# Patient Record
Sex: MALE | Race: WHITE | NOT HISPANIC OR LATINO | Employment: OTHER | URBAN - METROPOLITAN AREA
[De-identification: names, ages, dates, MRNs, and addresses within clinical notes are randomized per-mention and may not be internally consistent; named-entity substitution may affect disease eponyms.]

---

## 2017-11-28 ENCOUNTER — RECORDS - HEALTHEAST (OUTPATIENT)
Dept: LAB | Facility: CLINIC | Age: 82
End: 2017-11-28

## 2017-11-28 LAB
CHOLEST SERPL-MCNC: 167 MG/DL
FASTING STATUS PATIENT QL REPORTED: YES
HDLC SERPL-MCNC: 52 MG/DL
LDLC SERPL CALC-MCNC: 101 MG/DL
PSA SERPL-MCNC: 5.7 NG/ML (ref 0–6.5)
TRIGL SERPL-MCNC: 70 MG/DL

## 2019-01-08 ENCOUNTER — RECORDS - HEALTHEAST (OUTPATIENT)
Dept: LAB | Facility: CLINIC | Age: 84
End: 2019-01-08

## 2019-01-08 LAB
ALBUMIN SERPL-MCNC: 3.9 G/DL (ref 3.5–5)
ALP SERPL-CCNC: 85 U/L (ref 45–120)
ALT SERPL W P-5'-P-CCNC: 13 U/L (ref 0–45)
ANION GAP SERPL CALCULATED.3IONS-SCNC: 9 MMOL/L (ref 5–18)
AST SERPL W P-5'-P-CCNC: 21 U/L (ref 0–40)
BASOPHILS # BLD AUTO: 0.1 THOU/UL (ref 0–0.2)
BASOPHILS NFR BLD AUTO: 1 % (ref 0–2)
BILIRUB SERPL-MCNC: 0.8 MG/DL (ref 0–1)
BUN SERPL-MCNC: 16 MG/DL (ref 8–28)
CALCIUM SERPL-MCNC: 9.1 MG/DL (ref 8.5–10.5)
CHLORIDE BLD-SCNC: 104 MMOL/L (ref 98–107)
CHOLEST SERPL-MCNC: 166 MG/DL
CO2 SERPL-SCNC: 27 MMOL/L (ref 22–31)
CREAT SERPL-MCNC: 0.85 MG/DL (ref 0.7–1.3)
EOSINOPHIL # BLD AUTO: 0.3 THOU/UL (ref 0–0.4)
EOSINOPHIL NFR BLD AUTO: 4 % (ref 0–6)
ERYTHROCYTE [DISTWIDTH] IN BLOOD BY AUTOMATED COUNT: 13.1 % (ref 11–14.5)
FASTING STATUS PATIENT QL REPORTED: NORMAL
GFR SERPL CREATININE-BSD FRML MDRD: >60 ML/MIN/1.73M2
GLUCOSE BLD-MCNC: 102 MG/DL (ref 70–125)
HCT VFR BLD AUTO: 41.8 % (ref 40–54)
HDLC SERPL-MCNC: 60 MG/DL
HGB BLD-MCNC: 13.9 G/DL (ref 14–18)
LDLC SERPL CALC-MCNC: 93 MG/DL
LYMPHOCYTES # BLD AUTO: 1.5 THOU/UL (ref 0.8–4.4)
LYMPHOCYTES NFR BLD AUTO: 20 % (ref 20–40)
MCH RBC QN AUTO: 32 PG (ref 27–34)
MCHC RBC AUTO-ENTMCNC: 33.3 G/DL (ref 32–36)
MCV RBC AUTO: 96 FL (ref 80–100)
MONOCYTES # BLD AUTO: 1 THOU/UL (ref 0–0.9)
MONOCYTES NFR BLD AUTO: 13 % (ref 2–10)
NEUTROPHILS # BLD AUTO: 4.6 THOU/UL (ref 2–7.7)
NEUTROPHILS NFR BLD AUTO: 62 % (ref 50–70)
PLATELET # BLD AUTO: 264 THOU/UL (ref 140–440)
PMV BLD AUTO: 10 FL (ref 8.5–12.5)
POTASSIUM BLD-SCNC: 4.1 MMOL/L (ref 3.5–5)
PROT SERPL-MCNC: 6.4 G/DL (ref 6–8)
PSA SERPL-MCNC: 4.8 NG/ML (ref 0–6.5)
RBC # BLD AUTO: 4.34 MILL/UL (ref 4.4–6.2)
SODIUM SERPL-SCNC: 140 MMOL/L (ref 136–145)
TRIGL SERPL-MCNC: 64 MG/DL
TSH SERPL DL<=0.005 MIU/L-ACNC: 3.62 UIU/ML (ref 0.3–5)
WBC: 7.5 THOU/UL (ref 4–11)

## 2019-01-09 LAB — 25(OH)D3 SERPL-MCNC: 61.3 NG/ML (ref 30–80)

## 2019-03-29 ENCOUNTER — RECORDS - HEALTHEAST (OUTPATIENT)
Dept: LAB | Facility: CLINIC | Age: 84
End: 2019-03-29

## 2019-03-29 LAB
ERYTHROCYTE [DISTWIDTH] IN BLOOD BY AUTOMATED COUNT: 14.2 % (ref 11–14.5)
HCT VFR BLD AUTO: 26.3 % (ref 40–54)
HGB BLD-MCNC: 8.6 G/DL (ref 14–18)
MCH RBC QN AUTO: 32.8 PG (ref 27–34)
MCHC RBC AUTO-ENTMCNC: 32.7 G/DL (ref 32–36)
MCV RBC AUTO: 100 FL (ref 80–100)
PLATELET # BLD AUTO: 239 THOU/UL (ref 140–440)
PMV BLD AUTO: 10.5 FL (ref 8.5–12.5)
RBC # BLD AUTO: 2.62 MILL/UL (ref 4.4–6.2)
WBC: 8.8 THOU/UL (ref 4–11)

## 2019-04-24 ENCOUNTER — RECORDS - HEALTHEAST (OUTPATIENT)
Dept: LAB | Facility: CLINIC | Age: 84
End: 2019-04-24

## 2019-04-24 LAB — HGB BLD-MCNC: 10.9 G/DL (ref 14–18)

## 2020-02-26 ENCOUNTER — RECORDS - HEALTHEAST (OUTPATIENT)
Dept: LAB | Facility: CLINIC | Age: 85
End: 2020-02-26

## 2020-02-26 LAB
ALBUMIN SERPL-MCNC: 4 G/DL (ref 3.5–5)
ALP SERPL-CCNC: 99 U/L (ref 45–120)
ALT SERPL W P-5'-P-CCNC: 15 U/L (ref 0–45)
ANION GAP SERPL CALCULATED.3IONS-SCNC: 11 MMOL/L (ref 5–18)
AST SERPL W P-5'-P-CCNC: 20 U/L (ref 0–40)
BILIRUB SERPL-MCNC: 0.8 MG/DL (ref 0–1)
BUN SERPL-MCNC: 17 MG/DL (ref 8–28)
CALCIUM SERPL-MCNC: 9.6 MG/DL (ref 8.5–10.5)
CHLORIDE BLD-SCNC: 103 MMOL/L (ref 98–107)
CHOLEST SERPL-MCNC: 194 MG/DL
CO2 SERPL-SCNC: 27 MMOL/L (ref 22–31)
CREAT SERPL-MCNC: 0.89 MG/DL (ref 0.7–1.3)
FASTING STATUS PATIENT QL REPORTED: NORMAL
GFR SERPL CREATININE-BSD FRML MDRD: >60 ML/MIN/1.73M2
GLUCOSE BLD-MCNC: 109 MG/DL (ref 70–125)
HDLC SERPL-MCNC: 56 MG/DL
LDLC SERPL CALC-MCNC: 119 MG/DL
POTASSIUM BLD-SCNC: 4.4 MMOL/L (ref 3.5–5)
PROT SERPL-MCNC: 6.6 G/DL (ref 6–8)
PSA SERPL-MCNC: 5.3 NG/ML (ref 0–6.5)
SODIUM SERPL-SCNC: 141 MMOL/L (ref 136–145)
TRIGL SERPL-MCNC: 96 MG/DL
TSH SERPL DL<=0.005 MIU/L-ACNC: 4.09 UIU/ML (ref 0.3–5)

## 2021-02-11 ENCOUNTER — AMBULATORY - HEALTHEAST (OUTPATIENT)
Dept: NURSING | Facility: CLINIC | Age: 86
End: 2021-02-11

## 2021-03-04 ENCOUNTER — AMBULATORY - HEALTHEAST (OUTPATIENT)
Dept: NURSING | Facility: CLINIC | Age: 86
End: 2021-03-04

## 2021-05-28 ENCOUNTER — RECORDS - HEALTHEAST (OUTPATIENT)
Dept: ADMINISTRATIVE | Facility: CLINIC | Age: 86
End: 2021-05-28

## 2021-05-29 ENCOUNTER — RECORDS - HEALTHEAST (OUTPATIENT)
Dept: ADMINISTRATIVE | Facility: CLINIC | Age: 86
End: 2021-05-29

## 2021-06-29 ENCOUNTER — RECORDS - HEALTHEAST (OUTPATIENT)
Dept: LAB | Facility: CLINIC | Age: 86
End: 2021-06-29

## 2021-06-29 LAB
ALBUMIN SERPL-MCNC: 4 G/DL (ref 3.5–5)
ALP SERPL-CCNC: 82 U/L (ref 45–120)
ALT SERPL W P-5'-P-CCNC: 15 U/L (ref 0–45)
ANION GAP SERPL CALCULATED.3IONS-SCNC: 11 MMOL/L (ref 5–18)
AST SERPL W P-5'-P-CCNC: 19 U/L (ref 0–40)
BASOPHILS # BLD AUTO: 0.1 THOU/UL (ref 0–0.2)
BASOPHILS NFR BLD AUTO: 1 % (ref 0–2)
BILIRUB SERPL-MCNC: 0.9 MG/DL (ref 0–1)
BUN SERPL-MCNC: 22 MG/DL (ref 8–28)
CALCIUM SERPL-MCNC: 9 MG/DL (ref 8.5–10.5)
CHLORIDE BLD-SCNC: 106 MMOL/L (ref 98–107)
CHOLEST SERPL-MCNC: 169 MG/DL
CO2 SERPL-SCNC: 25 MMOL/L (ref 22–31)
CREAT SERPL-MCNC: 0.83 MG/DL (ref 0.7–1.3)
EOSINOPHIL # BLD AUTO: 0.3 THOU/UL (ref 0–0.4)
EOSINOPHIL NFR BLD AUTO: 4 % (ref 0–6)
ERYTHROCYTE [DISTWIDTH] IN BLOOD BY AUTOMATED COUNT: 13.3 % (ref 11–14.5)
FASTING STATUS PATIENT QL REPORTED: YES
GFR SERPL CREATININE-BSD FRML MDRD: >60 ML/MIN/1.73M2
GLUCOSE BLD-MCNC: 98 MG/DL (ref 70–125)
HCT VFR BLD AUTO: 42.1 % (ref 40–54)
HDLC SERPL-MCNC: 47 MG/DL
HGB BLD-MCNC: 14.1 G/DL (ref 14–18)
IMM GRANULOCYTES # BLD: 0 THOU/UL
IMM GRANULOCYTES NFR BLD: 0 %
LDLC SERPL CALC-MCNC: 101 MG/DL
LYMPHOCYTES # BLD AUTO: 1.7 THOU/UL (ref 0.8–4.4)
LYMPHOCYTES NFR BLD AUTO: 21 % (ref 20–40)
MCH RBC QN AUTO: 32.6 PG (ref 27–34)
MCHC RBC AUTO-ENTMCNC: 33.5 G/DL (ref 32–36)
MCV RBC AUTO: 97 FL (ref 80–100)
MONOCYTES # BLD AUTO: 0.9 THOU/UL (ref 0–0.9)
MONOCYTES NFR BLD AUTO: 12 % (ref 2–10)
NEUTROPHILS # BLD AUTO: 5.1 THOU/UL (ref 2–7.7)
NEUTROPHILS NFR BLD AUTO: 63 % (ref 50–70)
PLATELET # BLD AUTO: 239 THOU/UL (ref 140–440)
PMV BLD AUTO: 10.5 FL (ref 8.5–12.5)
POTASSIUM BLD-SCNC: 4 MMOL/L (ref 3.5–5)
PROT SERPL-MCNC: 6.3 G/DL (ref 6–8)
PSA SERPL-MCNC: 5 NG/ML (ref 0–6.5)
RBC # BLD AUTO: 4.33 MILL/UL (ref 4.4–6.2)
SODIUM SERPL-SCNC: 142 MMOL/L (ref 136–145)
TRIGL SERPL-MCNC: 104 MG/DL
TSH SERPL DL<=0.005 MIU/L-ACNC: 3.13 UIU/ML (ref 0.3–5)
WBC: 8.2 THOU/UL (ref 4–11)

## 2021-08-04 ENCOUNTER — VIRTUAL VISIT (OUTPATIENT)
Dept: FAMILY MEDICINE | Facility: CLINIC | Age: 86
End: 2021-08-04
Payer: MEDICARE

## 2021-08-04 DIAGNOSIS — Z20.822 SUSPECTED COVID-19 VIRUS INFECTION: Primary | ICD-10-CM

## 2021-08-04 PROCEDURE — 99207 PR NO CHARGE LOS: CPT | Performed by: PREVENTIVE MEDICINE

## 2021-08-04 NOTE — PATIENT INSTRUCTIONS
"Discharge Instructions for COVID-19 Patients  You have--or may have--COVID-19. Please follow the instructions listed below.   If you have a weakened immune system, discuss with your doctor any other actions you need to take.  How can I protect others?  If you have symptoms (fever, cough, body aches or trouble breathing):    Stay home and away from others (self-isolate) until:  ? Your other symptoms have resolved (gotten better). And   ? You've had no fever--and no medicine that reduces fever--for 1 full day (24 hours). And   ? At least 10 days have passed since your symptoms started. (You may need to wait 20 days. Follow the advice of your care team.)  If you don't show symptoms, but testing showed that you have COVID-19:    Stay home and away from others (self-isolate) until at least 10 days have passed since the date of your first positive COVID-19 test.  During this time    Stay in your own room, even for meals. Use your own bathroom if you can.    Stay away from others in your home. No hugging, kissing or shaking hands. No visitors.    Don't go to work, school or anywhere else.    Clean \"high touch\" surfaces often (doorknobs, counters, handles). Use household cleaning spray or wipes.    You'll find a full list of  on the EPA website: www.epa.gov/pesticide-registration/list-n-disinfectants-use-against-sars-cov-2.    Cover your mouth and nose with a mask or other face covering to avoid spreading germs.    Wash your hands and face often. Use soap and water.    Caregivers in these groups are at risk for severe illness due to COVID-19:  ? People 65 years and older  ? People who live in a nursing home or long-term care facility  ? People with chronic disease (lung, heart, cancer, diabetes, kidney, liver, immunologic)  ? People who have a weakened immune system, including those who:    Are in cancer treatment    Take medicine that weakens the immune system, such as corticosteroids    Had a bone marrow or organ " transplant    Have an immune deficiency    Have poorly controlled HIV or AIDS    Are obese (body mass index of 40 or higher)    Smoke regularly    Caregivers should wear gloves while washing dishes, handling laundry and cleaning bedrooms and bathrooms.    Use caution when washing and drying laundry: Don't shake dirty laundry and use the warmest water setting that you can.    For more tips on managing your health at home, go to www.cdc.gov/coronavirus/2019-ncov/downloads/10Things.pdf.  How can I take care of myself at home?  1. Get lots of rest. Drink extra fluids (unless a doctor has told you not to).  2. Take Tylenol (acetaminophen) for fever or pain. If you have liver or kidney problems, ask your family doctor if it's okay to take Tylenol.   Adults can take either:   ? 650 mg (two 325 mg pills) every 4 to 6 hours, or   ? 1,000 mg (two 500 mg pills) every 8 hours as needed.  ? Note: Don't take more than 3,000 mg in one day. Acetaminophen is found in many medicines (both prescribed and over-the-counter medicines). Read all labels to be sure you don't take too much.   For children, check the Tylenol bottle for the right dose. The dose is based on the child's age or weight.  3. If you have other health problems (like cancer, heart failure, an organ transplant or severe kidney disease): Call your specialty clinic if you don't feel better in the next 2 days.  4. Know when to call 911. Emergency warning signs include:  ? Trouble breathing or shortness of breath  ? Pain or pressure in the chest that doesn't go away  ? Feeling confused like you haven't felt before, or not being able to wake up  ? Bluish-colored lips or face  5. Your doctor may have prescribed a blood thinner medicine. Follow their instructions.  Where can I get more information?     [facility] - About COVID-19:   [email or web address]    CDC - What to Do If You're Sick:  www.cdc.gov/coronavirus/2019-ncov/about/steps-when-sick.html    CDC - Ending Home Isolation: www.cdc.gov/coronavirus/2019-ncov/hcp/disposition-in-home-patients.html    CDC - Caring for Someone: www.cdc.gov/coronavirus/2019-ncov/if-you-are-sick/care-for-someone.html    [facility] - Interim Guidance for Hospital Discharge to Home: www.health.[facility].[place]./diseases/coronavirus/hcp/hospdischarge.pdf    Below are the COVID-19 hotlines at the [place] Department of Health ([facility]). Interpreters are available.  ? For health questions: Call 615-680-9122 or 1-857.519.6854 (7 a.m. to 7 p.m.)  ? For questions about schools and childcare: Call 999-112-3596 or 1-798.351.5476 (7 a.m. to 7 p.m.)    For informational purposes only. Not to replace the advice of your health care provider. Clinically reviewed by Dr. Alf Mazariegos.   Copyright   2020 [facility]. All rights reserved. [employer] 011426 - REV 01/05/21.

## 2021-08-04 NOTE — PROGRESS NOTES
Clayton is a 89 year old who is being evaluated via a billable telephone visit.      What phone number would you like to be contacted at? [phone]  How would you like to obtain your AVS? MyChart    Assessment & Plan     Suspected COVID-19 virus infection  -await labs  -hydration and monitor temperature   - Symptomatic COVID-19 Virus (Coronavirus) by PCR Nasopharyngeal  -fully vaccinated for Covid  -Lives in assisted living       Ordering of each unique test  15 minutes spent on the date of the encounter doing chart review, history and exam, documentation and further activities per the note         Return in about 1 day (around 8/5/2021) for labs.    Kayla Monzon MD MPH    [facility]    Brigid Arnold is a 89 year old who presents for the following health issues:    HPI       PHQ 2:     Covid symptoms, lives in assisted living where there have been positive cases.    Concern for COVID-19  About how many days ago did these symptoms start? 7/31/21  Is this your first visit for this illness? Yes  In the 14 days before your symptoms started, have you had close contact with someone with COVID-19 (Coronavirus)? I do not know.  Do you have a fever or chills? Yes, I felt feverish or had chills  Are you having new or worsening difficulty breathing? No  Do you have new or worsening cough? Yes, I am coughing up mucus.  Have you had any new or unexplained body aches? No    Have you experienced any of the following NEW symptoms?    Headache: YES    Sore throat: No    Loss of taste or smell: No    Chest pain: No    Diarrhea: No    Rash: No  What treatments have you tried? Over the counter cold medication   Who do you live with? Wife in assisted living   Are you, or a household member, a healthcare worker or a ? No  Do you live in a nursing home, group home, or shelter? No Lives in assisted living   Do you have a way to get food/medications if quarantined? Yes, I have a  friend or family member who can help me.          Symptoms started 7/31/21  Has had a lot of congestion  Some blood in the nose when he blows his nose   No shortness of breath  No wheezing  Cough+ less in the last 2 days, some phlegm, thick, no hemoptysis  Dull headache last 2 days  Symptoms are a little better  Feels like a really bad cold  Fully vaccinated   Has taken over the counter medication     Review of Systems   Constitutional, HEENT, cardiovascular, pulmonary, gi and gu systems are negative, except as otherwise noted.      Objective           Vitals:  No vitals were obtained today due to virtual visit.    Physical Exam   healthy, alert and no distress  PSYCH: Alert and oriented times 3; coherent speech, normal   rate and volume, able to articulate logical thoughts, able   to abstract reason, no tangential thoughts, no hallucinations   or delusions  His affect is normal  RESP: No cough, no audible wheezing, able to talk in full sentences  Remainder of exam unable to be completed due to telephone visits            Phone call duration: 4 minutes

## 2021-08-05 DIAGNOSIS — Z20.822 SUSPECTED COVID-19 VIRUS INFECTION: ICD-10-CM

## 2021-08-05 LAB — SARS-COV-2 RNA RESP QL NAA+PROBE: POSITIVE

## 2021-08-05 PROCEDURE — U0003 INFECTIOUS AGENT DETECTION BY NUCLEIC ACID (DNA OR RNA); SEVERE ACUTE RESPIRATORY SYNDROME CORONAVIRUS 2 (SARS-COV-2) (CORONAVIRUS DISEASE [COVID-19]), AMPLIFIED PROBE TECHNIQUE, MAKING USE OF HIGH THROUGHPUT TECHNOLOGIES AS DESCRIBED BY CMS-2020-01-R: HCPCS

## 2021-08-05 PROCEDURE — U0005 INFEC AGEN DETEC AMPLI PROBE: HCPCS

## 2021-08-06 ENCOUNTER — TELEPHONE (OUTPATIENT)
Dept: EMERGENCY MEDICINE | Facility: CLINIC | Age: 86
End: 2021-08-06

## 2021-08-06 NOTE — TELEPHONE ENCOUNTER
"-Coronavirus (COVID-19) Notification    Caller Name (Patient, parent, daughter/son, grandparent, etc)  Patient and      Reason for call  Notify of Positive Coronavirus (COVID-19) lab results, assess symptoms,  review  [employer] recommendations    Lab Result    Lab test:  2019-nCoV rRt-PCR or SARS-CoV-2 PCR    Oropharyngeal AND/OR nasopharyngeal swabs is POSITIVE for 2019-nCoV RNA/SARS-COV-2 PCR (COVID-19 virus)    RN Recommendations/Instructions per [facility] Coronavirus COVID-19 recommendations    Brief introduction script  Introduce self then review script:  \"I am calling on behalf of [employer].  We were notified that your Coronavirus test (COVID-19) for was POSITIVE for the virus.  I have some information to relay to you but first I wanted to mention that the [place] Dept of Health will be contacting you shortly [it's possible MD already called Patient] to talk to you more about how you are feeling and other people you have had contact with who might now also have the virus.  Also,  [employer] is Partnering with the [facility] for Covid-19 research, you may be contacted directly by research staff.\"    Assessment (Inquire about Patient's current symptoms)   Assessment   Current Symptoms at time of phone call: (if no symptoms, document No symptoms] Cough, phlegm, and runny nose   Symptoms onset (if applicable) 7/30/2021     If at time of call, Patients symptoms hare worsened, the Patient should contact 911 or have someone drive them to Emergency Dept promptly:      If Patient calling 911, inform 911 personal that you have tested positive for the Coronavirus (COVID-19).  Place mask on and await 911 to arrive.    If Emergency Dept, If possible, please have another adult drive you to the Emergency Dept but you need to wear mask when in contact with other people.      Monoclonal Antibody Administration    You may be eligible to receive a new treatment with a monoclonal antibody for " "preventing hospitalization in patients at high risk for complications from COVID-19.   This medication is still experimental and available on a limited basis; it is given through an IV and must be given at an infusion center. Please note that not all people who are eligible will receive the medication since it is in limited supply.     Are you interested in being considered for this medication?  No.   Does the patient fit the criteria: No declined     If patient qualifies based on above criteria:  \"You will be contacted if you are selected to receive this treatment in the next 1-2 business days.   This is time sensitive and if you are not selected in the next 1-2 business days, you will not receive the medication.  If you do not receive a call to schedule, you have not been selected.\"      Review information with Patient    Your result was positive. This means you have COVID-19 (coronavirus).  We have sent you a letter that reviews the information that I'll be reviewing with you now.    How can I protect others?    If you have symptoms: stay home and away from others (self-isolate) until:    You've had no fever--and no medicine that reduces fever--for 1 full day (24 hours). And       Your other symptoms have gotten better. For example, your cough or breathing has improved. And     At least 10 days have passed since your symptoms started. (If you've been told by a doctor that you have a weak immune system, wait 20 days.)     If you don't have symptoms: Stay home and away from others (self-isolate) until at least 10 days have passed since your first positive COVID-19 test. (Date test collected)    During this time:    Stay in your own room, including for meals. Use your own bathroom if you can.    Stay away from others in your home. No hugging, kissing or shaking hands. No visitors.     Don't go to work, school or anywhere else.     Clean  high touch  surfaces often (doorknobs, counters, handles, etc.). Use a " household cleaning spray or wipes. You'll find a full list on the EPA website at www.epa.gov/pesticide-registration/list-n-disinfectants-use-against-sars-cov-2.     Cover your mouth and nose with a mask, tissue or other face covering to avoid spreading germs.    Wash your hands and face often with soap and water.    Make a list of people you have been in close contact with recently, even if either of you wore a face covering.   ; Start your list from 2 days before you became ill or had a positive test.  ; Include anyone that was within 6 feet of you for a cumulative total of 15 minutes or more in 24 hours. (Example: if you sat next to Sage for 5 minutes in the morning and 10 minutes in the afternoon, then you were in close contact for 15 minutes total that day. Sage would be added to your list.)    A public health worker will call or text you. It is important that you answer. They will ask you questions about possible exposures to COVID-19, such as people you have been in direct contact with and places you have visited.    Tell the people on your list that you have COVID-19; they should stay away from others for 14 days starting from the last time they were in contact with you (unless you are told something different from a public health worker).     Caregivers in these groups are at risk for severe illness due to COVID-19:  o People 65 years and older  o People who live in a nursing home or long-term care facility  o People with chronic disease (lung, heart, cancer, diabetes, kidney, liver, immunologic)  o People who have a weakened immune system, including those who:  - Are in cancer treatment  - Take medicine that weakens the immune system, such as corticosteroids  - Had a bone marrow or organ transplant  - Have an immune deficiency  - Have poorly controlled HIV or AIDS  - Are obese (body mass index of 40 or higher)  - Smoke regularly    Caregivers should wear gloves while washing dishes, handling laundry and  cleaning bedrooms and bathrooms.    Wash and dry laundry with special caution. Don't shake dirty laundry, and use the warmest water setting you can.    If you have a weakened immune system, ask your doctor about other actions you should take.    For more tips, go to www.cdc.gov/coronavirus/2019-ncov/downloads/10Things.pdf.    You should not go back to work until you meet the guidelines above for ending your home isolation. You don't need to be retested for COVID-19 before going back to work--studies show that you won't spread the virus if it's been at least 10 days since your symptoms started (or 20 days, if you have a weak immune system).    Employers: This document serves as formal notice of your employee's medical guidelines for going back to work. They must meet the above guidelines before going back to work in person.    How can I take care of myself?    1. Get lots of rest. Drink extra fluids (unless a doctor has told you not to).    2. Take Tylenol (acetaminophen) for fever or pain. If you have liver or kidney problems, ask your family doctor if it's okay to take Tylenol.     Take either:     650 mg (two 325 mg pills) every 4 to 6 hours, or     1,000 mg (two 500 mg pills) every 8 hours as needed.     Note: Don't take more than 3,000 mg in one day. Acetaminophen is found in many medicines (both prescribed and over-the-counter medicines). Read all labels to be sure you don't take too much.    For children, check the Tylenol bottle for the right dose (based on their age or weight).    3. If you have other health problems (like cancer, heart failure, an organ transplant or severe kidney disease): Call your specialty clinic if you don't feel better in the next 2 days.    4. Know when to call 911: Emergency warning signs include:    Trouble breathing or shortness of breath    Pain or pressure in the chest that doesn't go away    Feeling confused like you haven't felt before, or not being able to wake  up    Bluish-colored lips or face    5. Sign up for [employer]. We know it's scary to hear that you have COVID-19. We want to track your symptoms to make sure you're okay over the next 2 weeks. Please look for an email from [employer]--this is a free, online program that we'll use to keep in touch. To sign up, follow the link in the email. Learn more at [email or web address].    Where can I get more information?    [facility]: [email or web address]    Coronavirus Basics: www.health.[facility].[place]./diseases/coronavirus/basics.html    What to Do If You're Sick: www.cdc.gov/coronavirus/2019-ncov/about/steps-when-sick.html    Ending Home Isolation: www.cdc.gov/coronavirus/2019-ncov/hcp/disposition-in-home-patients.html     Caring for Someone with COVID-19: www.cdc.gov/coronavirus/2019-ncov/if-you-are-sick/care-for-someone.html     [facility] clinical trials (COVID-19 research studies): [email or web address].[email or web address]-clinical-trials     A Positive COVID-19 letter will be sent via [employer] or the mail. (Exception, no letters sent to Presurgerical/Preprocedure Patients)    Zeina Benson LPN

## 2021-08-06 NOTE — RESULT ENCOUNTER NOTE
Dalia Arnold test was Positive.     Please do not hesitate to call us at [phone] if you have any questions or concerns.    Thank you,    Kayla Monzon MD MPH

## 2021-08-21 ENCOUNTER — HEALTH MAINTENANCE LETTER (OUTPATIENT)
Age: 86
End: 2021-08-21

## 2021-08-23 ENCOUNTER — MYC MEDICAL ADVICE (OUTPATIENT)
Dept: FAMILY MEDICINE | Facility: CLINIC | Age: 86
End: 2021-08-23

## 2021-10-16 ENCOUNTER — HEALTH MAINTENANCE LETTER (OUTPATIENT)
Age: 86
End: 2021-10-16

## 2022-09-06 ENCOUNTER — LAB REQUISITION (OUTPATIENT)
Dept: LAB | Facility: CLINIC | Age: 87
End: 2022-09-06
Payer: MEDICARE

## 2022-09-06 DIAGNOSIS — I10 ESSENTIAL (PRIMARY) HYPERTENSION: ICD-10-CM

## 2022-09-06 DIAGNOSIS — E03.9 HYPOTHYROIDISM, UNSPECIFIED: ICD-10-CM

## 2022-09-06 DIAGNOSIS — R97.20 ELEVATED PROSTATE SPECIFIC ANTIGEN (PSA): ICD-10-CM

## 2022-09-06 DIAGNOSIS — E78.5 HYPERLIPIDEMIA, UNSPECIFIED: ICD-10-CM

## 2022-09-06 LAB
ALBUMIN SERPL BCG-MCNC: 4.3 G/DL (ref 3.5–5.2)
ALP SERPL-CCNC: 80 U/L (ref 40–129)
ALT SERPL W P-5'-P-CCNC: 16 U/L (ref 10–50)
ANION GAP SERPL CALCULATED.3IONS-SCNC: 10 MMOL/L (ref 7–15)
AST SERPL W P-5'-P-CCNC: 22 U/L (ref 10–50)
BASOPHILS # BLD AUTO: 0.1 10E3/UL (ref 0–0.2)
BASOPHILS NFR BLD AUTO: 1 %
BILIRUB SERPL-MCNC: 0.6 MG/DL
BUN SERPL-MCNC: 16.2 MG/DL (ref 8–23)
CALCIUM SERPL-MCNC: 9.4 MG/DL (ref 8.2–9.6)
CHLORIDE SERPL-SCNC: 104 MMOL/L (ref 98–107)
CHOLEST SERPL-MCNC: 166 MG/DL
CREAT SERPL-MCNC: 0.93 MG/DL (ref 0.67–1.17)
DEPRECATED HCO3 PLAS-SCNC: 27 MMOL/L (ref 22–29)
EOSINOPHIL # BLD AUTO: 0.3 10E3/UL (ref 0–0.7)
EOSINOPHIL NFR BLD AUTO: 3 %
ERYTHROCYTE [DISTWIDTH] IN BLOOD BY AUTOMATED COUNT: 13.9 % (ref 10–15)
GFR SERPL CREATININE-BSD FRML MDRD: 77 ML/MIN/1.73M2
GLUCOSE SERPL-MCNC: 95 MG/DL (ref 70–99)
HCT VFR BLD AUTO: 41.2 % (ref 40–53)
HDLC SERPL-MCNC: 56 MG/DL
HGB BLD-MCNC: 13.8 G/DL (ref 13.3–17.7)
IMM GRANULOCYTES # BLD: 0 10E3/UL
IMM GRANULOCYTES NFR BLD: 0 %
LDLC SERPL CALC-MCNC: 91 MG/DL
LYMPHOCYTES # BLD AUTO: 1.8 10E3/UL (ref 0.8–5.3)
LYMPHOCYTES NFR BLD AUTO: 20 %
MCH RBC QN AUTO: 32.5 PG (ref 26.5–33)
MCHC RBC AUTO-ENTMCNC: 33.5 G/DL (ref 31.5–36.5)
MCV RBC AUTO: 97 FL (ref 78–100)
MONOCYTES # BLD AUTO: 1.1 10E3/UL (ref 0–1.3)
MONOCYTES NFR BLD AUTO: 12 %
NEUTROPHILS # BLD AUTO: 5.9 10E3/UL (ref 1.6–8.3)
NEUTROPHILS NFR BLD AUTO: 64 %
NONHDLC SERPL-MCNC: 110 MG/DL
NRBC # BLD AUTO: 0 10E3/UL
NRBC BLD AUTO-RTO: 0 /100
PLATELET # BLD AUTO: 272 10E3/UL (ref 150–450)
POTASSIUM SERPL-SCNC: 3.8 MMOL/L (ref 3.4–5.3)
PROT SERPL-MCNC: 6.3 G/DL (ref 6.4–8.3)
PSA SERPL-MCNC: 4.99 NG/ML
RBC # BLD AUTO: 4.24 10E6/UL (ref 4.4–5.9)
SODIUM SERPL-SCNC: 141 MMOL/L (ref 136–145)
TRIGL SERPL-MCNC: 96 MG/DL
TSH SERPL DL<=0.005 MIU/L-ACNC: 3.49 UIU/ML (ref 0.3–4.2)
WBC # BLD AUTO: 9.2 10E3/UL (ref 4–11)

## 2022-09-06 PROCEDURE — 80053 COMPREHEN METABOLIC PANEL: CPT | Mod: ORL | Performed by: FAMILY MEDICINE

## 2022-09-06 PROCEDURE — 84443 ASSAY THYROID STIM HORMONE: CPT | Mod: ORL | Performed by: FAMILY MEDICINE

## 2022-09-06 PROCEDURE — 84153 ASSAY OF PSA TOTAL: CPT | Mod: ORL | Performed by: FAMILY MEDICINE

## 2022-09-06 PROCEDURE — 80061 LIPID PANEL: CPT | Mod: ORL | Performed by: FAMILY MEDICINE

## 2022-09-06 PROCEDURE — 85025 COMPLETE CBC W/AUTO DIFF WBC: CPT | Mod: ORL | Performed by: FAMILY MEDICINE

## 2022-10-01 ENCOUNTER — HEALTH MAINTENANCE LETTER (OUTPATIENT)
Age: 87
End: 2022-10-01

## 2023-07-06 ENCOUNTER — APPOINTMENT (OUTPATIENT)
Dept: CT IMAGING | Facility: HOSPITAL | Age: 88
DRG: 689 | End: 2023-07-06
Attending: STUDENT IN AN ORGANIZED HEALTH CARE EDUCATION/TRAINING PROGRAM
Payer: MEDICARE

## 2023-07-06 ENCOUNTER — APPOINTMENT (OUTPATIENT)
Dept: RADIOLOGY | Facility: HOSPITAL | Age: 88
DRG: 689 | End: 2023-07-06
Attending: STUDENT IN AN ORGANIZED HEALTH CARE EDUCATION/TRAINING PROGRAM
Payer: MEDICARE

## 2023-07-06 ENCOUNTER — APPOINTMENT (OUTPATIENT)
Dept: MRI IMAGING | Facility: HOSPITAL | Age: 88
DRG: 689 | End: 2023-07-06
Attending: STUDENT IN AN ORGANIZED HEALTH CARE EDUCATION/TRAINING PROGRAM
Payer: MEDICARE

## 2023-07-06 ENCOUNTER — HOSPITAL ENCOUNTER (INPATIENT)
Facility: HOSPITAL | Age: 88
LOS: 1 days | Discharge: HOME HEALTH | DRG: 689 | End: 2023-07-10
Attending: STUDENT IN AN ORGANIZED HEALTH CARE EDUCATION/TRAINING PROGRAM | Admitting: HOSPITALIST
Payer: MEDICARE
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
## 2023-07-06 DIAGNOSIS — N30.00 ACUTE CYSTITIS WITHOUT HEMATURIA: ICD-10-CM

## 2023-07-06 DIAGNOSIS — N39.0 URINARY TRACT INFECTION WITHOUT HEMATURIA, SITE UNSPECIFIED: ICD-10-CM

## 2023-07-06 DIAGNOSIS — M79.5 FOREIGN BODY (FB) IN SOFT TISSUE: ICD-10-CM

## 2023-07-06 DIAGNOSIS — N17.9 AKI (ACUTE KIDNEY INJURY) (H): ICD-10-CM

## 2023-07-06 PROBLEM — I10 PRIMARY HYPERTENSION: Status: ACTIVE | Noted: 2023-07-06

## 2023-07-06 PROBLEM — E78.49 OTHER HYPERLIPIDEMIA: Status: ACTIVE | Noted: 2023-07-06

## 2023-07-06 PROBLEM — E03.8 OTHER SPECIFIED HYPOTHYROIDISM: Status: ACTIVE | Noted: 2023-07-06

## 2023-07-06 PROBLEM — E83.42 HYPOMAGNESEMIA: Status: ACTIVE | Noted: 2023-07-06

## 2023-07-06 PROBLEM — G93.41 METABOLIC ENCEPHALOPATHY: Status: ACTIVE | Noted: 2023-07-06

## 2023-07-06 LAB
ALBUMIN SERPL BCG-MCNC: 4.4 G/DL (ref 3.5–5.2)
ALBUMIN UR-MCNC: 100 MG/DL
ALP SERPL-CCNC: 95 U/L (ref 40–129)
ALT SERPL W P-5'-P-CCNC: 14 U/L (ref 0–70)
ANION GAP SERPL CALCULATED.3IONS-SCNC: 15 MMOL/L (ref 7–15)
APPEARANCE UR: ABNORMAL
AST SERPL W P-5'-P-CCNC: 24 U/L (ref 0–45)
BASOPHILS # BLD MANUAL: 0 10E3/UL (ref 0–0.2)
BASOPHILS NFR BLD MANUAL: 0 %
BILIRUB SERPL-MCNC: 1.5 MG/DL
BILIRUB UR QL STRIP: NEGATIVE
BUN SERPL-MCNC: 25.4 MG/DL (ref 8–23)
CALCIUM SERPL-MCNC: 9.9 MG/DL (ref 8.2–9.6)
CHLORIDE SERPL-SCNC: 99 MMOL/L (ref 98–107)
COLOR UR AUTO: YELLOW
CREAT SERPL-MCNC: 1.16 MG/DL (ref 0.67–1.17)
CREAT SERPL-MCNC: 1.19 MG/DL (ref 0.67–1.17)
DEPRECATED HCO3 PLAS-SCNC: 23 MMOL/L (ref 22–29)
EOSINOPHIL # BLD MANUAL: 0.2 10E3/UL (ref 0–0.7)
EOSINOPHIL NFR BLD MANUAL: 1 %
ERYTHROCYTE [DISTWIDTH] IN BLOOD BY AUTOMATED COUNT: 13.3 % (ref 10–15)
ETHANOL SERPL-MCNC: <0.01 G/DL
FLUAV RNA SPEC QL NAA+PROBE: NEGATIVE
FLUBV RNA RESP QL NAA+PROBE: NEGATIVE
GFR SERPL CREATININE-BSD FRML MDRD: 57 ML/MIN/1.73M2
GFR SERPL CREATININE-BSD FRML MDRD: 59 ML/MIN/1.73M2
GLUCOSE SERPL-MCNC: 142 MG/DL (ref 70–99)
GLUCOSE UR STRIP-MCNC: NEGATIVE MG/DL
HCT VFR BLD AUTO: 39 % (ref 40–53)
HGB BLD-MCNC: 13.5 G/DL (ref 13.3–17.7)
HGB UR QL STRIP: ABNORMAL
HOLD SPECIMEN: NORMAL
KETONES UR STRIP-MCNC: ABNORMAL MG/DL
LACTATE SERPL-SCNC: 1.7 MMOL/L (ref 0.7–2)
LEUKOCYTE ESTERASE UR QL STRIP: ABNORMAL
LYMPHOCYTES # BLD MANUAL: 1.4 10E3/UL (ref 0.8–5.3)
LYMPHOCYTES NFR BLD MANUAL: 6 %
MAGNESIUM SERPL-MCNC: 1.6 MG/DL (ref 1.7–2.3)
MCH RBC QN AUTO: 32.8 PG (ref 26.5–33)
MCHC RBC AUTO-ENTMCNC: 34.6 G/DL (ref 31.5–36.5)
MCV RBC AUTO: 95 FL (ref 78–100)
MONOCYTES # BLD MANUAL: 0.2 10E3/UL (ref 0–1.3)
MONOCYTES NFR BLD MANUAL: 1 %
MUCOUS THREADS #/AREA URNS LPF: PRESENT /LPF
NEUTROPHILS # BLD MANUAL: 21.1 10E3/UL (ref 1.6–8.3)
NEUTROPHILS NFR BLD MANUAL: 92 %
NITRATE UR QL: POSITIVE
PH UR STRIP: 6 [PH] (ref 5–7)
PLAT MORPH BLD: ABNORMAL
PLATELET # BLD AUTO: ABNORMAL 10*3/UL
POTASSIUM SERPL-SCNC: 3.9 MMOL/L (ref 3.4–5.3)
PROT SERPL-MCNC: 7.1 G/DL (ref 6.4–8.3)
RBC # BLD AUTO: 4.11 10E6/UL (ref 4.4–5.9)
RBC MORPH BLD: ABNORMAL
RBC URINE: 53 /HPF
RSV RNA SPEC NAA+PROBE: NEGATIVE
SARS-COV-2 RNA RESP QL NAA+PROBE: NEGATIVE
SODIUM SERPL-SCNC: 137 MMOL/L (ref 136–145)
SP GR UR STRIP: 1.02 (ref 1–1.03)
SQUAMOUS EPITHELIAL: 1 /HPF
TROPONIN T SERPL HS-MCNC: 29 NG/L
TROPONIN T SERPL HS-MCNC: 34 NG/L
UROBILINOGEN UR STRIP-MCNC: <2 MG/DL
WBC # BLD AUTO: 22.9 10E3/UL (ref 4–11)
WBC URINE: >182 /HPF

## 2023-07-06 PROCEDURE — 87040 BLOOD CULTURE FOR BACTERIA: CPT | Performed by: STUDENT IN AN ORGANIZED HEALTH CARE EDUCATION/TRAINING PROGRAM

## 2023-07-06 PROCEDURE — 96361 HYDRATE IV INFUSION ADD-ON: CPT

## 2023-07-06 PROCEDURE — 85007 BL SMEAR W/DIFF WBC COUNT: CPT | Performed by: STUDENT IN AN ORGANIZED HEALTH CARE EDUCATION/TRAINING PROGRAM

## 2023-07-06 PROCEDURE — G1010 CDSM STANSON: HCPCS

## 2023-07-06 PROCEDURE — 80053 COMPREHEN METABOLIC PANEL: CPT | Performed by: STUDENT IN AN ORGANIZED HEALTH CARE EDUCATION/TRAINING PROGRAM

## 2023-07-06 PROCEDURE — 36415 COLL VENOUS BLD VENIPUNCTURE: CPT | Performed by: STUDENT IN AN ORGANIZED HEALTH CARE EDUCATION/TRAINING PROGRAM

## 2023-07-06 PROCEDURE — 87186 SC STD MICRODIL/AGAR DIL: CPT | Performed by: STUDENT IN AN ORGANIZED HEALTH CARE EDUCATION/TRAINING PROGRAM

## 2023-07-06 PROCEDURE — 72148 MRI LUMBAR SPINE W/O DYE: CPT | Mod: MF

## 2023-07-06 PROCEDURE — 82552 ASSAY OF CPK IN BLOOD: CPT | Performed by: INTERNAL MEDICINE

## 2023-07-06 PROCEDURE — 82550 ASSAY OF CK (CPK): CPT | Performed by: INTERNAL MEDICINE

## 2023-07-06 PROCEDURE — 250N000011 HC RX IP 250 OP 636: Mod: JZ | Performed by: HOSPITALIST

## 2023-07-06 PROCEDURE — 250N000013 HC RX MED GY IP 250 OP 250 PS 637: Performed by: STUDENT IN AN ORGANIZED HEALTH CARE EDUCATION/TRAINING PROGRAM

## 2023-07-06 PROCEDURE — 81001 URINALYSIS AUTO W/SCOPE: CPT | Performed by: STUDENT IN AN ORGANIZED HEALTH CARE EDUCATION/TRAINING PROGRAM

## 2023-07-06 PROCEDURE — 84484 ASSAY OF TROPONIN QUANT: CPT | Performed by: STUDENT IN AN ORGANIZED HEALTH CARE EDUCATION/TRAINING PROGRAM

## 2023-07-06 PROCEDURE — 96367 TX/PROPH/DG ADDL SEQ IV INF: CPT

## 2023-07-06 PROCEDURE — G0378 HOSPITAL OBSERVATION PER HR: HCPCS

## 2023-07-06 PROCEDURE — 258N000003 HC RX IP 258 OP 636: Performed by: STUDENT IN AN ORGANIZED HEALTH CARE EDUCATION/TRAINING PROGRAM

## 2023-07-06 PROCEDURE — 96372 THER/PROPH/DIAG INJ SC/IM: CPT | Performed by: HOSPITALIST

## 2023-07-06 PROCEDURE — 83735 ASSAY OF MAGNESIUM: CPT | Performed by: HOSPITALIST

## 2023-07-06 PROCEDURE — 250N000013 HC RX MED GY IP 250 OP 250 PS 637: Performed by: HOSPITALIST

## 2023-07-06 PROCEDURE — 87637 SARSCOV2&INF A&B&RSV AMP PRB: CPT | Performed by: STUDENT IN AN ORGANIZED HEALTH CARE EDUCATION/TRAINING PROGRAM

## 2023-07-06 PROCEDURE — 83605 ASSAY OF LACTIC ACID: CPT | Performed by: STUDENT IN AN ORGANIZED HEALTH CARE EDUCATION/TRAINING PROGRAM

## 2023-07-06 PROCEDURE — 99285 EMERGENCY DEPT VISIT HI MDM: CPT | Mod: 25

## 2023-07-06 PROCEDURE — 258N000003 HC RX IP 258 OP 636: Performed by: HOSPITALIST

## 2023-07-06 PROCEDURE — 71046 X-RAY EXAM CHEST 2 VIEWS: CPT

## 2023-07-06 PROCEDURE — 99222 1ST HOSP IP/OBS MODERATE 55: CPT | Performed by: HOSPITALIST

## 2023-07-06 PROCEDURE — 96365 THER/PROPH/DIAG IV INF INIT: CPT

## 2023-07-06 PROCEDURE — 82077 ASSAY SPEC XCP UR&BREATH IA: CPT | Performed by: STUDENT IN AN ORGANIZED HEALTH CARE EDUCATION/TRAINING PROGRAM

## 2023-07-06 PROCEDURE — 73502 X-RAY EXAM HIP UNI 2-3 VIEWS: CPT

## 2023-07-06 PROCEDURE — 96360 HYDRATION IV INFUSION INIT: CPT

## 2023-07-06 PROCEDURE — 85048 AUTOMATED LEUKOCYTE COUNT: CPT | Performed by: STUDENT IN AN ORGANIZED HEALTH CARE EDUCATION/TRAINING PROGRAM

## 2023-07-06 PROCEDURE — 250N000011 HC RX IP 250 OP 636: Mod: JZ | Performed by: STUDENT IN AN ORGANIZED HEALTH CARE EDUCATION/TRAINING PROGRAM

## 2023-07-06 RX ORDER — CEFTRIAXONE 1 G/1
1 INJECTION, POWDER, FOR SOLUTION INTRAMUSCULAR; INTRAVENOUS EVERY 24 HOURS
Status: DISCONTINUED | OUTPATIENT
Start: 2023-07-07 | End: 2023-07-06

## 2023-07-06 RX ORDER — ENOXAPARIN SODIUM 100 MG/ML
40 INJECTION SUBCUTANEOUS EVERY 24 HOURS
Status: DISCONTINUED | OUTPATIENT
Start: 2023-07-06 | End: 2023-07-10 | Stop reason: HOSPADM

## 2023-07-06 RX ORDER — LOSARTAN POTASSIUM AND HYDROCHLOROTHIAZIDE 12.5; 1 MG/1; MG/1
1 TABLET ORAL DAILY
COMMUNITY
End: 2023-07-06

## 2023-07-06 RX ORDER — ACETAMINOPHEN 650 MG/1
650 SUPPOSITORY RECTAL EVERY 6 HOURS PRN
Status: DISCONTINUED | OUTPATIENT
Start: 2023-07-06 | End: 2023-07-06

## 2023-07-06 RX ORDER — LIDOCAINE 40 MG/G
CREAM TOPICAL
Status: DISCONTINUED | OUTPATIENT
Start: 2023-07-06 | End: 2023-07-08

## 2023-07-06 RX ORDER — PRAVASTATIN SODIUM 20 MG
20 TABLET ORAL EVERY EVENING
COMMUNITY

## 2023-07-06 RX ORDER — CEFTRIAXONE 1 G/1
1 INJECTION, POWDER, FOR SOLUTION INTRAMUSCULAR; INTRAVENOUS EVERY 24 HOURS
Status: DISCONTINUED | OUTPATIENT
Start: 2023-07-07 | End: 2023-07-10 | Stop reason: HOSPADM

## 2023-07-06 RX ORDER — PIPERACILLIN SODIUM, TAZOBACTAM SODIUM 3; .375 G/15ML; G/15ML
3.38 INJECTION, POWDER, LYOPHILIZED, FOR SOLUTION INTRAVENOUS ONCE
Status: DISCONTINUED | OUTPATIENT
Start: 2023-07-06 | End: 2023-07-06

## 2023-07-06 RX ORDER — OXYCODONE HYDROCHLORIDE 5 MG/1
5 TABLET ORAL EVERY 4 HOURS PRN
Status: DISCONTINUED | OUTPATIENT
Start: 2023-07-06 | End: 2023-07-10 | Stop reason: HOSPADM

## 2023-07-06 RX ORDER — ACETAMINOPHEN 325 MG/1
650 TABLET ORAL EVERY 6 HOURS PRN
Status: DISCONTINUED | OUTPATIENT
Start: 2023-07-06 | End: 2023-07-06

## 2023-07-06 RX ORDER — ONDANSETRON 4 MG/1
4 TABLET, ORALLY DISINTEGRATING ORAL EVERY 6 HOURS PRN
Status: DISCONTINUED | OUTPATIENT
Start: 2023-07-06 | End: 2023-07-10 | Stop reason: HOSPADM

## 2023-07-06 RX ORDER — ONDANSETRON 4 MG/1
4 TABLET, ORALLY DISINTEGRATING ORAL EVERY 6 HOURS PRN
Status: DISCONTINUED | OUTPATIENT
Start: 2023-07-06 | End: 2023-07-06

## 2023-07-06 RX ORDER — AMLODIPINE BESYLATE 10 MG/1
10 TABLET ORAL DAILY
COMMUNITY

## 2023-07-06 RX ORDER — LIDOCAINE 40 MG/G
CREAM TOPICAL
Status: DISCONTINUED | OUTPATIENT
Start: 2023-07-06 | End: 2023-07-10 | Stop reason: HOSPADM

## 2023-07-06 RX ORDER — ONDANSETRON 2 MG/ML
4 INJECTION INTRAMUSCULAR; INTRAVENOUS EVERY 6 HOURS PRN
Status: DISCONTINUED | OUTPATIENT
Start: 2023-07-06 | End: 2023-07-06

## 2023-07-06 RX ORDER — ACETAMINOPHEN 325 MG/1
975 TABLET ORAL ONCE
Status: COMPLETED | OUTPATIENT
Start: 2023-07-06 | End: 2023-07-06

## 2023-07-06 RX ORDER — LEVOTHYROXINE SODIUM 25 UG/1
50 TABLET ORAL DAILY
Status: DISCONTINUED | OUTPATIENT
Start: 2023-07-07 | End: 2023-07-10 | Stop reason: HOSPADM

## 2023-07-06 RX ORDER — IRBESARTAN AND HYDROCHLOROTHIAZIDE 300; 12.5 MG/1; MG/1
1 TABLET, FILM COATED ORAL DAILY
COMMUNITY

## 2023-07-06 RX ORDER — AMLODIPINE BESYLATE 5 MG/1
10 TABLET ORAL DAILY
Status: DISCONTINUED | OUTPATIENT
Start: 2023-07-07 | End: 2023-07-10 | Stop reason: HOSPADM

## 2023-07-06 RX ORDER — CARBOXYMETHYLCELLULOSE SODIUM 5 MG/ML
2 SOLUTION/ DROPS OPHTHALMIC 2 TIMES DAILY
Status: DISCONTINUED | OUTPATIENT
Start: 2023-07-06 | End: 2023-07-08

## 2023-07-06 RX ORDER — LEVOTHYROXINE SODIUM 50 UG/1
50 TABLET ORAL DAILY
COMMUNITY
End: 2024-03-19

## 2023-07-06 RX ORDER — CEFTRIAXONE 1 G/1
1 INJECTION, POWDER, FOR SOLUTION INTRAMUSCULAR; INTRAVENOUS ONCE
Status: COMPLETED | OUTPATIENT
Start: 2023-07-06 | End: 2023-07-06

## 2023-07-06 RX ORDER — ONDANSETRON 2 MG/ML
4 INJECTION INTRAMUSCULAR; INTRAVENOUS EVERY 6 HOURS PRN
Status: DISCONTINUED | OUTPATIENT
Start: 2023-07-06 | End: 2023-07-10 | Stop reason: HOSPADM

## 2023-07-06 RX ORDER — PRAVASTATIN SODIUM 20 MG
20 TABLET ORAL EVERY EVENING
Status: DISCONTINUED | OUTPATIENT
Start: 2023-07-06 | End: 2023-07-10 | Stop reason: HOSPADM

## 2023-07-06 RX ORDER — SODIUM CHLORIDE 9 MG/ML
INJECTION, SOLUTION INTRAVENOUS CONTINUOUS
Status: DISCONTINUED | OUTPATIENT
Start: 2023-07-06 | End: 2023-07-07

## 2023-07-06 RX ORDER — ENOXAPARIN SODIUM 100 MG/ML
40 INJECTION SUBCUTANEOUS EVERY 24 HOURS
Status: DISCONTINUED | OUTPATIENT
Start: 2023-07-06 | End: 2023-07-06

## 2023-07-06 RX ORDER — SODIUM CHLORIDE, SODIUM LACTATE, POTASSIUM CHLORIDE, CALCIUM CHLORIDE 600; 310; 30; 20 MG/100ML; MG/100ML; MG/100ML; MG/100ML
INJECTION, SOLUTION INTRAVENOUS CONTINUOUS
Status: DISCONTINUED | OUTPATIENT
Start: 2023-07-06 | End: 2023-07-06

## 2023-07-06 RX ORDER — MAGNESIUM SULFATE HEPTAHYDRATE 40 MG/ML
2 INJECTION, SOLUTION INTRAVENOUS ONCE
Status: COMPLETED | OUTPATIENT
Start: 2023-07-06 | End: 2023-07-07

## 2023-07-06 RX ORDER — ACETAMINOPHEN 325 MG/1
650 TABLET ORAL EVERY 6 HOURS PRN
Status: DISCONTINUED | OUTPATIENT
Start: 2023-07-06 | End: 2023-07-10 | Stop reason: HOSPADM

## 2023-07-06 RX ORDER — ACETAMINOPHEN 650 MG/1
650 SUPPOSITORY RECTAL EVERY 6 HOURS PRN
Status: DISCONTINUED | OUTPATIENT
Start: 2023-07-06 | End: 2023-07-10 | Stop reason: HOSPADM

## 2023-07-06 RX ADMIN — PRAVASTATIN SODIUM 20 MG: 20 TABLET ORAL at 23:30

## 2023-07-06 RX ADMIN — ENOXAPARIN SODIUM 40 MG: 40 INJECTION SUBCUTANEOUS at 23:38

## 2023-07-06 RX ADMIN — ACETAMINOPHEN 975 MG: 325 TABLET ORAL at 17:55

## 2023-07-06 RX ADMIN — SODIUM CHLORIDE, POTASSIUM CHLORIDE, SODIUM LACTATE AND CALCIUM CHLORIDE 1000 ML: 600; 310; 30; 20 INJECTION, SOLUTION INTRAVENOUS at 17:55

## 2023-07-06 RX ADMIN — Medication 2 DROP: at 23:29

## 2023-07-06 RX ADMIN — SODIUM CHLORIDE: 9 INJECTION, SOLUTION INTRAVENOUS at 20:51

## 2023-07-06 RX ADMIN — CEFTRIAXONE SODIUM 1 G: 1 INJECTION, POWDER, FOR SOLUTION INTRAMUSCULAR; INTRAVENOUS at 20:13

## 2023-07-06 RX ADMIN — MAGNESIUM SULFATE HEPTAHYDRATE 2 G: 40 INJECTION, SOLUTION INTRAVENOUS at 23:31

## 2023-07-06 ASSESSMENT — ACTIVITIES OF DAILY LIVING (ADL)
ADLS_ACUITY_SCORE: 35

## 2023-07-06 NOTE — PHARMACY-ADMISSION MEDICATION HISTORY
Pharmacist Admission Medication History    Admission medication history is complete. The information provided in this note is only as accurate as the sources available at the time of the update.    Medication reconciliation/reorder completed by provider prior to medication history? No    Information Source(s): Patient, Clinic records and CareEverywhere/SureScripts via in-person    Pertinent Information: none    Changes made to PTA medication list:    Added: all as new to system but nothing new to him    Deleted: None    Changed: None    Medication Affordability:  Not including over the counter (OTC) medications, was there a time in the past 3 months when you did not take your medications as prescribed because of cost?: No    Allergies reviewed with patient and updates made in EHR: yes    Medication History Completed By: Angelito Hill [facility] 7/6/2023 6:24 PM    Prior to Admission medications    Medication Sig Last Dose Taking? Auth Provider Long Term End Date   amLODIPine (NORVASC) 10 MG tablet Take 10 mg by mouth daily 7/6/2023 at am Yes Unknown, Entered By History Yes    carboxymethylcellulose PF (REFRESH PLUS) 0.5 % ophthalmic solution Place 2 drops into both eyes 2 times daily 7/6/2023 at am Yes Unknown, Entered By History     irbesartan-hydrochlorothiazide (AVALIDE) 300-12.5 MG tablet Take 1 tablet by mouth daily 7/6/2023 at am Yes Unknown, Entered By History Yes    levothyroxine (SYNTHROID/LEVOTHROID) 50 MCG tablet Take 50 mcg by mouth daily 7/6/2023 at am Yes Unknown, Entered By History Yes    pravastatin (PRAVACHOL) 20 MG tablet Take 20 mg by mouth every evening 7/5/2023 Yes Unknown, Entered By History Yes

## 2023-07-06 NOTE — ED NOTES
[facility]  Expected date: 7/6/23  Expected time: 5:02 PM  Means of arrival: Ambulance  Comments:  MPLWD 89 M fall last night, no thinners, bump on head, alert, no thinners

## 2023-07-06 NOTE — ED TRIAGE NOTES
Pt arrives via ambulance to ER d/t altered mental status after falling around 0430 this morning. Fall was unwitnessed and a bump noted on back of right side of head. Pt knows why he is here but unable to give his birthday or date or place. Denies being on any blood thinners. Complains of mid back pain but no neck or chest or head pain. Sons were notified and said they're on their way here but lives a little far out. Slightly tachycardic and low grade temp but all VSS. Will continue to monitor.      Triage Assessment     Row Name 07/06/23 [id number]       Triage Assessment (Adult)    Airway WDL WDL       Respiratory WDL    Respiratory WDL X;rhythm/pattern    Rhythm/Pattern, Respiratory other (see comments)  o2 sats dropped a bit; on 1L NC       Skin Circulation/Temperature WDL    Skin Circulation/Temperature WDL WDL       Cardiac WDL    Cardiac WDL X;rhythm    Pulse Rate & Regularity tachycardic       Peripheral/Neurovascular WDL    Peripheral Neurovascular WDL WDL       Cognitive/Neuro/Behavioral WDL    Cognitive/Neuro/Behavioral WDL X;level of consciousness    Level of Consciousness confused;intermittent confusion    Arousal Level opens eyes spontaneously    Orientation disoriented to;time;person;place    Speech clear;rambling    Mood/Behavior calm;cooperative

## 2023-07-06 NOTE — ED PROVIDER NOTES
Emergency Department Encounter         FINAL IMPRESSION:  UTI        ED COURSE AND MEDICAL DECISION MAKING       ED Course as of 07/06/23 2007   Thu Jul 06, 2023   [id number] Patient is a 89-year-old male with history of hypertension, here from home via EMS after a fall.  Apparently his wife heard a boom this morning around 430 and patient fell getting out of bed.  Unsure how he fell.  Unable to tell me whether not he tripped or fainted.  EMS was called later on the day for unknown reason.  Seems like he may have been confused which initiated the call.  On arrival here he is confused.  Repeating questions intermittently.  Has no obvious head or neck trauma.  Neurologically grossly tact with no obvious cranial nerve deficits, no unilateral weakness.  He is febrile.  Tachycardic.  Placed on 1 L nasal cannula for subtle hypoxia.  Plan for metabolic, septic as well as traumatic work-up as patient states he has back pain.  Has no midline neck pain.  Heart and lungs otherwise normal.  No abdominal pain.  No chest pain.  No lower extremity injuries appreciated.   1958 Leukocytosis.  UTI and analysis.  CT head neck neg. Lactate normal.  Antibiotics given.  Fluids given.  Plan for admission   1959 Awaiting thoracic and lumbar spine imaging     5:09 PM I met with the patient to gather history and perform my exam. ED course and treatment discussed. Patient seen in the hallway due to critical capacity and boarding crisis leaving no ED rooms available.      8:07 PM I discussed the patient with Dr. Magallanes from the hospitalist service who agrees to admit the patient.              Medical Decision Making    History:    Supplemental history from: Documented in chart, if applicable and EMS    External Record(s) reviewed: Documented in chart, if applicable.    Work Up:    Chart documentation includes differential considered and any EKGs or imaging independently interpreted by provider, where specified.    In additional to work up  documented, I considered the following work up: Documented in chart, if applicable.    External consultation:    Discussion of management with another provider: Documented in chart, if applicable and Hospitalist    Complicating factors:    Care impacted by chronic illness: Hyperlipidemia and Hypertension    Care affected by social determinants of health: N/A    Disposition considerations: Admit.                      EKG:      Dr. Alf Rey reviewed and independently interpreted the patient's EKG, with comments made as listed above.    Please see scanned EKG for full report.       Critical Care     Performed by: Alf Rey or    Authorized by: Alf Rey  Total critical care time:  minutes  Critical care was necessary to treat or prevent imminent or life-threatening deterioration of the following conditions:   Critical care was time spent personally by me on the following activities: development of treatment plan with patient or surrogate, discussions with consultants, examination of patient, evaluation of patient's response to treatment, obtaining history from patient or surrogate, ordering and performing treatments and interventions, ordering and review of laboratory studies, ordering and review of radiographic studies, re-evaluation of patient's condition and monitoring for potential decompensation.  Critical care time was exclusive of separately billable procedures and treating other patients.'        At the conclusion of the encounter I discussed the results of all the tests and the disposition. The questions were answered. The patient or family acknowledged understanding and was agreeable with the care plan.                  MEDICATIONS GIVEN IN THE EMERGENCY DEPARTMENT:  Medications   lactated ringers BOLUS 1,000 mL (1,000 mLs Intravenous $New Bag 7/6/23 1755)   acetaminophen (TYLENOL) tablet 975 mg (975 mg Oral $Given 7/6/23 1755)       NEW PRESCRIPTIONS STARTED AT TODAY'S ED VISIT:  New Prescriptions    No  "medications on file       HPI     Patient information obtained from: EMS and patient     Use of Interpretor: N/A     Clayton Pacheco is a 89 year old male with a pertinent history of HTN, HLD, and hypothyroidism who presents to this ED via EMS for evaluation of a fall and confusion    Per EMS: The patient had an unwitnessed fall around 4:30 AM this morning. The patient is unsure of loss of consciousness and is not on blood thinners. They note the patient endorses a bump to the right side of his head and feels warm to the touch. The patient's wife notes the patient has had increased confusion as well. The patient was unable to answer what month, day, or year it is. When asked when his birthday is, he gave his wife's birthday instead of his own.     The patient reports that he fell getting out of bed and is unsure of loss of consciousness or how he fell. The patient reports mid-back pain from the fall and denies any bumps to his head. The patient reports feeling fine prior to the fall.     Of note, the patient endorses somewhat stuttering speech and answered \"seven thirty two\" when asked what year it is and answered the same thing when asked what month it is.  When asked what day it is, the patient answered, \"I'm not sure what day of the week it is\".     The patient denies neck pain, head pain, abdominal pain, chest pain, or shortness of breath.    REVIEW OF SYSTEMS:  Review of Systems   Constitutional: Positive for subjective fever   HEENT: Negative runny nose, sore throat, ear pain, neck pain. Positive for bump to the right side of head  Respiratory: Negative for shortness of breath, cough, congestion  Cardiovascular: Negative for chest pain, leg edema  Gastrointestinal: Negative for abdominal distention, abdominal pain, constipation, vomiting, nausea, diarrhea  Genitourinary: Negative for dysuria and hematuria.   Integument: Negative for rash, skin breakdown  Neurological: Negative for paresthesias, weakness, " headache. Positive for confusion  Musculoskeletal: Negative for joint pain, joint swelling      All other systems reviewed and are negative.          MEDICAL HISTORY     No past medical history on file.    No past surgical history on file.         No current outpatient medications on file.          PHYSICAL EXAM     BP (!) 142/63   Pulse 106   Temp (!) 100.8  F (38.2  C) (Oral)   Resp 22   SpO2 97%       PHYSICAL EXAM:     General: Patient appears well, nontoxic, comfortable  HEENT: Moist mucous membranes,  No obvious head or neck trauma. No midline neck pain   Cardiovascular: Normal rhythm, no extremity edema.  No appreciable murmur. Patient is tachycardic  Respiratory: No signs of respiratory distress, lungs are clear to auscultation bilaterally with no wheezes rhonchi or rales.  Abdominal: Soft, nontender, nondistended, no palpable masses, no guarding, no rebound  Musculoskeletal: Full range of motion of joints, no deformities appreciated. No lower extremity injuries appreciated  Neurological: Patient is confused, Repeating questions intermittently. Neurologically grossly tact with no obvious cranial nerve deficits, no unilateral weakness no lower extremity weakness.  Psychological: Normal affect and mood.  Integument: No rashes appreciated. Febrile         RESULTS       Labs Ordered and Resulted from Time of ED Arrival to Time of ED Departure   COMPREHENSIVE METABOLIC PANEL - Abnormal       Result Value    Sodium 137      Potassium 3.9      Chloride 99      Carbon Dioxide (CO2) 23      Anion Gap 15      Urea Nitrogen 25.4 (*)     Creatinine 1.16      Calcium 9.9 (*)     Glucose 142 (*)     Alkaline Phosphatase 95      AST 24      ALT 14      Protein Total 7.1      Albumin 4.4      Bilirubin Total 1.5 (*)     GFR Estimate 59 (*)    TROPONIN T, HIGH SENSITIVITY - Abnormal    Troponin T, High Sensitivity 29 (*)    ROUTINE UA WITH MICROSCOPIC REFLEX TO CULTURE - Abnormal    Color Urine Yellow      Appearance  Urine Cloudy (*)     Glucose Urine Negative      Bilirubin Urine Negative      Ketones Urine Trace (*)     Specific Gravity Urine 1.025      Blood Urine >1.0 mg/dL (*)     pH Urine 6.0      Protein Albumin Urine 100 (*)     Urobilinogen Urine <2.0      Nitrite Urine Positive (*)     Leukocyte Esterase Urine 500 Morales/uL (*)     Mucus Urine Present (*)     RBC Urine 53 (*)     WBC Urine >182 (*)     Squamous Epithelials Urine 1     CBC WITH PLATELETS AND DIFFERENTIAL - Abnormal    WBC Count 22.9 (*)     RBC Count 4.11 (*)     Hemoglobin 13.5      Hematocrit 39.0 (*)     MCV 95      MCH 32.8      MCHC 34.6      RDW 13.3      Platelet Count       DIFFERENTIAL - Abnormal    % Neutrophils 92      % Lymphocytes 6      % Monocytes 1      % Eosinophils 1      % Basophils 0      Absolute Neutrophils 21.1 (*)     Absolute Lymphocytes 1.4      Absolute Monocytes 0.2      Absolute Eosinophils 0.2      Absolute Basophils 0.0      RBC Morphology Confirmed RBC Indices      Platelet Assessment Platelets Clumped (*)    TROPONIN T, HIGH SENSITIVITY - Abnormal    Troponin T, High Sensitivity 34 (*)    CREATININE - Abnormal    Creatinine 1.19 (*)     GFR Estimate 57 (*)    MAGNESIUM - Abnormal    Magnesium 1.6 (*)    ETHYL ALCOHOL LEVEL - Normal    Alcohol ethyl <0.01     LACTIC ACID WHOLE BLOOD - Normal    Lactic Acid 1.7     INFLUENZA A/B, RSV, & SARS-COV2 PCR - Normal    Influenza A PCR Negative      Influenza B PCR Negative      RSV PCR Negative      SARS CoV2 PCR Negative     BASIC METABOLIC PANEL   CBC WITH PLATELETS   MAGNESIUM   BLOOD CULTURE   URINE CULTURE       Thoracic spine MRI w/o contrast   Final Result   IMPRESSION:   1.  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   2.  Lumbar degenerative changes with severe canal stenosis at L3-L4 and L4-L5, as detailed above.      Lumbar spine MRI w/o contrast   Final Result   IMPRESSION:   1.  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   2.  Lumbar degenerative changes with severe canal stenosis at L3-L4 and L4-L5, as detailed above.      XR Pelvis w Hip Right G/E 2 Views   Final Result   IMPRESSION: Bones are demineralized. There is no evidence of an acute, displaced pelvic or right hip fracture. Mild degenerative changes both hips. Atherosclerotic vascular calcifications. Small metallic density in the soft tissues along the right pelvis    is unchanged from prior.      Lumbar spine CT w/o contrast   Final Result   IMPRESSION:      THORACIC SPINE CT:   1.  Question nondisplaced fracture at the left anterior T6 vertebral body. Given limitations related to osseous demineralization, consider MRI if further imaging assessment is clinically warranted.   2.  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   3.  Mild multilevel degenerative changes.      LUMBAR SPINE CT:   1.  Suspect acute left lateral inferior endplate fracture at L4.   2.  Age-indeterminate mild L3 superior endplate compression eccentric to the right.   3.  Question nondisplaced fracture line through left iliac wing, partially visualized. Correlate for attributable pain and consider dedicated CT bony pelvis.   4.  Multilevel degenerative disc disease including suspected severe spinal canal stenosis at L3-L4 and grade 1 degenerative anterolisthesis at L4-L5.   5.  Diffuse osseous demineralization.            CT Thoracic Spine w/o Contrast   Final Result   IMPRESSION:      THORACIC SPINE CT:   1.  Question nondisplaced fracture at the left anterior T6 vertebral body. Given limitations related to osseous demineralization, consider MRI if further imaging assessment is clinically warranted.   2.  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   3.  Mild multilevel degenerative changes.      LUMBAR SPINE CT:   1.  Suspect acute left lateral inferior endplate fracture at L4.   2.  Age-indeterminate mild L3 superior endplate  compression eccentric to the right.   3.  Question nondisplaced fracture line through left iliac wing, partially visualized. Correlate for attributable pain and consider dedicated CT bony pelvis.   4.  Multilevel degenerative disc disease including suspected severe spinal canal stenosis at L3-L4 and grade 1 degenerative anterolisthesis at L4-L5.   5.  Diffuse osseous demineralization.            Chest XR,  PA & LAT   Final Result   IMPRESSION: Heart is normal in size. Lungs are clear.      Cervical spine CT w/o contrast   Final Result   IMPRESSION:   HEAD CT:   1.  No finding for intracranial hemorrhage, mass, or acute infarct.      2.  Moderate volume loss and mild to moderate presumed sequela chronic microvascular ischemic change.      CERVICAL SPINE CT:   1.  Advanced cervical spondylosis without finding for acute fracture.      2.  Grade 1 anterolisthesis C2 on C3 and C7 on T1, age indeterminate but favored to be chronic and degenerative in etiology.      3.  Disc osteophyte complex contributes to at least mild spinal canal stenosis at C6-C7.      Head CT w/o contrast   Final Result   IMPRESSION:   HEAD CT:   1.  No finding for intracranial hemorrhage, mass, or acute infarct.      2.  Moderate volume loss and mild to moderate presumed sequela chronic microvascular ischemic change.      CERVICAL SPINE CT:   1.  Advanced cervical spondylosis without finding for acute fracture.      2.  Grade 1 anterolisthesis C2 on C3 and C7 on T1, age indeterminate but favored to be chronic and degenerative in etiology.      3.  Disc osteophyte complex contributes to at least mild spinal canal stenosis at C6-C7.      CT Chest Abdomen Pelvis w/o Contrast    (Results Pending)                     PROCEDURES:  Procedures:  Procedures       ICandis am serving as a scribe to document services personally performed by Alf Rey DO, based on my observations and the provider's statements to me.  I, Alf Rey DO, attest that Candis  Samra is acting in a scribe capacity, has observed my performance of the services and has documented them in accordance with my direction.    Alf Rey DO  Emergency Medicine  [facility] EMERGENCY DEPARTMENT     Krissy, Alf Velazquez DO  07/07/23 0039

## 2023-07-07 ENCOUNTER — APPOINTMENT (OUTPATIENT)
Dept: CT IMAGING | Facility: HOSPITAL | Age: 88
DRG: 689 | End: 2023-07-07
Attending: STUDENT IN AN ORGANIZED HEALTH CARE EDUCATION/TRAINING PROGRAM
Payer: MEDICARE

## 2023-07-07 LAB
ANION GAP SERPL CALCULATED.3IONS-SCNC: 11 MMOL/L (ref 7–15)
BUN SERPL-MCNC: 21.9 MG/DL (ref 8–23)
CALCIUM SERPL-MCNC: 8.9 MG/DL (ref 8.2–9.6)
CHLORIDE SERPL-SCNC: 101 MMOL/L (ref 98–107)
CREAT SERPL-MCNC: 1.01 MG/DL (ref 0.67–1.17)
DEPRECATED HCO3 PLAS-SCNC: 25 MMOL/L (ref 22–29)
ERYTHROCYTE [DISTWIDTH] IN BLOOD BY AUTOMATED COUNT: 13.5 % (ref 10–15)
GFR SERPL CREATININE-BSD FRML MDRD: 69 ML/MIN/1.73M2
GLUCOSE SERPL-MCNC: 122 MG/DL (ref 70–99)
HCT VFR BLD AUTO: 36 % (ref 40–53)
HGB BLD-MCNC: 12.2 G/DL (ref 13.3–17.7)
LACTATE SERPL-SCNC: 1.2 MMOL/L (ref 0.7–2)
MAGNESIUM SERPL-MCNC: 1.9 MG/DL (ref 1.7–2.3)
MCH RBC QN AUTO: 33 PG (ref 26.5–33)
MCHC RBC AUTO-ENTMCNC: 33.9 G/DL (ref 31.5–36.5)
MCV RBC AUTO: 97 FL (ref 78–100)
PLATELET # BLD AUTO: 186 10E3/UL (ref 150–450)
POTASSIUM SERPL-SCNC: 3.7 MMOL/L (ref 3.4–5.3)
RBC # BLD AUTO: 3.7 10E6/UL (ref 4.4–5.9)
SODIUM SERPL-SCNC: 137 MMOL/L (ref 136–145)
TROPONIN T SERPL HS-MCNC: 53 NG/L
WBC # BLD AUTO: 26.4 10E3/UL (ref 4–11)

## 2023-07-07 PROCEDURE — G0378 HOSPITAL OBSERVATION PER HR: HCPCS

## 2023-07-07 PROCEDURE — 96376 TX/PRO/DX INJ SAME DRUG ADON: CPT

## 2023-07-07 PROCEDURE — 36415 COLL VENOUS BLD VENIPUNCTURE: CPT | Performed by: INTERNAL MEDICINE

## 2023-07-07 PROCEDURE — 250N000013 HC RX MED GY IP 250 OP 250 PS 637: Performed by: HOSPITALIST

## 2023-07-07 PROCEDURE — 258N000003 HC RX IP 258 OP 636: Performed by: INTERNAL MEDICINE

## 2023-07-07 PROCEDURE — 96361 HYDRATE IV INFUSION ADD-ON: CPT

## 2023-07-07 PROCEDURE — 250N000011 HC RX IP 250 OP 636: Mod: JZ | Performed by: HOSPITALIST

## 2023-07-07 PROCEDURE — 99233 SBSQ HOSP IP/OBS HIGH 50: CPT | Performed by: INTERNAL MEDICINE

## 2023-07-07 PROCEDURE — 83605 ASSAY OF LACTIC ACID: CPT | Performed by: INTERNAL MEDICINE

## 2023-07-07 PROCEDURE — 85027 COMPLETE CBC AUTOMATED: CPT | Performed by: HOSPITALIST

## 2023-07-07 PROCEDURE — 80048 BASIC METABOLIC PNL TOTAL CA: CPT | Performed by: HOSPITALIST

## 2023-07-07 PROCEDURE — 36415 COLL VENOUS BLD VENIPUNCTURE: CPT | Performed by: HOSPITALIST

## 2023-07-07 PROCEDURE — 96372 THER/PROPH/DIAG INJ SC/IM: CPT | Performed by: HOSPITALIST

## 2023-07-07 PROCEDURE — 83735 ASSAY OF MAGNESIUM: CPT | Performed by: HOSPITALIST

## 2023-07-07 PROCEDURE — G1010 CDSM STANSON: HCPCS

## 2023-07-07 PROCEDURE — 71250 CT THORAX DX C-: CPT | Mod: MG

## 2023-07-07 PROCEDURE — 84484 ASSAY OF TROPONIN QUANT: CPT | Performed by: INTERNAL MEDICINE

## 2023-07-07 RX ORDER — NALOXONE HYDROCHLORIDE 0.4 MG/ML
0.2 INJECTION, SOLUTION INTRAMUSCULAR; INTRAVENOUS; SUBCUTANEOUS
Status: DISCONTINUED | OUTPATIENT
Start: 2023-07-07 | End: 2023-07-10 | Stop reason: HOSPADM

## 2023-07-07 RX ORDER — NALOXONE HYDROCHLORIDE 0.4 MG/ML
0.4 INJECTION, SOLUTION INTRAMUSCULAR; INTRAVENOUS; SUBCUTANEOUS
Status: DISCONTINUED | OUTPATIENT
Start: 2023-07-07 | End: 2023-07-10 | Stop reason: HOSPADM

## 2023-07-07 RX ORDER — SODIUM CHLORIDE 9 MG/ML
INJECTION, SOLUTION INTRAVENOUS CONTINUOUS
Status: ACTIVE | OUTPATIENT
Start: 2023-07-07 | End: 2023-07-07

## 2023-07-07 RX ADMIN — CEFTRIAXONE SODIUM 1 G: 1 INJECTION, POWDER, FOR SOLUTION INTRAMUSCULAR; INTRAVENOUS at 07:46

## 2023-07-07 RX ADMIN — Medication 2 DROP: at 21:55

## 2023-07-07 RX ADMIN — ENOXAPARIN SODIUM 40 MG: 40 INJECTION SUBCUTANEOUS at 21:56

## 2023-07-07 RX ADMIN — LEVOTHYROXINE SODIUM 50 MCG: 0.03 TABLET ORAL at 06:32

## 2023-07-07 RX ADMIN — Medication 2 DROP: at 07:46

## 2023-07-07 RX ADMIN — PRAVASTATIN SODIUM 20 MG: 20 TABLET ORAL at 21:56

## 2023-07-07 RX ADMIN — AMLODIPINE BESYLATE 10 MG: 5 TABLET ORAL at 07:46

## 2023-07-07 RX ADMIN — SODIUM CHLORIDE: 9 INJECTION, SOLUTION INTRAVENOUS at 11:33

## 2023-07-07 ASSESSMENT — ACTIVITIES OF DAILY LIVING (ADL)
ADLS_ACUITY_SCORE: 39
ADLS_ACUITY_SCORE: 37
ADLS_ACUITY_SCORE: 35
ADLS_ACUITY_SCORE: 35
ADLS_ACUITY_SCORE: 37
DEPENDENT_IADLS:: TRANSPORTATION;CLEANING
ADLS_ACUITY_SCORE: 35
ADLS_ACUITY_SCORE: 37

## 2023-07-07 NOTE — PLAN OF CARE
PRIMARY DIAGNOSIS: PYELONEPHRITIS  OUTPATIENT/OBSERVATION GOALS TO BE MET BEFORE DISCHARGE:  Diagnostic test and consults (if applicable) complete: Yes    Vitals signs stable or return to baseline: No    Tolerate oral intake to maintain hydration: Yes    Pain status: Pain free.    Tolerating oral antibiotics or has plans for home infusion setup:  Yes    Return to near baseline physical activity: No    Discharge Planner Nurse   Safe discharge environment identified: No  Barriers to discharge: Yes       Entered by: Amada Boyd RN 07/07/2023 2:46 AM     Please review provider order for any additional goals.   Nurse to notify provider when observation goals have been met and patient is ready for discharge.

## 2023-07-07 NOTE — H&P
Admission History and Physical   Clayton Pacheco,  1929, MRN [id number]      Urinary tract infection without hematuria, site unspecified [N39.0]    PCP: Ocsar De Oliveira, [unfilled], [phone]   Code status:  No CPR- Pre-arrest intubation OK       Extended Emergency Contact Information  Primary Emergency Contact: YEFRI PACHECO  Mobile Phone: [phone]  Relation: Son  Secondary Emergency Contact: TIMOTHY PACHECO  Mobile Phone: [phone]  Relation: Son       Assessment and Plan   Clayton Pacheco is a 89 year old old male with significant past medical history of hypertension, hyperlipidemia, hypothyroidism who brought to the hospital because he had a fall early morning and he is confused.  Assessment:  Principal Problem:    Acute cystitis without hematuria  Active Problems:    Metabolic encephalopathy    Primary hypertension    Other hyperlipidemia    Other specified hypothyroidism    Fall    Hypomagnesemia    ELVIN (acute kidney injury) (H)  Possible vertebral fracture and pelvic wing fracture  Plan:  It is not clear if the patient has syncope or not  CT of the head no acute event  We will keep the patient on cardiac monitor and check orthostatic vital  We will keep the patient on IV fluid and replace his magnesium  His confusion likely metabolic encephalopathy secondary to UTI  Keep the patient on Rocephin IV and follow urine culture  CT scan of his spine and pelvis showing possible infarction  MRI ordered, neuro spine consult  Pain medication as needed  PT and OT  Hold his therapist into and HCTZ and resume his amlodipine  Resume his pravastatin  DVT prophylaxis Lovenox               Chief Complaint:  Fall and confusion     HPI:    Informant is patient reliable  Clayton Pacheco is a 89 year old old male with significant past medical history of hypertension, hyperlipidemia, hypothyroidism who brought to the hospital because he had a fall early morning and he is confused.  Patient  lives at home with his wife, he said around 4:30 AM he fall backward while he walking, he does not remember if he lost his consciousness or not and he is not sure why did he fall, with that because he was weak or tripped or he has a loss of consciousness and syncope, patient complaining of some back pain.  His family later on the day they were concerned because he is more confused, he been having some urine symptoms where he had burning in the urine in the last few days.  Patient denied any headache, no chest pain, no shortness of breath, no orthopnea or PND, he denied any fever or chills.  When I saw him he is alert, oriented x3         Medical History      No past medical history on file.  Hypertension  Hyperlipidemia  Hypothyroidism Family History  Reviewed, and family history is not on file.          Allergies  No Known Allergies Social History  Reviewed, and he      Review of Systems:  10-point ROS negative, except as noted in HPI            Prior to Admission Medications   (Not in a hospital admission)         Physical Exam:  Temp:  [98.9  F (37.2  C)-100.8  F (38.2  C)] 98.9  F (37.2  C)  Pulse:  [] 90  Resp:  [22-28] 27  BP: (106-142)/(54-63) 120/60  SpO2:  [89 %-97 %] 96 %  Wt Readings from Last 5 Encounters:   07/06/23 68 kg (150 lb)     Body mass index is 24.21 kg/m .  Alert, oriented*3, no acute distress  No pallor, icterus, clubbing, cyanosis  Head, atraumatic normocephalic  Moist mucus membranes  Neck supple, nontender  CVS: S1 S2-N, no murmurs, gallops, rubs  Resp: B/L vesicular breath sounds, no wheezing, crackles   Abd: soft, No t/g/r  Neuro: no involuntary movements such as tremors, nonfocal  Vasc: no leg edema  No clubbing  Skin--no generalized skin rash     Pertinent Labs  Lab Results:  Recent Results (from the past 24 hour(s))   Comprehensive metabolic panel    Collection Time: 07/06/23  5:23 PM   Result Value Ref Range    Sodium 137 136 - 145 mmol/L    Potassium 3.9 3.4 - 5.3 mmol/L     Chloride 99 98 - 107 mmol/L    Carbon Dioxide (CO2) 23 22 - 29 mmol/L    Anion Gap 15 7 - 15 mmol/L    Urea Nitrogen 25.4 (H) 8.0 - 23.0 mg/dL    Creatinine 1.16 0.67 - 1.17 mg/dL    Calcium 9.9 (H) 8.2 - 9.6 mg/dL    Glucose 142 (H) 70 - 99 mg/dL    Alkaline Phosphatase 95 40 - 129 U/L    AST 24 0 - 45 U/L    ALT 14 0 - 70 U/L    Protein Total 7.1 6.4 - 8.3 g/dL    Albumin 4.4 3.5 - 5.2 g/dL    Bilirubin Total 1.5 (H) <=1.2 mg/dL    GFR Estimate 59 (L) >60 mL/min/1.73m2   Troponin T, High Sensitivity    Collection Time: 07/06/23  5:23 PM   Result Value Ref Range    Troponin T, High Sensitivity 29 (H) <=22 ng/L   Ethyl Alcohol Level    Collection Time: 07/06/23  5:23 PM   Result Value Ref Range    Alcohol ethyl <0.01 <=0.01 g/dL   CBC with platelets and differential    Collection Time: 07/06/23  5:23 PM   Result Value Ref Range    WBC Count 22.9 (H) 4.0 - 11.0 10e3/uL    RBC Count 4.11 (L) 4.40 - 5.90 10e6/uL    Hemoglobin 13.5 13.3 - 17.7 g/dL    Hematocrit 39.0 (L) 40.0 - 53.0 %    MCV 95 78 - 100 fL    MCH 32.8 26.5 - 33.0 pg    MCHC 34.6 31.5 - 36.5 g/dL    RDW 13.3 10.0 - 15.0 %    Platelet Count     Extra Blue Top Tube    Collection Time: 07/06/23  5:23 PM   Result Value Ref Range    Hold Specimen JIC    Extra Red Top Tube    Collection Time: 07/06/23  5:23 PM   Result Value Ref Range    Hold Specimen JIC    Extra Green Top (Lithium Heparin) Tube    Collection Time: 07/06/23  5:23 PM   Result Value Ref Range    Hold Specimen JIC    Extra Purple Top Tube    Collection Time: 07/06/23  5:23 PM   Result Value Ref Range    Hold Specimen JIC    Manual Differential    Collection Time: 07/06/23  5:23 PM   Result Value Ref Range    % Neutrophils 92 %    % Lymphocytes 6 %    % Monocytes 1 %    % Eosinophils 1 %    % Basophils 0 %    Absolute Neutrophils 21.1 (H) 1.6 - 8.3 10e3/uL    Absolute Lymphocytes 1.4 0.8 - 5.3 10e3/uL    Absolute Monocytes 0.2 0.0 - 1.3 10e3/uL    Absolute Eosinophils 0.2 0.0 - 0.7 10e3/uL     Absolute Basophils 0.0 0.0 - 0.2 10e3/uL    RBC Morphology Confirmed RBC Indices     Platelet Assessment Platelets Clumped (A) Automated Count Confirmed. Platelet morphology is normal.   Creatinine    Collection Time: 07/06/23  5:23 PM   Result Value Ref Range    Creatinine 1.19 (H) 0.67 - 1.17 mg/dL    GFR Estimate 57 (L) >60 mL/min/1.73m2   Magnesium    Collection Time: 07/06/23  5:23 PM   Result Value Ref Range    Magnesium 1.6 (L) 1.7 - 2.3 mg/dL   Lactic acid whole blood    Collection Time: 07/06/23  5:48 PM   Result Value Ref Range    Lactic Acid 1.7 0.7 - 2.0 mmol/L   Symptomatic Influenza A/B, RSV, & SARS-CoV2 PCR (COVID-19) Nasopharyngeal    Collection Time: 07/06/23  5:48 PM    Specimen: Nasopharyngeal; Swab   Result Value Ref Range    Influenza A PCR Negative Negative    Influenza B PCR Negative Negative    RSV PCR Negative Negative    SARS CoV2 PCR Negative Negative   UA with Microscopic reflex to Culture    Collection Time: 07/06/23  6:35 PM    Specimen: Urine, Clean Catch   Result Value Ref Range    Color Urine Yellow Colorless, Straw, Light Yellow, Yellow    Appearance Urine Cloudy (A) Clear    Glucose Urine Negative Negative mg/dL    Bilirubin Urine Negative Negative    Ketones Urine Trace (A) Negative mg/dL    Specific Gravity Urine 1.025 1.001 - 1.030    Blood Urine >1.0 mg/dL (A) Negative    pH Urine 6.0 5.0 - 7.0    Protein Albumin Urine 100 (A) Negative mg/dL    Urobilinogen Urine <2.0 <2.0 mg/dL    Nitrite Urine Positive (A) Negative    Leukocyte Esterase Urine 500 Morales/uL (A) Negative    Mucus Urine Present (A) None Seen /LPF    RBC Urine 53 (H) <=2 /HPF    WBC Urine >182 (H) <=5 /HPF    Squamous Epithelials Urine 1 <=1 /HPF   Troponin T, High Sensitivity (now)    Collection Time: 07/06/23  8:09 PM   Result Value Ref Range    Troponin T, High Sensitivity 34 (H) <=22 ng/L     No results found for: HGBA1C  No results found for: IRON  No results found for: CKTOTAL, CKMB, TROPONINI  Lab Results    Component Value Date    TSH 3.49 09/06/2022       Pertinent Radiology  Radiology Results:  CT Thoracic Spine w/o Contrast    Result Date: 7/6/2023  EXAM: CT THORACIC SPINE W/O CONTRAST, CT LUMBAR SPINE W/O CONTRAST LOCATION: [facility] DATE/TIME: 7/6/2023 7:33 PM CDT INDICATION: Trauma. Fall. COMPARISON: None. TECHNIQUE: 1) Routine CT Thoracic Spine without IV contrast. Multiplanar reformats. Dose reduction techniques were used. 2) Routine CT Lumbar Spine without IV contrast. Multiplanar reformats. Dose reduction techniques were used. FINDINGS: THORACIC SPINE CT: VERTEBRA: Normal alignment. Visualized osseous structures appear diffusely demineralized. Flowing marginal osteophytes greater on the right compatible with diffuse idiopathic skeletal hyperostosis (DISH). Indeterminate linear lucency at the left anterior  T6 vertebral body (sagittal series 7 image 43 with possible subtle lateral superior endplate irregularity (coronal series 6 image 49) may indicate nondisplaced fracture. No vertebral body height loss. Facets are intact. CANAL/FORAMINA: Disc spaces are maintained. No high-grade spinal canal or foraminal stenosis. PARASPINAL: No visible soft tissue abnormality. Atelectasis in the visualized upper lungs. Advanced aortic atherosclerosis and coronary artery calcifications. LUMBAR SPINE CT: VERTEBRA: Grade 1 anterolisthesis at L4-L5 by approximately 4 mm, most likely degenerative. Slight upper lumbar dextrocurvature may be positional. Visualized osseous structures appear diffusely demineralized. Minimal irregularity at the left lateral inferior endplate of L4 (coronal series 6 image 31) suspicious for acute nondisplaced fracture. Mild depression of the right L3 superior endplate compatible with age-indeterminate compression fracture. Facets are intact. CANAL/FORAMINA: Multilevel discogenic and facet degenerative changes. Mild-to-moderate multilevel disc space narrowing with vacuum  discs. Disc bulge/protrusion at L3-L4 in conjunction with facet and flaval ligament hypertrophy contribute to suspected severe spinal canal stenosis. Additional suspected moderate spinal canal stenosis at L2-L3 and L4-L5. Multifocal foraminal stenosis includes severe narrowing bilaterally at L1-L2 and on the right at L3-L4 with additional mild to moderate multifocal narrowing. PARASPINAL: No visible soft tissue abnormality. Advanced aortoiliac atherosclerotic calcifications. Partially visualized large cystic lesion in the right hemiabdomen, most likely renal. Indeterminate linear lucency traversing the partially visualized left iliac wing (coronal series 6 image 62) may represent nondisplaced fracture.     IMPRESSION: THORACIC SPINE CT: 1.  Question nondisplaced fracture at the left anterior T6 vertebral body. Given limitations related to osseous demineralization, consider MRI if further imaging assessment is clinically warranted. 2.  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 3.  Mild multilevel degenerative changes. LUMBAR SPINE CT: 1.  Suspect acute left lateral inferior endplate fracture at L4. 2.  Age-indeterminate mild L3 superior endplate compression eccentric to the right. 3.  Question nondisplaced fracture line through left iliac wing, partially visualized. Correlate for attributable pain and consider dedicated CT bony pelvis. 4.  Multilevel degenerative disc disease including suspected severe spinal canal stenosis at L3-L4 and grade 1 degenerative anterolisthesis at L4-L5. 5.  Diffuse osseous demineralization.     Lumbar spine CT w/o contrast    Result Date: 7/6/2023  EXAM: CT THORACIC SPINE W/O CONTRAST, CT LUMBAR SPINE W/O CONTRAST LOCATION: [facility] DATE/TIME: 7/6/2023 7:33 PM CDT INDICATION: Trauma. Fall. COMPARISON: None. TECHNIQUE: 1) Routine CT Thoracic Spine without IV contrast. Multiplanar reformats. Dose reduction techniques were used. 2)  Routine CT Lumbar Spine without IV contrast. Multiplanar reformats. Dose reduction techniques were used. FINDINGS: THORACIC SPINE CT: VERTEBRA: Normal alignment. Visualized osseous structures appear diffusely demineralized. Flowing marginal osteophytes greater on the right compatible with diffuse idiopathic skeletal hyperostosis (DISH). Indeterminate linear lucency at the left anterior  T6 vertebral body (sagittal series 7 image 43 with possible subtle lateral superior endplate irregularity (coronal series 6 image 49) may indicate nondisplaced fracture. No vertebral body height loss. Facets are intact. CANAL/FORAMINA: Disc spaces are maintained. No high-grade spinal canal or foraminal stenosis. PARASPINAL: No visible soft tissue abnormality. Atelectasis in the visualized upper lungs. Advanced aortic atherosclerosis and coronary artery calcifications. LUMBAR SPINE CT: VERTEBRA: Grade 1 anterolisthesis at L4-L5 by approximately 4 mm, most likely degenerative. Slight upper lumbar dextrocurvature may be positional. Visualized osseous structures appear diffusely demineralized. Minimal irregularity at the left lateral inferior endplate of L4 (coronal series 6 image 31) suspicious for acute nondisplaced fracture. Mild depression of the right L3 superior endplate compatible with age-indeterminate compression fracture. Facets are intact. CANAL/FORAMINA: Multilevel discogenic and facet degenerative changes. Mild-to-moderate multilevel disc space narrowing with vacuum discs. Disc bulge/protrusion at L3-L4 in conjunction with facet and flaval ligament hypertrophy contribute to suspected severe spinal canal stenosis. Additional suspected moderate spinal canal stenosis at L2-L3 and L4-L5. Multifocal foraminal stenosis includes severe narrowing bilaterally at L1-L2 and on the right at L3-L4 with additional mild to moderate multifocal narrowing. PARASPINAL: No visible soft tissue abnormality. Advanced aortoiliac atherosclerotic  calcifications. Partially visualized large cystic lesion in the right hemiabdomen, most likely renal. Indeterminate linear lucency traversing the partially visualized left iliac wing (coronal series 6 image 62) may represent nondisplaced fracture.     IMPRESSION: THORACIC SPINE CT: 1.  Question nondisplaced fracture at the left anterior T6 vertebral body. Given limitations related to osseous demineralization, consider MRI if further imaging assessment is clinically warranted. 2.  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 3.  Mild multilevel degenerative changes. LUMBAR SPINE CT: 1.  Suspect acute left lateral inferior endplate fracture at L4. 2.  Age-indeterminate mild L3 superior endplate compression eccentric to the right. 3.  Question nondisplaced fracture line through left iliac wing, partially visualized. Correlate for attributable pain and consider dedicated CT bony pelvis. 4.  Multilevel degenerative disc disease including suspected severe spinal canal stenosis at L3-L4 and grade 1 degenerative anterolisthesis at L4-L5. 5.  Diffuse osseous demineralization.     Head CT w/o contrast    Result Date: 7/6/2023  EXAM: CT HEAD W/O CONTRAST, CT CERVICAL SPINE W/O CONTRAST LOCATION: [facility] DATE: 7/6/2023 INDICATION: Fall, AMS COMPARISON: None. TECHNIQUE: 1) Routine CT Head without IV contrast. Multiplanar reformats. Dose reduction techniques were used. 2) Routine CT Cervical Spine without IV contrast. Multiplanar reformats. Dose reduction techniques were used. FINDINGS: HEAD CT: INTRACRANIAL CONTENTS: No finding for intracranial hemorrhage, mass, or acute infarct. Mild to moderate prominence of the lateral and third ventricles and sulci. Moderate presumed sequela chronic microvascular ischemic change. No transcortical areas of low-attenuation change. No mass effect or midline shift. Cerebellar tonsils are normally positioned. Sella is unremarkable for  technique. Corpus callosum is normally formed. VISUALIZED ORBITS/SINUSES/MASTOIDS: Prior cataract surgeries. Scleral calcifications. Visualized paranasal sinuses, middle ear cavities, and mastoid air cells are clear. BONES/SOFT TISSUES: Calvarium is intact, without fracture or suspicious lytic or blastic foci. CERVICAL SPINE CT: VERTEBRA: Lateral alignment is normal. There is straightening of the normal cervical lordosis. There is slight anterolisthesis C2 on C3 and C7 on T1. Listhesis at these levels is age-indeterminate but favored to be chronic and degenerative in etiology given congruent appearance of associated facet degenerative changes at these levels and lack of prevertebral swelling. Vertebral body heights are grossly preserved. Moderate interspace narrowing at the C3-C4 and more caudal levels and most severe at C3-C4, C6-C7, and T2-T3. Solid-appearing ankylosis across the C4-C5 interspace and ventrally at C5-C6. Large ventral osteophyte formation C6-C7. Advanced facet arthropathy C2-C3 and C3-4. Ankylosis across the C4-C5 facets. Advanced facet arthropathy C7-T1 and T1-T2. CANAL/FORAMINA: Posterior interbody spurring C3-C4 through C6-C7 with moderate canal narrowing at C6-C7. There is severe bilateral C3-C4 and C6-C7 neural foraminal stenosis. PARASPINAL: Dorsal paraspinal soft tissues are unremarkable. No prevertebral soft tissue swelling. Coarse calcifications both carotid bifurcations/bulbs. Visualized lung apices are clear.     IMPRESSION: HEAD CT: 1.  No finding for intracranial hemorrhage, mass, or acute infarct. 2.  Moderate volume loss and mild to moderate presumed sequela chronic microvascular ischemic change. CERVICAL SPINE CT: 1.  Advanced cervical spondylosis without finding for acute fracture. 2.  Grade 1 anterolisthesis C2 on C3 and C7 on T1, age indeterminate but favored to be chronic and degenerative in etiology. 3.  Disc osteophyte complex contributes to at least mild spinal canal stenosis  at C6-C7.    Cervical spine CT w/o contrast    Result Date: 7/6/2023  EXAM: CT HEAD W/O CONTRAST, CT CERVICAL SPINE W/O CONTRAST LOCATION: [facility] DATE: 7/6/2023 INDICATION: Fall, AMS COMPARISON: None. TECHNIQUE: 1) Routine CT Head without IV contrast. Multiplanar reformats. Dose reduction techniques were used. 2) Routine CT Cervical Spine without IV contrast. Multiplanar reformats. Dose reduction techniques were used. FINDINGS: HEAD CT: INTRACRANIAL CONTENTS: No finding for intracranial hemorrhage, mass, or acute infarct. Mild to moderate prominence of the lateral and third ventricles and sulci. Moderate presumed sequela chronic microvascular ischemic change. No transcortical areas of low-attenuation change. No mass effect or midline shift. Cerebellar tonsils are normally positioned. Sella is unremarkable for technique. Corpus callosum is normally formed. VISUALIZED ORBITS/SINUSES/MASTOIDS: Prior cataract surgeries. Scleral calcifications. Visualized paranasal sinuses, middle ear cavities, and mastoid air cells are clear. BONES/SOFT TISSUES: Calvarium is intact, without fracture or suspicious lytic or blastic foci. CERVICAL SPINE CT: VERTEBRA: Lateral alignment is normal. There is straightening of the normal cervical lordosis. There is slight anterolisthesis C2 on C3 and C7 on T1. Listhesis at these levels is age-indeterminate but favored to be chronic and degenerative in etiology given congruent appearance of associated facet degenerative changes at these levels and lack of prevertebral swelling. Vertebral body heights are grossly preserved. Moderate interspace narrowing at the C3-C4 and more caudal levels and most severe at C3-C4, C6-C7, and T2-T3. Solid-appearing ankylosis across the C4-C5 interspace and ventrally at C5-C6. Large ventral osteophyte formation C6-C7. Advanced facet arthropathy C2-C3 and C3-4. Ankylosis across the C4-C5 facets. Advanced facet arthropathy C7-T1 and T1-T2.  CANAL/FORAMINA: Posterior interbody spurring C3-C4 through C6-C7 with moderate canal narrowing at C6-C7. There is severe bilateral C3-C4 and C6-C7 neural foraminal stenosis. PARASPINAL: Dorsal paraspinal soft tissues are unremarkable. No prevertebral soft tissue swelling. Coarse calcifications both carotid bifurcations/bulbs. Visualized lung apices are clear.     IMPRESSION: HEAD CT: 1.  No finding for intracranial hemorrhage, mass, or acute infarct. 2.  Moderate volume loss and mild to moderate presumed sequela chronic microvascular ischemic change. CERVICAL SPINE CT: 1.  Advanced cervical spondylosis without finding for acute fracture. 2.  Grade 1 anterolisthesis C2 on C3 and C7 on T1, age indeterminate but favored to be chronic and degenerative in etiology. 3.  Disc osteophyte complex contributes to at least mild spinal canal stenosis at C6-C7.    Chest XR,  PA & LAT    Result Date: 7/6/2023  EXAM: XR CHEST 2 VIEWS LOCATION: [facility] DATE: 7/6/2023 INDICATION: fall, ams COMPARISON: None.     IMPRESSION: Heart is normal in size. Lungs are clear.      EKG Results: personally reviewed.   Echo: No results found.

## 2023-07-07 NOTE — PLAN OF CARE
"  Problem: Plan of Care - These are the overarching goals to be used throughout the patient stay.    Goal: Plan of Care Review  Description: The Plan of Care Review/Shift note should be completed every shift.  The Outcome Evaluation is a brief statement about your assessment that the patient is improving, declining, or no change.  This information will be displayed automatically on your shift note.  Outcome: Progressing  Goal: Patient-Specific Goal (Individualized)  Description: You can add care plan individualizations to a care plan. Examples of Individualization might be:  \"Parent requests to be called daily at 9am for status\", \"I have a hard time hearing out of my right ear\", or \"Do not touch me to wake me up as it startles me\".  Outcome: Progressing  Goal: Absence of Hospital-Acquired Illness or Injury  Outcome: Progressing  Intervention: Identify and Manage Fall Risk  Recent Flowsheet Documentation  Taken 7/7/2023 1140 by David Leyva RN  Safety Promotion/Fall Prevention:    room door open    room near nurse's station    safety round/check completed    treat underlying cause  Taken 7/7/2023 0800 by David Leyva RN  Safety Promotion/Fall Prevention:    room door open    room near nurse's station    safety round/check completed    treat underlying cause  Intervention: Prevent Skin Injury  Recent Flowsheet Documentation  Taken 7/7/2023 1140 by David Leyva RN  Body Position: (boosted) log-rolled  Taken 7/7/2023 0800 by David Leyva RN  Body Position: (boosted) log-rolled  Goal: Optimal Comfort and Wellbeing  Outcome: Progressing  Goal: Readiness for Transition of Care  Outcome: Progressing   Goal Outcome Evaluation:         Pt is alert and oriented x3. Pt is forgetful. Pt denies pain. Pt's v/s is stable. Pt is med complaint. Pt has  Frequent  voiding. Pt was bladder scan for 62ml. Continue to monitor pt.               "

## 2023-07-07 NOTE — PROGRESS NOTES
Hospitalist Progress Note    Assessment/Plan  Clayton Pacheco is a 89 year old old male with significant past medical history of hypertension, hyperlipidemia, hypothyroidism who brought to the hospital because he had a fall early morning and he is confused.    Assessment:  Principal Problem:    Acute cystitis without hematuria: Cultures pending  Active Problems:  Leukocytosis: WBC 26.9-26.4.  Suspect reactive, more than infectious.    Metabolic encephalopathy: Resolved.  Patient awake, alert and oriented x3.    Primary hypertension: Controlled and at goal    Other hyperlipidemia: Stable    Other specified hypothyroidism:    Fall: No evidence of fractures.  CT head unrewarding.    Hypomagnesemia, POA.:  Magnesium noted at 1.9    ELVIN (acute kidney injury) (H): POA.  Resolved.  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    Lumbar degenerative changes with severe canal stenosis at L3-L4 and L4-L5    Colonic diverticulosis      Plan:  Continue to optimize pain control  -Continue telemetry monitor  -Continue IV fluid therapy  -Monitor and replete electrolytes as needed  -Follow urine culture  -Continue IV ceftriaxone  -Consult to spine surgery.  Orders placed  -PT/OT eval/Rx      DVT prophylaxis Lovenox  Disposition: Pending progress         Subjective  Patient denies any new complaints.  No acute events overnight.  Endorses low back pain.  Son at bedside, all questions answered.    Objective    Vital signs in last 24 hours  Temp:  [98.9  F (37.2  C)-100.8  F (38.2  C)] 99.8  F (37.7  C)  Pulse:  [] 97  Resp:  [22-34] 23  BP: (105-158)/(53-70) 105/58  SpO2:  [88 %-97 %] 91 % @LASTSAO2(12)@ O2 Device: None (Room air)    Weight:   Wt Readings from Last 3 Encounters:   07/06/23 68 kg (150 lb)      Weight change:     Intake/Output last 3 shifts  I/O last 3 completed  shifts:  In: 100 [IV Piggyback:100]  Out: 250 [Urine:250]  Body mass index is 24.21 kg/m .    Physical Exam    General Appearance:    Alert, cooperative, no distress, appears stated age   Lungs:     Clear bilaterally    Cardiovascular:    Regular rate ands rhythm.  Normal S1, S2.  No murmur, rub or gallop.  No edema   Abdomen:     Soft, non-tender, bowel sounds active all four quadrants,     no masses, no organomegaly   Neurologic:   Awake, alert, oriented x 3.  Grossly nonfocal      Pertinent Labs   Lab Results: personally reviewed.   Recent Labs   Lab 07/06/23  1723      CO2 23   BUN 25.4*   ALBUMIN 4.4   ALKPHOS 95   ALT 14   AST 24     Recent Labs   Lab 07/06/23  1723   WBC 22.9*   HGB 13.5   HCT 39.0*     No results for input(s): CKTOTAL, TROPONINI in the last 168 hours.    Invalid input(s): TROPONINT, CKMBINDEX  Invalid input(s): POCGLUFGR    Medications  Current Facility-Administered Medications   Medication     acetaminophen (TYLENOL) tablet 650 mg    Or     acetaminophen (TYLENOL) Suppository 650 mg     amLODIPine (NORVASC) tablet 10 mg     carboxymethylcellulose PF (REFRESH PLUS) 0.5 % ophthalmic solution 2 drop     cefTRIAXone (ROCEPHIN) 1 g vial to attach to  mL bag for ADULTS or NS 50 mL bag for PEDS     enoxaparin ANTICOAGULANT (LOVENOX) injection 40 mg     levothyroxine (SYNTHROID/LEVOTHROID) tablet 50 mcg     lidocaine (LMX4) cream     lidocaine (LMX4) cream     lidocaine 1 % 0.1-1 mL     lidocaine 1 % 0.1-1 mL     melatonin tablet 1 mg     ondansetron (ZOFRAN ODT) ODT tab 4 mg    Or     ondansetron (ZOFRAN) injection 4 mg     oxyCODONE (ROXICODONE) tablet 5 mg     pravastatin (PRAVACHOL) tablet 20 mg     sodium chloride (PF) 0.9% PF flush 3 mL     sodium chloride (PF) 0.9% PF flush 3 mL     sodium chloride (PF) 0.9% PF flush 3 mL     sodium chloride (PF) 0.9% PF flush 3 mL     Current Outpatient Medications   Medication     amLODIPine (NORVASC) 10 MG tablet     carboxymethylcellulose PF  (REFRESH PLUS) 0.5 % ophthalmic solution     irbesartan-hydrochlorothiazide (AVALIDE) 300-12.5 MG tablet     levothyroxine (SYNTHROID/LEVOTHROID) 50 MCG tablet     pravastatin (PRAVACHOL) 20 MG tablet       Pertinent Radiology   Radiology Results: Personally reviewed   Results for orders placed or performed during the hospital encounter of 07/06/23   Head CT w/o contrast    Impression    IMPRESSION:  HEAD CT:  1.  No finding for intracranial hemorrhage, mass, or acute infarct.    2.  Moderate volume loss and mild to moderate presumed sequela chronic microvascular ischemic change.    CERVICAL SPINE CT:  1.  Advanced cervical spondylosis without finding for acute fracture.    2.  Grade 1 anterolisthesis C2 on C3 and C7 on T1, age indeterminate but favored to be chronic and degenerative in etiology.    3.  Disc osteophyte complex contributes to at least mild spinal canal stenosis at C6-C7.   Cervical spine CT w/o contrast    Impression    IMPRESSION:  HEAD CT:  1.  No finding for intracranial hemorrhage, mass, or acute infarct.    2.  Moderate volume loss and mild to moderate presumed sequela chronic microvascular ischemic change.    CERVICAL SPINE CT:  1.  Advanced cervical spondylosis without finding for acute fracture.    2.  Grade 1 anterolisthesis C2 on C3 and C7 on T1, age indeterminate but favored to be chronic and degenerative in etiology.    3.  Disc osteophyte complex contributes to at least mild spinal canal stenosis at C6-C7.   CT Thoracic Spine w/o Contrast    Impression    IMPRESSION:    THORACIC SPINE CT:  1.  Question nondisplaced fracture at the left anterior T6 vertebral body. Given limitations related to osseous demineralization, consider MRI if further imaging assessment is clinically warranted.  2.  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  3.  Mild multilevel degenerative changes.    LUMBAR SPINE CT:  1.  Suspect acute left lateral inferior endplate fracture at  L4.  2.  Age-indeterminate mild L3 superior endplate compression eccentric to the right.  3.  Question nondisplaced fracture line through left iliac wing, partially visualized. Correlate for attributable pain and consider dedicated CT bony pelvis.  4.  Multilevel degenerative disc disease including suspected severe spinal canal stenosis at L3-L4 and grade 1 degenerative anterolisthesis at L4-L5.  5.  Diffuse osseous demineralization.       Lumbar spine CT w/o contrast    Impression    IMPRESSION:    THORACIC SPINE CT:  1.  Question nondisplaced fracture at the left anterior T6 vertebral body. Given limitations related to osseous demineralization, consider MRI if further imaging assessment is clinically warranted.  2.  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  3.  Mild multilevel degenerative changes.    LUMBAR SPINE CT:  1.  Suspect acute left lateral inferior endplate fracture at L4.  2.  Age-indeterminate mild L3 superior endplate compression eccentric to the right.  3.  Question nondisplaced fracture line through left iliac wing, partially visualized. Correlate for attributable pain and consider dedicated CT bony pelvis.  4.  Multilevel degenerative disc disease including suspected severe spinal canal stenosis at L3-L4 and grade 1 degenerative anterolisthesis at L4-L5.  5.  Diffuse osseous demineralization.       Chest XR,  PA & LAT    Impression    IMPRESSION: Heart is normal in size. Lungs are clear.   Lumbar spine MRI w/o contrast    Impression    IMPRESSION:  1.  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  2.  Lumbar degenerative changes with severe canal stenosis at L3-L4 and L4-L5, as detailed above.   Thoracic spine MRI w/o contrast    Impression    IMPRESSION:  1.  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  2.  Lumbar degenerative changes with severe canal stenosis at L3-L4 and L4-L5, as detailed above.   CT Chest  Abdomen Pelvis w/o Contrast    Impression    IMPRESSION:    1.  No acute process in the chest, abdomen and pelvis. No acute fracture.    2.  Moderate wall thickening of the urinary bladder, possibly due to a markedly enlarged prostate gland and relative decompression. However, clinical correlation with urinalysis is recommended due to presence of mild adjacent fat stranding.    3.  Colonic diverticulosis without acute arthritis.   XR Pelvis w Hip Right G/E 2 Views    Impression    IMPRESSION: Bones are demineralized. There is no evidence of an acute, displaced pelvic or right hip fracture. Mild degenerative changes both hips. Atherosclerotic vascular calcifications. Small metallic density in the soft tissues along the right pelvis   is unchanged from prior.       I spent a total of 53 inutes for this encounter with interval history, physical exam, in patient medication review and reconciliation.      Dia Pizano DO  Internal Medicine Hospitalist  7/7/2023

## 2023-07-07 NOTE — CONSULTS
Care Management Initial Consult    General Information  Assessment completed with: Patient, pt  Type of CM/SW Visit: Initial Assessment    Primary Care Provider verified and updated as needed: Yes   Readmission within the last 30 days: no previous admission in last 30 days      Reason for Consult: discharge planning  Advance Care Planning: Advance Care Planning Reviewed: no concerns identified, verified with patient          Communication Assessment  Patient's communication style: spoken language (English or Bilingual)             Cognitive  Cognitive/Neuro/Behavioral: .WDL except, speech, orientation  Level of Consciousness: alert, intermittent confusion  Arousal Level: opens eyes spontaneously  Orientation: disoriented to, place, time  Mood/Behavior: anxious, cooperative     Speech: word-finding difficulty (at times)    Living Environment:   People in home: spouse     Current living Arrangements: apartment, independent living facility ([facility])      Able to return to prior arrangements: yes       Family/Social Support:  Care provided by: self, spouse/significant other  Provides care for: no one  Marital Status:   Wife          Description of Support System: Supportive, Involved    Support Assessment: Adequate family and caregiver support, Adequate social supports    Current Resources:   Patient receiving home care services: No     Community Resources: Housekeeping/Chore Agency  Equipment currently used at home:    Supplies currently used at home: Hearing Aid Batteries    Employment/Financial:  Employment Status: retired        Financial Concerns: No concerns identified   Referral to Financial Worker: No       Does the patient's insurance plan have a 3 day qualifying hospital stay waiver?  No    Lifestyle & Psychosocial Needs:  Social Determinants of Health     Tobacco Use: Not on file   Alcohol Use: Not on file   Financial Resource Strain: Not on file   Food Insecurity: Not on file    Transportation Needs: Not on file   Physical Activity: Not on file   Stress: Not on file   Social Connections: Not on file   Intimate Partner Violence: Not on file   Depression: Not on file   Housing Stability: Not on file       Functional Status:  Prior to admission patient needed assistance:   Dependent ADLs:: Ambulation-cane, Dressing  Dependent IADLs:: Transportation, Cleaning  Assesssment of Functional Status: Not at baseline with ADL Functioning    Mental Health Status:  Mental Health Status: No Current Concerns       Chemical Dependency Status:                Values/Beliefs:  Spiritual, Cultural Beliefs, Faith Practices, Values that affect care:                 Additional Information:  Assessed, lives w/wife at [facility], and ind w/cane use, 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 Wife helps him minimally. Discussed MOON. Pt would rather go home. Awaiting PT/OT ARANZA munguia to follow.       Tanya Forbes RN

## 2023-07-07 NOTE — PLAN OF CARE
PRIMARY DIAGNOSIS: PYELONEPHRITIS  OUTPATIENT/OBSERVATION GOALS TO BE MET BEFORE DISCHARGE:  Diagnostic test and consults (if applicable) complete: Yes    Vitals signs stable or return to baseline: Yes    Tolerate oral intake to maintain hydration: Yes    Pain status: Pain free.    Tolerating oral antibiotics or has plans for home infusion setup:  Yes    Return to near baseline physical activity: No    Pt alert, orientation fluctuates, currently Aox4.  MRI and CT of his back completed.  Pt on bedrest, turns himself, denies pain.  Voiding, reports dysuria, on iv abx.     Discharge Planner Nurse   Safe discharge environment identified: Yes  Barriers to discharge: Yes       Entered by: Amada Boyd RN 07/07/2023 7:02 AM     Please review provider order for any additional goals.   Nurse to notify provider when observation goals have been met and patient is ready for discharge.

## 2023-07-08 ENCOUNTER — APPOINTMENT (OUTPATIENT)
Dept: CARDIOLOGY | Facility: HOSPITAL | Age: 88
DRG: 689 | End: 2023-07-08
Attending: INTERNAL MEDICINE
Payer: MEDICARE

## 2023-07-08 LAB
ANION GAP SERPL CALCULATED.3IONS-SCNC: 12 MMOL/L (ref 7–15)
ATRIAL RATE - MUSE: 100 BPM
BASOPHILS # BLD AUTO: 0.1 10E3/UL (ref 0–0.2)
BASOPHILS NFR BLD AUTO: 0 %
BUN SERPL-MCNC: 20.6 MG/DL (ref 8–23)
CALCIUM SERPL-MCNC: 8.5 MG/DL (ref 8.2–9.6)
CHLORIDE SERPL-SCNC: 102 MMOL/L (ref 98–107)
CREAT SERPL-MCNC: 0.8 MG/DL (ref 0.67–1.17)
DEPRECATED HCO3 PLAS-SCNC: 23 MMOL/L (ref 22–29)
DIASTOLIC BLOOD PRESSURE - MUSE: NORMAL MMHG
EOSINOPHIL # BLD AUTO: 0 10E3/UL (ref 0–0.7)
EOSINOPHIL NFR BLD AUTO: 0 %
ERYTHROCYTE [DISTWIDTH] IN BLOOD BY AUTOMATED COUNT: 13.3 % (ref 10–15)
GFR SERPL CREATININE-BSD FRML MDRD: 83 ML/MIN/1.73M2
GLUCOSE SERPL-MCNC: 103 MG/DL (ref 70–99)
HCT VFR BLD AUTO: 36.2 % (ref 40–53)
HGB BLD-MCNC: 12.2 G/DL (ref 13.3–17.7)
HOLD SPECIMEN: NORMAL
IMM GRANULOCYTES # BLD: 0.2 10E3/UL
IMM GRANULOCYTES NFR BLD: 1 %
INTERPRETATION ECG - MUSE: NORMAL
LACTATE SERPL-SCNC: 0.9 MMOL/L (ref 0.7–2)
LVEF ECHO: NORMAL
LYMPHOCYTES # BLD AUTO: 1.2 10E3/UL (ref 0.8–5.3)
LYMPHOCYTES NFR BLD AUTO: 5 %
MAGNESIUM SERPL-MCNC: 1.8 MG/DL (ref 1.7–2.3)
MCH RBC QN AUTO: 32.7 PG (ref 26.5–33)
MCHC RBC AUTO-ENTMCNC: 33.7 G/DL (ref 31.5–36.5)
MCV RBC AUTO: 97 FL (ref 78–100)
MONOCYTES # BLD AUTO: 1.9 10E3/UL (ref 0–1.3)
MONOCYTES NFR BLD AUTO: 8 %
NEUTROPHILS # BLD AUTO: 20.3 10E3/UL (ref 1.6–8.3)
NEUTROPHILS NFR BLD AUTO: 86 %
NRBC # BLD AUTO: 0 10E3/UL
NRBC BLD AUTO-RTO: 0 /100
NT-PROBNP SERPL-MCNC: 1880 PG/ML (ref 0–1800)
P AXIS - MUSE: 54 DEGREES
PLATELET # BLD AUTO: 177 10E3/UL (ref 150–450)
POTASSIUM SERPL-SCNC: 3.4 MMOL/L (ref 3.4–5.3)
POTASSIUM SERPL-SCNC: 3.4 MMOL/L (ref 3.4–5.3)
POTASSIUM SERPL-SCNC: 3.6 MMOL/L (ref 3.4–5.3)
PR INTERVAL - MUSE: 152 MS
QRS DURATION - MUSE: 88 MS
QT - MUSE: 350 MS
QTC - MUSE: 451 MS
R AXIS - MUSE: -53 DEGREES
RBC # BLD AUTO: 3.73 10E6/UL (ref 4.4–5.9)
SODIUM SERPL-SCNC: 137 MMOL/L (ref 136–145)
SYSTOLIC BLOOD PRESSURE - MUSE: NORMAL MMHG
T AXIS - MUSE: 38 DEGREES
VENTRICULAR RATE- MUSE: 100 BPM
WBC # BLD AUTO: 23.7 10E3/UL (ref 4–11)

## 2023-07-08 PROCEDURE — 93005 ELECTROCARDIOGRAM TRACING: CPT

## 2023-07-08 PROCEDURE — 255N000002 HC RX 255 OP 636: Performed by: INTERNAL MEDICINE

## 2023-07-08 PROCEDURE — 93306 TTE W/DOPPLER COMPLETE: CPT | Mod: 26 | Performed by: INTERNAL MEDICINE

## 2023-07-08 PROCEDURE — 93005 ELECTROCARDIOGRAM TRACING: CPT | Performed by: INTERNAL MEDICINE

## 2023-07-08 PROCEDURE — 85025 COMPLETE CBC W/AUTO DIFF WBC: CPT | Performed by: INTERNAL MEDICINE

## 2023-07-08 PROCEDURE — 80048 BASIC METABOLIC PNL TOTAL CA: CPT | Performed by: INTERNAL MEDICINE

## 2023-07-08 PROCEDURE — 84132 ASSAY OF SERUM POTASSIUM: CPT | Performed by: INTERNAL MEDICINE

## 2023-07-08 PROCEDURE — 250N000013 HC RX MED GY IP 250 OP 250 PS 637: Performed by: HOSPITALIST

## 2023-07-08 PROCEDURE — 83735 ASSAY OF MAGNESIUM: CPT | Performed by: HOSPITALIST

## 2023-07-08 PROCEDURE — 250N000011 HC RX IP 250 OP 636: Mod: JZ | Performed by: HOSPITALIST

## 2023-07-08 PROCEDURE — 96376 TX/PRO/DX INJ SAME DRUG ADON: CPT

## 2023-07-08 PROCEDURE — G0378 HOSPITAL OBSERVATION PER HR: HCPCS

## 2023-07-08 PROCEDURE — 84132 ASSAY OF SERUM POTASSIUM: CPT | Performed by: HOSPITALIST

## 2023-07-08 PROCEDURE — 36415 COLL VENOUS BLD VENIPUNCTURE: CPT | Performed by: HOSPITALIST

## 2023-07-08 PROCEDURE — 93010 ELECTROCARDIOGRAM REPORT: CPT | Mod: HIP | Performed by: INTERNAL MEDICINE

## 2023-07-08 PROCEDURE — 36415 COLL VENOUS BLD VENIPUNCTURE: CPT | Performed by: INTERNAL MEDICINE

## 2023-07-08 PROCEDURE — 99222 1ST HOSP IP/OBS MODERATE 55: CPT | Performed by: INTERNAL MEDICINE

## 2023-07-08 PROCEDURE — 83880 ASSAY OF NATRIURETIC PEPTIDE: CPT | Performed by: INTERNAL MEDICINE

## 2023-07-08 PROCEDURE — 83605 ASSAY OF LACTIC ACID: CPT | Performed by: INTERNAL MEDICINE

## 2023-07-08 PROCEDURE — 96372 THER/PROPH/DIAG INJ SC/IM: CPT | Performed by: HOSPITALIST

## 2023-07-08 PROCEDURE — 250N000013 HC RX MED GY IP 250 OP 250 PS 637: Performed by: INTERNAL MEDICINE

## 2023-07-08 RX ORDER — POTASSIUM CHLORIDE 1500 MG/1
40 TABLET, EXTENDED RELEASE ORAL ONCE
Status: COMPLETED | OUTPATIENT
Start: 2023-07-08 | End: 2023-07-08

## 2023-07-08 RX ORDER — CARBOXYMETHYLCELLULOSE SODIUM 10 MG/ML
2 GEL OPHTHALMIC 2 TIMES DAILY
Status: DISCONTINUED | OUTPATIENT
Start: 2023-07-08 | End: 2023-07-09

## 2023-07-08 RX ADMIN — Medication 2 DROP: at 08:21

## 2023-07-08 RX ADMIN — CEFTRIAXONE SODIUM 1 G: 1 INJECTION, POWDER, FOR SOLUTION INTRAMUSCULAR; INTRAVENOUS at 08:22

## 2023-07-08 RX ADMIN — PRAVASTATIN SODIUM 20 MG: 20 TABLET ORAL at 21:06

## 2023-07-08 RX ADMIN — ENOXAPARIN SODIUM 40 MG: 40 INJECTION SUBCUTANEOUS at 21:06

## 2023-07-08 RX ADMIN — CARBOXYMETHYLCELLULOSE SODIUM 2 DROP: 10 GEL OPHTHALMIC at 21:06

## 2023-07-08 RX ADMIN — POTASSIUM CHLORIDE 40 MEQ: 1500 TABLET, EXTENDED RELEASE ORAL at 11:35

## 2023-07-08 RX ADMIN — PERFLUTREN 3 ML: 6.52 INJECTION, SUSPENSION INTRAVENOUS at 10:53

## 2023-07-08 RX ADMIN — LEVOTHYROXINE SODIUM 50 MCG: 0.03 TABLET ORAL at 06:24

## 2023-07-08 RX ADMIN — AMLODIPINE BESYLATE 10 MG: 5 TABLET ORAL at 08:21

## 2023-07-08 ASSESSMENT — ACTIVITIES OF DAILY LIVING (ADL)
ADLS_ACUITY_SCORE: 39

## 2023-07-08 NOTE — PLAN OF CARE
Goal Outcome Evaluation:  Alert and oriented, can be forgetful at times.  Sepsis protocol fired at start of shift d/t low grade fever, tachycardia and WBC 26.4. LA came back at 1.2. VSS aside from low grade temp.  Denies pain.  Poor appetite.  Sl'd .  Mg and K protocols are both rechecks in am.  Urinary frequency. Primofit in place. Roopa urine.  Telemetry-NSR.   Bedrest d/t possible L4 fx and sever spinal stenosis. Awaiting Spine Surg Consult. Reposition every 2-3 hrs. Bed alarm on.

## 2023-07-08 NOTE — PLAN OF CARE
PRIMARY DIAGNOSIS: GENERALIZED WEAKNESS    Alert and oriented. Drowsy. Ate most of breakfast. Understands reason for bedrest and staying in bed. Using urinal for urinary frequency. Denies pain.   OUTPATIENT/OBSERVATION GOALS TO BE MET BEFORE DISCHARGE  1. Orthostatic performed: No    2. Tolerating PO medications: Yes    3. Return to near baseline physical activity: No    4. Cleared for discharge by consultants (if involved): No    Discharge Planner Nurse   Safe discharge environment identified: No  Barriers to discharge: Yes       Entered by: Gabriel Noguera RN 07/08/2023 11:46 AM     Please review provider order for any additional goals.   Nurse to notify provider when observation goals have been met and patient is ready for discharge.Goal Outcome Evaluation:

## 2023-07-08 NOTE — PROGRESS NOTES
PRIMARY DIAGNOSIS: GENERALIZED WEAKNESS    OUTPATIENT/OBSERVATION GOALS TO BE MET BEFORE DISCHARGE  1. Orthostatic performed: No    2. Tolerating PO medications: Yes    3. Return to near baseline physical activity: No    4. Cleared for discharge by consultants (if involved): No    Discharge Planner Nurse   Safe discharge environment identified: No  Barriers to discharge: Yes       Entered by: Robbie Azevedo RN 07/08/2023 6:06 AM     Please review provider order for any additional goals.   Nurse to notify provider when observation goals have been met and patient is ready for discharge.

## 2023-07-08 NOTE — CONSULTS
[facility] Heart Care  Cardiac Electrophysiology     Consultation Note: Ivan Haskins MD    Primary Care: Oscar Bedolla      Thank you, Dr. De Oliveira, Oscar Ho, for asking the [facility] Heart Care team to see Mr. Clayton Pacheco to evaluate elevated troponin.      Assessment/Recommendations   Assessment:      Elevated troponin: Mild high-sensitivity troponin elevation with increase of 19 on second measure.  The patient has no acute ST or T wave changes on his twelve-lead EKG (no comparisons available).  The patient has no symptoms suggesting coronary ischemia or significant heart failure.  Patient's echocardiogram demonstrates normal left ventricular systolic performance, normal wall motion, with LVEF of 55-60%.  At this point I would not recommend anticoagulant therapy and would follow the patient expectantly with planned early stress imaging study once the patient is cleared medically.    Fall (uncertain of whether it was mechanical versus syncope) which occurred in the early morning hours.  The patient was brought to the hospital due to confusion.  The patient reports no previous episodes.  His twelve-lead EKG does demonstrate left anterior fascicular block and telemetry is demonstrated no tachy or sherif arrhythmia.    Plan:    Check BNP level    Continue to monitor telemetry    Consider early dobutamine stress echo.       History of Present Illness/Subjective    Mr. Clayton Pacheco is a 89 year old male with no significant past cardiac history.  He does have a history of hypertension, hyperlipidemia and at least one of his sons has coronary artery disease.  The patient was admitted after suffering a fall in the early morning hours.  This was unwitnessed and the patient does not recall details.  It is unclear whether or not this is mechanical versus syncopal event.  The patient had no previous similar symptoms or events.  He reports no recent exertional dyspnea or  "chest discomfort.  He is not experiencing any orthopnea, PND, or ankle edema.  He reports no recent change in his medical therapy.    ECG: Personally reviewed: Twelve-lead EKG from today demonstrates sinus rhythm 100 bpm, RI, QRS and QT intervals are all normal.  Patient demonstrates left anterior fascicular block with slow R wave progression and leftward axis.    ECHO: Dated 7/8/2023                                              The left ventricle is normal in size.  There is mild to moderate concentric left ventricular hypertrophy.  The visual ejection fraction is 55-60%.  Diastolic Doppler findings (E/E' ratio and/or other parameters) suggest left  ventricular filling pressures are increased.  No regional wall motion abnormalities noted.  Normal right ventricle size and systolic function.  The right ventricular systolic pressure is approximated at 29mmHg plus the  right atrial pressure.  There is moderate trileaflet aortic sclerosis.  Mild valvular aortic stenosis.  Trivial pericardial effusion  Sinus rhythm was noted.    Other Studies:     Time spent: 60   minutes spent on the date of the encounter doing chart review, history and exam, documentation and further activities as noted above.    Clinically Significant Risk Factors Present on Admission                       Physical Examination Review of Systems   /66 (BP Location: Right arm)   Pulse 106   Temp 98.6  F (37  C) (Oral)   Resp 18   Ht 1.676 m (5' 6\")   Wt 68 kg (150 lb)   SpO2 94%   BMI 24.21 kg/m    Body mass index is 24.21 kg/m .  Wt Readings from Last 3 Encounters:   07/06/23 68 kg (150 lb)       Intake/Output Summary (Last 24 hours) at 7/8/2023 1725  Last data filed at 7/8/2023 1456  Gross per 24 hour   Intake 444 ml   Output 600 ml   Net -156 ml       General     Appearance:   The patient is alert oriented to person place and situation.    The patient is in no acute distress at the time of my evaluation.   HEENT:  Pupils are equal, " round, and reactive to light.  Conjunctiva and sclera are clear.  ENT: Oral mucosa is moist and without redness. No evident nasal discharge.   Neck: Without palpable thyroid or appreciable lymph nodes.   Chest: Symmetric, without deformity.   Lungs:   Pulmonary exam reveals finerales in the bases bilaterally, but no rhonchi, or wheezes.     Cardiovascular:   Rhythm is regular. S1 and S2 are normal. No significant murmur is present. JVP is mildly elevated.  Lower extremities demonstrate no significant edema. Distal pulses are intact bilaterally.   Abdomen:  Abdomen is flat, soft, and nontender.   Extremities: Without deformity.   Skin: Skin is warm, dry, and otherwise intact.   Neurologic: Gait is not observed as the patient is reclining in bed.            A 12 point comprehensive review of systems was negative except as noted.      Medical History  Surgical History Family History Social History   Hypertension  Dyslipidemia  Hypothyroidism   No past surgical history on file. Mother:   Father:   Son: Valve surgery  Son: CABG   Social History     Socioeconomic History     Marital status:      Spouse name: Not on file     Number of children: Not on file     Years of education: Not on file     Highest education level: Not on file   Occupational History     Not on file   Tobacco Use     Smoking status: Not on file     Smokeless tobacco: Not on file   Substance and Sexual Activity     Alcohol use: Not on file     Drug use: Not on file     Sexual activity: Not on file   Other Topics Concern     Not on file   Social History Narrative     Not on file     Social Determinants of Health     Financial Resource Strain: Not on file   Food Insecurity: Not on file   Transportation Needs: Not on file   Physical Activity: Not on file   Stress: Not on file   Social Connections: Not on file   Intimate Partner Violence: Not on file   Housing Stability: Not on file          Medications  Allergies   Scheduled Meds:     amLODIPine  10 mg Oral Daily     carboxymethylcellulose PF  2 drop Both Eyes BID     cefTRIAXone  1 g Intravenous Q24H     enoxaparin ANTICOAGULANT  40 mg Subcutaneous Q24H     levothyroxine  50 mcg Oral Daily     pravastatin  20 mg Oral QPM     sodium chloride (PF)  3 mL Intracatheter Q8H     sodium chloride (PF)  3 mL Intracatheter Q8H     Continuous Infusions:  PRN Meds:.acetaminophen **OR** acetaminophen, lidocaine 4%, lidocaine (buffered or not buffered), melatonin, naloxone **OR** naloxone **OR** naloxone **OR** naloxone, ondansetron **OR** ondansetron, oxyCODONE, sodium chloride (PF) No Known Allergies      Lab Results    Chemistry/lipid CBC Cardiac Enzymes/BNP/TSH/INR   Lab Results   Component Value Date    CHOL 166 09/06/2022    HDL 56 09/06/2022    TRIG 96 09/06/2022    BUN 20.6 07/08/2023     07/08/2023    CO2 23 07/08/2023    Lab Results   Component Value Date    WBC 23.7 (H) 07/08/2023    HGB 12.2 (L) 07/08/2023    HCT 36.2 (L) 07/08/2023    MCV 97 07/08/2023     07/08/2023    Lab Results   Component Value Date    TSH 3.49 09/06/2022           Ivan Haskins MD  Clinical Cardiac Electrophysiologist  [facility]

## 2023-07-08 NOTE — PROGRESS NOTES
Hospitalist Progress Note    Assessment/Plan  Clayton Pacheco is a 89 year old old male with significant past medical history of hypertension, hyperlipidemia, hypothyroidism who brought to the hospital because he had a fall early morning and he is confused.    Assessment:  Principal Problem:   Acute cystitis without hematuria: Cultures pending   Active Problems:     Elevated troponin: Noted troponin 29-->34-->53.  Denies any chest pain.  Unrewarding  EKG.  Intermittent tachycardia: will consult cardiology.  Discussed with Dr. Haskins.  Check   echocardiogram.    Leukocytosis: WBC slight improvement today at 23.7.  No evidence of active infectious process ongoing.  Monitor.  Metabolic encephalopathy: Resolved.  Patient awake, alert and oriented x3.    Primary hypertension: Controlled and at goal    Other hyperlipidemia: Stable    Other specified hypothyroidism:    Fall: No evidence of fractures.  CT head unrewarding.    Hypomagnesemia, POA.:  Resolved.    ELVIN (acute kidney injury) (H): POA.  Resolved.  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    Lumbar degenerative changes with severe canal stenosis at L3-L4 and L4-L5    Colonic diverticulosis  UTI: Urine cultures (+) E. Coli.      Plan:  Continue to optimize pain control  -Continue telemetry monitor  -Continue IV fluid therapy  -Cardiology consult.    -Monitor and replete electrolytes as needed  -Follow urine culture.  Monitor WBC.  -Continue IV ceftriaxone.  Plan to de-escalate prior to discharge.  -Consult to spine surgery.  Orders placed  -PT/OT eval/Rx      DVT prophylaxis Lovenox  Disposition: Pending progress.  Follow cardiology/spine surgery consult.       Subjective  Patient denies any new complaints.  No acute events overnight.  Endorses low back pain.  Son at bedside,  all questions answered.    Objective    Vital signs in last 24 hours  Temp:  [98  F (36.7  C)-100.8  F (38.2  C)] 98.7  F (37.1  C)  Pulse:  [] 84  Resp:  [18-28] 18  BP: (116-135)/(58-69) 121/60  SpO2:  [90 %-95 %] 95 % @LASTSAO2(12)@ O2 Device: None (Room air)    Weight:   Wt Readings from Last 3 Encounters:   07/06/23 68 kg (150 lb)      Weight change:     Intake/Output last 3 shifts  I/O last 3 completed shifts:  In: 444 [I.V.:444]  Out: 700 [Urine:700]  Body mass index is 24.21 kg/m .    Physical Exam    General Appearance:    Alert, cooperative, no distress, appears stated age   Lungs:     Clear bilaterally    Cardiovascular:    Regular rate ands rhythm.  Normal S1, S2.  No murmur, rub or gallop.  No edema   Abdomen:     Soft, non-tender, bowel sounds active all four quadrants,     no masses, no organomegaly   Neurologic:   Awake, alert, oriented x 3.  Grossly nonfocal      Pertinent Labs   Lab Results: personally reviewed.   Recent Labs   Lab 07/07/23  0752 07/06/23  1723    137   CO2 25 23   BUN 21.9 25.4*   ALBUMIN  --  4.4   ALKPHOS  --  95   ALT  --  14   AST  --  24     Recent Labs   Lab 07/07/23  0752 07/06/23  1723   WBC 26.4* 22.9*   HGB 12.2* 13.5   HCT 36.0* 39.0*     --      No results for input(s): CKTOTAL, TROPONINI in the last 168 hours.    Invalid input(s): TROPONINT, CKMBINDEX  Invalid input(s): POCGLUFGR    Medications  Current Facility-Administered Medications   Medication     acetaminophen (TYLENOL) tablet 650 mg    Or     acetaminophen (TYLENOL) Suppository 650 mg     amLODIPine (NORVASC) tablet 10 mg     carboxymethylcellulose PF (REFRESH PLUS) 0.5 % ophthalmic solution 2 drop     cefTRIAXone (ROCEPHIN) 1 g vial to attach to  mL bag for ADULTS or NS 50 mL bag for PEDS     enoxaparin ANTICOAGULANT (LOVENOX) injection 40 mg     levothyroxine (SYNTHROID/LEVOTHROID) tablet 50 mcg     lidocaine (LMX4) cream     lidocaine (LMX4) cream     lidocaine 1 % 0.1-1 mL      lidocaine 1 % 0.1-1 mL     melatonin tablet 1 mg     naloxone (NARCAN) injection 0.2 mg    Or     naloxone (NARCAN) injection 0.4 mg    Or     naloxone (NARCAN) injection 0.2 mg    Or     naloxone (NARCAN) injection 0.4 mg     ondansetron (ZOFRAN ODT) ODT tab 4 mg    Or     ondansetron (ZOFRAN) injection 4 mg     oxyCODONE (ROXICODONE) tablet 5 mg     pravastatin (PRAVACHOL) tablet 20 mg     sodium chloride (PF) 0.9% PF flush 3 mL     sodium chloride (PF) 0.9% PF flush 3 mL     sodium chloride (PF) 0.9% PF flush 3 mL     sodium chloride (PF) 0.9% PF flush 3 mL       Pertinent Radiology   Radiology Results: Personally reviewed   Results for orders placed or performed during the hospital encounter of 07/06/23   Head CT w/o contrast    Impression    IMPRESSION:  HEAD CT:  1.  No finding for intracranial hemorrhage, mass, or acute infarct.    2.  Moderate volume loss and mild to moderate presumed sequela chronic microvascular ischemic change.    CERVICAL SPINE CT:  1.  Advanced cervical spondylosis without finding for acute fracture.    2.  Grade 1 anterolisthesis C2 on C3 and C7 on T1, age indeterminate but favored to be chronic and degenerative in etiology.    3.  Disc osteophyte complex contributes to at least mild spinal canal stenosis at C6-C7.   Cervical spine CT w/o contrast    Impression    IMPRESSION:  HEAD CT:  1.  No finding for intracranial hemorrhage, mass, or acute infarct.    2.  Moderate volume loss and mild to moderate presumed sequela chronic microvascular ischemic change.    CERVICAL SPINE CT:  1.  Advanced cervical spondylosis without finding for acute fracture.    2.  Grade 1 anterolisthesis C2 on C3 and C7 on T1, age indeterminate but favored to be chronic and degenerative in etiology.    3.  Disc osteophyte complex contributes to at least mild spinal canal stenosis at C6-C7.   CT Thoracic Spine w/o Contrast    Impression    IMPRESSION:    THORACIC SPINE CT:  1.  Question nondisplaced fracture at  the left anterior T6 vertebral body. Given limitations related to osseous demineralization, consider MRI if further imaging assessment is clinically warranted.  2.  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  3.  Mild multilevel degenerative changes.    LUMBAR SPINE CT:  1.  Suspect acute left lateral inferior endplate fracture at L4.  2.  Age-indeterminate mild L3 superior endplate compression eccentric to the right.  3.  Question nondisplaced fracture line through left iliac wing, partially visualized. Correlate for attributable pain and consider dedicated CT bony pelvis.  4.  Multilevel degenerative disc disease including suspected severe spinal canal stenosis at L3-L4 and grade 1 degenerative anterolisthesis at L4-L5.  5.  Diffuse osseous demineralization.       Lumbar spine CT w/o contrast    Impression    IMPRESSION:    THORACIC SPINE CT:  1.  Question nondisplaced fracture at the left anterior T6 vertebral body. Given limitations related to osseous demineralization, consider MRI if further imaging assessment is clinically warranted.  2.  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  3.  Mild multilevel degenerative changes.    LUMBAR SPINE CT:  1.  Suspect acute left lateral inferior endplate fracture at L4.  2.  Age-indeterminate mild L3 superior endplate compression eccentric to the right.  3.  Question nondisplaced fracture line through left iliac wing, partially visualized. Correlate for attributable pain and consider dedicated CT bony pelvis.  4.  Multilevel degenerative disc disease including suspected severe spinal canal stenosis at L3-L4 and grade 1 degenerative anterolisthesis at L4-L5.  5.  Diffuse osseous demineralization.       Chest XR,  PA & LAT    Impression    IMPRESSION: Heart is normal in size. Lungs are clear.   Lumbar spine MRI w/o contrast    Impression    IMPRESSION:  1.  Thoracic DISH without MRI findings of acute thoracolumbar  spine injury.  2.  Lumbar degenerative changes with severe canal stenosis at L3-L4 and L4-L5, as detailed above.   Thoracic spine MRI w/o contrast    Impression    IMPRESSION:  1.  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  2.  Lumbar degenerative changes with severe canal stenosis at L3-L4 and L4-L5, as detailed above.   CT Chest Abdomen Pelvis w/o Contrast    Impression    IMPRESSION:    1.  No acute process in the chest, abdomen and pelvis. No acute fracture.    2.  Moderate wall thickening of the urinary bladder, possibly due to a markedly enlarged prostate gland and relative decompression. However, clinical correlation with urinalysis is recommended due to presence of mild adjacent fat stranding.    3.  Colonic diverticulosis without acute arthritis.   XR Pelvis w Hip Right G/E 2 Views    Impression    IMPRESSION: Bones are demineralized. There is no evidence of an acute, displaced pelvic or right hip fracture. Mild degenerative changes both hips. Atherosclerotic vascular calcifications. Small metallic density in the soft tissues along the right pelvis   is unchanged from prior.       I spent a total of 53 inutes for this encounter with interval history, physical exam, in patient medication review and reconciliation.      Dia Pizano DO  Internal Medicine Hospitalist  7/7/2023

## 2023-07-09 ENCOUNTER — APPOINTMENT (OUTPATIENT)
Dept: OCCUPATIONAL THERAPY | Facility: HOSPITAL | Age: 88
DRG: 689 | End: 2023-07-09
Payer: MEDICARE

## 2023-07-09 ENCOUNTER — APPOINTMENT (OUTPATIENT)
Dept: PHYSICAL THERAPY | Facility: HOSPITAL | Age: 88
DRG: 689 | End: 2023-07-09
Attending: HOSPITALIST
Payer: MEDICARE

## 2023-07-09 PROBLEM — N39.0 URINARY TRACT INFECTION WITHOUT HEMATURIA, SITE UNSPECIFIED: Status: ACTIVE | Noted: 2023-07-09

## 2023-07-09 PROBLEM — M79.5 FOREIGN BODY (FB) IN SOFT TISSUE: Status: ACTIVE | Noted: 2023-07-09

## 2023-07-09 LAB
ANION GAP SERPL CALCULATED.3IONS-SCNC: 10 MMOL/L (ref 7–15)
BACTERIA UR CULT: ABNORMAL
BASOPHILS # BLD AUTO: 0.1 10E3/UL (ref 0–0.2)
BASOPHILS NFR BLD AUTO: 1 %
BUN SERPL-MCNC: 23.2 MG/DL (ref 8–23)
CALCIUM SERPL-MCNC: 8.5 MG/DL (ref 8.2–9.6)
CHLORIDE SERPL-SCNC: 105 MMOL/L (ref 98–107)
CK BB CFR SERPL ELPH: 0 %
CK MACRO1 CFR SERPL: 0 %
CK MACRO2 CFR SERPL: 0 %
CK MB CFR SERPL ELPH: 0 %
CK MM CFR SERPL ELPH: 100 %
CK SERPL-CCNC: 105 U/L
CREAT SERPL-MCNC: 0.68 MG/DL (ref 0.67–1.17)
DEPRECATED HCO3 PLAS-SCNC: 23 MMOL/L (ref 22–29)
EOSINOPHIL # BLD AUTO: 0.1 10E3/UL (ref 0–0.7)
EOSINOPHIL NFR BLD AUTO: 0 %
ERYTHROCYTE [DISTWIDTH] IN BLOOD BY AUTOMATED COUNT: 13.3 % (ref 10–15)
GFR SERPL CREATININE-BSD FRML MDRD: 87 ML/MIN/1.73M2
GLUCOSE SERPL-MCNC: 117 MG/DL (ref 70–99)
HCT VFR BLD AUTO: 36.7 % (ref 40–53)
HGB BLD-MCNC: 12.6 G/DL (ref 13.3–17.7)
HOLD SPECIMEN: NORMAL
IMM GRANULOCYTES # BLD: 0.4 10E3/UL
IMM GRANULOCYTES NFR BLD: 2 %
LACTATE SERPL-SCNC: 1.4 MMOL/L (ref 0.7–2)
LYMPHOCYTES # BLD AUTO: 1.4 10E3/UL (ref 0.8–5.3)
LYMPHOCYTES NFR BLD AUTO: 9 %
MAGNESIUM SERPL-MCNC: 1.7 MG/DL (ref 1.7–2.3)
MCH RBC QN AUTO: 32.6 PG (ref 26.5–33)
MCHC RBC AUTO-ENTMCNC: 34.3 G/DL (ref 31.5–36.5)
MCV RBC AUTO: 95 FL (ref 78–100)
MONOCYTES # BLD AUTO: 1.4 10E3/UL (ref 0–1.3)
MONOCYTES NFR BLD AUTO: 9 %
NEUTROPHILS # BLD AUTO: 12.6 10E3/UL (ref 1.6–8.3)
NEUTROPHILS NFR BLD AUTO: 79 %
NRBC # BLD AUTO: 0 10E3/UL
NRBC BLD AUTO-RTO: 0 /100
PLATELET # BLD AUTO: 217 10E3/UL (ref 150–450)
POTASSIUM SERPL-SCNC: 3.6 MMOL/L (ref 3.4–5.3)
RBC # BLD AUTO: 3.87 10E6/UL (ref 4.4–5.9)
SODIUM SERPL-SCNC: 138 MMOL/L (ref 136–145)
WBC # BLD AUTO: 15.9 10E3/UL (ref 4–11)

## 2023-07-09 PROCEDURE — 97116 GAIT TRAINING THERAPY: CPT | Mod: GP

## 2023-07-09 PROCEDURE — 83605 ASSAY OF LACTIC ACID: CPT | Performed by: INTERNAL MEDICINE

## 2023-07-09 PROCEDURE — 97535 SELF CARE MNGMENT TRAINING: CPT | Mod: GO

## 2023-07-09 PROCEDURE — 96376 TX/PRO/DX INJ SAME DRUG ADON: CPT

## 2023-07-09 PROCEDURE — 83735 ASSAY OF MAGNESIUM: CPT | Performed by: INTERNAL MEDICINE

## 2023-07-09 PROCEDURE — 97161 PT EVAL LOW COMPLEX 20 MIN: CPT | Mod: GP

## 2023-07-09 PROCEDURE — 99232 SBSQ HOSP IP/OBS MODERATE 35: CPT | Performed by: INTERNAL MEDICINE

## 2023-07-09 PROCEDURE — 250N000011 HC RX IP 250 OP 636: Mod: JZ | Performed by: HOSPITALIST

## 2023-07-09 PROCEDURE — 97165 OT EVAL LOW COMPLEX 30 MIN: CPT | Mod: GO

## 2023-07-09 PROCEDURE — 250N000013 HC RX MED GY IP 250 OP 250 PS 637: Performed by: INTERNAL MEDICINE

## 2023-07-09 PROCEDURE — 250N000013 HC RX MED GY IP 250 OP 250 PS 637: Performed by: HOSPITALIST

## 2023-07-09 PROCEDURE — 82310 ASSAY OF CALCIUM: CPT | Performed by: INTERNAL MEDICINE

## 2023-07-09 PROCEDURE — 97110 THERAPEUTIC EXERCISES: CPT | Mod: GP

## 2023-07-09 PROCEDURE — 36415 COLL VENOUS BLD VENIPUNCTURE: CPT | Performed by: INTERNAL MEDICINE

## 2023-07-09 PROCEDURE — 85025 COMPLETE CBC W/AUTO DIFF WBC: CPT | Performed by: INTERNAL MEDICINE

## 2023-07-09 PROCEDURE — 120N000001 HC R&B MED SURG/OB

## 2023-07-09 PROCEDURE — G0378 HOSPITAL OBSERVATION PER HR: HCPCS

## 2023-07-09 RX ORDER — LANOLIN ALCOHOL/MO/W.PET/CERES
3 CREAM (GRAM) TOPICAL AT BEDTIME
COMMUNITY

## 2023-07-09 RX ORDER — AMOXICILLIN 250 MG
1 CAPSULE ORAL AT BEDTIME
Status: DISCONTINUED | OUTPATIENT
Start: 2023-07-09 | End: 2023-07-10 | Stop reason: HOSPADM

## 2023-07-09 RX ORDER — LANOLIN ALCOHOL/MO/W.PET/CERES
3 CREAM (GRAM) TOPICAL
Status: DISCONTINUED | OUTPATIENT
Start: 2023-07-09 | End: 2023-07-10 | Stop reason: HOSPADM

## 2023-07-09 RX ADMIN — CEFTRIAXONE SODIUM 1 G: 1 INJECTION, POWDER, FOR SOLUTION INTRAMUSCULAR; INTRAVENOUS at 08:20

## 2023-07-09 RX ADMIN — AMLODIPINE BESYLATE 10 MG: 5 TABLET ORAL at 08:18

## 2023-07-09 RX ADMIN — ENOXAPARIN SODIUM 40 MG: 40 INJECTION SUBCUTANEOUS at 20:10

## 2023-07-09 RX ADMIN — MAGNESIUM HYDROXIDE 30 ML: 400 SUSPENSION ORAL at 11:24

## 2023-07-09 RX ADMIN — SENNOSIDES AND DOCUSATE SODIUM 1 TABLET: 50; 8.6 TABLET ORAL at 20:09

## 2023-07-09 RX ADMIN — PRAVASTATIN SODIUM 20 MG: 20 TABLET ORAL at 20:09

## 2023-07-09 RX ADMIN — LEVOTHYROXINE SODIUM 50 MCG: 0.03 TABLET ORAL at 07:06

## 2023-07-09 ASSESSMENT — ACTIVITIES OF DAILY LIVING (ADL)
ADLS_ACUITY_SCORE: 39

## 2023-07-09 NOTE — UTILIZATION REVIEW
Inpatient appropriate    Admission Status; Secondary Review Determination       Under the authority of the Utilization Management Committee, the utilization review process indicated a secondary review on the above patient. The review outcome is based on review of the medical records, discussions with staff, and applying clinical experience noted on the date of the review.     (x) Inpatient Status Appropriate - This patient's medical care is consistent with medical management for inpatient care and reasonable inpatient medical practice.     RATIONALE FOR DETERMINATION     89 year old old male with significant past medical history of hypertension, hyperlipidemia, hypothyroidism who brought to the hospital because he had a fall early morning and he is confused.  Patient found to have UTI and met sepsis criteria.  He started on IV antibiotic.  Also found to have elevated troponin concerning for non-ST elevation MI.  Cardiologist plan for early stress test once patient is more stable.    At the time of admission with the information available to the attending physician more than 2 nights Hospital complex care was anticipated, based on patient risk of adverse outcome if treated as outpatient and complex care required. Inpatient admission is appropriate based on the Medicare guidelines.     The information on this document is developed by the utilization review team in order for the business office to ensure compliance. This only denotes the appropriateness of proper admission status and does not reflect the quality of care rendered.   The definitions of Inpatient Status and Observation Status used in making the determination above are those provided in the CMS Coverage Manual, Chapter 1 and Chapter 6, section 70.4.   Sincerely,   Gianfranco Pettit MD  Utilization Review  Physician Advisor  [facility].

## 2023-07-09 NOTE — PROGRESS NOTES
"   [facility]  Cardiology     Progress Note: Ivan Haskins MD    Primary Care: Dr. De Oliveira, Oscar Ho    Assessment:         Elevated troponin: Mild high-sensitivity troponin elevation with increase of 19 on second measure.  The patient has no acute ST or T wave changes on his twelve-lead EKG (no comparisons available).  The patient remains asymptomatic.  Patient's echocardiogram demonstrates normal left ventricular systolic performance, normal wall motion, with LVEF of 55-60%.    Okay to move forward with dobutamine stress echo.      HFpEF: Patient has elevated BNP in the setting of normal left regular systolic performance.  The patient does not appear to be volume overloaded at present time.    Fall (uncertain of whether it was mechanical versus syncope) which occurred in the early morning hours.  The patient was brought to the hospital due to confusion.  The patient reports no previous episodes.  His twelve-lead EKG does demonstrate left anterior fascicular block, cardiac telemetry has not demonstrated any tachy or sherif arrhythmia.  If the patient should suffer recurrent events then he would be a candidate for a implantable loop recorder (ILR).    Principal Problem:    Acute cystitis without hematuria  Active Problems:    Metabolic encephalopathy    Primary hypertension    Other hyperlipidemia    Other specified hypothyroidism    Fall    Hypomagnesemia    ELVIN (acute kidney injury) (H)    Foreign body (FB) in soft tissue    Urinary tract infection without hematuria, site unspecified     LOS: 0 days       Recommendations:    Patient will schedule for a dobutamine stress echo tomorrow.    Continue current medication regimen    Time spent: I have spent 30 minutes       Subjective:  Clayton Pacheco (89 year old male) is seen in follow-up for elevated troponin and BNP as well as a fall.  Patient reports that he did not sleep well and felt somewhat \"anxious\".  He reports no new symptoms.  He is not " having any chest pain or shortness of breath.  He reports no dizziness or lightheadedness.    No current outpatient medications on file.       Objective:   Vital signs in last 24 hours:  Temp:  [98.5  F (36.9  C)-99.6  F (37.6  C)] 98.5  F (36.9  C)  Pulse:  [86-98] 86  Resp:  [17-18] 18  BP: (127-145)/(62-73) 128/63  SpO2:  [94 %-95 %] 94 %  Weight:   [unfilled]   Weight change:       Physical Exam:  General: The patient is alert oriented to person place and situation.  The patient is in no acute distress at the time of my evaluation.  Eyes: Pupils are equal, round, and reactive to light.  Conjunctiva and sclera are clear.  ENT: Oral mucosa is moist and without redness. No evident nasal discharge.  Pulmonary: Lungs are clear bilaterally with no rales, rhonchi, or wheezes.    Cardiovascular exam: Rhythm is regular. S1 and S2 are normal. No apparent callie.  There is a 2/6 systolic murmur heard at the base.  JVP is normal. Lower extremities demonstrate no significant edema. Distal pulses are intact bilaterally.  Abdomen is soft, nontender, with no organomegaly. Bowel sounds are present.  Musculoskeletal: Spine is straight. Extremities without deformity.  Neuro: Gait is not observed as the patient is at bedrest.     Skin is warm, dry, and otherwise intact.        Cardiographics:   Cardiac telemetry, personally reviewed reveals sinus rhythm.    Radiology:      Lab Results:   Lab Results   Component Value Date     07/09/2023    CO2 23 07/09/2023    CO2 25 06/29/2021    BUN 23.2 07/09/2023    BUN 22 06/29/2021     Lab Results   Component Value Date    WBC 15.9 07/09/2023    HGB 12.6 07/09/2023    HCT 36.7 07/09/2023    MCV 95 07/09/2023     07/09/2023     No results found for: INR    Outside record review:

## 2023-07-09 NOTE — PROGRESS NOTES
Hospitalist Progress Note    Assessment/Plan  Clayton Pacheco is a 89 year old old male with significant past medical history of hypertension, hyperlipidemia, hypothyroidism who brought to the hospital because he had a fall early morning and he is confused.    Assessment:  Principal Problem:   Acute cystitis without hematuria: Urine culture (+) E. coli with available sensitivities.    Active Problems:  Elevated troponin: Echo overall normal.  Cardiology notes reviewed, appreciate input: Plan early stress imaging study once patient is stable  Leukocytosis: WBC continues to improve.  Noted at 15.9 today.   Metabolic encephalopathy: Resolved.  Patient awake, alert and oriented x3.  Primary hypertension: Controlled and at goal  Other hyperlipidemia: Stable  Other specified hypothyroidism:  Fall: No evidence of fractures.  CT head unrewarding.  Hypomagnesemia, POA.:  Resolved.  ELVIN (acute kidney injury) (H): POA.  Resolved.  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   Lumbar degenerative changes with severe canal stenosis at L3-L4 and L4-L5  Colonic diverticulosis        Plan:  Continue to optimize pain control  -Continue telemetry monitor  -Continue IV fluid therapy  -Follow spine surgery consult..    -Monitor and replete electrolytes as needed  -Follow urine culture.  Monitor WBC.  -Continue IV ceftriaxone.  Plan to de-escalate with normal WBC, given bladder stranding on imaging..  -PT/OT eval/Rx      DVT prophylaxis Lovenox  Disposition: Pending progress.  Follow spine surgery consult.  Watch WBC.      Subjective  Patient denies any new complaints.  No acute events overnight.      Objective    Vital signs in last 24 hours  Temp:  [98.5  F (36.9  C)-99.6  F (37.6  C)] 98.5  F (36.9  C)  Pulse:  [] 89  Resp:  [17-18] 18  BP: (120-145)/(57-73) 145/68  SpO2:  [94  %-95 %] 94 % @LASTSAO2(12)@ O2 Device: None (Room air)    Weight:   Wt Readings from Last 3 Encounters:   07/06/23 68 kg (150 lb)      Weight change:     Intake/Output last 3 shifts  I/O last 3 completed shifts:  In: -   Out: 850 [Urine:850]  Body mass index is 24.21 kg/m .    Physical Exam    General Appearance:    Alert, cooperative, no distress, appears stated age   Lungs:     Clear bilaterally    Cardiovascular:    Regular rate ands rhythm.  Normal S1, S2.  No murmur, rub or gallop.  No edema   Abdomen:     Soft, non-tender, bowel sounds active all four quadrants,     no masses, no organomegaly   Neurologic:   Awake, alert, oriented x 3.  Grossly nonfocal      Pertinent Labs   Lab Results: personally reviewed.   Recent Labs   Lab 07/09/23 0639 07/08/23 0619 07/07/23 0752 07/06/23  1723    137 137 137   CO2 23 23 25 23   BUN 23.2* 20.6 21.9 25.4*   ALBUMIN  --   --   --  4.4   ALKPHOS  --   --   --  95   ALT  --   --   --  14   AST  --   --   --  24     Recent Labs   Lab 07/09/23  0639 07/08/23 0619 07/07/23  0752   WBC 15.9* 23.7* 26.4*   HGB 12.6* 12.2* 12.2*   HCT 36.7* 36.2* 36.0*    177 186     No results for input(s): CKTOTAL, TROPONINI in the last 168 hours.    Invalid input(s): TROPONINT, CKMBINDEX  Invalid input(s): POCGLUFGR    Medications  Current Facility-Administered Medications   Medication     acetaminophen (TYLENOL) tablet 650 mg    Or     acetaminophen (TYLENOL) Suppository 650 mg     amLODIPine (NORVASC) tablet 10 mg     carboxymethylcellulose PF (REFRESH LIQUIGEL) 1 % ophthalmic gel 2 drop     cefTRIAXone (ROCEPHIN) 1 g vial to attach to  mL bag for ADULTS or NS 50 mL bag for PEDS     enoxaparin ANTICOAGULANT (LOVENOX) injection 40 mg     levothyroxine (SYNTHROID/LEVOTHROID) tablet 50 mcg     lidocaine (LMX4) cream     lidocaine 1 % 0.1-1 mL     magnesium hydroxide (MILK OF MAGNESIA) suspension 30 mL     melatonin tablet 1 mg     naloxone (NARCAN) injection 0.2 mg    Or      naloxone (NARCAN) injection 0.4 mg    Or     naloxone (NARCAN) injection 0.2 mg    Or     naloxone (NARCAN) injection 0.4 mg     ondansetron (ZOFRAN ODT) ODT tab 4 mg    Or     ondansetron (ZOFRAN) injection 4 mg     oxyCODONE (ROXICODONE) tablet 5 mg     pravastatin (PRAVACHOL) tablet 20 mg     senna-docusate (SENOKOT-S/PERICOLACE) 8.6-50 MG per tablet 1 tablet     sodium chloride (PF) 0.9% PF flush 3 mL     sodium chloride (PF) 0.9% PF flush 3 mL     sodium chloride (PF) 0.9% PF flush 3 mL       Pertinent Radiology   Radiology Results: Personally reviewed   Results for orders placed or performed during the hospital encounter of 07/06/23   Head CT w/o contrast    Impression    IMPRESSION:  HEAD CT:  1.  No finding for intracranial hemorrhage, mass, or acute infarct.    2.  Moderate volume loss and mild to moderate presumed sequela chronic microvascular ischemic change.    CERVICAL SPINE CT:  1.  Advanced cervical spondylosis without finding for acute fracture.    2.  Grade 1 anterolisthesis C2 on C3 and C7 on T1, age indeterminate but favored to be chronic and degenerative in etiology.    3.  Disc osteophyte complex contributes to at least mild spinal canal stenosis at C6-C7.   Cervical spine CT w/o contrast    Impression    IMPRESSION:  HEAD CT:  1.  No finding for intracranial hemorrhage, mass, or acute infarct.    2.  Moderate volume loss and mild to moderate presumed sequela chronic microvascular ischemic change.    CERVICAL SPINE CT:  1.  Advanced cervical spondylosis without finding for acute fracture.    2.  Grade 1 anterolisthesis C2 on C3 and C7 on T1, age indeterminate but favored to be chronic and degenerative in etiology.    3.  Disc osteophyte complex contributes to at least mild spinal canal stenosis at C6-C7.   CT Thoracic Spine w/o Contrast    Impression    IMPRESSION:    THORACIC SPINE CT:  1.  Question nondisplaced fracture at the left anterior T6 vertebral body. Given limitations related to  osseous demineralization, consider MRI if further imaging assessment is clinically warranted.  2.  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  3.  Mild multilevel degenerative changes.    LUMBAR SPINE CT:  1.  Suspect acute left lateral inferior endplate fracture at L4.  2.  Age-indeterminate mild L3 superior endplate compression eccentric to the right.  3.  Question nondisplaced fracture line through left iliac wing, partially visualized. Correlate for attributable pain and consider dedicated CT bony pelvis.  4.  Multilevel degenerative disc disease including suspected severe spinal canal stenosis at L3-L4 and grade 1 degenerative anterolisthesis at L4-L5.  5.  Diffuse osseous demineralization.       Lumbar spine CT w/o contrast    Impression    IMPRESSION:    THORACIC SPINE CT:  1.  Question nondisplaced fracture at the left anterior T6 vertebral body. Given limitations related to osseous demineralization, consider MRI if further imaging assessment is clinically warranted.  2.  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  3.  Mild multilevel degenerative changes.    LUMBAR SPINE CT:  1.  Suspect acute left lateral inferior endplate fracture at L4.  2.  Age-indeterminate mild L3 superior endplate compression eccentric to the right.  3.  Question nondisplaced fracture line through left iliac wing, partially visualized. Correlate for attributable pain and consider dedicated CT bony pelvis.  4.  Multilevel degenerative disc disease including suspected severe spinal canal stenosis at L3-L4 and grade 1 degenerative anterolisthesis at L4-L5.  5.  Diffuse osseous demineralization.       Chest XR,  PA & LAT    Impression    IMPRESSION: Heart is normal in size. Lungs are clear.   Lumbar spine MRI w/o contrast    Impression    IMPRESSION:  1.  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  2.  Lumbar degenerative changes with severe canal  stenosis at L3-L4 and L4-L5, as detailed above.   Thoracic spine MRI w/o contrast    Impression    IMPRESSION:  1.  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  2.  Lumbar degenerative changes with severe canal stenosis at L3-L4 and L4-L5, as detailed above.   CT Chest Abdomen Pelvis w/o Contrast    Impression    IMPRESSION:    1.  No acute process in the chest, abdomen and pelvis. No acute fracture.    2.  Moderate wall thickening of the urinary bladder, possibly due to a markedly enlarged prostate gland and relative decompression. However, clinical correlation with urinalysis is recommended due to presence of mild adjacent fat stranding.    3.  Colonic diverticulosis without acute arthritis.   XR Pelvis w Hip Right G/E 2 Views    Impression    IMPRESSION: Bones are demineralized. There is no evidence of an acute, displaced pelvic or right hip fracture. Mild degenerative changes both hips. Atherosclerotic vascular calcifications. Small metallic density in the soft tissues along the right pelvis   is unchanged from prior.       I spent a total of 53 inutes for this encounter with interval history, physical exam, in patient medication review and reconciliation.      Dia Pizano DO  Internal Medicine Hospitalist  7/7/2023

## 2023-07-09 NOTE — CONSULTS
NEUROSURGERY CONSULTATION NOTE    Clayton Pacheco   [address]   [facility] [address]  89 year old male  Admission Date/Time: 7/6/2023  5:18 PM  Primary Care Provider: Oscar De Oliveira [facility] Attending Physician: Dia Pizano DO    Neurosurgery was asked to see this patient by Dia Pizano DO for evaluation of back pain    PROBLEM LIST:  Principal Problem:    Acute cystitis without hematuria  Active Problems:    Metabolic encephalopathy    Primary hypertension    Other hyperlipidemia    Other specified hypothyroidism    Fall    Hypomagnesemia    ELVIN (acute kidney injury) (H)    Foreign body (FB) in soft tissue    Urinary tract infection without hematuria, site unspecified       Neurosurgery Attending: The patient's clinical examination, laboratory data, and plan was discussed with Dr Meehan    CONSULTATION ASSESSMENT AND PLAN:    Patient is a 89yo who presented to [facility] after having a mechanical fall at home getting out of bed. Arrived to the ED confused. Found to have UTI. C/L CTs at arrival demonstrated age indeterminate fractures of T6, L3, L4.    MRI confirms fractures are chronic. Clayton is asymptomatic at this point. Since fall, denies any neck or back pain. No history of radicular arm or leg pain, numbness, or weakness. No claudicating symptoms. We discussed CT Cspine, MRI thoracic and lumbar results. He does not appear to be symptomatic for his severe lumbar stenosis or listhesis at this time. I recommend pain control and PT/OT. Referral for outpatient PT if needed.     Can follow up in the spine center if questions or concerns. No surgery indicated at this time. Could consider injections if symptoms did not respond to PT. Did review red flag symptoms related to lumbar stenosis that he should contact us for as an outpatient if they occur. Patient agrees with plan.     Spoke with RN later who states he has been on bedrest since admission. There is no reason for this  "since MRIs clarified fractures are chronic. OK for activity as tolerated, PT/OT        HPI:    Patient is a 89yo who presented to [facility] ED on 7/6 following a mechanical fall. His left shoulder is very painful. He otherwise has no neck, arm, lower back, or leg pain. He has chronic intermittent low back pain with flares which resolving without treatment. Chronic left shoulder pain. Chronic nocturia related to enlarged prostate, which was diagnosed in his 40s. Denies radicular arm or leg pain, numbness, weakness.    No past medical history on file.  No past surgical history on file.    REVIEW OF SYSTEMS:   negative    MEDICATIONS:  No current outpatient medications on file.         ALLERGIES/SENSITIVITIES:     No Known Allergies    PERTINENT SOCIAL HISTORY: per below  Social History     Socioeconomic History     Marital status:          FAMILY HISTORY:  No family history on file.     PHYSICAL EXAM:   Constitutional: BP (!) 145/89 (BP Location: Right arm)   Pulse 99   Temp 98.4  F (36.9  C) (Oral)   Resp 18   Ht 5' 6\" (1.676 m)   Wt 150 lb (68 kg)   SpO2 92%   BMI 24.21 kg/m       Mental Status: A & O in no acute distress.  Affect is appropriate.  Speech is fluent.  Recent and remote memory are intact.  Attention span and concentration are normal.     Cranial Nerves: CN1: grossly intact per patient recall. CN2: No funduscopic exam performed. CN3,4 & 6: Pupillary light response, lateral and vertical gaze normal.  No nystagmus.  Visual fields are full to confrontation. CN5: Intact to touch CN7: No facial weakness, smile, facial symmetry intact. CN8: Intact to spoken voice. CN9&10: Gag reflex, uvula midline, palate rises with phonation. CN11: Shoulder shrug 5/5 intact bilaterally. CN12: Tongue midline and moves freely from side to side.     Motor: No pronator drift of upper extremity. Normal bulk and tone all muscle groups of upper and lower extremities.    Strength:   Full throughout both upper and lower " extremities     Sensory: Sensation intact bilaterally to light touch throughout upper and lower extremities    Gait deferred     Reflexes; supinator, biceps, triceps, knee/ ankle jerks are 0+. No hoffmans/babinski/ clonus.    IMAGING:  I personally reviewed all radiographic images   EXAM: CT THORACIC SPINE W/O CONTRAST, CT LUMBAR SPINE W/O CONTRAST  LOCATION: [facility]  DATE/TIME: 7/6/2023 7:33 PM CDT     INDICATION: Trauma. Fall.  COMPARISON: None.  TECHNIQUE:  1) Routine CT Thoracic Spine without IV contrast. Multiplanar reformats. Dose reduction techniques were used.   2) Routine CT Lumbar Spine without IV contrast. Multiplanar reformats. Dose reduction techniques were used.      FINDINGS:     THORACIC SPINE CT:  VERTEBRA: Normal alignment. Visualized osseous structures appear diffusely demineralized. Flowing marginal osteophytes greater on the right compatible with diffuse idiopathic skeletal hyperostosis (DISH). Indeterminate linear lucency at the left anterior   T6 vertebral body (sagittal series 7 image 43 with possible subtle lateral superior endplate irregularity (coronal series 6 image 49) may indicate nondisplaced fracture. No vertebral body height loss. Facets are intact.     CANAL/FORAMINA: Disc spaces are maintained. No high-grade spinal canal or foraminal stenosis.     PARASPINAL: No visible soft tissue abnormality. Atelectasis in the visualized upper lungs. Advanced aortic atherosclerosis and coronary artery calcifications.     LUMBAR SPINE CT:  VERTEBRA: Grade 1 anterolisthesis at L4-L5 by approximately 4 mm, most likely degenerative. Slight upper lumbar dextrocurvature may be positional. Visualized osseous structures appear diffusely demineralized. Minimal irregularity at the left lateral   inferior endplate of L4 (coronal series 6 image 31) suspicious for acute nondisplaced fracture. Mild depression of the right L3 superior endplate compatible with age-indeterminate  compression fracture. Facets are intact.     CANAL/FORAMINA: Multilevel discogenic and facet degenerative changes. Mild-to-moderate multilevel disc space narrowing with vacuum discs. Disc bulge/protrusion at L3-L4 in conjunction with facet and flaval ligament hypertrophy contribute to suspected   severe spinal canal stenosis. Additional suspected moderate spinal canal stenosis at L2-L3 and L4-L5. Multifocal foraminal stenosis includes severe narrowing bilaterally at L1-L2 and on the right at L3-L4 with additional mild to moderate multifocal   narrowing.     PARASPINAL: No visible soft tissue abnormality. Advanced aortoiliac atherosclerotic calcifications. Partially visualized large cystic lesion in the right hemiabdomen, most likely renal. Indeterminate linear lucency traversing the partially visualized   left iliac wing (coronal series 6 image 62) may represent nondisplaced fracture.                                                                      IMPRESSION:     THORACIC SPINE CT:  1.  Question nondisplaced fracture at the left anterior T6 vertebral body. Given limitations related to osseous demineralization, consider MRI if further imaging assessment is clinically warranted.  2.  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  3.  Mild multilevel degenerative changes.     LUMBAR SPINE CT:  1.  Suspect acute left lateral inferior endplate fracture at L4.  2.  Age-indeterminate mild L3 superior endplate compression eccentric to the right.  3.  Question nondisplaced fracture line through left iliac wing, partially visualized. Correlate for attributable pain and consider dedicated CT bony pelvis.  4.  Multilevel degenerative disc disease including suspected severe spinal canal stenosis at L3-L4 and grade 1 degenerative anterolisthesis at L4-L5.  5.  Diffuse osseous demineralization.      EXAM: MR THORACIC SPINE W/O CONTRAST, MR LUMBAR SPINE W/O CONTRAST  LOCATION: [facility]  DATE/TIME: 7/6/2023 11:11 PM CDT     INDICATION: Possible fracture seen on ct  COMPARISON:  Same day CT thoracic and lumbar spine.  TECHNIQUE:   1) Routine Thoracic Spine MRI without IV contrast.  2) Routine Lumbar Spine MRI without IV contrast.     FINDINGS:     THORACIC SPINE:  Thoracic vertebral body heights maintained. Alignment is unremarkable. Flowing marrow along the ventral osteophytes compatible with DISH. No suspicious marrow signal. Incidental L1 vertebral body hemangioma. Multilevel spondylosis without significant   canal or foraminal stenosis in the thoracic spine. Incompletely assessed severe canal stenosis at C6-C7 (series 7, image 2).      Intrafissural fluid in the left major fissure. Right renal cyst.     LUMBAR SPINE:   Motion degraded examination.     Nomenclature is based on 5 lumbar type vertebral bodies with L5-S1 defined on image 9 of series 5. 3 mm anterolisthesis of L3 on L4 and 8 mm anterolisthesis of L4 on L5. Chronic L3 superior endplate deformity with mild height loss.  No suspicious marrow   signal abnormality. Normal distal spinal cord and cauda equina with conus medullaris at L2.      Prevertebral and dorsal paraspinal soft tissues are unremarkable. Urinary bladder trabeculations.     T12-L1: No significant canal or foraminal stenosis.     L1-L2: Mild to moderate canal stenosis. Mild right and moderate left foraminal stenosis.     L2-L3: Moderate canal stenosis. No significant foraminal stenosis.      L3-L4: Severe canal stenosis. Mild bilateral foraminal stenosis.     L4-L5: Severe canal stenosis. Mild bilateral foraminal stenosis.     L5-S1: No significant canal or foraminal stenosis.                                                                      IMPRESSION:  1.  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  2.  Lumbar degenerative changes with severe canal stenosis at L3-L4 and L4-L5, as detailed above.      (non critical care) I  spent more than 45 minutes in this apt, examining the pt, reviewing the scans, reviewing notes from chart, discussing treatment options with risks and benefits and coordinating care. >50 % clinic time was spent in face to face counseling and coordinating care    Shea FELIPE  [facility] Neurosurgery  O. [phone]

## 2023-07-09 NOTE — PROGRESS NOTES
07/09/23 1350   Appointment Info   Signing Clinician's Name / Credentials (OT) Martina Armendariz OTR/L OTD   Quick Adds   Quick Adds Certification   Living Environment   People in Home spouse   Current Living Arrangements independent living facility   Home Accessibility no concerns   Living Environment Comments walk in shower, comfort height toilet   Self-Care   Equipment Currently Used at Home none   Fall history within last six months yes   Number of times patient has fallen within last six months 1   Activity/Exercise/Self-Care Comment Pt reports typically ind with all ADLs and IADLs, still driving   General Information   Onset of Illness/Injury or Date of Surgery 07/06/23   Referring Physician Dia Pizano MD   Patient/Family Therapy Goal Statement (OT) To return home   Additional Occupational Profile Info/Pertinent History of Current Problem Clayton Pacheco is a 89 year old old male with significant past medical history of hypertension, hyperlipidemia, hypothyroidism who brought to the hospital because he had a fall early morning and he is confused.   Existing Precautions/Restrictions fall   Limitations/Impairments hearing   Cognitive Status Examination   Orientation Status orientation to person, place and time   Affect/Mental Status (Cognitive) WFL   Follows Commands WNL   Cognitive Status Comments Reports confusion at beginning of hospital admission, reporting confusion has subsided   Visual Perception   Visual Impairment/Limitations corrective lenses full-time   Posture   Posture forward head position   Range of Motion Comprehensive   General Range of Motion upper extremity range of motion deficits identified   Comment, General Range of Motion R shoulder bursitis at baseline, L wrist ROM deficits at baseline   Strength Comprehensive (MMT)   Comment, General Manual Muscle Testing (MMT) Assessment Min generalized weakness   Bed Mobility   Bed Mobility supine-sit   Supine-Sit [place] (Bed Mobility)  supervision   Assistive Device (Bed Mobility) bed rails   Transfers   Transfers sit-stand transfer;toilet transfer   Sit-Stand Transfer   Sit-Stand [place] (Transfers) contact guard   Assistive Device (Sit-Stand Transfers) walker, front-wheeled   Toilet Transfer   [place] Level (Toilet Transfer) contact guard   Assistive Device (Toilet Transfer) grab bars/safety frame;walker, front-wheeled   Balance   Balance Assessment standing balance: dynamic   Balance Comments fair-good   Activities of Daily Living   BADL Assessment/Intervention lower body dressing;grooming;toileting   Lower Body Dressing Assessment/Training   [place] Level (Lower Body Dressing) minimum assist (75% patient effort)   Grooming Assessment/Training   [place] Level (Grooming) supervision   Toileting   [place] Level (Toileting) contact guard assist   Clinical Impression   Criteria for Skilled Therapeutic Interventions Met (OT) Yes, treatment indicated   OT Diagnosis Decreased ind with ADLs and safety   Influenced by the following impairments UTI, acute cystitis   OT Problem List-Impairments impacting ADL balance;strength;mobility   Assessment of Occupational Performance 1-3 Performance Deficits   Identified Performance Deficits dressing, toileting, g/h, home mgmt, bathing   Planned Therapy Interventions (OT) ADL retraining;balance training;progressive activity/exercise;transfer training   Clinical Decision Making Complexity (OT) low complexity   Risk & Benefits of therapy have been explained evaluation/treatment results reviewed;care plan/treatment goals reviewed;risks/benefits reviewed;current/potential barriers reviewed;patient   OT Total Evaluation Time   OT Eval, Low Complexity Minutes (39329) 10   OT Goals   Therapy Frequency (OT) Daily   OT Predicted Duration/Target Date for Goal Attainment 07/16/23   OT Goals Hygiene/Grooming;Lower Body Dressing;Toilet Transfer/Toileting   OT: Hygiene/Grooming modified independent;while  standing   OT: Lower Body Dressing Modified independent;using adaptive equipment   OT: Toilet Transfer/Toileting Modified independent;toilet transfer;cleaning and garment management   Interventions   Interventions Quick Adds Self-Care/Home Management   Self-Care/Home Management   Self-Care/Home Mgmt/ADL, Compensatory, Meal Prep Minutes (54383) 10   Symptoms Noted During/After Treatment (Meal Preparation/Planning Training) none   Treatment Detail/Skilled Intervention Evaluation completed - treatment initiated. Instructed on log roll technique for bed mobility, completed SBA following cueing. Fxl mob within room CGA w/ FWW, min cueing for pacing. toilet transfer CGA w/ cueing for hand placement. min A for balance when standing from low surface toilet, garment mgmt and pericares CGA. Returned to bedside chair CGA with cueing for safety. Pt left in chair with call light in reach.   OT Discharge Planning   OT Plan toileting, stand g/h, LB dressing with AE as needed   OT Discharge Recommendation (DC Rec) home with assist;home with home care occupational therapy   OT Rationale for DC Rec Pt mobilizing well and progressing toward OT goals, has good home set up, anticipate pt able to discharge home with support from wife and family   OT Brief overview of current status CGA fxl mob and ADLs   Total Session Time   Timed Code Treatment Minutes 10   Total Session Time (sum of timed and untimed services) 20

## 2023-07-09 NOTE — PROGRESS NOTES
Care Management Follow Up    Length of Stay (days): 0    Expected Discharge Date: 07/11/2023     Concerns to be Addressed: discharge planning, care progression      Patient plan of care discussed at interdisciplinary rounds: No    Anticipated Discharge Disposition:  Home     Anticipated Discharge Services:  Pending Therapy recommendations  Anticipated Discharge DME:  Per Therapy and treatement team    Patient/family educated on Medicare website which has current facility and service quality ratings:    Education Provided on the Discharge Plan:    Patient/Family in Agreement with the Plan:      Referrals Placed by CM/SW:  None at this time  Private pay costs discussed: Not applicable    Additional Information:  Chart reviewed.  Pt resides with wife at [facility].      OT recommends Home OT.    PT eval is pending as pt is on bedrest.  Care Management to follow for recommendations.    Juju Bhatia RN

## 2023-07-09 NOTE — PROGRESS NOTES
07/09/23 1415   Appointment Info   Signing Clinician's Name / Credentials (PT) Ellen Banks PT   Living Environment   People in Home spouse   Current Living Arrangements independent living facility;apartment   Home Accessibility no concerns   Transportation Anticipated family or friend will provide   Self-Care   Usual Activity Tolerance good   Current Activity Tolerance moderate   Equipment Currently Used at Home none   Activity/Exercise/Self-Care Comment Indep with all ADL's, drives, assists wife as needed   General Information   Onset of Illness/Injury or Date of Surgery 07/06/23   Referring Physician GRETCHEN Nichols CNP   Patient/Family Therapy Goals Statement (PT) return home   Pertinent History of Current Problem (include personal factors and/or comorbidities that impact the POC) 89 year old old male with significant past medical history of hypertension, hyperlipidemia, hypothyroidism who brought to the hospital because he had a fall early morning and he is confused.   Existing Precautions/Restrictions fall   General Observations pt very willing to participate with PT   Cognition   Affect/Mental Status (Cognition) WFL   Orientation Status (Cognition) oriented x 4   Follows Commands (Cognition) WFL   Pain Assessment   Patient Currently in Pain No   Range of Motion (ROM)   ROM Comment BLE ROM WFL   Strength (Manual Muscle Testing)   Strength Comments BLE strength WFL, fatigues fairly easily   Transfers   Transfers sit-stand transfer   Sit-Stand Transfer   Sit-Stand [place] (Transfers) contact guard   Assistive Device (Sit-Stand Transfers) walker, front-wheeled   Comment, (Sit-Stand Transfer) initial standing balance is good   Gait/Stairs (Locomotion)   [place] Level (Gait) contact guard;verbal cues   Assistive Device (Gait) walker, front-wheeled   Distance in Feet 25   Distance in Feet (Gait) 225   Pattern (Gait) step-to;step-through   Deviations/Abnormal Patterns (Gait) ariana decreased   Comment,  (Gait/Stairs) slow but steady pace, no LOB, mild fatigue   Clinical Impression   Criteria for Skilled Therapeutic Intervention Yes, treatment indicated   PT Diagnosis (PT) impaired functional mobility   Influenced by the following impairments deconditioned/bedrest   Functional limitations due to impairments transfers, gait   Clinical Presentation (PT Evaluation Complexity) Stable/Uncomplicated   Clinical Presentation Rationale presents as diagnosed   Clinical Decision Making (Complexity) low complexity   Planned Therapy Interventions (PT) bed mobility training;transfer training;gait training;strengthening   Anticipated Equipment Needs at Discharge (PT) walker, rolling   Risk & Benefits of therapy have been explained care plan/treatment goals reviewed;patient   PT Total Evaluation Time   PT Eval, Low Complexity Minutes (83999) 10   Physical Therapy Goals   PT Frequency Daily   PT Predicted Duration/Target Date for Goal Attainment 07/12/23   PT Goals Bed Mobility;Transfers;Gait   PT: Bed Mobility Supervision/stand-by assist   PT: Transfers Modified independent;Sit to/from stand;Bed to/from chair;Assistive device   PT: Gait Supervision/stand-by assist;Rolling walker;150 feet   Interventions   Interventions Quick Adds Therapeutic Procedure;Gait Training   Therapeutic Procedure/Exercise   Ther. Procedure: strength, endurance, ROM, flexibillity Minutes (08641) 10   Symptoms Noted During/After Treatment fatigue   Treatment Detail/Skilled Intervention instructed in and performed BLE ex in sitting, 10 reps each with supervision, rest breaks as needed   Gait Training   Gait Training Minutes (58937) 13   Symptoms Noted During/After Treatment (Gait Training) fatigue   Treatment Detail/Skilled Intervention slow pace to begin, signif improvement with time up walking.  Steady pace, no LOB, at present prefers to use the FWW for supportl   [place] Level (Gait Training) stand-by assist   Physical Assistance Level (Gait Training)  verbal cues;1 person assist   Weight Bearing (Gait Training) full weight-bearing   Assistive Device (Gait Training) rolling walker   Pattern Analysis (Gait Training) swing-through gait   Gait Analysis Deviations decreased ariana   PT Discharge Planning   PT Plan instruct log rolling bed mobility, transfers and gt with FWW, LE ther ex   PT Discharge Recommendation (DC Rec) home with assist;home with home care physical therapy   PT Rationale for DC Rec sons available to assist if needed, home PT to eval for safety and maximize functional mobility   PT Brief overview of current status transfers and gt with FWW, up to 225', CGA/SBA   Total Session Time   Timed Code Treatment Minutes 23   Total Session Time (sum of timed and untimed services) 33

## 2023-07-09 NOTE — PLAN OF CARE
PRIMARY DIAGNOSIS: GENERALIZED WEAKNESS    OUTPATIENT/OBSERVATION GOALS TO BE MET BEFORE DISCHARGE  1. Orthostatic performed: No    2. Tolerating PO medications: Yes    3. Return to near baseline physical activity: No    4. Cleared for discharge by consultants (if involved): No    Discharge Planner Nurse   Safe discharge environment identified: No  Barriers to discharge: Yes       Entered by: Robbie Azevedo RN 07/09/2023 8:19 AM     Please review provider order for any additional goals.   Nurse to notify provider when observation goals have been met and patient is ready for discharge.

## 2023-07-09 NOTE — PLAN OF CARE
Goal Outcome Evaluation:               Alert and oriented. Skin checked and no redness or sores. Bedrest lifted this afternoon. No pain or discomfort in spine. Only left shoulder chronic pain but tolerable. External catheter. Pt. Complained of having no sleep due to frequent urination, and sound of external catheter waking him up. Melatonin order adjusted to help sleep at night.

## 2023-07-10 ENCOUNTER — APPOINTMENT (OUTPATIENT)
Dept: NUCLEAR MEDICINE | Facility: HOSPITAL | Age: 88
DRG: 689 | End: 2023-07-10
Attending: INTERNAL MEDICINE
Payer: MEDICARE

## 2023-07-10 ENCOUNTER — APPOINTMENT (OUTPATIENT)
Dept: PHYSICAL THERAPY | Facility: HOSPITAL | Age: 88
DRG: 689 | End: 2023-07-10
Payer: MEDICARE

## 2023-07-10 ENCOUNTER — APPOINTMENT (OUTPATIENT)
Dept: CARDIOLOGY | Facility: HOSPITAL | Age: 88
DRG: 689 | End: 2023-07-10
Attending: INTERNAL MEDICINE
Payer: MEDICARE

## 2023-07-10 ENCOUNTER — APPOINTMENT (OUTPATIENT)
Dept: OCCUPATIONAL THERAPY | Facility: HOSPITAL | Age: 88
DRG: 689 | End: 2023-07-10
Payer: MEDICARE

## 2023-07-10 VITALS
HEART RATE: 92 BPM | DIASTOLIC BLOOD PRESSURE: 62 MMHG | WEIGHT: 144.4 LBS | BODY MASS INDEX: 23.21 KG/M2 | TEMPERATURE: 98 F | OXYGEN SATURATION: 93 % | HEIGHT: 66 IN | RESPIRATION RATE: 18 BRPM | SYSTOLIC BLOOD PRESSURE: 119 MMHG

## 2023-07-10 LAB
CV STRESS CURRENT BP HE: NORMAL
CV STRESS CURRENT HR HE: 100
CV STRESS CURRENT HR HE: 101
CV STRESS CURRENT HR HE: 102
CV STRESS CURRENT HR HE: 105
CV STRESS CURRENT HR HE: 106
CV STRESS CURRENT HR HE: 96
CV STRESS CURRENT HR HE: 96
CV STRESS CURRENT HR HE: 97
CV STRESS CURRENT HR HE: 98
CV STRESS CURRENT HR HE: 99
CV STRESS DEVIATION TIME HE: NORMAL
CV STRESS ECHO PERCENT HR HE: NORMAL
CV STRESS EXERCISE STAGE HE: NORMAL
CV STRESS FINAL RESTING BP HE: NORMAL
CV STRESS FINAL RESTING HR HE: 97
CV STRESS MAX HR HE: 106
CV STRESS MAX TREADMILL GRADE HE: 0
CV STRESS MAX TREADMILL SPEED HE: 0
CV STRESS PEAK DIA BP HE: NORMAL
CV STRESS PEAK SYS BP HE: NORMAL
CV STRESS PHASE HE: NORMAL
CV STRESS PROTOCOL HE: NORMAL
CV STRESS RESTING PT POSITION HE: NORMAL
CV STRESS ST DEVIATION AMOUNT HE: NORMAL
CV STRESS ST DEVIATION ELEVATION HE: NORMAL
CV STRESS ST EVELATION AMOUNT HE: NORMAL
CV STRESS TEST TYPE HE: NORMAL
CV STRESS TOTAL STAGE TIME MIN 1 HE: NORMAL
HOLD SPECIMEN: NORMAL
MAGNESIUM SERPL-MCNC: 1.9 MG/DL (ref 1.7–2.3)
NUC STRESS EJECTION FRACTION: 70 %
POTASSIUM SERPL-SCNC: 3.6 MMOL/L (ref 3.4–5.3)
RATE PRESSURE PRODUCT: NORMAL
STRESS ECHO BASELINE DIASTOLIC HE: 63
STRESS ECHO BASELINE HR: 96
STRESS ECHO BASELINE SYSTOLIC BP: 133
STRESS ECHO CALCULATED PERCENT HR: 83 %
STRESS ECHO LAST STRESS DIASTOLIC BP: 46
STRESS ECHO LAST STRESS HR: 97
STRESS ECHO LAST STRESS SYSTOLIC BP: 100
STRESS ECHO TARGET HR: 127

## 2023-07-10 PROCEDURE — 93018 CV STRESS TEST I&R ONLY: CPT | Performed by: INTERNAL MEDICINE

## 2023-07-10 PROCEDURE — 99239 HOSP IP/OBS DSCHRG MGMT >30: CPT | Performed by: HOSPITALIST

## 2023-07-10 PROCEDURE — 97535 SELF CARE MNGMENT TRAINING: CPT | Mod: GO

## 2023-07-10 PROCEDURE — 36415 COLL VENOUS BLD VENIPUNCTURE: CPT | Performed by: INTERNAL MEDICINE

## 2023-07-10 PROCEDURE — 93016 CV STRESS TEST SUPVJ ONLY: CPT | Performed by: INTERNAL MEDICINE

## 2023-07-10 PROCEDURE — 83735 ASSAY OF MAGNESIUM: CPT | Performed by: INTERNAL MEDICINE

## 2023-07-10 PROCEDURE — 84132 ASSAY OF SERUM POTASSIUM: CPT | Performed by: INTERNAL MEDICINE

## 2023-07-10 PROCEDURE — 93017 CV STRESS TEST TRACING ONLY: CPT

## 2023-07-10 PROCEDURE — 78452 HT MUSCLE IMAGE SPECT MULT: CPT | Mod: 26 | Performed by: INTERNAL MEDICINE

## 2023-07-10 PROCEDURE — G1010 CDSM STANSON: HCPCS | Performed by: INTERNAL MEDICINE

## 2023-07-10 PROCEDURE — 250N000013 HC RX MED GY IP 250 OP 250 PS 637: Performed by: HOSPITALIST

## 2023-07-10 PROCEDURE — 250N000011 HC RX IP 250 OP 636: Mod: JZ | Performed by: HOSPITALIST

## 2023-07-10 PROCEDURE — 343N000001 HC RX 343: Performed by: HOSPITALIST

## 2023-07-10 PROCEDURE — 97530 THERAPEUTIC ACTIVITIES: CPT | Mod: GP | Performed by: PHYSICAL THERAPIST

## 2023-07-10 PROCEDURE — G1010 CDSM STANSON: HCPCS

## 2023-07-10 PROCEDURE — 99231 SBSQ HOSP IP/OBS SF/LOW 25: CPT | Performed by: INTERNAL MEDICINE

## 2023-07-10 PROCEDURE — A9500 TC99M SESTAMIBI: HCPCS | Performed by: HOSPITALIST

## 2023-07-10 RX ORDER — AMINOPHYLLINE 25 MG/ML
50 INJECTION, SOLUTION INTRAVENOUS
Status: DISCONTINUED | OUTPATIENT
Start: 2023-07-10 | End: 2023-07-10 | Stop reason: HOSPADM

## 2023-07-10 RX ORDER — REGADENOSON 0.08 MG/ML
0.4 INJECTION, SOLUTION INTRAVENOUS ONCE
Status: COMPLETED | OUTPATIENT
Start: 2023-07-10 | End: 2023-07-10

## 2023-07-10 RX ADMIN — AMLODIPINE BESYLATE 10 MG: 5 TABLET ORAL at 08:18

## 2023-07-10 RX ADMIN — Medication 50 MG: at 11:17

## 2023-07-10 RX ADMIN — REGADENOSON 0.4 MG: 0.08 INJECTION, SOLUTION INTRAVENOUS at 11:11

## 2023-07-10 RX ADMIN — CEFTRIAXONE SODIUM 1 G: 1 INJECTION, POWDER, FOR SOLUTION INTRAMUSCULAR; INTRAVENOUS at 08:20

## 2023-07-10 RX ADMIN — Medication 30.4 MILLICURIE: at 11:15

## 2023-07-10 RX ADMIN — Medication 8.7 MILLICURIE: at 10:19

## 2023-07-10 RX ADMIN — LEVOTHYROXINE SODIUM 50 MCG: 0.03 TABLET ORAL at 06:56

## 2023-07-10 ASSESSMENT — ACTIVITIES OF DAILY LIVING (ADL)
ADLS_ACUITY_SCORE: 41
ADLS_ACUITY_SCORE: 37
ADLS_ACUITY_SCORE: 41
ADLS_ACUITY_SCORE: 41
ADLS_ACUITY_SCORE: 39

## 2023-07-10 NOTE — PLAN OF CARE
Goal Outcome Evaluation:         Problem: Risk for Delirium  Goal: Improved Attention and Thought Clarity  Outcome: Progressing     Problem: Risk for Delirium  Goal: Improved Sleep  Outcome: Progressing     [id number]    Pt AxO x4, VSS on room air, denies pain, sleeping between cares, declined dinner, NSR tele, no significant events this shift.

## 2023-07-10 NOTE — DISCHARGE SUMMARY
[facility] MEDICINE  DISCHARGE SUMMARY     Primary Care Physician: Oscar De Oliveira  Admission Date: 7/6/2023   Discharge Provider: Scooter Holland MD Discharge Date: 7/10/2023   Diet:   Active Diet and Nourishment Order   Procedures     Regular Diet Adult     Diet       Code Status: Full Code   Activity: DCACTIVITY: Activity as tolerated        Condition at Discharge: Stable       PRINCIPAL & ACTIVE DISCHARGE DIAGNOSES     Principal Problem:    Acute cystitis without hematuria  Active Problems:    Metabolic encephalopathy    Primary hypertension    Other hyperlipidemia    Other specified hypothyroidism    Fall    Hypomagnesemia    ELVIN (acute kidney injury) (H)    Foreign body (FB) in soft tissue    Urinary tract infection without hematuria, site unspecified      PENDING LABS     Unresulted Labs Ordered in the Past 30 Days of this Admission     Date and Time Order Name Status Description    7/6/2023  5:35 PM Blood Culture Peripheral Blood Preliminary             PROCEDURES ( this hospitalization only)          RECOMMENDATIONS TO OUTPATIENT PROVIDER FOR F/U VISIT     Follow-up Appointments     Follow-up and recommended labs and tests       Follow up with primary care provider, Oscar De Oliveira, within 7   days for hospital follow- up.  No follow up labs or test are needed.                DISPOSITION     Home with home care    SUMMARY OF HOSPITAL COURSE:      Initial presentation (Copied from [facility])    Clayton Pacheco is a 89 year old old male with significant past medical history of hypertension, hyperlipidemia, hypothyroidism who brought to the hospital because he had a fall early morning and he is confused.  Patient lives at home with his wife, he said around 4:30 AM he fall backward while he walking, he does not remember if he lost his consciousness or not and he is not sure why did he fall, with that because he was weak or tripped or he has a loss of  consciousness and syncope, patient complaining of some back pain.  His family later on the day they were concerned because he is more confused, he been having some urine symptoms where he had burning in the urine in the last few days.  Patient denied any headache, no chest pain, no shortness of breath, no orthopnea or PND, he denied any fever or chills.  When I saw him he is alert, oriented x3    Problems addressed during hospital stay      Elevated troponin  -Patient denies chest pain.  EKG showed nonspecific T wave changes  -Lexiscan stress test ordered by cardiology and reported normal.  Cleared for discharge from cardiology standpoint.     Toxic metabolic encephalopathy   -likely from UTI, resolved     Acute cystitis  E. coli UTI  -Urine cultures noted  -Continue IV ceftriaxone for 3 to 5 days     Primary hypertension  -Stable, continue PTA meds.     Hyperlipidemia  -Stable, continue pravastatin     Acute kidney injury  -Resolved     Low back pain  Lumbar degenerative changes with severe canal stenosis  Chronic fractures seen by orthopedics  Pain control as needed activity, continue PT and OT.  Pain control as needed with Tylenol.     Hypothyroidism  -Continue levothyroxine    Discharge Medications with Med changes:     Current Discharge Medication List      CONTINUE these medications which have NOT CHANGED    Details   amLODIPine (NORVASC) 10 MG tablet Take 10 mg by mouth daily      Carboxymethylcellulose Sod PF 0.25 % SOLN Place 2 drops into both eyes 2 times daily      irbesartan-hydrochlorothiazide (AVALIDE) 300-12.5 MG tablet Take 1 tablet by mouth daily      levothyroxine (SYNTHROID/LEVOTHROID) 50 MCG tablet Take 50 mcg by mouth daily      melatonin 3 MG tablet Take 3 mg by mouth nightly as needed for sleep      pravastatin (PRAVACHOL) 20 MG tablet Take 20 mg by mouth every evening                   Rationale for medication changes:                Consults         OCCUPATIONAL THERAPY ADULT IP  CONSULT  PHYSICAL THERAPY ADULT IP CONSULT  SPINE SURGERY ADULT IP CONSULT  CARE MANAGEMENT / SOCIAL WORK IP CONSULT  CARDIOLOGY IP CONSULT  PHYSICAL THERAPY ADULT IP CONSULT  OCCUPATIONAL THERAPY ADULT IP CONSULT    Immunizations given this encounter     Most Recent Immunizations   Administered Date(s) Administered     COVID-19 Bivalent 12+ (Pfizer) 11/03/2022     COVID-19 MONOVALENT 12+ (Pfizer) 03/04/2021           Anticoagulation Information            SIGNIFICANT IMAGING FINDINGS     Results for orders placed or performed during the hospital encounter of 07/06/23   Head CT w/o contrast    Impression    IMPRESSION:  HEAD CT:  1.  No finding for intracranial hemorrhage, mass, or acute infarct.    2.  Moderate volume loss and mild to moderate presumed sequela chronic microvascular ischemic change.    CERVICAL SPINE CT:  1.  Advanced cervical spondylosis without finding for acute fracture.    2.  Grade 1 anterolisthesis C2 on C3 and C7 on T1, age indeterminate but favored to be chronic and degenerative in etiology.    3.  Disc osteophyte complex contributes to at least mild spinal canal stenosis at C6-C7.   Cervical spine CT w/o contrast    Impression    IMPRESSION:  HEAD CT:  1.  No finding for intracranial hemorrhage, mass, or acute infarct.    2.  Moderate volume loss and mild to moderate presumed sequela chronic microvascular ischemic change.    CERVICAL SPINE CT:  1.  Advanced cervical spondylosis without finding for acute fracture.    2.  Grade 1 anterolisthesis C2 on C3 and C7 on T1, age indeterminate but favored to be chronic and degenerative in etiology.    3.  Disc osteophyte complex contributes to at least mild spinal canal stenosis at C6-C7.   CT Thoracic Spine w/o Contrast    Impression    IMPRESSION:    THORACIC SPINE CT:  1.  Question nondisplaced fracture at the left anterior T6 vertebral body. Given limitations related to osseous demineralization, consider MRI if further imaging assessment is  clinically warranted.  2.  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  3.  Mild multilevel degenerative changes.    LUMBAR SPINE CT:  1.  Suspect acute left lateral inferior endplate fracture at L4.  2.  Age-indeterminate mild L3 superior endplate compression eccentric to the right.  3.  Question nondisplaced fracture line through left iliac wing, partially visualized. Correlate for attributable pain and consider dedicated CT bony pelvis.  4.  Multilevel degenerative disc disease including suspected severe spinal canal stenosis at L3-L4 and grade 1 degenerative anterolisthesis at L4-L5.  5.  Diffuse osseous demineralization.       Lumbar spine CT w/o contrast    Impression    IMPRESSION:    THORACIC SPINE CT:  1.  Question nondisplaced fracture at the left anterior T6 vertebral body. Given limitations related to osseous demineralization, consider MRI if further imaging assessment is clinically warranted.  2.  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  3.  Mild multilevel degenerative changes.    LUMBAR SPINE CT:  1.  Suspect acute left lateral inferior endplate fracture at L4.  2.  Age-indeterminate mild L3 superior endplate compression eccentric to the right.  3.  Question nondisplaced fracture line through left iliac wing, partially visualized. Correlate for attributable pain and consider dedicated CT bony pelvis.  4.  Multilevel degenerative disc disease including suspected severe spinal canal stenosis at L3-L4 and grade 1 degenerative anterolisthesis at L4-L5.  5.  Diffuse osseous demineralization.       Chest XR,  PA & LAT    Impression    IMPRESSION: Heart is normal in size. Lungs are clear.   Lumbar spine MRI w/o contrast    Impression    IMPRESSION:  1.  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  2.  Lumbar degenerative changes with severe canal stenosis at L3-L4 and L4-L5, as detailed above.   Thoracic spine MRI w/o  contrast    Impression    IMPRESSION:  1.  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  2.  Lumbar degenerative changes with severe canal stenosis at L3-L4 and L4-L5, as detailed above.   CT Chest Abdomen Pelvis w/o Contrast    Impression    IMPRESSION:    1.  No acute process in the chest, abdomen and pelvis. No acute fracture.    2.  Moderate wall thickening of the urinary bladder, possibly due to a markedly enlarged prostate gland and relative decompression. However, clinical correlation with urinalysis is recommended due to presence of mild adjacent fat stranding.    3.  Colonic diverticulosis without acute arthritis.   XR Pelvis w Hip Right G/E 2 Views    Impression    IMPRESSION: Bones are demineralized. There is no evidence of an acute, displaced pelvic or right hip fracture. Mild degenerative changes both hips. Atherosclerotic vascular calcifications. Small metallic density in the soft tissues along the right pelvis   is unchanged from prior.   Echocardiogram Complete   Result Value Ref Range    LVEF  55-60%    NM MPI w Lexiscan   Result Value Ref Range    Pharmacologic Protocol Lexiscan     Test Type Pharmacological     Baseline HR 96     Baseline Systolic      Baseline Diastolic BP 63     Last Stress HR 97     Last Stress Systolic      Last Stress Diastolic BP 46     Target      PERCENT HR 85%     ST Deviation Elevation III 0.2mm     Deviation Time aVL -0.2mm     ST Elevation Amount V2 0.5mm     ST Deviation Amount he aVR -0.4mm     Final Resting /58     Final Resting HR 97     Max Treadmill Speed 0.0     Max Treadmill Grade 0.0     Peak Systolic /56     Peak Diastolic /58     Max HR  106     Stress Phase Resting     Stress Resting Pt Position MANUAL EVENT     Current      Current /50     Stress Phase Stress     Stage Minute EXE 00:00     Exercise Stage STAGE 2     Current HR 97     Current /63     Stress Phase Stress      Stage Minute EXE 01:00     Exercise Stage STAGE 3     Current HR 98     Current /63     Stress Phase Stress     Stage Minute EXE 01:42     Exercise Stage STAGE 3     Current      Current /56     Stress Phase Stress     Stage Minute EXE 02:00     Exercise Stage STAGE 4     Current      Current /56     Stress Phase Stress     Stage Minute EXE 02:51     Exercise Stage STAGE 4     Current      Current /46     Stress Phase Stress     Stage Minute EXE 03:00     Exercise Stage STAGE 5     Current      Current /46     Stress Phase Stress     Stage Minute EXE 04:00     Exercise Stage STAGE 6     Current HR 97     Current /46     Stress Phase Stress     Stage Minute EXE 04:00     Exercise Stage STAGE 6     Current HR 97     Current /46     Stress Phase Recovery     Stage Minute REC 00:17     Exercise Stage Recovery     Current      Current /50     Stress Phase Recovery     Stage Minute REC 00:45     Exercise Stage Recovery     Current      Current /50     Stress Phase Recovery     Stage Minute REC 00:59     Exercise Stage Recovery     Current HR 96     Current /50     Stress Phase Recovery     Stage Minute REC 01:27     Exercise Stage Recovery     Current HR 98     Current /50     Stress Phase Recovery     Stage Minute REC 01:59     Exercise Stage Recovery     Current HR 99     Current /50     Stress Phase Recovery     Stage Minute REC 02:27     Exercise Stage Recovery     Current HR 98     Current /58     Stress Phase Recovery     Stage Minute REC 02:59     Exercise Stage Recovery     Current      Current /58     Stress Phase Recovery     Stage Minute REC 03:59     Exercise Stage Recovery     Current HR 96     Current /58     Stress Phase Recovery     Stage Minute REC 04:01     Exercise Stage Recovery     Current HR 97     Current /58     Max Predicted HR  83 %    Rate Pressure Product  10,600.0     Left Ventricular EF 70 %       SIGNIFICANT LABORATORY FINDINGS         Discharge Orders        Home Care Referral      Reason for your hospital stay    Urinary tract infection, back pain, elevated troponin     Follow-up and recommended labs and tests     Follow up with primary care provider, Oscar De Oliveira, within 7 days for hospital follow- up.  No follow up labs or test are needed.     Activity    Your activity upon discharge: activity as tolerated     Full Code     Diet    Follow this diet upon discharge: Orders Placed This Encounter      Regular Diet Adult       Examination   Physical Exam   Temp:  [97.9  F (36.6  C)-99.3  F (37.4  C)] 98  F (36.7  C)  Pulse:  [70-99] 92  Resp:  [18] 18  BP: (119-158)/(59-89) 119/62  SpO2:  [91 %-98 %] 93 %  Wt Readings from Last 1 Encounters:   07/10/23 65.5 kg (144 lb 6.4 oz)       Physical Exam  HENT:      Head: Normocephalic.   Cardiovascular:      Rate and Rhythm: Normal rate.      Pulses: Normal pulses.   Pulmonary:      Effort: Pulmonary effort is normal.   Abdominal:      General: Abdomen is flat.   Skin:     Capillary Refill: Capillary refill takes less than 2 seconds.   Neurological:      Mental Status: He is alert.             Please see EMR for more detailed significant labs, imaging, consultant notes etc.    I, Scooter Holland MD, personally saw the patient today and spent greater than 30 minutes discharging this patient.    Scooter Holland MD  [facility]    CC:Oscar De Oliveira

## 2023-07-10 NOTE — PROGRESS NOTES
Nuclear Pharmacological Stress Test:  Sitting Protocol. Lexiscan 0.4mg IV administered over 15sec. Peak VS: HR= 106, BP= 100/46. No symptoms of CP produced. Patient felt typical vasodilator side effects from Lexiscan including hypotension so Aminophylline 50mg IV was administered to patient which resolved symptoms. No further dietary restrictions according to stress test. Results pending cardiology interpretation.   Isela Gallegos RN  [facility]

## 2023-07-10 NOTE — PLAN OF CARE
Problem: Plan of Care - These are the overarching goals to be used throughout the patient stay.    Goal: Optimal Comfort and Wellbeing  Outcome: Progressing  Intervention: Provide Person-Centered Care  Recent Flowsheet Documentation  Taken 7/10/2023 0818 by Og Butcher, RN  Trust Relationship/Rapport:   care explained   questions answered   questions encouraged   reassurance provided     Problem: Plan of Care - These are the overarching goals to be used throughout the patient stay.    Goal: Readiness for Transition of Care  Outcome: Progressing     Problem: Infection  Goal: Absence of Infection Signs and Symptoms  Outcome: Progressing     Patient VSS on RA this shift, A+Ox4, denying pain, up with SBA. Telemetry reading sinus tachycardia w/ occasional PACs. IV abx per orders. Stress test done late this AM into the afternoon, cardiology interpretation incomplete at this time. Patient voices readiness to discharge home. Mag and K levels WNL, no replacement per protocols.     For vital signs and complete assessments, please see documentation flowsheets. For detailed medication administrations, please see SHA Butcher, RN 7/10/2023  [phone]

## 2023-07-10 NOTE — PLAN OF CARE
Goal Outcome Evaluation:       Patient is alert and able to communicate needs. Denies pain, slept most the shift. Uses the urinal, NPO at midnight.

## 2023-07-10 NOTE — PROGRESS NOTES
"  HEART CARE ENCOUNTER CONSULTATON NOTE      [facility]  [phone]      Assessment/Recommendations   Assessment:  1.  Elevated troponin: May be demand ischemia.  Underwent Lexiscan nuclear stress test which was negative for inducible ischemia.  No complaints of chest pain.  2.  Fall unclear syncope: If recurrent events would consider ILR per EP    Plan:  1.  Lexiscan nuclear stress test was negative for inducible ischemia.  No new cardiac recommendations.  Call if any further questions or concerns.       History of Present Illness/Subjective     syNo chest pain or recurrent syncopal event  No chest pain or recurrent syncope  No ncopal events     Physical Examination  Review of Systems   Vitals: /62 (BP Location: Right arm)   Pulse 92   Temp 98  F (36.7  C) (Oral)   Resp 18   Ht 1.676 m (5' 6\")   Wt 65.5 kg (144 lb 6.4 oz)   SpO2 93%   BMI 23.31 kg/m    BMI= Body mass index is 23.31 kg/m .  Wt Readings from Last 3 Encounters:   07/10/23 65.5 kg (144 lb 6.4 oz)       General Appearance:   no distress, normal body habitus   ENT/Mouth: membranes moist, no oral lesions or bleeding gums.      EYES:  no scleral icterus, normal conjunctivae   Neck: no carotid bruits or thyromegaly   Chest/Lungs:   lungs are clear to auscultation   Cardiovascular:   Regular. Normal first and second heart sounds with no murmur  no edema bilaterally    Abdomen:  bowel sounds are present   Extremities: no cyanosis or clubbing   Skin: no xanthelasma, warm.    Neurologic: normal  bilateral, no tremors     Psychiatric: alert and oriented x3, calm        Please refer above for cardiac ROS details.        Medical History  Surgical History Family History Social History     Metabolic encephalopathy    Primary hypertension    Other hyperlipidemia    Other specified hypothyroidism    Fall    Hypomagnesemia    ELVIN (acute kidney injury) (H)    Foreign body (FB) in soft tissue    Urinary tract infection without " hematuria, site unspecified No past surgical history on file.    No family history of heart disease Social History     Socioeconomic History     Marital status:      Spouse name: Not on file     Number of children: Not on file     Years of education: Not on file     Highest education level: Not on file   Occupational History     Not on file   Tobacco Use     Smoking status: Not on file     Smokeless tobacco: Not on file   Substance and Sexual Activity     Alcohol use: Not on file     Drug use: Not on file     Sexual activity: Not on file   Other Topics Concern     Not on file   Social History Narrative     Not on file     Social Determinants of Health     Financial Resource Strain: Not on file   Food Insecurity: Not on file   Transportation Needs: Not on file   Physical Activity: Not on file   Stress: Not on file   Social Connections: Not on file   Intimate Partner Violence: Not on file   Housing Stability: Not on file           Medications  Allergies   No current outpatient medications on file.     No Known Allergies       Lab Results    Chemistry/lipid CBC Cardiac Enzymes/BNP/TSH/INR   Recent Labs   Lab Test 09/06/22  0758   CHOL 166   HDL 56   LDL 91   TRIG 96     Recent Labs   Lab Test 09/06/22  0758 06/29/21  0710 02/26/20  0743   LDL 91 101 119     Recent Labs   Lab Test 07/10/23  0612 07/09/23  0639   NA  --  138   POTASSIUM 3.6 3.6   CHLORIDE  --  105   CO2  --  23   GLC  --  117*   BUN  --  23.2*   CR  --  0.68   GFRESTIMATED  --  87   MEGHANN  --  8.5     Recent Labs   Lab Test 07/09/23  0639 07/08/23  0619 07/07/23  0752   CR 0.68 0.80 1.01     No results for input(s): A1C in the last 25308 hours.       Recent Labs   Lab Test 07/09/23  0639   WBC 15.9*   HGB 12.6*   HCT 36.7*   MCV 95        Recent Labs   Lab Test 07/09/23  0639 07/08/23  0619 07/07/23  0752   HGB 12.6* 12.2* 12.2*    No results for input(s): TROPONINI in the last 33771 hours.  Recent Labs   Lab Test 07/08/23  1606   NTBNPI  1,880*     Recent Labs   Lab Test 09/06/22  0758   TSH 3.49     No results for input(s): INR in the last 49734 hours.     Shavonne Skaggs MD

## 2023-07-10 NOTE — PROVIDER NOTIFICATION
@1620 Dr Almeida called. Stress test normal. Informed Og VILLASENOR.  @0006 Dr. Skaggs also stopped by, stated that the stress test is normal also, pt ok to discharge from Cardiology standpoint.  Dr. Holland updated by writer via Vocera messaging.

## 2023-07-10 NOTE — PLAN OF CARE
Goal Outcome Evaluation:    Problem: Plan of Care - These are the overarching goals to be used throughout the patient stay.    Goal: Absence of Hospital-Acquired Illness or Injury  Outcome: Progressing    Pt had no new injuries. No safety incident this shift. Pt is steady on his feet; walker use with ambulation to the bathroom.       Problem: Risk for Delirium  Goal: Optimal Coping  Outcome: Progressing    Pt denied pain this shift; appeared comfortable.    Telemetry: Sinus rhythm with PVCs.    PIV removed; pt tolerated well.    Discharge instruction given to pt. Son present at discharged.   Pt discharged at 6:41 pm, wheelchair assist.

## 2023-07-11 LAB — BACTERIA BLD CULT: NO GROWTH

## 2023-07-11 NOTE — PLAN OF CARE
Physical Therapy Discharge Summary    Reason for therapy discharge:    Discharged to home with home therapy.    Progress towards therapy goal(s). See goals on Care Plan in [facility] electronic health record for goal details.  Goals partially met.  Barriers to achieving goals:   discharge from facility.    Therapy recommendation(s):    Continued therapy is recommended.  Rationale/Recommendations:  PT goals not fully met.

## 2023-10-15 ENCOUNTER — HEALTH MAINTENANCE LETTER (OUTPATIENT)
Age: 88
End: 2023-10-15

## 2023-10-27 ENCOUNTER — LAB REQUISITION (OUTPATIENT)
Dept: LAB | Facility: CLINIC | Age: 88
End: 2023-10-27
Payer: MEDICARE

## 2023-10-27 DIAGNOSIS — E03.9 HYPOTHYROIDISM, UNSPECIFIED: ICD-10-CM

## 2023-10-27 DIAGNOSIS — E78.2 MIXED HYPERLIPIDEMIA: ICD-10-CM

## 2023-10-27 DIAGNOSIS — I10 ESSENTIAL (PRIMARY) HYPERTENSION: ICD-10-CM

## 2023-10-27 DIAGNOSIS — Z87.19 PERSONAL HISTORY OF OTHER DISEASES OF THE DIGESTIVE SYSTEM: ICD-10-CM

## 2023-10-27 LAB
ALBUMIN SERPL BCG-MCNC: 4 G/DL (ref 3.5–5.2)
ALP SERPL-CCNC: 77 U/L (ref 40–129)
ALT SERPL W P-5'-P-CCNC: 16 U/L (ref 0–70)
ANION GAP SERPL CALCULATED.3IONS-SCNC: 13 MMOL/L (ref 7–15)
AST SERPL W P-5'-P-CCNC: 21 U/L (ref 0–45)
BASOPHILS # BLD AUTO: 0.1 10E3/UL (ref 0–0.2)
BASOPHILS NFR BLD AUTO: 1 %
BILIRUB SERPL-MCNC: 0.4 MG/DL
BUN SERPL-MCNC: 18.6 MG/DL (ref 8–23)
CALCIUM SERPL-MCNC: 9.2 MG/DL (ref 8.2–9.6)
CHLORIDE SERPL-SCNC: 99 MMOL/L (ref 98–107)
CHOLEST SERPL-MCNC: 172 MG/DL
CREAT SERPL-MCNC: 0.9 MG/DL (ref 0.67–1.17)
DEPRECATED HCO3 PLAS-SCNC: 25 MMOL/L (ref 22–29)
EGFRCR SERPLBLD CKD-EPI 2021: 80 ML/MIN/1.73M2
EOSINOPHIL # BLD AUTO: 0.3 10E3/UL (ref 0–0.7)
EOSINOPHIL NFR BLD AUTO: 3 %
ERYTHROCYTE [DISTWIDTH] IN BLOOD BY AUTOMATED COUNT: 13.8 % (ref 10–15)
GLUCOSE SERPL-MCNC: 141 MG/DL (ref 70–99)
HCT VFR BLD AUTO: 37.9 % (ref 40–53)
HDLC SERPL-MCNC: 49 MG/DL
HGB BLD-MCNC: 13.1 G/DL (ref 13.3–17.7)
IMM GRANULOCYTES # BLD: 0 10E3/UL
IMM GRANULOCYTES NFR BLD: 0 %
LDLC SERPL CALC-MCNC: 51 MG/DL
LYMPHOCYTES # BLD AUTO: 2 10E3/UL (ref 0.8–5.3)
LYMPHOCYTES NFR BLD AUTO: 22 %
MCH RBC QN AUTO: 32.9 PG (ref 26.5–33)
MCHC RBC AUTO-ENTMCNC: 34.6 G/DL (ref 31.5–36.5)
MCV RBC AUTO: 95 FL (ref 78–100)
MONOCYTES # BLD AUTO: 1 10E3/UL (ref 0–1.3)
MONOCYTES NFR BLD AUTO: 11 %
NEUTROPHILS # BLD AUTO: 5.7 10E3/UL (ref 1.6–8.3)
NEUTROPHILS NFR BLD AUTO: 63 %
NONHDLC SERPL-MCNC: 123 MG/DL
NRBC # BLD AUTO: 0 10E3/UL
NRBC BLD AUTO-RTO: 0 /100
PLATELET # BLD AUTO: 240 10E3/UL (ref 150–450)
POTASSIUM SERPL-SCNC: 3.6 MMOL/L (ref 3.4–5.3)
PROT SERPL-MCNC: 6.3 G/DL (ref 6.4–8.3)
PSA SERPL DL<=0.01 NG/ML-MCNC: 4.58 NG/ML
RBC # BLD AUTO: 3.98 10E6/UL (ref 4.4–5.9)
SODIUM SERPL-SCNC: 137 MMOL/L (ref 135–145)
TRIGL SERPL-MCNC: 362 MG/DL
TSH SERPL DL<=0.005 MIU/L-ACNC: 4.59 UIU/ML (ref 0.3–4.2)
WBC # BLD AUTO: 9 10E3/UL (ref 4–11)

## 2023-10-27 PROCEDURE — 84153 ASSAY OF PSA TOTAL: CPT | Mod: ORL | Performed by: FAMILY MEDICINE

## 2023-10-27 PROCEDURE — 80053 COMPREHEN METABOLIC PANEL: CPT | Mod: ORL | Performed by: FAMILY MEDICINE

## 2023-10-27 PROCEDURE — 85025 COMPLETE CBC W/AUTO DIFF WBC: CPT | Mod: ORL | Performed by: FAMILY MEDICINE

## 2023-10-27 PROCEDURE — 84443 ASSAY THYROID STIM HORMONE: CPT | Mod: ORL | Performed by: FAMILY MEDICINE

## 2023-10-27 PROCEDURE — 80061 LIPID PANEL: CPT | Mod: ORL | Performed by: FAMILY MEDICINE

## 2024-03-13 NOTE — PLAN OF CARE
Occupational Therapy Discharge Summary    Reason for therapy discharge:    Discharged to home with home therapy.    Progress towards therapy goal(s). See goals on Care Plan in [facility] electronic health record for goal details.  Goals partially met.  Barriers to achieving goals:   limited tolerance for therapy.    Therapy recommendation(s):    Continued therapy is recommended.  Rationale/Recommendations:  pt would benefit from home care therapy for continued progression toward goals and independence.                             (4) walks frequently

## 2024-03-19 ENCOUNTER — APPOINTMENT (OUTPATIENT)
Dept: CT IMAGING | Facility: HOSPITAL | Age: 89
DRG: 871 | End: 2024-03-19
Attending: EMERGENCY MEDICINE
Payer: MEDICARE

## 2024-03-19 ENCOUNTER — HOSPITAL ENCOUNTER (INPATIENT)
Facility: HOSPITAL | Age: 89
LOS: 3 days | Discharge: HOME | DRG: 871 | End: 2024-03-22
Attending: EMERGENCY MEDICINE | Admitting: INTERNAL MEDICINE
Payer: MEDICARE

## 2024-03-19 ENCOUNTER — APPOINTMENT (OUTPATIENT)
Dept: RADIOLOGY | Facility: HOSPITAL | Age: 89
DRG: 871 | End: 2024-03-19
Attending: EMERGENCY MEDICINE
Payer: MEDICARE

## 2024-03-19 DIAGNOSIS — N39.0 URINARY TRACT INFECTION WITH HEMATURIA, SITE UNSPECIFIED: ICD-10-CM

## 2024-03-19 DIAGNOSIS — A41.9 SEPSIS, DUE TO UNSPECIFIED ORGANISM, UNSPECIFIED WHETHER ACUTE ORGAN DYSFUNCTION PRESENT (H): Primary | ICD-10-CM

## 2024-03-19 DIAGNOSIS — R31.9 URINARY TRACT INFECTION WITH HEMATURIA, SITE UNSPECIFIED: ICD-10-CM

## 2024-03-19 DIAGNOSIS — R53.83 OTHER FATIGUE: ICD-10-CM

## 2024-03-19 DIAGNOSIS — R09.02 HYPOXIA: ICD-10-CM

## 2024-03-19 DIAGNOSIS — J98.11 ATELECTASIS: ICD-10-CM

## 2024-03-19 DIAGNOSIS — R50.9 FEVER, UNSPECIFIED FEVER CAUSE: ICD-10-CM

## 2024-03-19 DIAGNOSIS — K59.00 CONSTIPATION, UNSPECIFIED CONSTIPATION TYPE: ICD-10-CM

## 2024-03-19 PROBLEM — H90.3 SENSORINEURAL HEARING LOSS, BILATERAL: Status: ACTIVE | Noted: 2024-03-19

## 2024-03-19 PROBLEM — H52.4 PRESBYOPIA: Status: ACTIVE | Noted: 2024-03-19

## 2024-03-19 PROBLEM — H90.3 SENSORINEURAL HEARING LOSS (SNHL) OF BOTH EARS: Status: ACTIVE | Noted: 2024-03-19

## 2024-03-19 LAB
ALBUMIN SERPL BCG-MCNC: 3.8 G/DL (ref 3.5–5.2)
ALBUMIN UR-MCNC: 100 MG/DL
ALP SERPL-CCNC: 167 U/L (ref 40–150)
ALT SERPL W P-5'-P-CCNC: 31 U/L (ref 0–70)
ANION GAP SERPL CALCULATED.3IONS-SCNC: 16 MMOL/L (ref 7–15)
APPEARANCE UR: ABNORMAL
AST SERPL W P-5'-P-CCNC: 35 U/L (ref 0–45)
BACTERIA #/AREA URNS HPF: ABNORMAL /HPF
BASE EXCESS BLDV CALC-SCNC: -0.8 MMOL/L (ref -3–3)
BASOPHILS # BLD AUTO: 0 10E3/UL (ref 0–0.2)
BASOPHILS NFR BLD AUTO: 0 %
BILIRUB DIRECT SERPL-MCNC: 0.27 MG/DL (ref 0–0.3)
BILIRUB SERPL-MCNC: 0.9 MG/DL
BILIRUB UR QL STRIP: NEGATIVE
BUN SERPL-MCNC: 26.1 MG/DL (ref 8–23)
CALCIUM SERPL-MCNC: 9.2 MG/DL (ref 8.2–9.6)
CHLORIDE SERPL-SCNC: 99 MMOL/L (ref 98–107)
CK SERPL-CCNC: 90 U/L (ref 39–308)
COLOR UR AUTO: YELLOW
CREAT SERPL-MCNC: 0.94 MG/DL (ref 0.67–1.17)
CRP SERPL-MCNC: 168.6 MG/L
DEPRECATED HCO3 PLAS-SCNC: 20 MMOL/L (ref 22–29)
EGFRCR SERPLBLD CKD-EPI 2021: 75 ML/MIN/1.73M2
EOSINOPHIL # BLD AUTO: 0.1 10E3/UL (ref 0–0.7)
EOSINOPHIL NFR BLD AUTO: 1 %
ERYTHROCYTE [DISTWIDTH] IN BLOOD BY AUTOMATED COUNT: 14 % (ref 10–15)
FLUAV RNA SPEC QL NAA+PROBE: NEGATIVE
FLUBV RNA RESP QL NAA+PROBE: NEGATIVE
GLUCOSE SERPL-MCNC: 127 MG/DL (ref 70–99)
GLUCOSE UR STRIP-MCNC: NEGATIVE MG/DL
HCO3 BLDV-SCNC: 23 MMOL/L (ref 21–28)
HCT VFR BLD AUTO: 36 % (ref 40–53)
HGB BLD-MCNC: 12.4 G/DL (ref 13.3–17.7)
HGB UR QL STRIP: ABNORMAL
HOLD SPECIMEN: NORMAL
HYALINE CASTS: 2 /LPF
IMM GRANULOCYTES # BLD: 0.1 10E3/UL
IMM GRANULOCYTES NFR BLD: 1 %
INR PPP: 1.08 (ref 0.85–1.15)
KETONES UR STRIP-MCNC: NEGATIVE MG/DL
LACTATE SERPL-SCNC: 1.1 MMOL/L (ref 0.7–2)
LEUKOCYTE ESTERASE UR QL STRIP: ABNORMAL
LIPASE SERPL-CCNC: 14 U/L (ref 13–60)
LYMPHOCYTES # BLD AUTO: 1.2 10E3/UL (ref 0.8–5.3)
LYMPHOCYTES NFR BLD AUTO: 9 %
MAGNESIUM SERPL-MCNC: 1.7 MG/DL (ref 1.7–2.3)
MCH RBC QN AUTO: 33.2 PG (ref 26.5–33)
MCHC RBC AUTO-ENTMCNC: 34.4 G/DL (ref 31.5–36.5)
MCV RBC AUTO: 96 FL (ref 78–100)
MONOCYTES # BLD AUTO: 0.4 10E3/UL (ref 0–1.3)
MONOCYTES NFR BLD AUTO: 3 %
MUCOUS THREADS #/AREA URNS LPF: PRESENT /LPF
NEUTROPHILS # BLD AUTO: 11.9 10E3/UL (ref 1.6–8.3)
NEUTROPHILS NFR BLD AUTO: 86 %
NITRATE UR QL: POSITIVE
NRBC # BLD AUTO: 0 10E3/UL
NRBC BLD AUTO-RTO: 0 /100
NT-PROBNP SERPL-MCNC: 812 PG/ML (ref 0–1800)
O2/TOTAL GAS SETTING VFR VENT: 21 %
OXYHGB MFR BLDV: 81 % (ref 70–75)
PCO2 BLDV: 30 MM HG (ref 40–50)
PH BLDV: 7.49 [PH] (ref 7.32–7.43)
PH UR STRIP: 6 [PH] (ref 5–7)
PLATELET # BLD AUTO: 224 10E3/UL (ref 150–450)
PO2 BLDV: 41 MM HG (ref 25–47)
POTASSIUM SERPL-SCNC: 3.9 MMOL/L (ref 3.4–5.3)
PROCALCITONIN SERPL IA-MCNC: 0.14 NG/ML
PROT SERPL-MCNC: 6.6 G/DL (ref 6.4–8.3)
RBC # BLD AUTO: 3.74 10E6/UL (ref 4.4–5.9)
RBC URINE: 11 /HPF
RSV RNA SPEC NAA+PROBE: NEGATIVE
SAO2 % BLDV: 81.5 % (ref 70–75)
SARS-COV-2 RNA RESP QL NAA+PROBE: NEGATIVE
SODIUM SERPL-SCNC: 135 MMOL/L (ref 135–145)
SP GR UR STRIP: 1.02 (ref 1–1.03)
TROPONIN T SERPL HS-MCNC: 34 NG/L
TROPONIN T SERPL HS-MCNC: 78 NG/L
TSH SERPL DL<=0.005 MIU/L-ACNC: 1.44 UIU/ML (ref 0.3–4.2)
UROBILINOGEN UR STRIP-MCNC: <2 MG/DL
WBC # BLD AUTO: 13.7 10E3/UL (ref 4–11)
WBC CLUMPS #/AREA URNS HPF: PRESENT /HPF
WBC URINE: >182 /HPF

## 2024-03-19 PROCEDURE — 87186 SC STD MICRODIL/AGAR DIL: CPT | Performed by: EMERGENCY MEDICINE

## 2024-03-19 PROCEDURE — 86140 C-REACTIVE PROTEIN: CPT | Performed by: EMERGENCY MEDICINE

## 2024-03-19 PROCEDURE — 84443 ASSAY THYROID STIM HORMONE: CPT | Performed by: INTERNAL MEDICINE

## 2024-03-19 PROCEDURE — G1010 CDSM STANSON: HCPCS

## 2024-03-19 PROCEDURE — 87086 URINE CULTURE/COLONY COUNT: CPT | Performed by: EMERGENCY MEDICINE

## 2024-03-19 PROCEDURE — 258N000003 HC RX IP 258 OP 636: Performed by: INTERNAL MEDICINE

## 2024-03-19 PROCEDURE — 83605 ASSAY OF LACTIC ACID: CPT | Performed by: STUDENT IN AN ORGANIZED HEALTH CARE EDUCATION/TRAINING PROGRAM

## 2024-03-19 PROCEDURE — 81001 URINALYSIS AUTO W/SCOPE: CPT | Performed by: EMERGENCY MEDICINE

## 2024-03-19 PROCEDURE — 84484 ASSAY OF TROPONIN QUANT: CPT | Performed by: INTERNAL MEDICINE

## 2024-03-19 PROCEDURE — 82550 ASSAY OF CK (CPK): CPT | Performed by: INTERNAL MEDICINE

## 2024-03-19 PROCEDURE — 84153 ASSAY OF PSA TOTAL: CPT | Performed by: INTERNAL MEDICINE

## 2024-03-19 PROCEDURE — 36415 COLL VENOUS BLD VENIPUNCTURE: CPT | Performed by: EMERGENCY MEDICINE

## 2024-03-19 PROCEDURE — 82248 BILIRUBIN DIRECT: CPT | Performed by: EMERGENCY MEDICINE

## 2024-03-19 PROCEDURE — 82805 BLOOD GASES W/O2 SATURATION: CPT | Performed by: EMERGENCY MEDICINE

## 2024-03-19 PROCEDURE — 96365 THER/PROPH/DIAG IV INF INIT: CPT | Mod: 59

## 2024-03-19 PROCEDURE — 84145 PROCALCITONIN (PCT): CPT | Performed by: EMERGENCY MEDICINE

## 2024-03-19 PROCEDURE — 250N000013 HC RX MED GY IP 250 OP 250 PS 637: Performed by: INTERNAL MEDICINE

## 2024-03-19 PROCEDURE — 250N000011 HC RX IP 250 OP 636: Performed by: EMERGENCY MEDICINE

## 2024-03-19 PROCEDURE — 99285 EMERGENCY DEPT VISIT HI MDM: CPT | Mod: 25

## 2024-03-19 PROCEDURE — 83735 ASSAY OF MAGNESIUM: CPT | Performed by: EMERGENCY MEDICINE

## 2024-03-19 PROCEDURE — 99223 1ST HOSP IP/OBS HIGH 75: CPT | Performed by: INTERNAL MEDICINE

## 2024-03-19 PROCEDURE — 258N000003 HC RX IP 258 OP 636: Performed by: EMERGENCY MEDICINE

## 2024-03-19 PROCEDURE — 83880 ASSAY OF NATRIURETIC PEPTIDE: CPT | Performed by: EMERGENCY MEDICINE

## 2024-03-19 PROCEDURE — 83690 ASSAY OF LIPASE: CPT | Performed by: EMERGENCY MEDICINE

## 2024-03-19 PROCEDURE — 85004 AUTOMATED DIFF WBC COUNT: CPT | Performed by: EMERGENCY MEDICINE

## 2024-03-19 PROCEDURE — 71045 X-RAY EXAM CHEST 1 VIEW: CPT

## 2024-03-19 PROCEDURE — 84443 ASSAY THYROID STIM HORMONE: CPT | Performed by: EMERGENCY MEDICINE

## 2024-03-19 PROCEDURE — 74177 CT ABD & PELVIS W/CONTRAST: CPT | Mod: MG

## 2024-03-19 PROCEDURE — 84484 ASSAY OF TROPONIN QUANT: CPT | Performed by: EMERGENCY MEDICINE

## 2024-03-19 PROCEDURE — 96367 TX/PROPH/DG ADDL SEQ IV INF: CPT

## 2024-03-19 PROCEDURE — 80053 COMPREHEN METABOLIC PANEL: CPT | Performed by: EMERGENCY MEDICINE

## 2024-03-19 PROCEDURE — 93005 ELECTROCARDIOGRAM TRACING: CPT | Performed by: EMERGENCY MEDICINE

## 2024-03-19 PROCEDURE — 120N000001 HC R&B MED SURG/OB

## 2024-03-19 PROCEDURE — 85610 PROTHROMBIN TIME: CPT | Performed by: EMERGENCY MEDICINE

## 2024-03-19 PROCEDURE — 87149 DNA/RNA DIRECT PROBE: CPT | Performed by: EMERGENCY MEDICINE

## 2024-03-19 PROCEDURE — 36415 COLL VENOUS BLD VENIPUNCTURE: CPT | Performed by: INTERNAL MEDICINE

## 2024-03-19 PROCEDURE — 87637 SARSCOV2&INF A&B&RSV AMP PRB: CPT | Performed by: EMERGENCY MEDICINE

## 2024-03-19 PROCEDURE — 250N000013 HC RX MED GY IP 250 OP 250 PS 637: Performed by: EMERGENCY MEDICINE

## 2024-03-19 RX ORDER — LIDOCAINE 40 MG/G
CREAM TOPICAL
Status: DISCONTINUED | OUTPATIENT
Start: 2024-03-19 | End: 2024-03-22 | Stop reason: HOSPADM

## 2024-03-19 RX ORDER — LEVOTHYROXINE SODIUM 25 UG/1
75 TABLET ORAL DAILY
Status: DISCONTINUED | OUTPATIENT
Start: 2024-03-20 | End: 2024-03-22 | Stop reason: HOSPADM

## 2024-03-19 RX ORDER — AMOXICILLIN 250 MG
2 CAPSULE ORAL 2 TIMES DAILY PRN
Status: DISCONTINUED | OUTPATIENT
Start: 2024-03-19 | End: 2024-03-22 | Stop reason: HOSPADM

## 2024-03-19 RX ORDER — AMOXICILLIN 250 MG
1 CAPSULE ORAL 2 TIMES DAILY PRN
Status: DISCONTINUED | OUTPATIENT
Start: 2024-03-19 | End: 2024-03-22 | Stop reason: HOSPADM

## 2024-03-19 RX ORDER — IOPAMIDOL 755 MG/ML
90 INJECTION, SOLUTION INTRAVASCULAR ONCE
Status: COMPLETED | OUTPATIENT
Start: 2024-03-19 | End: 2024-03-19

## 2024-03-19 RX ORDER — AMLODIPINE BESYLATE 5 MG/1
10 TABLET ORAL EVERY EVENING
Status: DISCONTINUED | OUTPATIENT
Start: 2024-03-20 | End: 2024-03-22 | Stop reason: HOSPADM

## 2024-03-19 RX ORDER — PIPERACILLIN SODIUM, TAZOBACTAM SODIUM 3; .375 G/15ML; G/15ML
3.38 INJECTION, POWDER, LYOPHILIZED, FOR SOLUTION INTRAVENOUS ONCE
Status: COMPLETED | OUTPATIENT
Start: 2024-03-19 | End: 2024-03-19

## 2024-03-19 RX ORDER — ACETAMINOPHEN 500 MG
1000 TABLET ORAL 2 TIMES DAILY
COMMUNITY

## 2024-03-19 RX ORDER — ACETAMINOPHEN 325 MG/1
650 TABLET ORAL EVERY 4 HOURS PRN
Status: DISCONTINUED | OUTPATIENT
Start: 2024-03-19 | End: 2024-03-22 | Stop reason: HOSPADM

## 2024-03-19 RX ORDER — ACETAMINOPHEN 325 MG/1
975 TABLET ORAL ONCE
Status: COMPLETED | OUTPATIENT
Start: 2024-03-19 | End: 2024-03-19

## 2024-03-19 RX ORDER — ACETAMINOPHEN 650 MG/1
650 SUPPOSITORY RECTAL EVERY 4 HOURS PRN
Status: DISCONTINUED | OUTPATIENT
Start: 2024-03-19 | End: 2024-03-22 | Stop reason: HOSPADM

## 2024-03-19 RX ORDER — ONDANSETRON 2 MG/ML
4 INJECTION INTRAMUSCULAR; INTRAVENOUS EVERY 6 HOURS PRN
Status: DISCONTINUED | OUTPATIENT
Start: 2024-03-19 | End: 2024-03-22 | Stop reason: HOSPADM

## 2024-03-19 RX ORDER — ONDANSETRON 4 MG/1
4 TABLET, ORALLY DISINTEGRATING ORAL EVERY 6 HOURS PRN
Status: DISCONTINUED | OUTPATIENT
Start: 2024-03-19 | End: 2024-03-22 | Stop reason: HOSPADM

## 2024-03-19 RX ORDER — SODIUM CHLORIDE 9 MG/ML
INJECTION, SOLUTION INTRAVENOUS CONTINUOUS
Status: DISCONTINUED | OUTPATIENT
Start: 2024-03-19 | End: 2024-03-21

## 2024-03-19 RX ORDER — CEFAZOLIN SODIUM 1 G/50ML
1250 SOLUTION INTRAVENOUS ONCE
Status: COMPLETED | OUTPATIENT
Start: 2024-03-19 | End: 2024-03-19

## 2024-03-19 RX ORDER — PIPERACILLIN SODIUM, TAZOBACTAM SODIUM 3; .375 G/15ML; G/15ML
3.38 INJECTION, POWDER, LYOPHILIZED, FOR SOLUTION INTRAVENOUS EVERY 8 HOURS
Status: DISCONTINUED | OUTPATIENT
Start: 2024-03-20 | End: 2024-03-20

## 2024-03-19 RX ORDER — BISACODYL 10 MG
10 SUPPOSITORY, RECTAL RECTAL ONCE
Status: COMPLETED | OUTPATIENT
Start: 2024-03-19 | End: 2024-03-19

## 2024-03-19 RX ORDER — CALCIUM CARBONATE 500 MG/1
1000 TABLET, CHEWABLE ORAL 4 TIMES DAILY PRN
Status: DISCONTINUED | OUTPATIENT
Start: 2024-03-19 | End: 2024-03-22 | Stop reason: HOSPADM

## 2024-03-19 RX ORDER — VITAMIN B COMPLEX
1 TABLET ORAL DAILY
COMMUNITY

## 2024-03-19 RX ORDER — ACETAMINOPHEN 500 MG
1000 TABLET ORAL 2 TIMES DAILY
Status: DISCONTINUED | OUTPATIENT
Start: 2024-03-19 | End: 2024-03-22 | Stop reason: HOSPADM

## 2024-03-19 RX ORDER — LEVOTHYROXINE SODIUM 75 UG/1
75 TABLET ORAL DAILY
COMMUNITY
Start: 2024-01-09

## 2024-03-19 RX ORDER — PRAVASTATIN SODIUM 20 MG
20 TABLET ORAL EVERY EVENING
Status: DISCONTINUED | OUTPATIENT
Start: 2024-03-20 | End: 2024-03-22 | Stop reason: HOSPADM

## 2024-03-19 RX ORDER — CARBOXYMETHYLCELLULOSE SODIUM 5 MG/ML
2 SOLUTION/ DROPS OPHTHALMIC 2 TIMES DAILY
Status: DISCONTINUED | OUTPATIENT
Start: 2024-03-19 | End: 2024-03-22 | Stop reason: HOSPADM

## 2024-03-19 RX ADMIN — ACETAMINOPHEN 975 MG: 325 TABLET ORAL at 19:13

## 2024-03-19 RX ADMIN — BISACODYL 10 MG: 10 SUPPOSITORY RECTAL at 23:57

## 2024-03-19 RX ADMIN — SODIUM CHLORIDE: 9 INJECTION, SOLUTION INTRAVENOUS at 23:54

## 2024-03-19 RX ADMIN — ACETAMINOPHEN 1000 MG: 500 TABLET ORAL at 23:56

## 2024-03-19 RX ADMIN — SODIUM CHLORIDE 1000 ML: 9 INJECTION, SOLUTION INTRAVENOUS at 19:08

## 2024-03-19 RX ADMIN — IOPAMIDOL 90 ML: 755 INJECTION, SOLUTION INTRAVENOUS at 20:58

## 2024-03-19 RX ADMIN — VANCOMYCIN HYDROCHLORIDE 1250 MG: 5 INJECTION, POWDER, LYOPHILIZED, FOR SOLUTION INTRAVENOUS at 21:19

## 2024-03-19 RX ADMIN — Medication 2 DROP: at 23:56

## 2024-03-19 RX ADMIN — PIPERACILLIN AND TAZOBACTAM 3.38 G: 3; .375 INJECTION, POWDER, FOR SOLUTION INTRAVENOUS at 19:28

## 2024-03-19 ASSESSMENT — COLUMBIA-SUICIDE SEVERITY RATING SCALE - C-SSRS
2. HAVE YOU ACTUALLY HAD ANY THOUGHTS OF KILLING YOURSELF IN THE PAST MONTH?: NO
6. HAVE YOU EVER DONE ANYTHING, STARTED TO DO ANYTHING, OR PREPARED TO DO ANYTHING TO END YOUR LIFE?: NO
1. IN THE PAST MONTH, HAVE YOU WISHED YOU WERE DEAD OR WISHED YOU COULD GO TO SLEEP AND NOT WAKE UP?: NO

## 2024-03-19 ASSESSMENT — ACTIVITIES OF DAILY LIVING (ADL)
ADLS_ACUITY_SCORE: 38

## 2024-03-19 NOTE — ED NOTES
Bed: JNED-19  Expected date: 3/19/24  Expected time: 6:39 PM  Means of arrival: Ambulance  Comments:  Adrianne   89M  SOB

## 2024-03-19 NOTE — ED TRIAGE NOTES
Patient arrives from a senior/independent living.  Was feeling fine earlier today- went to take a nap and awoke with sudden onset of SOB.  EMS state patient does not have any hx of lung problems- was audibly wheezing and given duo neb en route.  Patient tachypneic and febrile upon arrival.       Triage Assessment (Adult)       Row Name 03/19/24 1848          Triage Assessment    Airway WDL WDL        Respiratory WDL    Respiratory WDL X;rhythm/pattern     Rhythm/Pattern, Respiratory shortness of breath;tachypneic        Skin Circulation/Temperature WDL    Skin Circulation/Temperature WDL temperature     Skin Temperature --  hot to touch        Cardiac WDL    Cardiac WDL X;rhythm     Pulse Rate & Regularity tachycardic        Peripheral/Neurovascular WDL    Peripheral Neurovascular WDL WDL        Cognitive/Neuro/Behavioral WDL    Cognitive/Neuro/Behavioral WDL WDL

## 2024-03-20 LAB
ACINETOBACTER SPECIES: NOT DETECTED
ANION GAP SERPL CALCULATED.3IONS-SCNC: 11 MMOL/L (ref 7–15)
ATRIAL RATE - MUSE: 124 BPM
BUN SERPL-MCNC: 17.2 MG/DL (ref 8–23)
CALCIUM SERPL-MCNC: 8.3 MG/DL (ref 8.2–9.6)
CHLORIDE SERPL-SCNC: 106 MMOL/L (ref 98–107)
CITROBACTER SPECIES: NOT DETECTED
CREAT SERPL-MCNC: 0.86 MG/DL (ref 0.67–1.17)
CTX-M: NOT DETECTED
DEPRECATED HCO3 PLAS-SCNC: 23 MMOL/L (ref 22–29)
DIASTOLIC BLOOD PRESSURE - MUSE: 62 MMHG
EGFRCR SERPLBLD CKD-EPI 2021: 80 ML/MIN/1.73M2
ENTEROBACTER SPECIES: NOT DETECTED
ERYTHROCYTE [DISTWIDTH] IN BLOOD BY AUTOMATED COUNT: 14.3 % (ref 10–15)
ESCHERICHIA COLI: DETECTED
GLUCOSE SERPL-MCNC: 96 MG/DL (ref 70–99)
HCT VFR BLD AUTO: 32.2 % (ref 40–53)
HGB BLD-MCNC: 10.5 G/DL (ref 13.3–17.7)
IMP: NOT DETECTED
INTERPRETATION ECG - MUSE: NORMAL
KLEBSIELLA OXYTOCA: NOT DETECTED
KLEBSIELLA PNEUMONIAE: NOT DETECTED
KPC: NOT DETECTED
LACTATE SERPL-SCNC: 1.5 MMOL/L (ref 0.7–2)
MCH RBC QN AUTO: 32.4 PG (ref 26.5–33)
MCHC RBC AUTO-ENTMCNC: 32.6 G/DL (ref 31.5–36.5)
MCV RBC AUTO: 99 FL (ref 78–100)
NDM: NOT DETECTED
OXA (DETECTED/NOT DETECTED): NOT DETECTED
P AXIS - MUSE: 74 DEGREES
PLATELET # BLD AUTO: 199 10E3/UL (ref 150–450)
POTASSIUM SERPL-SCNC: 3.7 MMOL/L (ref 3.4–5.3)
PR INTERVAL - MUSE: 152 MS
PROTEUS SPECIES: NOT DETECTED
PSA SERPL DL<=0.01 NG/ML-MCNC: 21.5 NG/ML
PSEUDOMONAS AERUGINOSA: NOT DETECTED
QRS DURATION - MUSE: 74 MS
QT - MUSE: 302 MS
QTC - MUSE: 433 MS
R AXIS - MUSE: -68 DEGREES
RBC # BLD AUTO: 3.24 10E6/UL (ref 4.4–5.9)
SODIUM SERPL-SCNC: 140 MMOL/L (ref 135–145)
SYSTOLIC BLOOD PRESSURE - MUSE: 134 MMHG
T AXIS - MUSE: 70 DEGREES
TSH SERPL DL<=0.005 MIU/L-ACNC: 1.35 UIU/ML (ref 0.3–4.2)
VENTRICULAR RATE- MUSE: 124 BPM
VIM: NOT DETECTED
WBC # BLD AUTO: 14.1 10E3/UL (ref 4–11)

## 2024-03-20 PROCEDURE — 80048 BASIC METABOLIC PNL TOTAL CA: CPT | Performed by: INTERNAL MEDICINE

## 2024-03-20 PROCEDURE — 99233 SBSQ HOSP IP/OBS HIGH 50: CPT | Performed by: HOSPITALIST

## 2024-03-20 PROCEDURE — 36415 COLL VENOUS BLD VENIPUNCTURE: CPT | Performed by: INTERNAL MEDICINE

## 2024-03-20 PROCEDURE — 85027 COMPLETE CBC AUTOMATED: CPT | Performed by: INTERNAL MEDICINE

## 2024-03-20 PROCEDURE — 250N000011 HC RX IP 250 OP 636: Performed by: HOSPITALIST

## 2024-03-20 PROCEDURE — 250N000013 HC RX MED GY IP 250 OP 250 PS 637: Performed by: HOSPITALIST

## 2024-03-20 PROCEDURE — 83605 ASSAY OF LACTIC ACID: CPT | Performed by: HOSPITALIST

## 2024-03-20 PROCEDURE — 36415 COLL VENOUS BLD VENIPUNCTURE: CPT | Performed by: HOSPITALIST

## 2024-03-20 PROCEDURE — 250N000013 HC RX MED GY IP 250 OP 250 PS 637: Performed by: INTERNAL MEDICINE

## 2024-03-20 PROCEDURE — 250N000011 HC RX IP 250 OP 636: Performed by: INTERNAL MEDICINE

## 2024-03-20 PROCEDURE — 258N000003 HC RX IP 258 OP 636: Performed by: INTERNAL MEDICINE

## 2024-03-20 PROCEDURE — 120N000001 HC R&B MED SURG/OB

## 2024-03-20 RX ORDER — IPRATROPIUM BROMIDE AND ALBUTEROL SULFATE 2.5; .5 MG/3ML; MG/3ML
3 SOLUTION RESPIRATORY (INHALATION) EVERY 4 HOURS PRN
Status: DISCONTINUED | OUTPATIENT
Start: 2024-03-20 | End: 2024-03-22 | Stop reason: HOSPADM

## 2024-03-20 RX ORDER — SENNOSIDES 8.6 MG
2 TABLET ORAL DAILY
Status: DISCONTINUED | OUTPATIENT
Start: 2024-03-20 | End: 2024-03-22 | Stop reason: HOSPADM

## 2024-03-20 RX ORDER — CEFEPIME HYDROCHLORIDE 2 G/1
2 INJECTION, POWDER, FOR SOLUTION INTRAVENOUS EVERY 8 HOURS
Status: DISCONTINUED | OUTPATIENT
Start: 2024-03-20 | End: 2024-03-20

## 2024-03-20 RX ORDER — CEFEPIME HYDROCHLORIDE 2 G/1
2 INJECTION, POWDER, FOR SOLUTION INTRAVENOUS EVERY 12 HOURS
Status: DISCONTINUED | OUTPATIENT
Start: 2024-03-20 | End: 2024-03-21

## 2024-03-20 RX ORDER — CEFTRIAXONE 1 G/1
1 INJECTION, POWDER, FOR SOLUTION INTRAMUSCULAR; INTRAVENOUS EVERY 24 HOURS
Status: DISCONTINUED | OUTPATIENT
Start: 2024-03-20 | End: 2024-03-20

## 2024-03-20 RX ORDER — POLYETHYLENE GLYCOL 3350 17 G/17G
17 POWDER, FOR SOLUTION ORAL DAILY
Status: DISCONTINUED | OUTPATIENT
Start: 2024-03-20 | End: 2024-03-22 | Stop reason: HOSPADM

## 2024-03-20 RX ORDER — ENOXAPARIN SODIUM 100 MG/ML
40 INJECTION SUBCUTANEOUS EVERY 24 HOURS
Status: DISCONTINUED | OUTPATIENT
Start: 2024-03-20 | End: 2024-03-22 | Stop reason: HOSPADM

## 2024-03-20 RX ADMIN — ENOXAPARIN SODIUM 40 MG: 40 INJECTION SUBCUTANEOUS at 15:53

## 2024-03-20 RX ADMIN — SODIUM CHLORIDE: 9 INJECTION, SOLUTION INTRAVENOUS at 11:57

## 2024-03-20 RX ADMIN — PIPERACILLIN AND TAZOBACTAM 3.38 G: 3; .375 INJECTION, POWDER, FOR SOLUTION INTRAVENOUS at 08:03

## 2024-03-20 RX ADMIN — CEFEPIME HYDROCHLORIDE 2 G: 2 INJECTION, POWDER, FOR SOLUTION INTRAVENOUS at 12:28

## 2024-03-20 RX ADMIN — SODIUM CHLORIDE: 9 INJECTION, SOLUTION INTRAVENOUS at 23:48

## 2024-03-20 RX ADMIN — Medication 2 DROP: at 08:03

## 2024-03-20 RX ADMIN — PRAVASTATIN SODIUM 20 MG: 20 TABLET ORAL at 20:12

## 2024-03-20 RX ADMIN — Medication 2 DROP: at 20:12

## 2024-03-20 RX ADMIN — ACETAMINOPHEN 1000 MG: 500 TABLET ORAL at 08:03

## 2024-03-20 RX ADMIN — CEFTRIAXONE SODIUM 1 G: 1 INJECTION, POWDER, FOR SOLUTION INTRAMUSCULAR; INTRAVENOUS at 09:15

## 2024-03-20 RX ADMIN — ACETAMINOPHEN 650 MG: 325 TABLET ORAL at 17:02

## 2024-03-20 RX ADMIN — PIPERACILLIN AND TAZOBACTAM 3.38 G: 3; .375 INJECTION, POWDER, FOR SOLUTION INTRAVENOUS at 00:02

## 2024-03-20 RX ADMIN — LEVOTHYROXINE SODIUM 75 MCG: 0.03 TABLET ORAL at 08:03

## 2024-03-20 RX ADMIN — CEFEPIME HYDROCHLORIDE 2 G: 2 INJECTION, POWDER, FOR SOLUTION INTRAVENOUS at 23:44

## 2024-03-20 ASSESSMENT — ACTIVITIES OF DAILY LIVING (ADL)
HEARING_DIFFICULTY_OR_DEAF: YES
ADLS_ACUITY_SCORE: 29
ADLS_ACUITY_SCORE: 40
ADLS_ACUITY_SCORE: 40
ADLS_ACUITY_SCORE: 38
ADLS_ACUITY_SCORE: 29
ADLS_ACUITY_SCORE: 40
DESCRIBE_HEARING_LOSS: BILATERAL HEARING LOSS
ADLS_ACUITY_SCORE: 40
USE_OF_HEARING_ASSISTIVE_DEVICES: BILATERAL HEARING AIDS
ADLS_ACUITY_SCORE: 40
ADLS_ACUITY_SCORE: 29
ADLS_ACUITY_SCORE: 40
DEPENDENT_IADLS:: INDEPENDENT
ADLS_ACUITY_SCORE: 40
ADLS_ACUITY_SCORE: 29

## 2024-03-20 NOTE — H&P
Admission History and Physical   Clayton Pacheco,    1929,   [id number]    [facility]   No admission diagnoses are documented for this encounter.    PCP: Oscar De Oliveira,    Code status:  Prior       Extended Emergency Contact Information  Primary Emergency Contact: YEFRI PACHECO  Mobile Phone: [phone]  Relation: Son  Secondary Emergency Contact: TIMOTHY PACHECO  Mobile Phone: [phone]  Relation: Son       Active Problems:    * No active hospital problems. *     Clayton Pacheco is a 89 year old male with history of HTN, HLD, hypothyroidism presenting per EMS from [facility] for evaluation of fatigue.       ASSESSMENT AND PLAN:  Generalized weakness and fatigue, likely due to UTI, no focal neurological deficit at bedside.  Begin IV antibiotics, PT OT evaluation, check CK rule out rhabdo    UTI, history of similar infection a few years ago, antibiotics started pending urine culture, CT abdomen showed no surgical acute pathology, has prostate enlargement with diffuse bladder wall thickening, check PSA which could indicate prostatitis if elevated    Fever/sepsis likely due to UTI, chest imaging with no PE or focal infiltrates, dilated pulmonary arteries, possible pulmonary hypertension.  Follow blood culture results, broad-spectrum antibiotic started, note normal lactic acid    Hypertension controlled, temporarily hold home meds for now, follow vital signs closely lt    Mild troponin elevation noted on repeat labs, no acute EKG changes,  Stress test  noted negative for inducible ischemia, LVEF 70%, likely type II non-STEMI, continue cardiac monitoring, follow symptoms    Hypothyroidism, continue home meds, check TSH in a.m.    Full code per discussion with patient and family at bedside      DVT PPX:  Mechanical    Barriers to discharge UTI treatment    Anticipated Discharge date inpatient multiple days          Chief Complaint:  Generalized weakness and  fatigue today    HPI:  Clayton Pacheco is a 89 year old old male presenting for evaluation of generalized weakness and fatigue which has been getting worse since this morning.  He does have a history of pain with urination a few days duration, no incontinence, no abdominal pain or diarrhea, no nausea vomiting.  Does have some chills and fever, no chest pain or shortness of breath, no PND orthopnea.    Denies any recent falls, no headaches skin rashes and no recent antibiotic use.  In the ED he was found to have vital signs significant for temperature 103, blood pressure was stable.  Workup showed evidence for possible sepsis, and a urinary infection.  He was started on antibiotics and admitted to hospital medicine for further care      Medical History  No past medical history on file.       Surgical History  He  has no past surgical history on file.      SOCIAL HISTORY:  Social History     Socioeconomic History    Marital status:      Spouse name: Not on file    Number of children: Not on file    Years of education: Not on file    Highest education level: Not on file   Occupational History    Not on file   Tobacco Use    Smoking status: Not on file    Smokeless tobacco: Not on file   Substance and Sexual Activity    Alcohol use: Not on file    Drug use: Not on file    Sexual activity: Not on file   Other Topics Concern    Not on file   Social History Narrative    Not on file     Social Determinants of Health     Financial Resource Strain: Not on file   Food Insecurity: Not on file   Transportation Needs: Not on file   Physical Activity: Not on file   Stress: Not on file   Social Connections: Not on file   Interpersonal Safety: Not on file   Housing Stability: Not on file       FAMILY HISTORY:  No family history on file.      ALLERGIES:  No Known Allergies    MEDICATIONS: See pharmacy note        ROS:  12 point review of systems reviewed and is negative except as stated above      PHYSICAL EXAM:  /53  "  Pulse 106   Temp (!) 101.4  F (38.6  C) (Oral)   Resp 28   Ht 1.727 m (5' 8\")   SpO2 95%   BMI 21.96 kg/m      General: Alert and oriented x 3. Not in obvious distress.  HEENT: Pupils equal and reactive,ENT WNL   Neck- supple, No JVP elevation, lymphadenopathy or thyromegaly. Trachea-central.  Chest: Clear to auscultation bilaterally.  Heart: S1S2 regular. No M/R/G.  Abdomen: Soft. NT, ND. No organomegaly. Bowel sounds- active.  Back: No spine tenderness. No CVA tenderness.  Extremities: No leg swelling. Peripheral pulses 2+ bilaterally.  Neuro: No focal neurological deficit  Skin: no skin rashes     DIAGNOSTIC DATA:      EKG Results: Personally reviewed, no acute ischemia        Advanced Care Planning       Pedro Jurado MD     "

## 2024-03-20 NOTE — PLAN OF CARE
Problem: Sepsis/Septic Shock  Goal: Optimal Nutrition Intake  Outcome: Not Progressing     Problem: UTI (Urinary Tract Infection)  Goal: Improved Infection Symptoms  Outcome: Progressing     Problem: Sepsis/Septic Shock  Goal: Absence of Infection Signs and Symptoms  Outcome: Progressing  Intervention: Promote Recovery  Recent Flowsheet Documentation  Taken 3/20/2024 1545 by Mayte Shah, RN  Activity Management: ambulated to bathroom     Problem: Comorbidity Management  Goal: Blood Pressure in Desired Range  Outcome: Progressing  Intervention: Maintain Blood Pressure Management  Recent Flowsheet Documentation  Taken 3/20/2024 1552 by Mayte Shah RN  Medication Review/Management: medications reviewed   Goal Outcome Evaluation:       Patient alert and oriented x 4. Denied pain. Tachycardic,  tachypneic, and reported SOB and being chilled after using the toilet. Expiratory wheezing noted. MD and RT notified. Bear hugger used but was turned off patient reported feeling better. Breathing was much better after being in bed. Tylenol given. Temperature at 1574 was 100.7, last re-check was 99.4. Tolerated room air at first but had to be put back on oxygen at 1 LPM NC to keep oxygen saturation at 90% and better. Patient refused dinner saying he had big lunch earlier. Assist of 1 with a walker. Requested a urinal for weakness, urinary urgency and frequency.

## 2024-03-20 NOTE — CONSULTS
Care Management Initial Consult    General Information  Assessment completed with: Patient, patient  Type of CM/SW Visit: Initial Assessment    Primary Care Provider verified and updated as needed: Yes   Readmission within the last 30 days:        Reason for Consult: discharge planning  Advance Care Planning:            Communication Assessment  Patient's communication style: spoken language (English or Bilingual)    Hearing Difficulty or Deaf: yes        Cognitive  Cognitive/Neuro/Behavioral: WDL                      Living Environment:   People in home: spouse     Current living Arrangements: apartment      Able to return to prior arrangements: yes       Family/Social Support:  Care provided by: self  Provides care for: no one  Marital Status:   Wife, Children  Krystyna       Description of Support System: Supportive, Involved    Support Assessment: Adequate family and caregiver support, Adequate social supports    Current Resources:   Patient receiving home care services: No     Community Resources: Housekeeping/[employer]  Equipment currently used at home:    Supplies currently used at home: None    Employment/Financial:  Employment Status:          Financial Concerns:             Does the patient's insurance plan have a 3 day qualifying hospital stay waiver?  Yes     Which insurance plan 3 day waiver is available? [employer]    Will the waiver be used for post-acute placement? Undetermined at this time    Lifestyle & Psychosocial Needs:  Social Determinants of Health     Food Insecurity: Not on file   Depression: Not on file   Housing Stability: Not on file   Tobacco Use: Not on file   Financial Resource Strain: Not on file   Alcohol Use: Not on file   Transportation Needs: Not on file   Physical Activity: Not on file   Interpersonal Safety: Not on file   Stress: Not on file   Social Connections: Not on file       Functional Status:  Prior to admission patient needed assistance:   Dependent ADLs::  Ambulation-cane  Dependent IADLs:: Independent               Additional Information:  Met with patient in the ED. Says he lives with his wife at [facility] , a senior apartment building. Says both he and his wife are independent at baseline. Do not have services other than housekeeping. Uses a cane as needed. Drives.     Patient anticipates returning home at discharge. Says son will transport    CM will follow     Bri Hanna RN

## 2024-03-20 NOTE — PLAN OF CARE
Problem: Adult Inpatient Plan of Care  Goal: Optimal Comfort and Wellbeing  Outcome: Progressing     Problem: UTI (Urinary Tract Infection)  Goal: Improved Infection Symptoms  Outcome: Progressing  Denies pain. Up with SBA. Needs oxygen when sleeping. Call light within reach, calls approprietly. Family at bedside this afternoon. Pt updating family on change of room.        Goal Outcome Evaluation:

## 2024-03-20 NOTE — ED PROVIDER NOTES
EMERGENCY DEPARTMENT ENCOUNTER      NAME: Clayton Pacheco  AGE: 89 year old male  YOB: 1929  MRN: [id number]  EVALUATION DATE & TIME: 3/19/2024  6:45 PM    PCP: Oscar De Oliveira    ED PROVIDER: Isaak Hughes M.D.      Chief Complaint   Patient presents with    Shortness of Breath         IMPRESSION  1. Sepsis, due to unspecified organism, unspecified whether acute organ dysfunction present (H)    2. Fever, unspecified fever cause    3. Other fatigue    4. Urinary tract infection with hematuria, site unspecified    5. Hypoxia    6. Atelectasis        PLAN  - admit to hospitalist for further care; med/surg tele    ED COURSE & MEDICAL DECISION MAKING    ED Course as of 03/19/24 2200   Tue Mar 19, 2024   1907 89yoM with history of HTN, HLD, hypothyroidism presenting per EMS from [facility] for evaluation of fatigue. Reports he was feeling fine this morning, then took his early afternoon as usual. Awoke with new notable fatigue; unable to get himself up so EMS called. Hemodynamically stable. EMS heard wheezing so gave Duoneb; patient denies any shortness of breath and states he feels unchanged after the Duoneb. Denies any cough, runny nose, pains of any sort, shortness of breath, UTI symptoms, rash, headache, neck pain.    Temp 103F, HR 140s, RR 40s, satting 96% on room air, BP 130s/60s on presentation. Calm on exam with dry mucous membranes, no meningismus, clear mentation, clear lungs, normal work of breathing, benign abdomen, no peripheral edema or calf tenderness, no rash.    Clear concern for infectious source; meets sepsis criteria. Will give IVF & antibiotics while obtaining viral swab, CXR, blood, urine. Will likely require admission given age with sepsis. Patient comfortable with this plan; no further questions at this time.   1917 CXR independently reviewed & interpreted by me: RLL infiltrate, no pneumothorax or pulmonary edema.   2157 UA with clear UTI; suspect this  as source. Viral swab negative. Blood tests with WBC 13, lactic acid 1.1 (does not meet severe sepsis criteria or septic shock), no ELVIN, unremarkable LFTs.    While in the ED, new O2 requirement of 2-3L NC. CXR read as clear per radiology. UTI with sepsis would not explain hypoxia. CT PE chest and abdomen/pelvis thus obtained to evaluate for hypoxia source; found to have bilateral lower lobe atelectasis (more likely than pneumonia per radiology); no PE or other explanatory pathology for hypoxia. No ureteral stone, other acute intraabdominal process.    Patient feeling much better on recheck with continued clear mentation and no hypotension. Stable for floor Consulted hospitalist for admission; they agreed. Patient understood and agreed with the plan; no further questions at the time of admission.       --------------------------------------------------------------------------------   --------------------------------------------------------------------------------         This patient involved a high degree of complexity in medical decision making, as significant risks were present and assessed. Recent encounters & results in medical record reviewed by me.    All workup (i.e. any EKG/labs/imaging as per charting below) reviewed and independently interpreted by me. See respective sections for details.        See additional MDM below if interested.    MEDICATIONS GIVEN IN THE EMERGENCY DEPARTMENT  Medications   vancomycin (VANCOCIN) 1,250 mg in sodium chloride 0.9 % 250 mL intermittent infusion (1,250 mg Intravenous $New Bag 3/19/24 2119)   acetaminophen (TYLENOL) tablet 975 mg (975 mg Oral $Given 3/19/24 1913)   sodium chloride 0.9% BOLUS 1,000 mL (0 mLs Intravenous Stopped 3/19/24 1948)   piperacillin-tazobactam (ZOSYN) 3.375 g vial to attach to  mL bag (0 g Intravenous Stopped 3/19/24 1948)   iopamidol (ISOVUE-370) solution 90 mL (90 mLs Intravenous $Given 3/19/24 2058)                =================================================================      HPI  Clayton Pacheco is a 89 year old male with history of HTN, HLD, hypothyroidism presenting per EMS from independent [facility] for evaluation of fatigue. Reports he was feeling fine this morning, then took his early afternoon as usual. Awoke with new notable fatigue; unable to get himself up so EMS called. Hemodynamically stable. EMS heard wheezing so gave Duoneb; patient denies any shortness of breath and states he feels unchanged after the Duoneb. Denies any cough, runny nose, pains of any sort, shortness of breath, UTI symptoms, rash, headache, neck pain.          --------------- MEDICAL HISTORY ---------------  PAST MEDICAL HISTORY:  Reviewed by me.  No past medical history on file.  Patient Active Problem List   Diagnosis    Acute cystitis without hematuria    Metabolic encephalopathy    Primary hypertension    Other hyperlipidemia    Other specified hypothyroidism    Fall    Hypomagnesemia    ELVIN (acute kidney injury) (H24)    Foreign body (FB) in soft tissue    Urinary tract infection without hematuria, site unspecified    Presbyopia    Sensorineural hearing loss, bilateral    Sensorineural hearing loss (SNHL) of both ears    Atelectasis    Hypoxia    Fever, unspecified fever cause    Urinary tract infection with hematuria, site unspecified    Other fatigue    Sepsis, due to unspecified organism, unspecified whether acute organ dysfunction present (H)       PAST SURGICAL HISTORY:  Reviewed by me.No past surgical history on file.    CURRENT MEDICATIONS:    Reviewed by me.    Current Facility-Administered Medications:     vancomycin (VANCOCIN) 1,250 mg in sodium chloride 0.9 % 250 mL intermittent infusion, 1,250 mg, Intravenous, Once, Isaak Hughes MD, 1,250 mg at 03/19/24 2119    Current Outpatient Medications:     acetaminophen (TYLENOL) 500 MG tablet, Take 1,000 mg by mouth 2 times daily, Disp: , Rfl:      amLODIPine (NORVASC) 10 MG tablet, Take 10 mg by mouth daily, Disp: , Rfl:     Carboxymethylcellulose Sod PF 0.25 % SOLN, Place 2 drops into both eyes 2 times daily, Disp: , Rfl:     irbesartan-hydrochlorothiazide (AVALIDE) 300-12.5 MG tablet, Take 1 tablet by mouth daily, Disp: , Rfl:     levothyroxine (SYNTHROID/LEVOTHROID) 75 MCG tablet, Take 75 mcg by mouth daily, Disp: , Rfl:     melatonin 3 MG tablet, Take 3 mg by mouth at bedtime, Disp: , Rfl:     pravastatin (PRAVACHOL) 20 MG tablet, Take 20 mg by mouth every evening, Disp: , Rfl:     Vitamin D3 (VITAMIN D, CHOLECALCIFEROL,) 25 mcg (1000 units) tablet, Take 1 tablet by mouth daily, Disp: , Rfl:     ALLERGIES:  Reviewed by me.  No Known Allergies    FAMILY HISTORY:  Reviewed by me.  No family history on file.      SOCIAL HISTORY:   Reviewed by me.  Social History     Socioeconomic History    Marital status:          --------------- PHYSICAL EXAM ---------------  Nursing notes and vitals independently reviewed by me.  VITALS:  Vitals:    03/19/24 2000 03/19/24 2015 03/19/24 2030 03/19/24 2040   BP: 123/57 122/59 111/53    Pulse: 109 109 106    Resp: 28 26 28    Temp:    (!) 101.4  F (38.6  C)   TempSrc:    Oral   SpO2: 94% 93% 95%    Height:           PHYSICAL EXAM:    General:  alert, interactive, no distress, appears generally fatigued but answers questions quickly & clearly  Eyes:  conjunctivae clear, conjugate gaze, PERRL @ 3mm with EOMI  HENT:  atraumatic, nose with no rhinorrhea, oropharynx clear, mild dry mucous membranes, hearing aids in place  Neck:  no meningismus  Cardiovascular:   during exam, regular rhythm, no murmurs, brisk cap refill  Chest:  no chest wall tenderness  Pulmonary:  no stridor, normal phonation, normal work of breathing, clear lungs bilaterally  Abdomen:  soft, nondistended, nontender  :  no CVA tenderness  Back:  no midline spinal tenderness  Musculoskeletal:  no pretibial edema, no calf tenderness. Gross ROM  intact to joints of extremities with no obvious deformities.  Skin:  warm, dry, no rash  Neuro:  awake, alert, answers questions appropriately, follows commands, moves all limbs, CN 2-12 intact, negative pronator drift, 5/5 strength to individual limb testing, no tremor  Psych:  calm, normal affect      --------------- RESULTS ---------------  EKG:    Reviewed and independently interpreted by me.  - sinus tachycardia at 124bpm, no ST or T wave changes, normal intervals  - increased rate from 100bpm prior with otherwise no changes from 7/8/23  My read.    LAB:  Reviewed and independently interpreted by me.  Results for orders placed or performed during the hospital encounter of 03/19/24   XR Chest Port 1 View    Impression    IMPRESSION: Negative chest.   CT Chest Pulmonary Embolism w Contrast    Impression    IMPRESSION:  1.  No evidence of acute pulmonary embolism.  2.  Mild dilatation of the central pulmonary arteries may indicate pulmonary arterial hypertension.  3.  Moderate dependent opacities in both lungs have increased from 07/07/2023 and are likely due to atelectasis. Infection is not absolutely excluded but considered significantly less likely.     CT Abdomen Pelvis w Contrast    Impression    IMPRESSION:   1.  No acute pathology identified in abdomen or pelvis.  2.  Large amount stool throughout the colon.  3.  Colonic diverticulosis without active inflammation.  4.  Marked enlargement the prostate gland. Diffuse bladder wall thickening likely due to hypertrophy.  5.  Subtle surface nodularity of the liver and relative prominence of the left hepatic lobe may indicate underlying cirrhosis.     Symptomatic Influenza A/B, RSV, & SARS-CoV2 PCR (COVID-19) Nasopharyngeal    Specimen: Nasopharyngeal; Swab   Result Value Ref Range    Influenza A PCR Negative Negative    Influenza B PCR Negative Negative    RSV PCR Negative Negative    SARS CoV2 PCR Negative Negative   Result Value Ref Range    INR 1.08 0.85 -  1.15   Basic metabolic panel   Result Value Ref Range    Sodium 135 135 - 145 mmol/L    Potassium 3.9 3.4 - 5.3 mmol/L    Chloride 99 98 - 107 mmol/L    Carbon Dioxide (CO2) 20 (L) 22 - 29 mmol/L    Anion Gap 16 (H) 7 - 15 mmol/L    Urea Nitrogen 26.1 (H) 8.0 - 23.0 mg/dL    Creatinine 0.94 0.67 - 1.17 mg/dL    GFR Estimate 75 >60 mL/min/1.73m2    Calcium 9.2 8.2 - 9.6 mg/dL    Glucose 127 (H) 70 - 99 mg/dL   Hepatic function panel   Result Value Ref Range    Protein Total 6.6 6.4 - 8.3 g/dL    Albumin 3.8 3.5 - 5.2 g/dL    Bilirubin Total 0.9 <=1.2 mg/dL    Alkaline Phosphatase 167 (H) 40 - 150 U/L    AST 35 0 - 45 U/L    ALT 31 0 - 70 U/L    Bilirubin Direct 0.27 0.00 - 0.30 mg/dL   Result Value Ref Range    Lipase 14 13 - 60 U/L   Result Value Ref Range    Procalcitonin 0.14 <0.50 ng/mL   Result Value Ref Range    Troponin T, High Sensitivity 34 (H) <=22 ng/L   Result Value Ref Range    Magnesium 1.7 1.7 - 2.3 mg/dL   Blood gas venous   Result Value Ref Range    pH Venous 7.49 (H) 7.32 - 7.43    pCO2 Venous 30 (L) 40 - 50 mm Hg    pO2 Venous 41 25 - 47 mm Hg    Bicarbonate Venous 23 21 - 28 mmol/L    Base Excess/Deficit Venous -0.8 -3.0 - 3.0 mmol/L    FIO2 21     Oxyhemoglobin Venous 81 (H) 70 - 75 %    O2 Sat, Venous 81.5 (H) 70.0 - 75.0 %   TSH with free T4 reflex   Result Value Ref Range    TSH 1.44 0.30 - 4.20 uIU/mL   Result Value Ref Range    CRP Inflammation 168.60 (H) <5.00 mg/L   Nt probnp inpatient (BNP)   Result Value Ref Range    N terminal Pro BNP Inpatient 812 0 - 1,800 pg/mL   Lactic Acid STAT   Result Value Ref Range    Lactic Acid 1.1 0.7 - 2.0 mmol/L   UA with Microscopic reflex to Culture    Specimen: Urine, Clean Catch   Result Value Ref Range    Color Urine Yellow Colorless, Straw, Light Yellow, Yellow    Appearance Urine Turbid (A) Clear    Glucose Urine Negative Negative mg/dL    Bilirubin Urine Negative Negative    Ketones Urine Negative Negative mg/dL    Specific Gravity Urine 1.025  1.001 - 1.030    Blood Urine 0.06 mg/dL (A) Negative    pH Urine 6.0 5.0 - 7.0    Protein Albumin Urine 100 (A) Negative mg/dL    Urobilinogen Urine <2.0 <2.0 mg/dL    Nitrite Urine Positive (A) Negative    Leukocyte Esterase Urine 500 Morales/uL (A) Negative    Bacteria Urine Few (A) None Seen /HPF    WBC Clumps Urine Present (A) None Seen /HPF    Mucus Urine Present (A) None Seen /LPF    RBC Urine 11 (H) <=2 /HPF    WBC Urine >182 (H) <=5 /HPF    Hyaline Casts Urine 2 <=2 /LPF   CBC with platelets and differential   Result Value Ref Range    WBC Count 13.7 (H) 4.0 - 11.0 10e3/uL    RBC Count 3.74 (L) 4.40 - 5.90 10e6/uL    Hemoglobin 12.4 (L) 13.3 - 17.7 g/dL    Hematocrit 36.0 (L) 40.0 - 53.0 %    MCV 96 78 - 100 fL    MCH 33.2 (H) 26.5 - 33.0 pg    MCHC 34.4 31.5 - 36.5 g/dL    RDW 14.0 10.0 - 15.0 %    Platelet Count 224 150 - 450 10e3/uL    % Neutrophils 86 %    % Lymphocytes 9 %    % Monocytes 3 %    % Eosinophils 1 %    % Basophils 0 %    % Immature Granulocytes 1 %    NRBCs per 100 WBC 0 <1 /100    Absolute Neutrophils 11.9 (H) 1.6 - 8.3 10e3/uL    Absolute Lymphocytes 1.2 0.8 - 5.3 10e3/uL    Absolute Monocytes 0.4 0.0 - 1.3 10e3/uL    Absolute Eosinophils 0.1 0.0 - 0.7 10e3/uL    Absolute Basophils 0.0 0.0 - 0.2 10e3/uL    Absolute Immature Granulocytes 0.1 <=0.4 10e3/uL    Absolute NRBCs 0.0 10e3/uL   Extra Green Top (Lithium Heparin) Tube   Result Value Ref Range    Hold Specimen JI          RADIOLOGY:  Reviewed and independently interpreted by me. Please see official radiology report.  Recent Results (from the past 24 hour(s))   XR Chest Port 1 View    Narrative    EXAM: XR CHEST PORT 1 VIEW  LOCATION: [facility]  DATE: 3/19/2024    INDICATION: sepsis  COMPARISON: 07/06/2023      Impression    IMPRESSION: Negative chest.   CT Chest Pulmonary Embolism w Contrast    Narrative    EXAM: CT CHEST PULMONARY EMBOLISM W CONTRAST  LOCATION: [facility]  DATE:  3/19/2024    INDICATION: hypoxia, fever, clear CXR per radiology  COMPARISON: 07/06/2023  TECHNIQUE: CT chest pulmonary angiogram during arterial phase injection of IV contrast. Multiplanar reformats and MIP reconstructions were performed. Dose reduction techniques were used.   CONTRAST: isovue 370 90ml    FINDINGS:  ANGIOGRAM CHEST: No evidence of acute pulmonary embolism. Mild dilatation of the central pulmonary arteries may indicate pulmonary arterial hypertension. Extensive calcified plaque in the thoracic aorta. No evidence of dissection.    LUNGS AND PLEURA: Moderate dependent opacities in both lungs have increased from 07/07/2023 and are likely due to atelectasis. No consolidation. A small amount of presumed loculated fluid or pleural thickening along the superior aspect of the left   fissure remains unchanged from 07/06/2023.    MEDIASTINUM/AXILLAE: Normal heart size. No pericardial effusions. No adenopathy.    CORONARY ARTERY CALCIFICATION: Moderate.    UPPER ABDOMEN: See concurrent CT abdomen report.    MUSCULOSKELETAL: Degenerative hypertrophic changes in the thoracic spine.      Impression    IMPRESSION:  1.  No evidence of acute pulmonary embolism.  2.  Mild dilatation of the central pulmonary arteries may indicate pulmonary arterial hypertension.  3.  Moderate dependent opacities in both lungs have increased from 07/07/2023 and are likely due to atelectasis. Infection is not absolutely excluded but considered significantly less likely.     CT Abdomen Pelvis w Contrast    Narrative    EXAM: CT ABDOMEN PELVIS W CONTRAST  LOCATION: [facility]  DATE: 3/19/2024    INDICATION: sepsis, UTI  COMPARISON: 07/06/2023  TECHNIQUE: CT scan of the abdomen and pelvis was performed following injection of IV contrast. Multiplanar reformats were obtained. Dose reduction techniques were used.  CONTRAST: isovue 370 90ml    FINDINGS:   LOWER CHEST: See concurrent CT chest report.    HEPATOBILIARY:  Subtle surface nodularity of the liver and relative prominence of the left hepatic lobe may indicate underlying cirrhosis. Benign-appearing hepatic cysts measuring up to 5.4 cm. No specific follow-up needed. No biliary dilatation.    PANCREAS: Normal.    SPLEEN: Normal.    ADRENAL GLANDS: Stable benign nodular thickening of the adrenal glands.    KIDNEYS/BLADDER: Approximately 9 cm right renal cyst. No follow-up needed. No hydronephrosis. Diffuse bladder wall thickening likely due to hypertrophy.    BOWEL: Probable postoperative changes involving the proximal stomach, likely a fundoplication. Small bowel loops are normal caliber. Colonic diverticulosis without active inflammation. Large amount stool throughout the colon. No evidence of appendicitis.    LYMPH NODES: Normal.    VASCULATURE: Diffuse calcified arterial atheromatous plaque. No evidence of aortic aneurysm.    PELVIC ORGANS: Marked enlargement the prostate gland.    MUSCULOSKELETAL: The bones are demineralized. Degenerative disc disease and facet arthritis in the lumbar spine.      Impression    IMPRESSION:   1.  No acute pathology identified in abdomen or pelvis.  2.  Large amount stool throughout the colon.  3.  Colonic diverticulosis without active inflammation.  4.  Marked enlargement the prostate gland. Diffuse bladder wall thickening likely due to hypertrophy.  5.  Subtle surface nodularity of the liver and relative prominence of the left hepatic lobe may indicate underlying cirrhosis.           PROCEDURES:   Procedures   --------------------------------------------------------------------------------   Cardiac telemetry monitoring ordered by me secondary to the patient's history of fatigue and to monitor the patient for dysrhythmia. Reviewed & independently interpreted by me. Revealed sinus tachycardia.  --------------------------------------------------------------------------------           Critical Care     Performed by:   Isaak Hughes MD    Authorized by:   Isaak Hughes MD  Total critical care time: 90 minutes (Critical care time was exclusive of separately billable procedures and treating other patients.)    Critical care was necessary to treat or prevent imminent or life-threatening deterioration of the following conditions: Sepsis requiring IVF, IV antibiotics, admission    Critical care was time spent personally by me on the following activities:  - obtaining history from patient or surrogate  - examination of patient  - development of treatment plan with patient or surrogate  - ordering and performing treatments and interventions  - ordering and review of laboratory studies  - ordering and review of radiographic studies  - re-evaluation of patient's condition  - monitoring for potential decompensation  - discussion with consultants  ---------------------------------------------------------------------------------------------------------------------  ---------------------------------------------------------------------------------------------------------------------          --------------- ADDITIONAL MDM ---------------  History:  - I considered systemic symptoms of the presenting illness.  - Supplemental history from:       -- patient, EMS  - External Record(s) reviewed:       -- Inpatient/outpatient record, prior labs, prior imaging       -- see above ED course & MDM for further details    Workup:  - Chart documentation above includes differential considered and any EKGs or imaging independently interpreted by provider.  - In additional to work up documented, I considered the following work up:       -- see above ED course & MDM for further details    External Consultation:  - Discussion of management with another provider:       -- see above charting for additional    Complicating Factors:  - Care impacted by chronic illness:       -- see above MDM, past medical history, & problem list  - Care affected by social determinants of health:        -- see above social history       -- Access to Affordable Healthcare    Disposition Considerations:  - Admit         Isaak Hughes MD  03/19/24  Emergency Medicine  [facility] EMERGENCY DEPARTMENT  [address] [place] [id number] [phone]  Dept: [phone]     Isaak Hughes MD  03/19/24 0642

## 2024-03-20 NOTE — PROGRESS NOTES
"[facility] ED Handoff Report    ED Chief Complaint: SOB, Wheezing    ED Diagnosis:  (A41.9) Sepsis, due to unspecified organism, unspecified whether acute organ dysfunction present (H)  (primary encounter diagnosis)  Comment:   Plan:     (R50.9) Fever, unspecified fever cause  Comment:   Plan:     (R53.83) Other fatigue  Comment:   Plan:     (N39.0,  R31.9) Urinary tract infection with hematuria, site unspecified  Comment:   Plan:     (R09.02) Hypoxia  Comment:   Plan:     (J98.11) Atelectasis  Comment:   Plan:        PMH:  No past medical history on file.     Code Status:  No CPR- Pre-arrest intubation OK     Falls Risk: Yes Band: Applied    Current Living Situation/Residence: lives in an assisted living facility     Elimination Status: Continent: Yes     Activity Level: SBA, uses cane intermittently    Vital Signs:  /55   Pulse 79   Temp 98.3  F (36.8  C) (Oral)   Resp 20   Ht 1.727 m (5' 8\")   Wt 68.9 kg (152 lb)   SpO2 94%   BMI 23.11 kg/m         Pain Score: 0/10    Is the Patient Confused:  No    Last Food or Drink: 03/20/24 at 1300    Focused Assessment:  2L oxygen when sleeping, otherwise no oxygen needed. IV antibiotics given per order. Blood cultures positive for E.coli, MD aware.  IV fluids infusing per order.     Tests Performed: Done: Labs and Imaging    Treatments Provided:  See MAR    Family Dynamics/Concerns: No      Belongings Checklist Done and Signed by Patient: Yes    Belongings Sent with Patient: clothing, bilateral hearing aids, glasses, hearing aid batteries    Medications sent with patient: n/a    Additional Information: PT/OT to see    RN: Bri Alan RN 3/20/2024 2:47 PM       "

## 2024-03-20 NOTE — PROGRESS NOTES
Attempted to wean patient off nasal canula. O2 sats dropping to high 80's on room air. Placed back on 2L nasal canula with O2 sats now mid 90's.

## 2024-03-20 NOTE — PHARMACY-ADMISSION MEDICATION HISTORY
Pharmacist Admission Medication History    Admission medication history is complete. The information provided in this note is only as accurate as the sources available at the time of the update.    Information Source(s): Patient and CareEverywhere/SureScripts via in-person    Pertinent Information:     Changes made to PTA medication list:  Added: acetaminophen, vitamin D  Deleted: None  Changed: levothyroxine, melatonin    Allergies reviewed with patient and updates made in EHR: yes    Medication History Completed By: Dianne Shepherd [facility] 3/19/2024 7:48 PM    PTA Med List   Medication Sig Last Dose    acetaminophen (TYLENOL) 500 MG tablet Take 1,000 mg by mouth 2 times daily 3/19/2024 at x1 in am    amLODIPine (NORVASC) 10 MG tablet Take 10 mg by mouth daily 3/18/2024 at pm    Carboxymethylcellulose Sod PF 0.25 % SOLN Place 2 drops into both eyes 2 times daily 3/19/2024 at x1 in am    irbesartan-hydrochlorothiazide (AVALIDE) 300-12.5 MG tablet Take 1 tablet by mouth daily 3/19/2024 at am    levothyroxine (SYNTHROID/LEVOTHROID) 75 MCG tablet Take 75 mcg by mouth daily 3/19/2024 at am    melatonin 3 MG tablet Take 3 mg by mouth at bedtime 3/18/2024 at pm    pravastatin (PRAVACHOL) 20 MG tablet Take 20 mg by mouth every evening 3/19/2024 at pm    Vitamin D3 (VITAMIN D, CHOLECALCIFEROL,) 25 mcg (1000 units) tablet Take 1 tablet by mouth daily Unknown

## 2024-03-20 NOTE — ED NOTES
Patient's RA sats slowly dropping to 89-91%.  Placed on 2L NC and sats rebound to 93-94%.  Provider updated.

## 2024-03-20 NOTE — PHARMACY-VANCOMYCIN DOSING SERVICE
Pharmacy Vancomycin Initial Note  Date of Service 2024  Patient's  [id number]  89 year old, male    Indication: Sepsis    Current estimated CrCl = Estimated Creatinine Clearance: 44.5 mL/min (based on SCr of 0.94 mg/dL).    Creatinine for last 3 days  3/19/2024:  6:58 PM Creatinine 0.94 mg/dL    Recent Vancomycin Level(s) for last 3 days  No results found for requested labs within last 3 days.      Vancomycin IV Administrations (past 72 hours)        No vancomycin orders with administrations in past 72 hours.                    Nephrotoxins and other renal medications (From now, onward)      Start     Dose/Rate Route Frequency Ordered Stop    24  vancomycin (VANCOCIN) 1,250 mg in sodium chloride 0.9 % 250 mL intermittent infusion         1,250 mg  over 90 Minutes Intravenous ONCE 24              Contrast Orders - past 72 hours (72h ago, onward)      None                  Plan:  Start vancomycin  1250 mg IV once   If continuing vancomycin while inpatient, please reconsult pharmacy for further dosing.    Dianne Shepherd [facility]

## 2024-03-20 NOTE — PROGRESS NOTES
[facility]    Medicine Progress Note - Hospitalist Service    Date of Admission:  3/19/2024    Assessment & Plan   89 year old male from independent senior living with history of HTN, HLD, hypothyroidism presented on 3/19 for evaluation of fatigue.  Patient was febrile up to 103 in the ER, also reported dysuria.  CXR was negative for any acute findings, CT A/P was negative for any acute abdominal pathology aside from enlarged prostate with bladder wall thickening, and lisinopril for constipation.  Admitted with sepsis likely due to UTI.  Initially started on broad-spectrum antibiotics, this was de-escalated initially to ceftriaxone and then cefepime on 3/20 to cover for UTI.  Blood culture was positive for E. coli within 24 hours of admission.    Was started bowel meds for constipation seen on CT.     -Await culture results, continue cefepime for now.     UTI  E Coli Bacteremia due to UTI  Sepsis 2/2 Above  Generalized weakness  -HD stable  -Blood Cx 1 of 4 bottles positive for E. Coli  -Urine culture >100K CFU Ecoli, sensitivity pending  -Cefepime (initial antibiotic start date 3/19)      Constipation  Seen on CT.  -Bowel meds ordered     Mild Troponin Elevation, Non-MI   Suspect due to sepsis. No acute ischemic EKG changes. Denies chest pain. Stress test 7/23 noted negative for inducible ischemia, LVEF 70%. Given lack of ischemic symptoms, no EKG changes, does not qualify for type II MI.  -Given recent stress test, hold off on additional work up as suspect this is due to sepsis    ?Cirrhosis  CT read reported possible mention of cirrhosis.  -Follow up as outpatient for further assessment    Hypertension   -Home meds on hold given sepsis    Hypothyroidism  -Continue home meds          Diet: Combination Diet Low Saturated Fat Na <2400mg Diet, No Caffeine Diet    DVT Prophylaxis: Enoxaparin (Lovenox) SQ  Mcintosh Catheter: Not present  Lines: None     Cardiac Monitoring: None  Code Status: No  CPR- Pre-arrest intubation OK      Clinically Significant Risk Factors Present on Admission          # Hypocalcemia: Lowest Ca = 8.3 mg/dL in last 2 days, will monitor and replace as appropriate         # Hypertension: Noted on problem list   # Acute Respiratory Failure: Documented O2 saturation < 91%.  Continue supplemental oxygen as needed                Disposition Plan      Expected Discharge Date: 03/21/2024      Destination: home with family          Case discussed with patient, and patient's nurse.    Donnell Olivares MD  Hospitalist Service  [facility]  Securely message with [employer] (more info)  Text page via [employer] Paging/Directory   ______________________________________________________________________    Interval History   No acute events overnight.     Patient seen and evaluated at bedside.  Denies any chest pain or shortness of breath.  No abdominal pain.  States he was having dysuria prior to arrival which is improving.    Spoke to patient about CODE STATUS and he reported he would like to think about it more and discuss with his family.    Physical Exam   Vital Signs: Temp: 98.3  F (36.8  C) Temp src: Oral BP: 111/55 Pulse: 79   Resp: 20 SpO2: 94 % O2 Device: Nasal cannula Oxygen Delivery: 2 LPM  Weight: 152 lbs 0 oz    General: sitting up in bed, appears well  CV: +S1/S2, no pitting edema  Respiratory: clear bilaterally  GI: soft, NT, ND  Neuro: alert, answers questions appropriately    Medical Decision Making       55 MINUTES SPENT BY ME on the date of service doing chart review, history, exam, documentation & further activities per the note.      Data     I have personally reviewed the following data over the past 24 hrs:    14.1 (H)  \   10.5 (L)   / 199     140 106 17.2 /  96   3.7 23 0.86 \     ALT: 31 AST: 35 AP: 167 (H) TBILI: 0.9   ALB: 3.8 TOT PROTEIN: 6.6 LIPASE: 14     Trop: 78 (H) BNP: 812     TSH: 1.35 T4: N/A A1C: N/A     Procal: 0.14 CRP: 168.60 (H) Lactic Acid:  1.1       INR:  1.08 PTT:  N/A   D-dimer:  N/A Fibrinogen:  N/A       Imaging results reviewed over the past 24 hrs:   Recent Results (from the past 24 hour(s))   XR Chest Port 1 View    Narrative    EXAM: XR CHEST PORT 1 VIEW  LOCATION: [facility]  DATE: 3/19/2024    INDICATION: sepsis  COMPARISON: 07/06/2023      Impression    IMPRESSION: Negative chest.   CT Chest Pulmonary Embolism w Contrast    Narrative    EXAM: CT CHEST PULMONARY EMBOLISM W CONTRAST  LOCATION: [facility]  DATE: 3/19/2024    INDICATION: hypoxia, fever, clear CXR per radiology  COMPARISON: 07/06/2023  TECHNIQUE: CT chest pulmonary angiogram during arterial phase injection of IV contrast. Multiplanar reformats and MIP reconstructions were performed. Dose reduction techniques were used.   CONTRAST: isovue 370 90ml    FINDINGS:  ANGIOGRAM CHEST: No evidence of acute pulmonary embolism. Mild dilatation of the central pulmonary arteries may indicate pulmonary arterial hypertension. Extensive calcified plaque in the thoracic aorta. No evidence of dissection.    LUNGS AND PLEURA: Moderate dependent opacities in both lungs have increased from 07/07/2023 and are likely due to atelectasis. No consolidation. A small amount of presumed loculated fluid or pleural thickening along the superior aspect of the left   fissure remains unchanged from 07/06/2023.    MEDIASTINUM/AXILLAE: Normal heart size. No pericardial effusions. No adenopathy.    CORONARY ARTERY CALCIFICATION: Moderate.    UPPER ABDOMEN: See concurrent CT abdomen report.    MUSCULOSKELETAL: Degenerative hypertrophic changes in the thoracic spine.      Impression    IMPRESSION:  1.  No evidence of acute pulmonary embolism.  2.  Mild dilatation of the central pulmonary arteries may indicate pulmonary arterial hypertension.  3.  Moderate dependent opacities in both lungs have increased from 07/07/2023 and are likely due to atelectasis. Infection is  not absolutely excluded but considered significantly less likely.     CT Abdomen Pelvis w Contrast    Narrative    EXAM: CT ABDOMEN PELVIS W CONTRAST  LOCATION: [facility]  DATE: 3/19/2024    INDICATION: sepsis, UTI  COMPARISON: 07/06/2023  TECHNIQUE: CT scan of the abdomen and pelvis was performed following injection of IV contrast. Multiplanar reformats were obtained. Dose reduction techniques were used.  CONTRAST: isovue 370 90ml    FINDINGS:   LOWER CHEST: See concurrent CT chest report.    HEPATOBILIARY: Subtle surface nodularity of the liver and relative prominence of the left hepatic lobe may indicate underlying cirrhosis. Benign-appearing hepatic cysts measuring up to 5.4 cm. No specific follow-up needed. No biliary dilatation.    PANCREAS: Normal.    SPLEEN: Normal.    ADRENAL GLANDS: Stable benign nodular thickening of the adrenal glands.    KIDNEYS/BLADDER: Approximately 9 cm right renal cyst. No follow-up needed. No hydronephrosis. Diffuse bladder wall thickening likely due to hypertrophy.    BOWEL: Probable postoperative changes involving the proximal stomach, likely a fundoplication. Small bowel loops are normal caliber. Colonic diverticulosis without active inflammation. Large amount stool throughout the colon. No evidence of appendicitis.    LYMPH NODES: Normal.    VASCULATURE: Diffuse calcified arterial atheromatous plaque. No evidence of aortic aneurysm.    PELVIC ORGANS: Marked enlargement the prostate gland.    MUSCULOSKELETAL: The bones are demineralized. Degenerative disc disease and facet arthritis in the lumbar spine.      Impression    IMPRESSION:   1.  No acute pathology identified in abdomen or pelvis.  2.  Large amount stool throughout the colon.  3.  Colonic diverticulosis without active inflammation.  4.  Marked enlargement the prostate gland. Diffuse bladder wall thickening likely due to hypertrophy.  5.  Subtle surface nodularity of the liver and relative  prominence of the left hepatic lobe may indicate underlying cirrhosis.

## 2024-03-21 ENCOUNTER — APPOINTMENT (OUTPATIENT)
Dept: PHYSICAL THERAPY | Facility: HOSPITAL | Age: 89
DRG: 871 | End: 2024-03-21
Attending: INTERNAL MEDICINE
Payer: MEDICARE

## 2024-03-21 LAB
ALBUMIN SERPL BCG-MCNC: 3.4 G/DL (ref 3.5–5.2)
ALP SERPL-CCNC: 177 U/L (ref 40–150)
ALT SERPL W P-5'-P-CCNC: 24 U/L (ref 0–70)
ANION GAP SERPL CALCULATED.3IONS-SCNC: 8 MMOL/L (ref 7–15)
AST SERPL W P-5'-P-CCNC: 28 U/L (ref 0–45)
BACTERIA UR CULT: ABNORMAL
BILIRUB DIRECT SERPL-MCNC: 0.22 MG/DL (ref 0–0.3)
BILIRUB SERPL-MCNC: 0.7 MG/DL
BUN SERPL-MCNC: 13.3 MG/DL (ref 8–23)
CALCIUM SERPL-MCNC: 8.5 MG/DL (ref 8.2–9.6)
CHLORIDE SERPL-SCNC: 104 MMOL/L (ref 98–107)
CREAT SERPL-MCNC: 0.7 MG/DL (ref 0.67–1.17)
DEPRECATED HCO3 PLAS-SCNC: 26 MMOL/L (ref 22–29)
EGFRCR SERPLBLD CKD-EPI 2021: 85 ML/MIN/1.73M2
ERYTHROCYTE [DISTWIDTH] IN BLOOD BY AUTOMATED COUNT: 14.3 % (ref 10–15)
GLUCOSE SERPL-MCNC: 103 MG/DL (ref 70–99)
HCT VFR BLD AUTO: 35.1 % (ref 40–53)
HGB BLD-MCNC: 11.6 G/DL (ref 13.3–17.7)
LACTATE SERPL-SCNC: 1.2 MMOL/L (ref 0.7–2)
MAGNESIUM SERPL-MCNC: 1.8 MG/DL (ref 1.7–2.3)
MCH RBC QN AUTO: 32.6 PG (ref 26.5–33)
MCHC RBC AUTO-ENTMCNC: 33 G/DL (ref 31.5–36.5)
MCV RBC AUTO: 99 FL (ref 78–100)
PHOSPHATE SERPL-MCNC: 2.1 MG/DL (ref 2.5–4.5)
PLATELET # BLD AUTO: 223 10E3/UL (ref 150–450)
POTASSIUM SERPL-SCNC: 3.5 MMOL/L (ref 3.4–5.3)
PROT SERPL-MCNC: 6 G/DL (ref 6.4–8.3)
RBC # BLD AUTO: 3.56 10E6/UL (ref 4.4–5.9)
SODIUM SERPL-SCNC: 138 MMOL/L (ref 135–145)
WBC # BLD AUTO: 15.3 10E3/UL (ref 4–11)

## 2024-03-21 PROCEDURE — 97110 THERAPEUTIC EXERCISES: CPT | Mod: GP

## 2024-03-21 PROCEDURE — 84100 ASSAY OF PHOSPHORUS: CPT | Performed by: HOSPITALIST

## 2024-03-21 PROCEDURE — 80048 BASIC METABOLIC PNL TOTAL CA: CPT | Performed by: HOSPITALIST

## 2024-03-21 PROCEDURE — 83605 ASSAY OF LACTIC ACID: CPT | Performed by: HOSPITALIST

## 2024-03-21 PROCEDURE — 97116 GAIT TRAINING THERAPY: CPT | Mod: GP

## 2024-03-21 PROCEDURE — 99232 SBSQ HOSP IP/OBS MODERATE 35: CPT | Performed by: HOSPITALIST

## 2024-03-21 PROCEDURE — 250N000013 HC RX MED GY IP 250 OP 250 PS 637: Performed by: HOSPITALIST

## 2024-03-21 PROCEDURE — 250N000011 HC RX IP 250 OP 636: Performed by: HOSPITALIST

## 2024-03-21 PROCEDURE — 97161 PT EVAL LOW COMPLEX 20 MIN: CPT | Mod: GP

## 2024-03-21 PROCEDURE — 36415 COLL VENOUS BLD VENIPUNCTURE: CPT | Performed by: HOSPITALIST

## 2024-03-21 PROCEDURE — 84075 ASSAY ALKALINE PHOSPHATASE: CPT | Performed by: HOSPITALIST

## 2024-03-21 PROCEDURE — 120N000001 HC R&B MED SURG/OB

## 2024-03-21 PROCEDURE — 250N000013 HC RX MED GY IP 250 OP 250 PS 637

## 2024-03-21 PROCEDURE — 85027 COMPLETE CBC AUTOMATED: CPT | Performed by: HOSPITALIST

## 2024-03-21 PROCEDURE — 250N000013 HC RX MED GY IP 250 OP 250 PS 637: Performed by: INTERNAL MEDICINE

## 2024-03-21 PROCEDURE — 83735 ASSAY OF MAGNESIUM: CPT | Performed by: HOSPITALIST

## 2024-03-21 RX ORDER — CEFTRIAXONE 2 G/1
2 INJECTION, POWDER, FOR SOLUTION INTRAMUSCULAR; INTRAVENOUS EVERY 24 HOURS
Status: DISCONTINUED | OUTPATIENT
Start: 2024-03-21 | End: 2024-03-22 | Stop reason: HOSPADM

## 2024-03-21 RX ADMIN — Medication 2 DROP: at 20:07

## 2024-03-21 RX ADMIN — LEVOTHYROXINE SODIUM 75 MCG: 0.03 TABLET ORAL at 06:31

## 2024-03-21 RX ADMIN — POTASSIUM & SODIUM PHOSPHATES POWDER PACK 280-160-250 MG 2 PACKET: 280-160-250 PACK at 20:07

## 2024-03-21 RX ADMIN — ENOXAPARIN SODIUM 40 MG: 40 INJECTION SUBCUTANEOUS at 15:49

## 2024-03-21 RX ADMIN — POTASSIUM & SODIUM PHOSPHATES POWDER PACK 280-160-250 MG 2 PACKET: 280-160-250 PACK at 14:30

## 2024-03-21 RX ADMIN — ACETAMINOPHEN 1000 MG: 500 TABLET ORAL at 09:14

## 2024-03-21 RX ADMIN — POTASSIUM & SODIUM PHOSPHATES POWDER PACK 280-160-250 MG 2 PACKET: 280-160-250 PACK at 18:09

## 2024-03-21 RX ADMIN — Medication 5 MG: at 21:54

## 2024-03-21 RX ADMIN — PRAVASTATIN SODIUM 20 MG: 20 TABLET ORAL at 20:07

## 2024-03-21 RX ADMIN — CEFTRIAXONE SODIUM 2 G: 2 INJECTION, POWDER, FOR SOLUTION INTRAMUSCULAR; INTRAVENOUS at 09:16

## 2024-03-21 RX ADMIN — ACETAMINOPHEN 1000 MG: 500 TABLET ORAL at 20:07

## 2024-03-21 RX ADMIN — SENNOSIDES 2 TABLET: 8.6 TABLET, FILM COATED ORAL at 09:16

## 2024-03-21 RX ADMIN — AMLODIPINE BESYLATE 10 MG: 5 TABLET ORAL at 20:07

## 2024-03-21 RX ADMIN — Medication 2 DROP: at 09:15

## 2024-03-21 ASSESSMENT — ACTIVITIES OF DAILY LIVING (ADL)
ADLS_ACUITY_SCORE: 29
ADLS_ACUITY_SCORE: 37
ADLS_ACUITY_SCORE: 35
ADLS_ACUITY_SCORE: 29
ADLS_ACUITY_SCORE: 35
ADLS_ACUITY_SCORE: 37
ADLS_ACUITY_SCORE: 29
ADLS_ACUITY_SCORE: 37
ADLS_ACUITY_SCORE: 37
ADLS_ACUITY_SCORE: 29

## 2024-03-21 NOTE — PROGRESS NOTES
RT came to asses patient. RN present at bedside and states patient currently stable respiratory wise. Currently on 2L nasal cannula. Sating 91%. Patient not in respiratory distress. RN to call RT again if needed.

## 2024-03-21 NOTE — PLAN OF CARE
Goal Outcome Evaluation:      Plan of Care Reviewed With: patient          Problem: Adult Inpatient Plan of Care  Goal: Absence of Hospital-Acquired Illness or Injury  Intervention: Identify and Manage Fall Risk  Recent Flowsheet Documentation  Taken 3/21/2024 0915 by Fiorella Stevens, RN  Safety Promotion/Fall Prevention:   activity supervised   assistive device/personal items within reach   clutter free environment maintained   nonskid shoes/slippers when out of bed   patient and family education   room organization consistent   safety round/check completed   supervised activity     Problem: Adult Inpatient Plan of Care  Goal: Absence of Hospital-Acquired Illness or Injury  Intervention: Prevent Skin Injury  Recent Flowsheet Documentation  Taken 3/21/2024 0915 by Fiorella Stevens, RN  Body Position: position changed independently     Pt educated about the need to move, turn, walk to help prevent further skin breakdown on coccyx. Currently there is small area of redness at distal end of coccyx.    PAIN: denies  GI: Normactive bowel sounds. Pt had a medium sized, soft-formed, tan BM this afternoon.  : Urinating without difficulty.  PULM: Lungs clear

## 2024-03-21 NOTE — PLAN OF CARE
Problem: Adult Inpatient Plan of Care  Goal: Absence of Hospital-Acquired Illness or Injury  Outcome: Progressing  Safety Promotion/Fall Prevention:   activity supervised   assistive device/personal items within reach   clutter free environment maintained   nonskid shoes/slippers when out of bed   safety round/check completed  Problem: UTI (Urinary Tract Infection)  Goal: Improved Infection Symptoms  Outcome: Progressing  Problem: Sepsis/Septic Shock  Goal: Absence of Infection Signs and Symptoms  Outcome: Progressing   Goal Outcome Evaluation:       Pt slept well overnight. A/Ox4, VSS on 1L NC, RA when awake. Spiked some fever, prn tylenol given with some effect. Septic protocol fired once, lactic draw WNL. Pt continues to have urinary urgency. Using urinal with assistance. IVF NS @75 mL/hr. Transfers with A1/GB/W. No acute events reported this shift.

## 2024-03-21 NOTE — PROGRESS NOTES
[facility]    Medicine Progress Note - Hospitalist Service    Date of Admission:  3/19/2024    Assessment & Plan   89 year old male from independent senior living with history of HTN, HLD, hypothyroidism presented on 3/19 for evaluation of fatigue.  Patient was febrile up to 103 in the ER, also reported dysuria.  CXR was negative for any acute findings, CT A/P was negative for any acute abdominal pathology aside from enlarged prostate with bladder wall thickening, and lisinopril for constipation.  Admitted with sepsis likely due to UTI.  Initially started on broad-spectrum antibiotics, this was de-escalated initially to ceftriaxone and then cefepime on 3/20 to cover for UTI.  Blood culture was positive for E. coli within 24 hours of admission. Antibiotics tailored to Ceftriaxone.    Was started bowel meds for constipation seen on CT.    Seen and cleared by PT for discharge home.    -Antibiotics switched to Ceftriaxone.  Monitor for day.     UTI  E Coli Bacteremia due to UTI  Sepsis 2/2 Above (Resolved)  Generalized weakness  Blood Cx 1 of 4 bottles positive for E. Coli. Urine culture >100K CFU Ecoli  -HD stable, clinically appears well  -Was febrile yesterday but had Devendra hugger on  -WBC uptrended slightly, will monitor for 24 hours to ensure downtrending  -Cefepime to Ceftriaxone (initial antibiotic start date 3/19), plan to discharge on Cirpo for total 7 days      Constipation  Seen on CT.  -Bowel meds ordered in house     Mild Troponin Elevation, Non-MI   Suspect due to sepsis. No acute ischemic EKG changes. Denies chest pain. Stress test 7/23 noted negative for inducible ischemia, LVEF 70%. Given lack of ischemic symptoms, no EKG changes, does not qualify for type II MI.  -Given recent stress test, hold off on additional work up as suspect this is due to sepsis    ?Cirrhosis  CT read reported possible mention of cirrhosis.  -Follow up as outpatient for further assessment    Hypertension    -Amlodipine restarted  -Home Irbesartan-HCTZ    Hypothyroidism  -Continue home meds          Diet: Combination Diet Low Saturated Fat Na <2400mg Diet, No Caffeine Diet    DVT Prophylaxis: Enoxaparin (Lovenox) SQ  Mcintosh Catheter: Not present  Lines: None     Cardiac Monitoring: None  Code Status: No CPR- Pre-arrest intubation OK      Clinically Significant Risk Factors          # Hypocalcemia: Lowest Ca = 8.3 mg/dL in last 2 days, will monitor and replace as appropriate     # Hypoalbuminemia: Lowest albumin = 3.4 g/dL at 3/21/2024  7:38 AM, will monitor as appropriate     # Hypertension: Noted on problem list                   Disposition Plan      Expected Discharge Date: 03/22/2024      Destination: home with family  Discharge Comments: WBC went up today 03/21-watch overnight and if improved d/c in the am        Case discussed with patient and son, patient's nurse, and case management.      Donnell Olivares MD  Hospitalist Service  [facility]  Securely message with [employer] (more info)  Text page via [employer] Paging/Directory   ______________________________________________________________________    Interval History   No acute events overnight.     Patient seen and evaluated at bedside.  States he feels relatively well, denies any chest pain, shortness breath, abdominal pain.  Dysuria has improved as well.  Explained plan to watch for him for today to ensure leukocytosis improves.    Spoke to his son Curt with an update about the plan.    Physical Exam   Vital Signs: Temp: 97.9  F (36.6  C) Temp src: Oral BP: (!) 141/66 Pulse: 82   Resp: 21 SpO2: 94 % O2 Device: None (Room air) Oxygen Delivery: 1 LPM  Weight: 147 lbs 14.86 oz    General: Sitting in chair, NAD  CV: +S1/S2, no pitting edema  Respiratory: CTABL  GI: soft, NT, nondistended  Neuro: alert, answers questions appropriately    Medical Decision Making       55 MINUTES SPENT BY ME on the date of service doing chart review, history, exam,  documentation & further activities per the note.      Data     I have personally reviewed the following data over the past 24 hrs:    15.3 (H)  \   11.6 (L)   / 223     138 104 13.3 /  103 (H)   3.5 26 0.70 \     ALT: 24 AST: 28 AP: 177 (H) TBILI: 0.7   ALB: 3.4 (L) TOT PROTEIN: 6.0 (L) LIPASE: N/A     Procal: N/A CRP: N/A Lactic Acid: 1.2         Imaging results reviewed over the past 24 hrs:   No results found for this or any previous visit (from the past 24 hour(s)).

## 2024-03-21 NOTE — PROGRESS NOTES
SPIRITUAL HEALTH SERVICES Note     Saw pt Clayton Pacheco and administered [employer] sacrament of anointing for the healing of the sick.    Fr. Keegan Arteaga

## 2024-03-21 NOTE — PROGRESS NOTES
"Care Management Follow Up    Length of Stay (days): 2    Expected Discharge Date: 03/22/2024     Concerns to be Addressed:    discharge planning   Patient plan of care discussed at interdisciplinary rounds: Yes    Anticipated Discharge Disposition: Home     Anticipated Discharge Services:    Education Provided on the Discharge Plan:  Per Care Team   Patient/Family in Agreement with the Plan:  Yes    Referrals Placed by CM/SW:    Private pay costs discussed: Not applicable    Additional Information:  Chart reviewed.    CM updates:  Therapy recs home care.       Pt is kindly declining home care therapy at this time, he says he has all the exercises from last time.       Social hx:  \" Lives with his wife at [facility] , a senior apartment building. Says both he and his wife are independent at baseline. Do not have services other than housekeeping. Uses a cane as needed. Drives. Patient anticipates returning home at discharge. Says son will transport.\"       Cm will continue to follow plan of care,review recommendations, and assist with any discharge needs anticipated.      Corina Mulligan RN      "

## 2024-03-21 NOTE — PROVIDER NOTIFICATION
Texted Dr Olivares:     Sepsis protocol fired at 12:30. Lactic acid 1.2. VS WDL & in computer. No fevers.

## 2024-03-21 NOTE — PROGRESS NOTES
03/21/24 0920   Appointment Info   Signing Clinician's Name / Credentials (PT) Alessia Chakraborty PT   Rehab Comments (PT) Patient up in chair .Left in chair with chair alarm on and call light in reach.Very chatty.   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   Existing Precautions/Restrictions fall   Weight-Bearing Status - LLE full weight-bearing   Weight-Bearing Status - RLE full weight-bearing   Cognition   Affect/Mental Status (Cognition) WFL   Orientation Status (Cognition) oriented x 4   Pain Assessment   Patient Currently in Pain No   Range of Motion (ROM)   Range of Motion ROM is WFL   Strength (Manual Muscle Testing)   Strength (Manual Muscle Testing) strength is WFL   Bed Mobility   Bed Mobility no deficits identified   Transfers   Transfers no deficits identified   Gait/Stairs (Locomotion)   [place] Level (Gait) contact guard   Assistive Device (Gait) cane, straight   Distance in Feet (Gait) 50 feet   Pattern (Gait) step-through   Deviations/Abnormal Patterns (Gait) right sided deviations;ariana decreased;stride length decreased   Comment, (Gait/Stairs) slightlyy unsteady initially ,but no loss of  balance,path deviation to R   Clinical Impression   Criteria for Skilled Therapeutic Intervention Yes, treatment indicated   PT Diagnosis (PT) impaired functional mobility   Influenced by the following impairments inactivity ,medical dx,age   Functional limitations due to impairments balance ,endurance   Clinical Presentation (PT Evaluation Complexity) stable   Clinical Presentation Rationale patient preesnts as med diagnosed   Clinical Decision Making (Complexity) low complexity   Planned Therapy Interventions (PT) balance training;gait training;progressive activity/exercise   Risk & Benefits of therapy have been explained evaluation/treatment results reviewed;patient   Clinical Impression Comments patient is a 89 yo male admitted with hypoxia and UTI presents  independent mobility /close to his baseline.Apropriate for skilled PT to mobilize and ensure safe return home.   PT Total Evaluation Time   PT Eval, Low Complexity Minutes (32020) 15   Physical Therapy Goals   PT Frequency Daily   PT Predicted Duration/Target Date for Goal Attainment 03/28/24   PT Goals Bed Mobility;Transfers;Gait   PT: Bed Mobility Independent;Supine to/from sit;Goal Met   PT: Transfers Modified independent;Sit to/from stand;Assistive device;Goal Met   PT: Gait Supervision/stand-by assist;Straight cane;Greater than 200 feet   Interventions   Interventions Quick Adds Gait Training;Therapeutic Procedure   Therapeutic Procedure/Exercise   Ther. Procedure: strength, endurance, ROM, flexibillity Minutes (23087) 12   Treatment Detail/Skilled Intervention seated cecille L/E ex x20 reps .Sit -stand x 10 reps   Gait Training   Gait Training Minutes (97644) 8   Symptoms Noted During/After Treatment (Gait Training) fatigue;shortness of breath   Treatment Detail/Skilled Intervention ambulated out on hallway,on RA -O 2 sats 93 % at rest ,96 % after ambultion   Distance in Feet 200 feet   [place] Level (Gait Training) stand-by assist   Physical  Assistance Level (Gait Training) supervision;1 person assist   Weight Bearing (Gait Training) full weight-bearing   Assistive Device (Gait Training) straight cane;rolling walker   Pattern Analysis (Gait Training) swing-through gait   Gait Analysis Deviations decreased step length;decreased ariana;decreased stride length;decreased weight-shifting ability;decreased toe-to-floor clearance   Impairments (Gait Analysis/Training) strength decreased   PT Discharge Planning   PT Plan distance amb and safety with SEC ,gen strengthening /conditioning   PT Discharge Recommendation (DC Rec) home with assist;home with home care physical therapy   PT Rationale for DC Rec Moving well ,independent prior.   PT Brief overview of current status SBA for mobility and a,mb 200 feet with SEC.   PT Equipment Needed at Discharge cane, straight;walker, rolling   Total Session Time   Timed Code Treatment Minutes 20   Total Session Time (sum of timed and untimed services) 35

## 2024-03-22 VITALS
TEMPERATURE: 98.6 F | HEART RATE: 90 BPM | DIASTOLIC BLOOD PRESSURE: 67 MMHG | RESPIRATION RATE: 20 BRPM | WEIGHT: 147.93 LBS | BODY MASS INDEX: 23.77 KG/M2 | OXYGEN SATURATION: 95 % | HEIGHT: 66 IN | SYSTOLIC BLOOD PRESSURE: 140 MMHG

## 2024-03-22 LAB
ANION GAP SERPL CALCULATED.3IONS-SCNC: 12 MMOL/L (ref 7–15)
BACTERIA BLD CULT: ABNORMAL
BACTERIA BLD CULT: ABNORMAL
BUN SERPL-MCNC: 11.2 MG/DL (ref 8–23)
CALCIUM SERPL-MCNC: 8.5 MG/DL (ref 8.2–9.6)
CHLORIDE SERPL-SCNC: 100 MMOL/L (ref 98–107)
CREAT SERPL-MCNC: 0.58 MG/DL (ref 0.67–1.17)
DEPRECATED HCO3 PLAS-SCNC: 24 MMOL/L (ref 22–29)
EGFRCR SERPLBLD CKD-EPI 2021: 90 ML/MIN/1.73M2
ERYTHROCYTE [DISTWIDTH] IN BLOOD BY AUTOMATED COUNT: 13.9 % (ref 10–15)
GLUCOSE SERPL-MCNC: 106 MG/DL (ref 70–99)
HCT VFR BLD AUTO: 35.7 % (ref 40–53)
HGB BLD-MCNC: 11.9 G/DL (ref 13.3–17.7)
MCH RBC QN AUTO: 32.3 PG (ref 26.5–33)
MCHC RBC AUTO-ENTMCNC: 33.3 G/DL (ref 31.5–36.5)
MCV RBC AUTO: 97 FL (ref 78–100)
PLATELET # BLD AUTO: 233 10E3/UL (ref 150–450)
POTASSIUM SERPL-SCNC: 3.5 MMOL/L (ref 3.4–5.3)
RBC # BLD AUTO: 3.68 10E6/UL (ref 4.4–5.9)
SODIUM SERPL-SCNC: 136 MMOL/L (ref 135–145)
WBC # BLD AUTO: 11.9 10E3/UL (ref 4–11)

## 2024-03-22 PROCEDURE — 99239 HOSP IP/OBS DSCHRG MGMT >30: CPT | Performed by: HOSPITALIST

## 2024-03-22 PROCEDURE — 250N000011 HC RX IP 250 OP 636: Performed by: HOSPITALIST

## 2024-03-22 PROCEDURE — 250N000013 HC RX MED GY IP 250 OP 250 PS 637: Performed by: INTERNAL MEDICINE

## 2024-03-22 PROCEDURE — 85027 COMPLETE CBC AUTOMATED: CPT | Performed by: HOSPITALIST

## 2024-03-22 PROCEDURE — 80048 BASIC METABOLIC PNL TOTAL CA: CPT | Performed by: HOSPITALIST

## 2024-03-22 PROCEDURE — 36415 COLL VENOUS BLD VENIPUNCTURE: CPT | Performed by: HOSPITALIST

## 2024-03-22 RX ORDER — POLYETHYLENE GLYCOL 3350 17 G/17G
17 POWDER, FOR SOLUTION ORAL DAILY PRN
Qty: 30 EACH | Refills: 1 | Status: SHIPPED | OUTPATIENT
Start: 2024-03-22

## 2024-03-22 RX ORDER — CIPROFLOXACIN 500 MG/1
500 TABLET, FILM COATED ORAL 2 TIMES DAILY
Qty: 8 TABLET | Refills: 0 | Status: SHIPPED | OUTPATIENT
Start: 2024-03-23 | End: 2024-03-27

## 2024-03-22 RX ADMIN — LEVOTHYROXINE SODIUM 75 MCG: 0.03 TABLET ORAL at 06:36

## 2024-03-22 RX ADMIN — CEFTRIAXONE SODIUM 2 G: 2 INJECTION, POWDER, FOR SOLUTION INTRAMUSCULAR; INTRAVENOUS at 09:40

## 2024-03-22 RX ADMIN — ACETAMINOPHEN 1000 MG: 500 TABLET ORAL at 09:40

## 2024-03-22 RX ADMIN — Medication 2 DROP: at 09:41

## 2024-03-22 ASSESSMENT — ACTIVITIES OF DAILY LIVING (ADL)
ADLS_ACUITY_SCORE: 37

## 2024-03-22 NOTE — PLAN OF CARE
Problem: Adult Inpatient Plan of Care  Goal: Optimal Comfort and Wellbeing  Outcome: Progressing     Problem: Sepsis/Septic Shock  Goal: Absence of Bleeding  Outcome: Progressing  Goal: Optimal Nutrition Intake  Outcome: Progressing     Problem: Comorbidity Management  Goal: Blood Pressure in Desired Range  Outcome: Progressing  Intervention: Maintain Blood Pressure Management  Recent Flowsheet Documentation  Taken 3/21/2024 1557 by Mayte Shah RN  Medication Review/Management: medications reviewed   Goal Outcome Evaluation:       Patient afebrile. Continues to tolerate room air. Had a large loose, watery stools. Still with urinary urgency and frequency but is better per patient. Up with a cane; assist of 1. Denies pain.

## 2024-03-22 NOTE — PLAN OF CARE
Physical Therapy Discharge Summary    Reason for therapy discharge:    Discharged to home.    Progress towards therapy goal(s). See goals on Care Plan in [facility] electronic health record for goal details.  Goals partially met.  Barriers to achieving goals:   discharge from facility.    Therapy recommendation(s):    Continued therapy is recommended.  Rationale/Recommendations:  Continue with home PT as pt is progressing towards goals.           [facility]

## 2024-03-22 NOTE — PLAN OF CARE
Goal Outcome Evaluation:       Pt. Alert and oriented this morning. IV antibiotics infusing. Eating lightly due to frequent loose stools at [facility]. Refuses to take the ordered mirilax or the sennakot. Denies pain. Wanting to go home today. Awaiting WBC results.

## 2024-03-22 NOTE — PROGRESS NOTES
Care Management Discharge Note    Discharge Date: 03/22/2024       Discharge Disposition: Home    Discharge Transportation: family or friend will provide    Education Provided on the Discharge Plan:  Per Care Team   Persons Notified of Discharge Plans: Yes  Patient/Family in Agreement with the Plan:      Handoff Referral Completed: Yes    Additional Information:    Final discharge plan is for pt to discharge back to [facility] with his wife and follow up OP. Pt declined the need for TCU or home care. Per bedside son to transport pt this afternoon.       Corina Mulligan RN

## 2024-03-22 NOTE — PLAN OF CARE
"  Problem: Adult Inpatient Plan of Care  Goal: Optimal Comfort and Wellbeing  Outcome: Progressing     Problem: Risk for Delirium  Goal: Optimal Coping  Outcome: Progressing   Goal Outcome Evaluation:    A&O x4. RA. VSS. Denies pain. Continues to have urinary urgency and frequency. Urinal at bedside and within arms reach. One small loose stool noted prior to bedtime. Tolerating PO intakes well. Able to make needs known. No new concerns noted during this time, care plan is ongoing.     /60 (BP Location: Right arm)   Pulse 88   Temp 98.4  F (36.9  C) (Oral)   Resp 22   Ht 1.676 m (5' 6\")   Wt 67.1 kg (147 lb 14.9 oz)   SpO2 92%   BMI 23.88 kg/m      "

## 2024-03-24 LAB — BACTERIA BLD CULT: NO GROWTH

## 2024-03-26 ENCOUNTER — PATIENT OUTREACH (OUTPATIENT)
Dept: CARE COORDINATION | Facility: CLINIC | Age: 89
End: 2024-03-26
Payer: MEDICARE

## 2024-03-26 NOTE — PROGRESS NOTES
Clinic Care Coordination Contact  Care Team Conversations    Patient identified for care management outreach, however [facility] RN staff has already followed up with patient to ensure they are following up with PCP and have needs and resources met. Clinic RN will refer back to JAMES CHEN if needed/appropriate.    Annalee Lenz, [facility]  Social Work Care Coordinator - [facility]  Care Coordination  [email or web address]  Cell Phone: [phone]  Gender pronouns: she/her  Employed by [facility] [employer] [employer]

## 2024-03-28 ENCOUNTER — LAB REQUISITION (OUTPATIENT)
Dept: LAB | Facility: CLINIC | Age: 89
End: 2024-03-28
Payer: MEDICARE

## 2024-03-28 DIAGNOSIS — A41.51 SEPSIS DUE TO ESCHERICHIA COLI (E. COLI) (H): ICD-10-CM

## 2024-03-28 DIAGNOSIS — I10 ESSENTIAL (PRIMARY) HYPERTENSION: ICD-10-CM

## 2024-03-28 LAB
BASOPHILS # BLD AUTO: 0.1 10E3/UL (ref 0–0.2)
BASOPHILS NFR BLD AUTO: 1 %
EOSINOPHIL # BLD AUTO: 0.3 10E3/UL (ref 0–0.7)
EOSINOPHIL NFR BLD AUTO: 2 %
ERYTHROCYTE [DISTWIDTH] IN BLOOD BY AUTOMATED COUNT: 14.6 % (ref 10–15)
HCT VFR BLD AUTO: 36.4 % (ref 40–53)
HGB BLD-MCNC: 11.8 G/DL (ref 13.3–17.7)
IMM GRANULOCYTES # BLD: 0.3 10E3/UL
IMM GRANULOCYTES NFR BLD: 2 %
LYMPHOCYTES # BLD AUTO: 2.3 10E3/UL (ref 0.8–5.3)
LYMPHOCYTES NFR BLD AUTO: 16 %
MCH RBC QN AUTO: 32.8 PG (ref 26.5–33)
MCHC RBC AUTO-ENTMCNC: 32.4 G/DL (ref 31.5–36.5)
MCV RBC AUTO: 101 FL (ref 78–100)
MONOCYTES # BLD AUTO: 1.5 10E3/UL (ref 0–1.3)
MONOCYTES NFR BLD AUTO: 11 %
NEUTROPHILS # BLD AUTO: 9.6 10E3/UL (ref 1.6–8.3)
NEUTROPHILS NFR BLD AUTO: 68 %
NRBC # BLD AUTO: 0 10E3/UL
NRBC BLD AUTO-RTO: 0 /100
PLATELET # BLD AUTO: 422 10E3/UL (ref 150–450)
RBC # BLD AUTO: 3.6 10E6/UL (ref 4.4–5.9)
WBC # BLD AUTO: 14.2 10E3/UL (ref 4–11)

## 2024-03-28 PROCEDURE — 85025 COMPLETE CBC W/AUTO DIFF WBC: CPT | Mod: ORL | Performed by: FAMILY MEDICINE

## 2024-03-28 PROCEDURE — 80053 COMPREHEN METABOLIC PANEL: CPT | Mod: ORL | Performed by: FAMILY MEDICINE

## 2024-03-29 LAB
ALBUMIN SERPL BCG-MCNC: 3.9 G/DL (ref 3.5–5.2)
ALP SERPL-CCNC: 118 U/L (ref 40–150)
ALT SERPL W P-5'-P-CCNC: 24 U/L (ref 0–70)
ANION GAP SERPL CALCULATED.3IONS-SCNC: 14 MMOL/L (ref 7–15)
AST SERPL W P-5'-P-CCNC: 27 U/L (ref 0–45)
BILIRUB SERPL-MCNC: 0.3 MG/DL
BUN SERPL-MCNC: 19.9 MG/DL (ref 8–23)
CALCIUM SERPL-MCNC: 9.4 MG/DL (ref 8.2–9.6)
CHLORIDE SERPL-SCNC: 101 MMOL/L (ref 98–107)
CREAT SERPL-MCNC: 0.97 MG/DL (ref 0.67–1.17)
DEPRECATED HCO3 PLAS-SCNC: 22 MMOL/L (ref 22–29)
EGFRCR SERPLBLD CKD-EPI 2021: 72 ML/MIN/1.73M2
GLUCOSE SERPL-MCNC: 137 MG/DL (ref 70–99)
POTASSIUM SERPL-SCNC: 4.2 MMOL/L (ref 3.4–5.3)
PROT SERPL-MCNC: 6.5 G/DL (ref 6.4–8.3)
SODIUM SERPL-SCNC: 137 MMOL/L (ref 135–145)

## 2024-04-04 ENCOUNTER — LAB REQUISITION (OUTPATIENT)
Dept: LAB | Facility: CLINIC | Age: 89
End: 2024-04-04
Payer: MEDICARE

## 2024-04-04 DIAGNOSIS — E03.9 HYPOTHYROIDISM, UNSPECIFIED: ICD-10-CM

## 2024-04-04 DIAGNOSIS — D72.829 ELEVATED WHITE BLOOD CELL COUNT, UNSPECIFIED: ICD-10-CM

## 2024-04-04 DIAGNOSIS — R73.09 OTHER ABNORMAL GLUCOSE: ICD-10-CM

## 2024-04-04 LAB
BASOPHILS # BLD AUTO: 0.1 10E3/UL (ref 0–0.2)
BASOPHILS NFR BLD AUTO: 1 %
EOSINOPHIL # BLD AUTO: 0.2 10E3/UL (ref 0–0.7)
EOSINOPHIL NFR BLD AUTO: 2 %
ERYTHROCYTE [DISTWIDTH] IN BLOOD BY AUTOMATED COUNT: 14.5 % (ref 10–15)
HBA1C MFR BLD: 6.3 %
HCT VFR BLD AUTO: 34.9 % (ref 40–53)
HGB BLD-MCNC: 11.4 G/DL (ref 13.3–17.7)
IMM GRANULOCYTES # BLD: 0 10E3/UL
IMM GRANULOCYTES NFR BLD: 1 %
LYMPHOCYTES # BLD AUTO: 1.5 10E3/UL (ref 0.8–5.3)
LYMPHOCYTES NFR BLD AUTO: 19 %
MCH RBC QN AUTO: 32.7 PG (ref 26.5–33)
MCHC RBC AUTO-ENTMCNC: 32.7 G/DL (ref 31.5–36.5)
MCV RBC AUTO: 100 FL (ref 78–100)
MONOCYTES # BLD AUTO: 1 10E3/UL (ref 0–1.3)
MONOCYTES NFR BLD AUTO: 13 %
NEUTROPHILS # BLD AUTO: 5.3 10E3/UL (ref 1.6–8.3)
NEUTROPHILS NFR BLD AUTO: 64 %
NRBC # BLD AUTO: 0 10E3/UL
NRBC BLD AUTO-RTO: 0 /100
PLATELET # BLD AUTO: 414 10E3/UL (ref 150–450)
RBC # BLD AUTO: 3.49 10E6/UL (ref 4.4–5.9)
WBC # BLD AUTO: 8.1 10E3/UL (ref 4–11)

## 2024-04-04 PROCEDURE — 85025 COMPLETE CBC W/AUTO DIFF WBC: CPT | Mod: ORL | Performed by: FAMILY MEDICINE

## 2024-04-04 PROCEDURE — 84443 ASSAY THYROID STIM HORMONE: CPT | Mod: ORL | Performed by: FAMILY MEDICINE

## 2024-04-04 PROCEDURE — 83036 HEMOGLOBIN GLYCOSYLATED A1C: CPT | Mod: ORL | Performed by: FAMILY MEDICINE

## 2024-04-05 LAB — TSH SERPL DL<=0.005 MIU/L-ACNC: 3.37 UIU/ML (ref 0.3–4.2)

## 2024-12-08 ENCOUNTER — HEALTH MAINTENANCE LETTER (OUTPATIENT)
Age: 89
End: 2024-12-08

## 2024-12-14 ENCOUNTER — HOSPITAL ENCOUNTER (INPATIENT)
Facility: HOSPITAL | Age: 89
LOS: 1 days | Discharge: ANOTHER HOSPITAL | DRG: 378 | End: 2024-12-15
Attending: EMERGENCY MEDICINE | Admitting: INTERNAL MEDICINE
Payer: MEDICARE

## 2024-12-14 ENCOUNTER — APPOINTMENT (OUTPATIENT)
Dept: CT IMAGING | Facility: HOSPITAL | Age: 89
DRG: 378 | End: 2024-12-14
Attending: EMERGENCY MEDICINE
Payer: MEDICARE

## 2024-12-14 DIAGNOSIS — K92.1 MELENA: ICD-10-CM

## 2024-12-14 DIAGNOSIS — D62 ANEMIA DUE TO BLOOD LOSS, ACUTE: ICD-10-CM

## 2024-12-14 DIAGNOSIS — K92.2 UGIB (UPPER GASTROINTESTINAL BLEED): ICD-10-CM

## 2024-12-14 LAB
ABO + RH BLD: NORMAL
ALBUMIN SERPL BCG-MCNC: 3.6 G/DL (ref 3.5–5.2)
ALBUMIN UR-MCNC: NEGATIVE MG/DL
ALP SERPL-CCNC: 85 U/L (ref 40–150)
ALT SERPL W P-5'-P-CCNC: 12 U/L (ref 0–70)
ANION GAP SERPL CALCULATED.3IONS-SCNC: 12 MMOL/L (ref 7–15)
APPEARANCE UR: CLEAR
APTT PPP: 24 SECONDS (ref 22–38)
AST SERPL W P-5'-P-CCNC: 16 U/L (ref 0–45)
BASOPHILS # BLD AUTO: 0 10E3/UL (ref 0–0.2)
BASOPHILS NFR BLD AUTO: 0 %
BILIRUB SERPL-MCNC: 0.4 MG/DL
BILIRUB UR QL STRIP: NEGATIVE
BLD GP AB SCN SERPL QL: NEGATIVE
BUN SERPL-MCNC: 67 MG/DL (ref 8–23)
CALCIUM SERPL-MCNC: 8.7 MG/DL (ref 8.8–10.4)
CHLORIDE SERPL-SCNC: 102 MMOL/L (ref 98–107)
COLOR UR AUTO: COLORLESS
CREAT SERPL-MCNC: 0.96 MG/DL (ref 0.67–1.17)
EGFRCR SERPLBLD CKD-EPI 2021: 73 ML/MIN/1.73M2
EOSINOPHIL # BLD AUTO: 0 10E3/UL (ref 0–0.7)
EOSINOPHIL NFR BLD AUTO: 0 %
ERYTHROCYTE [DISTWIDTH] IN BLOOD BY AUTOMATED COUNT: 13.7 % (ref 10–15)
GLUCOSE SERPL-MCNC: 144 MG/DL (ref 70–99)
GLUCOSE UR STRIP-MCNC: NEGATIVE MG/DL
HCO3 SERPL-SCNC: 21 MMOL/L (ref 22–29)
HCT VFR BLD AUTO: 25.2 % (ref 40–53)
HGB BLD-MCNC: 8.6 G/DL (ref 13.3–17.7)
HGB UR QL STRIP: NEGATIVE
HOLD SPECIMEN: NORMAL
HYALINE CASTS: 8 /LPF
IMM GRANULOCYTES # BLD: 0.1 10E3/UL
IMM GRANULOCYTES NFR BLD: 1 %
INR PPP: 1.21 (ref 0.85–1.15)
KETONES UR STRIP-MCNC: ABNORMAL MG/DL
LEUKOCYTE ESTERASE UR QL STRIP: NEGATIVE
LYMPHOCYTES # BLD AUTO: 1.5 10E3/UL (ref 0.8–5.3)
LYMPHOCYTES NFR BLD AUTO: 11 %
MCH RBC QN AUTO: 33.7 PG (ref 26.5–33)
MCHC RBC AUTO-ENTMCNC: 34.1 G/DL (ref 31.5–36.5)
MCV RBC AUTO: 99 FL (ref 78–100)
MONOCYTES # BLD AUTO: 1.1 10E3/UL (ref 0–1.3)
MONOCYTES NFR BLD AUTO: 8 %
MUCOUS THREADS #/AREA URNS LPF: PRESENT /LPF
NEUTROPHILS # BLD AUTO: 11.2 10E3/UL (ref 1.6–8.3)
NEUTROPHILS NFR BLD AUTO: 80 %
NITRATE UR QL: NEGATIVE
NRBC # BLD AUTO: 0 10E3/UL
NRBC BLD AUTO-RTO: 0 /100
PH UR STRIP: 5.5 [PH] (ref 5–7)
PLATELET # BLD AUTO: 223 10E3/UL (ref 150–450)
POTASSIUM SERPL-SCNC: 4.2 MMOL/L (ref 3.4–5.3)
PROT SERPL-MCNC: 5.3 G/DL (ref 6.4–8.3)
RBC # BLD AUTO: 2.55 10E6/UL (ref 4.4–5.9)
RBC URINE: 1 /HPF
SODIUM SERPL-SCNC: 135 MMOL/L (ref 135–145)
SP GR UR STRIP: 1.02 (ref 1–1.03)
SPECIMEN EXP DATE BLD: NORMAL
TROPONIN T SERPL HS-MCNC: 23 NG/L
TROPONIN T SERPL HS-MCNC: 27 NG/L
UROBILINOGEN UR STRIP-MCNC: <2 MG/DL
WBC # BLD AUTO: 14 10E3/UL (ref 4–11)
WBC URINE: 1 /HPF

## 2024-12-14 PROCEDURE — 85610 PROTHROMBIN TIME: CPT | Performed by: EMERGENCY MEDICINE

## 2024-12-14 PROCEDURE — 74174 CTA ABD&PLVS W/CONTRAST: CPT | Mod: MA

## 2024-12-14 PROCEDURE — 250N000011 HC RX IP 250 OP 636: Performed by: EMERGENCY MEDICINE

## 2024-12-14 PROCEDURE — 86900 BLOOD TYPING SEROLOGIC ABO: CPT | Performed by: EMERGENCY MEDICINE

## 2024-12-14 PROCEDURE — 96374 THER/PROPH/DIAG INJ IV PUSH: CPT | Mod: 59

## 2024-12-14 PROCEDURE — 86923 COMPATIBILITY TEST ELECTRIC: CPT | Performed by: INTERNAL MEDICINE

## 2024-12-14 PROCEDURE — 85730 THROMBOPLASTIN TIME PARTIAL: CPT | Performed by: EMERGENCY MEDICINE

## 2024-12-14 PROCEDURE — 85049 AUTOMATED PLATELET COUNT: CPT | Performed by: EMERGENCY MEDICINE

## 2024-12-14 PROCEDURE — 93005 ELECTROCARDIOGRAM TRACING: CPT | Performed by: EMERGENCY MEDICINE

## 2024-12-14 PROCEDURE — 99222 1ST HOSP IP/OBS MODERATE 55: CPT | Mod: AI | Performed by: INTERNAL MEDICINE

## 2024-12-14 PROCEDURE — 84484 ASSAY OF TROPONIN QUANT: CPT | Performed by: EMERGENCY MEDICINE

## 2024-12-14 PROCEDURE — 250N000009 HC RX 250: Performed by: EMERGENCY MEDICINE

## 2024-12-14 PROCEDURE — 99285 EMERGENCY DEPT VISIT HI MDM: CPT | Mod: 25

## 2024-12-14 PROCEDURE — 82247 BILIRUBIN TOTAL: CPT | Performed by: EMERGENCY MEDICINE

## 2024-12-14 PROCEDURE — 85025 COMPLETE CBC W/AUTO DIFF WBC: CPT | Performed by: EMERGENCY MEDICINE

## 2024-12-14 PROCEDURE — 81001 URINALYSIS AUTO W/SCOPE: CPT | Performed by: EMERGENCY MEDICINE

## 2024-12-14 PROCEDURE — 120N000001 HC R&B MED SURG/OB

## 2024-12-14 PROCEDURE — 36415 COLL VENOUS BLD VENIPUNCTURE: CPT | Performed by: EMERGENCY MEDICINE

## 2024-12-14 PROCEDURE — 82310 ASSAY OF CALCIUM: CPT | Performed by: EMERGENCY MEDICINE

## 2024-12-14 RX ORDER — IOPAMIDOL 755 MG/ML
87 INJECTION, SOLUTION INTRAVASCULAR ONCE
Status: COMPLETED | OUTPATIENT
Start: 2024-12-14 | End: 2024-12-14

## 2024-12-14 RX ORDER — METOCLOPRAMIDE HYDROCHLORIDE 5 MG/ML
10 INJECTION INTRAMUSCULAR; INTRAVENOUS ONCE
Status: DISCONTINUED | OUTPATIENT
Start: 2024-12-15 | End: 2024-12-15

## 2024-12-14 RX ADMIN — PANTOPRAZOLE SODIUM 40 MG: 40 INJECTION, POWDER, FOR SOLUTION INTRAVENOUS at 21:05

## 2024-12-14 RX ADMIN — IOPAMIDOL 87 ML: 755 INJECTION, SOLUTION INTRAVENOUS at 22:39

## 2024-12-14 ASSESSMENT — ACTIVITIES OF DAILY LIVING (ADL)
ADLS_ACUITY_SCORE: 58

## 2024-12-14 ASSESSMENT — ENCOUNTER SYMPTOMS
LIGHT-HEADEDNESS: 0
ABDOMINAL PAIN: 0
BACK PAIN: 1
SHORTNESS OF BREATH: 0

## 2024-12-14 ASSESSMENT — COLUMBIA-SUICIDE SEVERITY RATING SCALE - C-SSRS
1. IN THE PAST MONTH, HAVE YOU WISHED YOU WERE DEAD OR WISHED YOU COULD GO TO SLEEP AND NOT WAKE UP?: NO
2. HAVE YOU ACTUALLY HAD ANY THOUGHTS OF KILLING YOURSELF IN THE PAST MONTH?: NO
6. HAVE YOU EVER DONE ANYTHING, STARTED TO DO ANYTHING, OR PREPARED TO DO ANYTHING TO END YOUR LIFE?: NO

## 2024-12-15 ENCOUNTER — APPOINTMENT (OUTPATIENT)
Dept: CT IMAGING | Facility: CLINIC | Age: 89
End: 2024-12-15
Attending: INTERNAL MEDICINE
Payer: MEDICARE

## 2024-12-15 ENCOUNTER — ANESTHESIA EVENT (OUTPATIENT)
Dept: SURGERY | Facility: HOSPITAL | Age: 89
DRG: 378 | End: 2024-12-15
Payer: MEDICARE

## 2024-12-15 ENCOUNTER — HOSPITAL ENCOUNTER (INPATIENT)
Facility: CLINIC | Age: 89
End: 2024-12-15
Attending: HOSPITALIST | Admitting: HOSPITALIST
Payer: MEDICARE

## 2024-12-15 ENCOUNTER — ANESTHESIA (OUTPATIENT)
Dept: SURGERY | Facility: HOSPITAL | Age: 89
DRG: 378 | End: 2024-12-15
Payer: MEDICARE

## 2024-12-15 VITALS
SYSTOLIC BLOOD PRESSURE: 119 MMHG | DIASTOLIC BLOOD PRESSURE: 53 MMHG | WEIGHT: 145 LBS | HEIGHT: 66 IN | OXYGEN SATURATION: 97 % | HEART RATE: 77 BPM | BODY MASS INDEX: 23.3 KG/M2 | TEMPERATURE: 98 F | RESPIRATION RATE: 16 BRPM

## 2024-12-15 DIAGNOSIS — K59.00 CONSTIPATION, UNSPECIFIED CONSTIPATION TYPE: ICD-10-CM

## 2024-12-15 DIAGNOSIS — R31.9 URINARY TRACT INFECTION WITH HEMATURIA, SITE UNSPECIFIED: ICD-10-CM

## 2024-12-15 DIAGNOSIS — D62 ANEMIA DUE TO BLOOD LOSS, ACUTE: ICD-10-CM

## 2024-12-15 DIAGNOSIS — G93.41 METABOLIC ENCEPHALOPATHY: ICD-10-CM

## 2024-12-15 DIAGNOSIS — N39.0 URINARY TRACT INFECTION WITH HEMATURIA, SITE UNSPECIFIED: ICD-10-CM

## 2024-12-15 DIAGNOSIS — K92.2 UGIB (UPPER GASTROINTESTINAL BLEED): Primary | ICD-10-CM

## 2024-12-15 LAB
ALBUMIN SERPL BCG-MCNC: 3.3 G/DL (ref 3.5–5.2)
ALP SERPL-CCNC: 76 U/L (ref 40–150)
ALT SERPL W P-5'-P-CCNC: 11 U/L (ref 0–70)
ANION GAP SERPL CALCULATED.3IONS-SCNC: 12 MMOL/L (ref 7–15)
ANION GAP SERPL CALCULATED.3IONS-SCNC: 9 MMOL/L (ref 7–15)
AST SERPL W P-5'-P-CCNC: 16 U/L (ref 0–45)
BASOPHILS # BLD MANUAL: 0 10E3/UL (ref 0–0.2)
BASOPHILS NFR BLD MANUAL: 0 %
BILIRUB SERPL-MCNC: 0.5 MG/DL
BLD PROD TYP BPU: NORMAL
BLOOD COMPONENT TYPE: NORMAL
BUN SERPL-MCNC: 61.9 MG/DL (ref 8–23)
BUN SERPL-MCNC: 64.4 MG/DL (ref 8–23)
CALCIUM SERPL-MCNC: 8 MG/DL (ref 8.8–10.4)
CALCIUM SERPL-MCNC: 8.6 MG/DL (ref 8.8–10.4)
CHLORIDE SERPL-SCNC: 106 MMOL/L (ref 98–107)
CHLORIDE SERPL-SCNC: 108 MMOL/L (ref 98–107)
CODING SYSTEM: NORMAL
CREAT SERPL-MCNC: 0.8 MG/DL (ref 0.67–1.17)
CREAT SERPL-MCNC: 0.97 MG/DL (ref 0.67–1.17)
CROSSMATCH: NORMAL
EGFRCR SERPLBLD CKD-EPI 2021: 72 ML/MIN/1.73M2
EGFRCR SERPLBLD CKD-EPI 2021: 81 ML/MIN/1.73M2
EOSINOPHIL # BLD MANUAL: 0 10E3/UL (ref 0–0.7)
EOSINOPHIL NFR BLD MANUAL: 0 %
ERYTHROCYTE [DISTWIDTH] IN BLOOD BY AUTOMATED COUNT: 13.5 % (ref 10–15)
ERYTHROCYTE [DISTWIDTH] IN BLOOD BY AUTOMATED COUNT: 13.6 % (ref 10–15)
GLUCOSE SERPL-MCNC: 111 MG/DL (ref 70–99)
GLUCOSE SERPL-MCNC: 127 MG/DL (ref 70–99)
HCO3 SERPL-SCNC: 20 MMOL/L (ref 22–29)
HCO3 SERPL-SCNC: 23 MMOL/L (ref 22–29)
HCT VFR BLD AUTO: 20.1 % (ref 40–53)
HCT VFR BLD AUTO: 27.2 % (ref 40–53)
HGB BLD-MCNC: 6.8 G/DL (ref 13.3–17.7)
HGB BLD-MCNC: 7.5 G/DL (ref 13.3–17.7)
HGB BLD-MCNC: 9.2 G/DL (ref 13.3–17.7)
HGB BLD-MCNC: 9.2 G/DL (ref 13.3–17.7)
HGB BLD-MCNC: 9.7 G/DL (ref 13.3–17.7)
INR PPP: 1.28 (ref 0.85–1.15)
ISSUE DATE AND TIME: NORMAL
LYMPHOCYTES # BLD MANUAL: 2.1 10E3/UL (ref 0.8–5.3)
LYMPHOCYTES NFR BLD MANUAL: 9 %
MCH RBC QN AUTO: 32.6 PG (ref 26.5–33)
MCH RBC QN AUTO: 33.2 PG (ref 26.5–33)
MCHC RBC AUTO-ENTMCNC: 33.8 G/DL (ref 31.5–36.5)
MCHC RBC AUTO-ENTMCNC: 35.1 G/DL (ref 31.5–36.5)
MCV RBC AUTO: 96 FL (ref 78–100)
MCV RBC AUTO: 98 FL (ref 78–100)
MONOCYTES # BLD MANUAL: 1.4 10E3/UL (ref 0–1.3)
MONOCYTES NFR BLD MANUAL: 6 %
NEUTROPHILS # BLD MANUAL: 20.1 10E3/UL (ref 1.6–8.3)
NEUTROPHILS NFR BLD MANUAL: 85 %
PLAT MORPH BLD: ABNORMAL
PLATELET # BLD AUTO: 188 10E3/UL (ref 150–450)
PLATELET # BLD AUTO: 189 10E3/UL (ref 150–450)
POTASSIUM SERPL-SCNC: 3.8 MMOL/L (ref 3.4–5.3)
POTASSIUM SERPL-SCNC: 4.1 MMOL/L (ref 3.4–5.3)
PROT SERPL-MCNC: 4.8 G/DL (ref 6.4–8.3)
RBC # BLD AUTO: 2.05 10E6/UL (ref 4.4–5.9)
RBC # BLD AUTO: 2.79 10E6/UL (ref 4.4–5.9)
RBC MORPH BLD: ABNORMAL
SODIUM SERPL-SCNC: 138 MMOL/L (ref 135–145)
SODIUM SERPL-SCNC: 140 MMOL/L (ref 135–145)
UNIT ABO/RH: NORMAL
UNIT NUMBER: NORMAL
UNIT STATUS: NORMAL
UNIT TYPE ISBT: 6200
UPPER GI ENDOSCOPY: NORMAL
WBC # BLD AUTO: 10.4 10E3/UL (ref 4–11)
WBC # BLD AUTO: 23.6 10E3/UL (ref 4–11)

## 2024-12-15 PROCEDURE — 88305 TISSUE EXAM BY PATHOLOGIST: CPT | Mod: TC | Performed by: INTERNAL MEDICINE

## 2024-12-15 PROCEDURE — 360N000075 HC SURGERY LEVEL 2, PER MIN: Performed by: INTERNAL MEDICINE

## 2024-12-15 PROCEDURE — P9016 RBC LEUKOCYTES REDUCED: HCPCS | Performed by: INTERNAL MEDICINE

## 2024-12-15 PROCEDURE — 250N000009 HC RX 250: Performed by: HOSPITALIST

## 2024-12-15 PROCEDURE — 370N000017 HC ANESTHESIA TECHNICAL FEE, PER MIN: Performed by: INTERNAL MEDICINE

## 2024-12-15 PROCEDURE — XW0G886 INTRODUCTION OF MINERAL-BASED TOPICAL HEMOSTATIC AGENT INTO UPPER GI, VIA NATURAL OR ARTIFICIAL OPENING ENDOSCOPIC, NEW TECHNOLOGY GROUP 6: ICD-10-PCS | Performed by: INTERNAL MEDICINE

## 2024-12-15 PROCEDURE — 250N000009 HC RX 250

## 2024-12-15 PROCEDURE — 258N000003 HC RX IP 258 OP 636

## 2024-12-15 PROCEDURE — 120N000001 HC R&B MED SURG/OB

## 2024-12-15 PROCEDURE — P9016 RBC LEUKOCYTES REDUCED: HCPCS | Performed by: HOSPITALIST

## 2024-12-15 PROCEDURE — 86923 COMPATIBILITY TEST ELECTRIC: CPT | Performed by: HOSPITALIST

## 2024-12-15 PROCEDURE — 0W3P8ZZ CONTROL BLEEDING IN GASTROINTESTINAL TRACT, VIA NATURAL OR ARTIFICIAL OPENING ENDOSCOPIC: ICD-10-PCS | Performed by: INTERNAL MEDICINE

## 2024-12-15 PROCEDURE — 250N000013 HC RX MED GY IP 250 OP 250 PS 637: Performed by: INTERNAL MEDICINE

## 2024-12-15 PROCEDURE — 36415 COLL VENOUS BLD VENIPUNCTURE: CPT | Performed by: INTERNAL MEDICINE

## 2024-12-15 PROCEDURE — 85018 HEMOGLOBIN: CPT | Performed by: INTERNAL MEDICINE

## 2024-12-15 PROCEDURE — 85048 AUTOMATED LEUKOCYTE COUNT: CPT | Performed by: HOSPITALIST

## 2024-12-15 PROCEDURE — 250N000011 HC RX IP 250 OP 636

## 2024-12-15 PROCEDURE — 85610 PROTHROMBIN TIME: CPT | Performed by: INTERNAL MEDICINE

## 2024-12-15 PROCEDURE — 85014 HEMATOCRIT: CPT | Performed by: HOSPITALIST

## 2024-12-15 PROCEDURE — 258N000003 HC RX IP 258 OP 636: Performed by: HOSPITALIST

## 2024-12-15 PROCEDURE — G1010 CDSM STANSON: HCPCS

## 2024-12-15 PROCEDURE — 272N000001 HC OR GENERAL SUPPLY STERILE: Performed by: INTERNAL MEDICINE

## 2024-12-15 PROCEDURE — 99223 1ST HOSP IP/OBS HIGH 75: CPT | Mod: AI | Performed by: HOSPITALIST

## 2024-12-15 PROCEDURE — 0DB68ZX EXCISION OF STOMACH, VIA NATURAL OR ARTIFICIAL OPENING ENDOSCOPIC, DIAGNOSTIC: ICD-10-PCS | Performed by: INTERNAL MEDICINE

## 2024-12-15 PROCEDURE — 80048 BASIC METABOLIC PNL TOTAL CA: CPT | Performed by: INTERNAL MEDICINE

## 2024-12-15 PROCEDURE — 85027 COMPLETE CBC AUTOMATED: CPT | Performed by: INTERNAL MEDICINE

## 2024-12-15 PROCEDURE — 85018 HEMOGLOBIN: CPT | Performed by: HOSPITALIST

## 2024-12-15 PROCEDURE — 36415 COLL VENOUS BLD VENIPUNCTURE: CPT | Performed by: HOSPITALIST

## 2024-12-15 PROCEDURE — 99239 HOSP IP/OBS DSCHRG MGMT >30: CPT | Performed by: INTERNAL MEDICINE

## 2024-12-15 PROCEDURE — 86923 COMPATIBILITY TEST ELECTRIC: CPT | Performed by: INTERNAL MEDICINE

## 2024-12-15 PROCEDURE — 80053 COMPREHEN METABOLIC PANEL: CPT | Performed by: HOSPITALIST

## 2024-12-15 PROCEDURE — 999N000141 HC STATISTIC PRE-PROCEDURE NURSING ASSESSMENT: Performed by: INTERNAL MEDICINE

## 2024-12-15 PROCEDURE — 258N000003 HC RX IP 258 OP 636: Performed by: INTERNAL MEDICINE

## 2024-12-15 PROCEDURE — 85007 BL SMEAR W/DIFF WBC COUNT: CPT | Performed by: HOSPITALIST

## 2024-12-15 RX ORDER — SODIUM CHLORIDE 9 MG/ML
INJECTION, SOLUTION INTRAVENOUS CONTINUOUS
Status: DISCONTINUED | OUTPATIENT
Start: 2024-12-15 | End: 2024-12-15 | Stop reason: HOSPADM

## 2024-12-15 RX ORDER — LIDOCAINE 40 MG/G
CREAM TOPICAL
Status: DISCONTINUED | OUTPATIENT
Start: 2024-12-15 | End: 2024-12-15

## 2024-12-15 RX ORDER — HYDROMORPHONE HCL IN WATER/PF 6 MG/30 ML
0.4 PATIENT CONTROLLED ANALGESIA SYRINGE INTRAVENOUS
Status: DISCONTINUED | OUTPATIENT
Start: 2024-12-15 | End: 2024-12-17

## 2024-12-15 RX ORDER — TAMSULOSIN HYDROCHLORIDE 0.4 MG/1
0.4 CAPSULE ORAL DAILY
COMMUNITY

## 2024-12-15 RX ORDER — HYDROMORPHONE HCL IN WATER/PF 6 MG/30 ML
0.2 PATIENT CONTROLLED ANALGESIA SYRINGE INTRAVENOUS EVERY 5 MIN PRN
Status: CANCELLED | OUTPATIENT
Start: 2024-12-15

## 2024-12-15 RX ORDER — AMOXICILLIN 250 MG
2 CAPSULE ORAL 2 TIMES DAILY PRN
Status: DISCONTINUED | OUTPATIENT
Start: 2024-12-15 | End: 2024-12-27 | Stop reason: HOSPADM

## 2024-12-15 RX ORDER — SODIUM CHLORIDE, SODIUM LACTATE, POTASSIUM CHLORIDE, CALCIUM CHLORIDE 600; 310; 30; 20 MG/100ML; MG/100ML; MG/100ML; MG/100ML
INJECTION, SOLUTION INTRAVENOUS CONTINUOUS
Status: CANCELLED | OUTPATIENT
Start: 2024-12-15

## 2024-12-15 RX ORDER — POLYETHYLENE GLYCOL 3350 17 G/17G
1 POWDER, FOR SOLUTION ORAL DAILY PRN
COMMUNITY

## 2024-12-15 RX ORDER — AMOXICILLIN 250 MG
1 CAPSULE ORAL 2 TIMES DAILY PRN
Status: DISCONTINUED | OUTPATIENT
Start: 2024-12-15 | End: 2024-12-15 | Stop reason: HOSPADM

## 2024-12-15 RX ORDER — LIDOCAINE 40 MG/G
CREAM TOPICAL
Status: DISCONTINUED | OUTPATIENT
Start: 2024-12-15 | End: 2024-12-15 | Stop reason: HOSPADM

## 2024-12-15 RX ORDER — NALOXONE HYDROCHLORIDE 1 MG/ML
0.4 INJECTION INTRAMUSCULAR; INTRAVENOUS; SUBCUTANEOUS
Status: CANCELLED | OUTPATIENT
Start: 2024-12-15

## 2024-12-15 RX ORDER — OXYCODONE HYDROCHLORIDE 5 MG/1
5 TABLET ORAL EVERY 4 HOURS PRN
Status: DISCONTINUED | OUTPATIENT
Start: 2024-12-15 | End: 2024-12-17

## 2024-12-15 RX ORDER — AMOXICILLIN 250 MG
2 CAPSULE ORAL 2 TIMES DAILY PRN
Status: DISCONTINUED | OUTPATIENT
Start: 2024-12-15 | End: 2024-12-15 | Stop reason: HOSPADM

## 2024-12-15 RX ORDER — PRAVASTATIN SODIUM 20 MG
20 TABLET ORAL DAILY
COMMUNITY

## 2024-12-15 RX ORDER — ACETAMINOPHEN 325 MG/1
650 TABLET ORAL EVERY 4 HOURS PRN
Status: DISCONTINUED | OUTPATIENT
Start: 2024-12-15 | End: 2024-12-27 | Stop reason: HOSPADM

## 2024-12-15 RX ORDER — SODIUM CHLORIDE 9 MG/ML
INJECTION, SOLUTION INTRAVENOUS CONTINUOUS
Status: DISCONTINUED | OUTPATIENT
Start: 2024-12-15 | End: 2024-12-16

## 2024-12-15 RX ORDER — ACETAMINOPHEN 500 MG
1000 TABLET ORAL 2 TIMES DAILY
COMMUNITY

## 2024-12-15 RX ORDER — BISACODYL 10 MG
10 SUPPOSITORY, RECTAL RECTAL DAILY PRN
Status: DISCONTINUED | OUTPATIENT
Start: 2024-12-15 | End: 2024-12-27 | Stop reason: HOSPADM

## 2024-12-15 RX ORDER — ONDANSETRON 2 MG/ML
4 INJECTION INTRAMUSCULAR; INTRAVENOUS EVERY 30 MIN PRN
Status: CANCELLED | OUTPATIENT
Start: 2024-12-15

## 2024-12-15 RX ORDER — FLUMAZENIL 0.1 MG/ML
0.2 INJECTION, SOLUTION INTRAVENOUS
Status: CANCELLED | OUTPATIENT
Start: 2024-12-15 | End: 2024-12-15

## 2024-12-15 RX ORDER — OXYCODONE HYDROCHLORIDE 5 MG/1
10 TABLET ORAL EVERY 4 HOURS PRN
Status: DISCONTINUED | OUTPATIENT
Start: 2024-12-15 | End: 2024-12-17

## 2024-12-15 RX ORDER — DEXAMETHASONE SODIUM PHOSPHATE 4 MG/ML
4 INJECTION, SOLUTION INTRA-ARTICULAR; INTRALESIONAL; INTRAMUSCULAR; INTRAVENOUS; SOFT TISSUE
Status: CANCELLED | OUTPATIENT
Start: 2024-12-15

## 2024-12-15 RX ORDER — ONDANSETRON 4 MG/1
4 TABLET, ORALLY DISINTEGRATING ORAL EVERY 6 HOURS PRN
Status: DISCONTINUED | OUTPATIENT
Start: 2024-12-15 | End: 2024-12-27 | Stop reason: HOSPADM

## 2024-12-15 RX ORDER — NALOXONE HYDROCHLORIDE 1 MG/ML
0.2 INJECTION INTRAMUSCULAR; INTRAVENOUS; SUBCUTANEOUS
Status: CANCELLED | OUTPATIENT
Start: 2024-12-15

## 2024-12-15 RX ORDER — LEVOTHYROXINE SODIUM 75 UG/1
75 TABLET ORAL DAILY
COMMUNITY

## 2024-12-15 RX ORDER — FENTANYL CITRATE 50 UG/ML
50 INJECTION, SOLUTION INTRAMUSCULAR; INTRAVENOUS EVERY 5 MIN PRN
Status: CANCELLED | OUTPATIENT
Start: 2024-12-15

## 2024-12-15 RX ORDER — NALOXONE HYDROCHLORIDE 0.4 MG/ML
0.2 INJECTION, SOLUTION INTRAMUSCULAR; INTRAVENOUS; SUBCUTANEOUS
Status: DISCONTINUED | OUTPATIENT
Start: 2024-12-15 | End: 2024-12-27 | Stop reason: HOSPADM

## 2024-12-15 RX ORDER — FENTANYL CITRATE 50 UG/ML
25 INJECTION, SOLUTION INTRAMUSCULAR; INTRAVENOUS EVERY 5 MIN PRN
Status: CANCELLED | OUTPATIENT
Start: 2024-12-15

## 2024-12-15 RX ORDER — LIDOCAINE 40 MG/G
CREAM TOPICAL
Status: DISCONTINUED | OUTPATIENT
Start: 2024-12-15 | End: 2024-12-17

## 2024-12-15 RX ORDER — IRBESARTAN AND HYDROCHLOROTHIAZIDE 300; 12.5 MG/1; MG/1
1 TABLET, FILM COATED ORAL DAILY
Status: ON HOLD | COMMUNITY
End: 2024-12-27

## 2024-12-15 RX ORDER — ONDANSETRON 4 MG/1
4 TABLET, ORALLY DISINTEGRATING ORAL EVERY 6 HOURS PRN
Status: DISCONTINUED | OUTPATIENT
Start: 2024-12-15 | End: 2024-12-15 | Stop reason: HOSPADM

## 2024-12-15 RX ORDER — VITAMIN B COMPLEX
25 TABLET ORAL DAILY
COMMUNITY

## 2024-12-15 RX ORDER — NALOXONE HYDROCHLORIDE 0.4 MG/ML
0.4 INJECTION, SOLUTION INTRAMUSCULAR; INTRAVENOUS; SUBCUTANEOUS
Status: DISCONTINUED | OUTPATIENT
Start: 2024-12-15 | End: 2024-12-27 | Stop reason: HOSPADM

## 2024-12-15 RX ORDER — GUAIFENESIN/DEXTROMETHORPHAN 100-10MG/5
10 SYRUP ORAL EVERY 4 HOURS PRN
Status: DISCONTINUED | OUTPATIENT
Start: 2024-12-15 | End: 2024-12-27 | Stop reason: HOSPADM

## 2024-12-15 RX ORDER — NALOXONE HYDROCHLORIDE 0.4 MG/ML
0.1 INJECTION, SOLUTION INTRAMUSCULAR; INTRAVENOUS; SUBCUTANEOUS
Status: CANCELLED | OUTPATIENT
Start: 2024-12-15

## 2024-12-15 RX ORDER — ONDANSETRON 4 MG/1
4 TABLET, ORALLY DISINTEGRATING ORAL EVERY 30 MIN PRN
Status: CANCELLED | OUTPATIENT
Start: 2024-12-15

## 2024-12-15 RX ORDER — LIDOCAINE HYDROCHLORIDE 20 MG/ML
INJECTION, SOLUTION INFILTRATION; PERINEURAL PRN
Status: DISCONTINUED | OUTPATIENT
Start: 2024-12-15 | End: 2024-12-15

## 2024-12-15 RX ORDER — CALCIUM CARBONATE 500 MG/1
1000 TABLET, CHEWABLE ORAL 4 TIMES DAILY PRN
Status: DISCONTINUED | OUTPATIENT
Start: 2024-12-15 | End: 2024-12-27 | Stop reason: HOSPADM

## 2024-12-15 RX ORDER — AMLODIPINE BESYLATE 10 MG/1
10 TABLET ORAL DAILY
COMMUNITY

## 2024-12-15 RX ORDER — ONDANSETRON 2 MG/ML
4 INJECTION INTRAMUSCULAR; INTRAVENOUS EVERY 6 HOURS PRN
Status: DISCONTINUED | OUTPATIENT
Start: 2024-12-15 | End: 2024-12-15 | Stop reason: HOSPADM

## 2024-12-15 RX ORDER — SODIUM CHLORIDE 9 MG/ML
INJECTION, SOLUTION INTRAVENOUS CONTINUOUS PRN
Status: DISCONTINUED | OUTPATIENT
Start: 2024-12-15 | End: 2024-12-15

## 2024-12-15 RX ORDER — ACETAMINOPHEN 650 MG/1
650 SUPPOSITORY RECTAL EVERY 4 HOURS PRN
Status: DISCONTINUED | OUTPATIENT
Start: 2024-12-15 | End: 2024-12-27 | Stop reason: HOSPADM

## 2024-12-15 RX ORDER — POLYETHYLENE GLYCOL 3350 17 G/17G
17 POWDER, FOR SOLUTION ORAL 2 TIMES DAILY PRN
Status: DISCONTINUED | OUTPATIENT
Start: 2024-12-15 | End: 2024-12-27 | Stop reason: HOSPADM

## 2024-12-15 RX ORDER — ONDANSETRON 2 MG/ML
4 INJECTION INTRAMUSCULAR; INTRAVENOUS EVERY 6 HOURS PRN
Status: DISCONTINUED | OUTPATIENT
Start: 2024-12-15 | End: 2024-12-27 | Stop reason: HOSPADM

## 2024-12-15 RX ORDER — HYDROMORPHONE HCL IN WATER/PF 6 MG/30 ML
0.2 PATIENT CONTROLLED ANALGESIA SYRINGE INTRAVENOUS
Status: DISCONTINUED | OUTPATIENT
Start: 2024-12-15 | End: 2024-12-17

## 2024-12-15 RX ORDER — CARBOXYMETHYLCELLULOSE SODIUM 5 MG/ML
2 SOLUTION/ DROPS OPHTHALMIC 2 TIMES DAILY
COMMUNITY

## 2024-12-15 RX ORDER — PROPOFOL 10 MG/ML
INJECTION, EMULSION INTRAVENOUS PRN
Status: DISCONTINUED | OUTPATIENT
Start: 2024-12-15 | End: 2024-12-15

## 2024-12-15 RX ORDER — AMOXICILLIN 250 MG
1 CAPSULE ORAL 2 TIMES DAILY PRN
Status: DISCONTINUED | OUTPATIENT
Start: 2024-12-15 | End: 2024-12-27 | Stop reason: HOSPADM

## 2024-12-15 RX ORDER — HYDROMORPHONE HCL IN WATER/PF 6 MG/30 ML
0.4 PATIENT CONTROLLED ANALGESIA SYRINGE INTRAVENOUS EVERY 5 MIN PRN
Status: CANCELLED | OUTPATIENT
Start: 2024-12-15

## 2024-12-15 RX ADMIN — PHENYLEPHRINE HYDROCHLORIDE 100 MCG: 10 INJECTION INTRAVENOUS at 01:17

## 2024-12-15 RX ADMIN — PROPOFOL 20 MG: 10 INJECTION, EMULSION INTRAVENOUS at 01:22

## 2024-12-15 RX ADMIN — PANTOPRAZOLE SODIUM 40 MG: 40 INJECTION, POWDER, FOR SOLUTION INTRAVENOUS at 16:20

## 2024-12-15 RX ADMIN — PROPOFOL 20 MG: 10 INJECTION, EMULSION INTRAVENOUS at 01:14

## 2024-12-15 RX ADMIN — PHENYLEPHRINE HYDROCHLORIDE 200 MCG: 10 INJECTION INTRAVENOUS at 01:44

## 2024-12-15 RX ADMIN — PHENYLEPHRINE HYDROCHLORIDE 100 MCG: 10 INJECTION INTRAVENOUS at 01:26

## 2024-12-15 RX ADMIN — PROPOFOL 20 MG: 10 INJECTION, EMULSION INTRAVENOUS at 01:25

## 2024-12-15 RX ADMIN — PHENYLEPHRINE HYDROCHLORIDE 100 MCG: 10 INJECTION INTRAVENOUS at 01:12

## 2024-12-15 RX ADMIN — PROPOFOL 20 MG: 10 INJECTION, EMULSION INTRAVENOUS at 01:28

## 2024-12-15 RX ADMIN — SODIUM CHLORIDE: 9 INJECTION, SOLUTION INTRAVENOUS at 02:20

## 2024-12-15 RX ADMIN — QUETIAPINE 12.5 MG: 25 TABLET, FILM COATED ORAL at 21:17

## 2024-12-15 RX ADMIN — PHENYLEPHRINE HYDROCHLORIDE 200 MCG: 10 INJECTION INTRAVENOUS at 01:28

## 2024-12-15 RX ADMIN — PROPOFOL 20 MG: 10 INJECTION, EMULSION INTRAVENOUS at 01:10

## 2024-12-15 RX ADMIN — PHENYLEPHRINE HYDROCHLORIDE 200 MCG: 10 INJECTION INTRAVENOUS at 01:53

## 2024-12-15 RX ADMIN — SODIUM CHLORIDE: 9 INJECTION, SOLUTION INTRAVENOUS at 01:05

## 2024-12-15 RX ADMIN — PROPOFOL 20 MG: 10 INJECTION, EMULSION INTRAVENOUS at 01:17

## 2024-12-15 RX ADMIN — PANTOPRAZOLE SODIUM 40 MG: 40 INJECTION, POWDER, FOR SOLUTION INTRAVENOUS at 21:17

## 2024-12-15 RX ADMIN — PHENYLEPHRINE HYDROCHLORIDE 100 MCG: 10 INJECTION INTRAVENOUS at 01:14

## 2024-12-15 RX ADMIN — PHENYLEPHRINE HYDROCHLORIDE 200 MCG: 10 INJECTION INTRAVENOUS at 01:36

## 2024-12-15 RX ADMIN — PHENYLEPHRINE HYDROCHLORIDE 200 MCG: 10 INJECTION INTRAVENOUS at 01:50

## 2024-12-15 RX ADMIN — PROPOFOL 10 MG: 10 INJECTION, EMULSION INTRAVENOUS at 01:12

## 2024-12-15 RX ADMIN — SODIUM CHLORIDE: 9 INJECTION, SOLUTION INTRAVENOUS at 04:49

## 2024-12-15 RX ADMIN — LIDOCAINE HYDROCHLORIDE 80 ML: 20 INJECTION, SOLUTION INFILTRATION; PERINEURAL at 01:10

## 2024-12-15 ASSESSMENT — ACTIVITIES OF DAILY LIVING (ADL)
ADLS_ACUITY_SCORE: 47
ADLS_ACUITY_SCORE: 47
ADLS_ACUITY_SCORE: 43
ADLS_ACUITY_SCORE: 47
ADLS_ACUITY_SCORE: 58
ADLS_ACUITY_SCORE: 47
ADLS_ACUITY_SCORE: 58
ADLS_ACUITY_SCORE: 47
ADLS_ACUITY_SCORE: 58
ADLS_ACUITY_SCORE: 47
ADLS_ACUITY_SCORE: 48
ADLS_ACUITY_SCORE: 47
ADLS_ACUITY_SCORE: 47
ADLS_ACUITY_SCORE: 57
ADLS_ACUITY_SCORE: 47
ADLS_ACUITY_SCORE: 43
ADLS_ACUITY_SCORE: 47
ADLS_ACUITY_SCORE: 47
ADLS_ACUITY_SCORE: 58
ADLS_ACUITY_SCORE: 47

## 2024-12-15 ASSESSMENT — ENCOUNTER SYMPTOMS: SEIZURES: 0

## 2024-12-15 ASSESSMENT — COPD QUESTIONNAIRES: COPD: 0

## 2024-12-15 NOTE — ED PROVIDER NOTES
EMERGENCY DEPARTMENT ENCOUNTER      NAME: Clayton Pacheco  AGE: 89 year old male  YOB: 1929  MRN: [id number]  EVALUATION DATE & TIME: 2024  8:00 PM    PCP: Oscar De Oliveira    ED PROVIDER: Caden Wilson M.D.      Chief Complaint   Patient presents with    Melena         FINAL IMPRESSION:  1.  Acute melena.  2.  Upper GI bleed.  3.  Acute blood loss anemia.    ED COURSE & MEDICAL DECISION MAKIN:30 PM I met with the patient to gather history and to perform my initial exam. We discussed plans for the ED course, including diagnostic testing and treatment. PPE worn: cloth mask.  Patient with a last 2 days with a melanotic stools.  No abdominal pain.  11:17 PM I reevaluate the patient .  Hemoglobin down to 8.6 from previous 11.4.  EKG unremarkable.  Urinalysis negative.  CT showing a very large enlarged prostate.  There is a questionable small focus of GI bleeding in the distal stomach and thickening of the distal stomach and proximal duodenum.  BUN elevated to 68 from previous 20.  Troponin 20 7 repeat troponin 23.  Blood type a positive.  Plan admit patient to MedSurg telemetry for blood loss anemia, and upper GI bleeding.  Patient received Protonix IV.  Patient aware of the findings.  Will page and discussed with [facility] and the admitting service.  11:35 PM.  [facility] agrees with plan of management and MedSurg telemetry admission.  They asked that we add Reglan and n.p.o. status to the patient.  They would like to be tagged in the morning.  11:38 PM.  Hospitalist in agreement.  They are aware of need for repeat hemoglobin level  11:58 PM.  [facility] is planning on taking the patient to the GI lab at this time to perform upper endoscopy.    Pertinent Labs & Imaging studies reviewed. (See chart for details)  89 year old male presents to the Emergency Department for evaluation of black tarry stools.    At the conclusion of the encounter I discussed the results of all of  the tests and the disposition. The questions were answered. The patient or family acknowledged understanding and was agreeable with the care plan.              Medical Decision Making  Obtained supplemental history:Supplemental history obtained?: Documented in chart and EMS  Reviewed external records: Both inpatient and outpatient computer records were reviewed.  Care impacted by chronic illness:Hypertension  Did you consider but not order tests?: Work up considered but not performed and documented in chart, if applicable  Did you interpret images independently?: Independent interpretation of ECG and images noted in documentation, when applicable.  Consultation discussion with other provider: We will probably discuss with the hospitalist after evaluation.  Probable admission given age and GI bleeding symptoms.    MIPS: Not Applicable        MEDICATIONS GIVEN IN THE EMERGENCY:  Medications   pantoprazole (PROTONIX) IV push injection 40 mg (has no administration in time range)       NEW PRESCRIPTIONS STARTED AT TODAY'S ER VISIT  New Prescriptions    No medications on file          =================================================================    HPI    Patient information was obtained from: the patient and EMS    Use of : N/A         Clayton Pacheco is a 89 year old male with a pertinent history of HTN, ELVIN, and sepsis who presents to this ED by EMS for evaluation of melena.     Per EMS, the patient arrives from home for an onset of back pain at 4:00 PM today. Reported having black tarry stools. Started on an IV and fluids with EMS.     Per patient, he reports having black tarry stool at 4:00 PM today. Denies abdominal pain. He denies chest pain, shortness of breath, and lightheadedness.     He reports having chronic back pain. No smoking. No drug use. No EtOH intake.     He does not identify any waxing or waning symptoms otherwise, exacerbating or alleviating features, associated symptoms except as  mentioned. He denies any pain related complaints.    REVIEW OF SYSTEMS   Review of Systems   Respiratory:  Negative for shortness of breath.    Cardiovascular:  Negative for chest pain.   Gastrointestinal:  Negative for abdominal pain.   Genitourinary:         Positive for melena   Musculoskeletal:  Positive for back pain.   Neurological:  Negative for light-headedness.    No other complaints at this time.       PAST MEDICAL HISTORY:  History reviewed. No pertinent past medical history.    PAST SURGICAL HISTORY:  History reviewed. No pertinent surgical history.        CURRENT MEDICATIONS:    acetaminophen (TYLENOL) 500 MG tablet  amLODIPine (NORVASC) 10 MG tablet  Carboxymethylcellulose Sod PF 0.25 % SOLN  irbesartan-hydrochlorothiazide (AVALIDE) 300-12.5 MG tablet  levothyroxine (SYNTHROID/LEVOTHROID) 75 MCG tablet  melatonin 3 MG tablet  polyethylene glycol (MIRALAX) 17 g packet  pravastatin (PRAVACHOL) 20 MG tablet  Vitamin D3 (VITAMIN D, CHOLECALCIFEROL,) 25 mcg (1000 units) tablet        ALLERGIES:  No Known Allergies    FAMILY HISTORY:  History reviewed. No pertinent family history.    SOCIAL HISTORY:   Social History     Socioeconomic History    Marital status:      Spouse name: None    Number of children: None    Years of education: None    Highest education level: None     No drugs, alcohol, or tobacco.    VITALS:  /56   Pulse 94   Temp 98.5  F (36.9  C) (Oral)   Resp 16   Wt 65.8 kg (145 lb)   SpO2 95%   BMI 23.40 kg/m      PHYSICAL EXAM    Vital Signs:  /56   Pulse 94   Temp 98.5  F (36.9  C) (Oral)   Resp 16   Wt 65.8 kg (145 lb)   SpO2 95%   BMI 23.40 kg/m    General:  On entering the room he is in no apparent distress.    Neck:  Neck supple with full range of motion and nontender.    Back:  Back and spine are nontender.  No costovertebral angle tenderness.    HEENT:  Oropharynx clear with moist mucous membranes.  HEENT unremarkable.    Pulmonary:  Chest clear to  auscultation without rhonchi rales or wheezing.    Cardiovascular:  Cardiac regular rate and rhythm without murmurs rubs or gallops.    Abdomen:  Abdomen soft nontender.  There is no rebound or guarding.    Muskuloskeletal:  He moves all 4 without any difficulty and has normal neurovascular exams.  Extremities without clubbing, cyanosis, or edema.  Legs and calves are nontender.    Neuro:  He is alert and oriented ×3 and moves all extremities symmetrically.    Psych:  Normal affect.    Skin:  Unremarkable and warm and dry.       LAB:  All pertinent labs reviewed and interpreted.  Labs Ordered and Resulted from Time of ED Arrival to Time of ED Departure   ROUTINE UA WITH MICROSCOPIC REFLEX TO CULTURE - Abnormal       Result Value    Color Urine Colorless      Appearance Urine Clear      Glucose Urine Negative      Bilirubin Urine Negative      Ketones Urine Trace (*)     Specific Gravity Urine 1.020      Blood Urine Negative      pH Urine 5.5      Protein Albumin Urine Negative      Urobilinogen Urine <2.0      Nitrite Urine Negative      Leukocyte Esterase Urine Negative      Mucus Urine Present (*)     RBC Urine 1      WBC Urine 1      Hyaline Casts Urine 8 (*)    COMPREHENSIVE METABOLIC PANEL - Abnormal    Sodium 135      Potassium 4.2      Carbon Dioxide (CO2) 21 (*)     Anion Gap 12      Urea Nitrogen 67.0 (*)     Creatinine 0.96      GFR Estimate 73      Calcium 8.7 (*)     Chloride 102      Glucose 144 (*)     Alkaline Phosphatase 85      AST 16      ALT 12      Protein Total 5.3 (*)     Albumin 3.6      Bilirubin Total 0.4     INR - Abnormal    INR 1.21 (*)    TROPONIN T, HIGH SENSITIVITY - Abnormal    Troponin T, High Sensitivity 27 (*)    CBC WITH PLATELETS AND DIFFERENTIAL - Abnormal    WBC Count 14.0 (*)     RBC Count 2.55 (*)     Hemoglobin 8.6 (*)     Hematocrit 25.2 (*)     MCV 99      MCH 33.7 (*)     MCHC 34.1      RDW 13.7      Platelet Count 223      % Neutrophils 80      % Lymphocytes 11      %  Monocytes 8      % Eosinophils 0      % Basophils 0      % Immature Granulocytes 1      NRBCs per 100 WBC 0      Absolute Neutrophils 11.2 (*)     Absolute Lymphocytes 1.5      Absolute Monocytes 1.1      Absolute Eosinophils 0.0      Absolute Basophils 0.0      Absolute Immature Granulocytes 0.1      Absolute NRBCs 0.0     TROPONIN T, HIGH SENSITIVITY - Abnormal    Troponin T, High Sensitivity 23 (*)    PARTIAL THROMBOPLASTIN TIME - Normal    aPTT 24     EXTRA PURPLE TOP ON ICE    Hold Specimen JIC     TYPE AND SCREEN, ADULT    ABO/RH(D) A POS      Antibody Screen Negative      SPECIMEN EXPIRATION DATE [id number]     ABO/RH TYPE AND SCREEN       RADIOLOGY:  Reviewed all pertinent imaging. Please see official radiology report.  CTA Abdomen Pelvis with Contrast   Final Result   IMPRESSION:   1.  Query small focus of active gastrointestinal bleeding within the distal stomach. There is also possible thickening of the distal stomach/proximal duodenum. Consider correlation with endoscopy.   2.  Massive prostatomegaly.                 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  Otherwise no acute findings.  Very similar to previous EKG of March 2024.    I have independently reviewed and interpreted the EKG(s) documented above.    PROCEDURES:         I, Svetlana Rivers, am serving as a scribe to document services personally performed by Dr. Wilson based on my observation and the provider's statements to me. I, Caden Wilson MD attest that Svetlana Rivers is acting in a scribe capacity, has observed my performance of the services and has documented them in accordance with my direction.    Caden Wilson M.D.  Emergency Medicine  [facility] EMERGENCY DEPARTMENT  [address] [address] [phone]  Dept: [phone]       Caden Wilson MD  12/14/24 4977       Caden Wilson MD  12/14/24 1072       Caden Wilson,  MD  12/14/24 5729       Caden Wilson MD  12/14/24 2116

## 2024-12-15 NOTE — ED NOTES
Bed: JNED-25  Expected date: 12/14/24  Expected time:   Means of arrival:   Comments:  89 male  Lares 89 male  Black stools, fluids running

## 2024-12-15 NOTE — PROGRESS NOTES
Patient arrived from PACU after EGD. Vitals stable. Hemoglobin dropped down to 6.8. First of 2 unit of blood infusing. Also has maintenance IVF infusing. No stool. Denied any pain. Patient to transfer to [facility]. Report given to Kwesi VILLASENOR.

## 2024-12-15 NOTE — ED NOTES
[facility] ED Handoff Report    ED Chief Complaint: tarry stools    ED Diagnosis:  (K92.1) Melena  Comment:   Plan: Case Request: ESOPHAGOGASTRODUODENOSCOPY            (K92.2) UGIB (upper gastrointestinal bleed)  Comment:   Plan: Case Request: ESOPHAGOGASTRODUODENOSCOPY            (D62) Anemia due to blood loss, acute  Comment:   Plan: Case Request: ESOPHAGOGASTRODUODENOSCOPY               PMH:  History reviewed. No pertinent past medical history.     Code Status:  Prior     Fall risk, band applied    Current Living Situation/Residence: lives with a significant other     Elimination Status: Continent: Yes     Activity Level: SBA    Patients Preferred Language:  English     Needed: No    Vital Signs:  /68   Pulse 92   Temp 98.5  F (36.9  C) (Oral)   Resp 21   Wt 65.8 kg (145 lb)   SpO2 97%   BMI 23.40 kg/m       Cardiac Rhythm: NSR    Pain Score: 0/10    Is the Patient Confused:  No    Last Food or Drink: 12/14/24 at 0600    Focused Assessment:  The patient comes from home. He started having dark tarry stools at 1600 today. BP in normal ranges. He is A&Ox4. Denies any pain. Hemoglobin was 11.4 in 4/4/24 and has dropped to 8.6 today. CT reveals an area of suspected bleeding. GI consulted. Pt to have esophagogastroduodenoscopy.  Tests Performed: Done: Labs and Imaging    Treatments Provided:  Antibiotics    Family Dynamics/Concerns: No    Son updated to visitor policy    Plan of Care Communicated to Family: Yes    Who Was Updated about Plan of Care: son    Belongings Checklist Done and Signed by Patient: Yes    Medications sent with patient: na    Covid: asymptomatic , na    Additional Information:     RN: Jimmie Hernandez RN  F 12/15/2024 12:00 AM

## 2024-12-15 NOTE — H&P
[facility]    History and Physical - Hospitalist Service       Date of Admission:  12/14/2024    Assessment & Plan      Clayton Pacheco is a 89 year old male admitted on 12/14/2024. He history PMH most notable for HTN, HLD, hypothyroidism and presents with back pain and melena. He is found to have acute anemia likely due to GI bleed. He is stable on admission.     #Melena  #Likely upper GI bleed and acute blood loss anemia  The patient presents with acute onset of melena for 1 day and orthostatic symptoms.  No syncope. Vital signs normal/stable on admission.  Labs with ~3 g Hgb drop and uremia suggestive of upper GI bleed.  CTA AP with a possible small focus of active gastrointestinal bleeding within the distal stomach.  -GI consulted, am told he will go to the procedure suite tonight  -S/p 40 mg IV Protonix in the ED, will give 40 mg more for a total of 80 mg and continue 40 mg every 12 hours  -Type and screen obtained by the ED  -N.p.o.  -MIVF with NS at 100 mL/hour for 1 day  -tele   -trend Hgb     #Back/RUQ pain  Could be due to MSK pain.  The patient states his melena was preceded by back pain.  The back pain is actually located in the right mid axillary line and RUQ.  On exam he is tenderness to palpation along his right upper quadrant and right ribs.  No tenderness along C/T/L spine.  LFTs are normal.  CTAP with no acute changes in the hepatobiliary system.  Patient denies recent falls and trauma to his rib cage.  -Tylenol as needed  -Rib XR    #Leukocytosis  -Likely due to stress demargination in the setting of acute blood loss anemia.  The patient denies infectious symptoms.  No signs of infection on exam, labs, imaging.  -Observe for now, start antimicrobials if the patient develops systemic signs of infection    #Elevated troponin  Likely due to demand ischemia in the setting of acute GI bleed.  The patient denies chest pain.  EKG with no contiguous ST segment  "elevation/depressions or diffuse T wave inversions.  Troponin is downtrending.    #Massive prostamegaly  -UA not indicative of infection and the patient denies urinary symptoms  -Bladder scan every shift to evaluate for retention    #HTN  -Hold PTA antihypertensives    #HLD  -PTA pravastatin    #Hypothyroidism  - Hold Synthroid        Diet: NPO for Medical/Clinical Reasons Except for: No Exceptions    DVT Prophylaxis: Pneumatic Compression Devices  Mcintosh Catheter: Not present  Lines: None     Cardiac Monitoring: None  Code Status:  Full code, discussed with the patient on admission.  He states he would want to be full code for 1 day to allow his family to gather together if needed    Clinically Significant Risk Factors Present on Admission                # Coagulation Defect: INR = 1.21 (Ref range: 0.85 - 1.15) and/or PTT = 24 Seconds (Ref range: 22 - 38 Seconds), will monitor for bleeding    # Hypertension: Noted on problem list      # Anemia: based on hgb <11                  Disposition Plan     Medically Ready for Discharge: Anticipated in 2-4 Days           Lobo Ybarra MD  Hospitalist Service  [facility]  Securely message with [employer] (more info)  Text page via [employer] Paging/Directory     ______________________________________________________________________    Chief Complaint   \"This whole thing started with back pain.\"    History is obtained from the patient    History of Present Illness   Clayton Pacheco is a 89 year old male who has history of HTN, HLD, hypothyroidism that presents with melena.    Earlier today the patient states he developed acute onset of back pain.  Further questioning reveals that the back pain is actually located along the right mid axillary line and RUQ.  The patient denies falls or trauma.  Later he developed orthostatic symptoms.  He would get lightheaded and dizzy when he stood up.  He then had several episodes of black tarry bowel movements.  He " called EMS and was brought to the emergency department.  He denies fevers, rigors, syncope, chest pain, dyspnea, abdominal pain, vomiting, diarrhea, urinary complaints.  He ambulates with a cane.  No recent falls.  He lives with his wife in a senior living apartment.  He is quite functional for his age.    In the ED the patient's vital signs were normal/stable.  Labs with BUN 67, CR 0.96, WBC 14, Hgb 8.6 (down about 3 g from 04/2024).  Troponin initially 27 and decreased to 23.  INR 1.21.  CTA AP with possible small focus of active GI bleed.  He was given 40 mg Protonix and Reglan.  GI consulted and recommended admission to acute telemetry bed.  Later I was notified the patient will be taken to the procedural suite for endoscopy.      Past Medical History    History reviewed. No pertinent past medical history.    Past Surgical History   History reviewed. No pertinent surgical history.    Prior to Admission Medications   Prior to Admission Medications   Prescriptions Last Dose Informant Patient Reported? Taking?   Carboxymethylcellulose Sod PF 0.25 % SOLN   Yes No   Sig: Place 2 drops into both eyes 2 times daily   Vitamin D3 (VITAMIN D, CHOLECALCIFEROL,) 25 mcg (1000 units) tablet   Yes No   Sig: Take 1 tablet by mouth daily   acetaminophen (TYLENOL) 500 MG tablet   Yes No   Sig: Take 1,000 mg by mouth 2 times daily   amLODIPine (NORVASC) 10 MG tablet   Yes No   Sig: Take 10 mg by mouth daily   irbesartan-hydrochlorothiazide (AVALIDE) 300-12.5 MG tablet   Yes No   Sig: Take 1 tablet by mouth daily   levothyroxine (SYNTHROID/LEVOTHROID) 75 MCG tablet   Yes No   Sig: Take 75 mcg by mouth daily   melatonin 3 MG tablet   Yes No   Sig: Take 3 mg by mouth at bedtime   polyethylene glycol (MIRALAX) 17 g packet   No No   Sig: Take 17 g by mouth daily as needed for constipation Goal to have at least 1 bowel movement either every day or every other day   pravastatin (PRAVACHOL) 20 MG tablet   Yes No   Sig: Take 20 mg by mouth  every evening      Facility-Administered Medications: None        Review of Systems    The 10 point Review of Systems is negative other than noted in the HPI or here.     Social History   I have reviewed this patient's social history and updated it with pertinent information if needed.         Family History     No significant family history, including no history of: Hemophilia      Allergies   No Known Allergies     Physical Exam   Vital Signs: Temp: 98.5  F (36.9  C) Temp src: Oral BP: 139/63 Pulse: 87   Resp: 22 SpO2: 96 % O2 Device: None (Room air)    Weight: 145 lbs 0 oz    GENERAL: Lying in bed, no distress, appears stated age   HEENT: NC/AT, sclera anicteric   CV: RRR, no M/R/G, CR < 2 s   PULM: CTAB, no wheezes, rales, rhonchi   GI: Abd soft, tender in the RUQ and right ribs.  No involuntary or voluntary guarding.  Nonacute abdomen.  BACK: No C/T/L-spine tenderness  MSK: WWP, mild LE  NEURO: Awake, alert, oriented to [facility], December 2024, CN II-XII grossly intact, CARDENAS, appears nonfocal  SKIN: no rash           Medical Decision Making       58 MINUTES SPENT BY ME on the date of service doing chart review, history, exam, documentation & further activities per the note.      Data     I have personally reviewed the following data over the past 24 hrs:    14.0 (H)  \   8.6 (L)   / 223     135 102 67.0 (H) /  144 (H)   4.2 21 (L) 0.96 \     ALT: 12 AST: 16 AP: 85 TBILI: 0.4   ALB: 3.6 TOT PROTEIN: 5.3 (L) LIPASE: N/A     Trop: 23 (H) BNP: N/A     INR:  1.21 (H) PTT:  24   D-dimer:  N/A Fibrinogen:  N/A       Imaging results reviewed over the past 24 hrs:   Recent Results (from the past 24 hours)   CTA Abdomen Pelvis with Contrast    Narrative    EXAM: CTA ABDOMEN PELVIS WITH CONTRAST  LOCATION: [facility]  DATE: 12/14/2024    INDICATION: melena  COMPARISON: CT abdomen/pelvis with contrast 03/19/2024  TECHNIQUE: CT angiogram abdomen pelvis during arterial phase of injection of  IV contrast. 2D and 3D MIP reconstructions were performed by the CT technologist. Dose reduction techniques were used.  CONTRAST: 87ml isovue 370    FINDINGS:  ANGIOGRAM ABDOMEN/PELVIS: Query small focus of active gastrointestinal bleeding within the distal stomach. There is also possible thickening of the distal stomach/proximal duodenum.    LOWER CHEST: No acute focal pulmonary consolidation or pleural effusion.    HEPATOBILIARY: Hepatic steatosis. Benign hepatic cysts which required no dedicated follow-up. Similar morphologic changes of the liver suggestive of chronic liver disease.    PANCREAS: Normal.    SPLEEN: Normal.    ADRENAL GLANDS: Stable adrenal thickening.    KIDNEYS/BLADDER: Renal cysts which require no dedicated follow-up. No hydronephrosis. Diffusely thickened urinary bladder most likely on the basis of chronic outlet obstruction.    BOWEL: Colonic diverticulosis. No bowel obstruction. Rest as above. Atherosclerotic calcifications of the abdominal aorta and its branches without evidence of aneurysmal dilatation.    LYMPH NODES: Normal.    PELVIC ORGANS: Massive prostatomegaly.    MUSCULOSKELETAL: Degenerative changes of the thoracic and lumbosacral spine. No acute osseous abnormality.      Impression    IMPRESSION:  1.  Query small focus of active gastrointestinal bleeding within the distal stomach. There is also possible thickening of the distal stomach/proximal duodenum. Consider correlation with endoscopy.  2.  Massive prostatomegaly.

## 2024-12-15 NOTE — PROGRESS NOTES
First Unit of [facility] from [facility] ED completed shortly after arrival to [facility] ~0430. No signs of transfusion reaction. VSS.

## 2024-12-15 NOTE — ANESTHESIA PREPROCEDURE EVALUATION
Anesthesia Pre-Procedure Evaluation    Patient: Clayton Pacheco   MRN: [id number] : 1929        Procedure : Procedure(s):  ESOPHAGOGASTRODUODENOSCOPY          History reviewed. No pertinent past medical history.   History reviewed. No pertinent surgical history.   No Known Allergies   Social History     Tobacco Use    Smoking status: Not on file    Smokeless tobacco: Not on file   Substance Use Topics    Alcohol use: Not on file      Wt Readings from Last 1 Encounters:   24 65.8 kg (145 lb)        Anesthesia Evaluation            ROS/MED HX  ENT/Pulmonary:    (-) asthma, COPD and recent URI   Neurologic:    (-) no seizures, no CVA and no TIA   Cardiovascular:     (+) Dyslipidemia hypertension- -   -  - -                                   (-) CAD and CHF   METS/Exercise Tolerance:     Hematologic:     (+)      anemia,          Musculoskeletal:    (-) arthritis   GI/Hepatic: Comment: Active GI bleeding   (-) GERD and liver disease   Renal/Genitourinary:     (+) renal disease, type: ARF,            Endo:     (+)          thyroid problem, hypothyroidism,        (-) Type II DM and obesity   Psychiatric/Substance Use:       Infectious Disease:       Malignancy:       Other:            Physical Exam    Airway  airway exam normal      Mallampati: II   TM distance: > 3 FB   Neck ROM: full   Mouth opening: > 3 cm    Respiratory Devices and Support         Dental       (+) Completely normal teeth      Cardiovascular          Rhythm and rate: regular and normal     Pulmonary           breath sounds clear to auscultation           OUTSIDE LABS:  CBC:   Lab Results   Component Value Date    WBC 14.0 (H) 2024    WBC 8.1 2024    HGB 8.6 (L) 2024    HGB 11.4 (L) 2024    HCT 25.2 (L) 2024    HCT 34.9 (L) 2024     2024     2024     BMP:   Lab Results   Component Value Date     2024     2024    POTASSIUM 4.2 2024    POTASSIUM  "4.2 03/28/2024    CHLORIDE 102 12/14/2024    CHLORIDE 101 03/28/2024    CO2 21 (L) 12/14/2024    CO2 22 03/28/2024    BUN 67.0 (H) 12/14/2024    BUN 19.9 03/28/2024    CR 0.96 12/14/2024    CR 0.97 03/28/2024     (H) 12/14/2024     (H) 03/28/2024     COAGS:   Lab Results   Component Value Date    PTT 24 12/14/2024    INR 1.21 (H) 12/14/2024     POC: No results found for: \"BGM\", \"HCG\", \"HCGS\"  HEPATIC:   Lab Results   Component Value Date    ALBUMIN 3.6 12/14/2024    PROTTOTAL 5.3 (L) 12/14/2024    ALT 12 12/14/2024    AST 16 12/14/2024    ALKPHOS 85 12/14/2024    BILITOTAL 0.4 12/14/2024     OTHER:   Lab Results   Component Value Date    LACT 1.2 03/21/2024    A1C 6.3 (H) 04/04/2024    MEGHANN 8.7 (L) 12/14/2024    PHOS 2.1 (L) 03/21/2024    MAG 1.8 03/21/2024    LIPASE 14 03/19/2024    TSH 3.37 04/04/2024       Anesthesia Plan    ASA Status:  4, emergent    NPO Status:  NPO Appropriate    Anesthesia Type: MAC.   Induction: Intravenous, Propofol.   Maintenance: TIVA.        Consents    Anesthesia Plan(s) and associated risks, benefits, and realistic alternatives discussed. Questions answered and patient/representative(s) expressed understanding.     - Discussed: Risks, Benefits and Alternatives for the PROCEDURE were discussed     - Discussed with:  Patient            Postoperative Care    Pain management: IV analgesics, Oral pain medications.   PONV prophylaxis: Ondansetron (or other 5HT-3), Dexamethasone or Solumedrol     Comments:               Saul Coon MD    I have reviewed the pertinent notes and labs in the chart from the past 30 days and (re)examined the patient.  Any updates or changes from those notes are reflected in this note.            # Coagulation Defect: INR = 1.21 (Ref range: 0.85 - 1.15) and/or PTT = 24 Seconds (Ref range: 22 - 38 Seconds), will monitor for bleeding    # Hypertension: Noted on problem list      # Anemia: based on hgb <11  # Anemia: based on hgb <11                 "

## 2024-12-15 NOTE — CONSULTS
[facility]    Consult Note - General Surgery Service  Date of Admission:  12/15/2024  Consult Requested by: Janell Roy MD  Reason for Consult: Ulcerating gastric mass    Assessment & Plan: Surgery   Clayton Pacheco is a 89 year old male who was seen at outside facility for GI bleed.  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  Biopsy of the mass were taken and was noted to have bleeding.  Patient was transferred to [facility] for potential IR intervention.  Patient received 2 units of packed red blood cells on 12/15.  Appears to be hemodynamically stable at this time    - No surgical intervention at this time  - General surgery will continue to follow  - Awaiting pathology from upper endoscopy & biopsy  - If patient demonstrates ongoing bleeding, recommend angiography and embolization.         Drains: None     Code Status: Full Code      Clinically Significant Risk Factors Present on Admission          # Hyperchloremia: Highest Cl = 108 mmol/L in last 2 days, will monitor as appropriate      # Hypocalcemia: Lowest Ca = 8 mg/dL in last 2 days, will monitor and replace as appropriate     # Hypoalbuminemia: Lowest albumin = 3.3 g/dL at 12/15/2024  6:06 AM, will monitor as appropriate  # Coagulation Defect: INR = 1.28 (Ref range: 0.85 - 1.15) and/or PTT = 24 Seconds (Ref range: 22 - 38 Seconds), will monitor for bleeding    # Hypertension: Noted on problem list      # Anemia: based on hgb <11  # Anemia: based on hgb <11                The patient's care was discussed with the Attending Physician, Dr. Curtis .    Giovanny Zamorano DO  [facility]  Non-urgent messages: Securely message with [employer] (more info)  Text page via [employer] Paging/Directory     ______________________________________________________________________    Chief Complaint   Upper GI bleed    History is obtained from the  patient    History of Present Illness   Clayton Pacheco is a 89 year old male with previous medical history significant for hypertension, hyperlipidemia, and hypothyroidism.  Patient was seen at outside facility for GI bleed.  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  Biopsies were taken of the mass and there was noted further bleeding.  Hemostatic gel and Hemospray was applied.  Given concerns for possible IR intervention, patient was transferred to [facility].    Patient received 2 units of packed red blood cells early this morning.  Patient responded appropriately to blood products and remains hemodynamically stable.  Patient states that yesterday he did have some dizziness and weakness but no longer feeling weak.      Past Medical History    Past Medical History:   Diagnosis Date    Enlarged prostate     HTN (hypertension)     Hyperlipidemia     Hypothyroidism     Presbyopia     Vitamin D deficiency      Past Surgical History   Past Surgical History:   Procedure Laterality Date    EGD  12/15/2024     Prior to Admission Medications   I have reviewed this patient's current medications     Social History   I have reviewed this patient's social history and updated it with pertinent information if needed.  Social History     Tobacco Use    Smoking status: Never   Substance Use Topics    Alcohol use: Not Currently     Family History   I have reviewed this patient's family history and updated it with pertinent information if needed.  Family History   Problem Relation Age of Onset    Coronary Artery Disease Son      Allergies   No Known Allergies     Physical Exam   Vital Signs: Temp: 98  F (36.7  C) Temp src: Oral BP: 114/45 Pulse: 64   Resp: 18 SpO2: 95 % O2 Device: None (Room air)    Weight: 0 lbs 0 oz  Intake/Output Summary (Last 24 hours) at 12/15/2024 1459  Last data filed at 12/15/2024 1451  Gross per 24 hour   Intake 800 ml    Output 1325 ml   Net -525 ml     General Appearance: Alert, oriented, no acute distress  Respiratory: Symmetric chest rise and fall, no increased work of breathing  Cardiovascular: Regular rate and rhythm, SBP as above  GI: Soft, distended abdomen.  Minimal epigastric/left upper quadrant abdominal tenderness.  No guarding or rebound tenderness.  No signs of peritonitis.  Skin: Warm and well-perfused    Data     I have personally reviewed the following data over the past 24 hrs:    23.6 (H)  \   9.7 (L)   / 188     138 106 61.9 (H) /  111 (H)   3.8 20 (L) 0.80 \     ALT: 11 AST: 16 AP: 76 TBILI: 0.5   ALB: 3.3 (L) TOT PROTEIN: 4.8 (L) LIPASE: N/A     Trop: 23 (H) BNP: N/A     INR:  1.28 (H) PTT:  24   D-dimer:  N/A Fibrinogen:  N/A       Imaging results reviewed over the past 24 hrs:   Recent Results (from the past 24 hours)   CTA Abdomen Pelvis with Contrast    Narrative    EXAM: CTA ABDOMEN PELVIS WITH CONTRAST  LOCATION: [facility]  DATE: 12/14/2024    INDICATION: melena  COMPARISON: CT abdomen/pelvis with contrast 03/19/2024  TECHNIQUE: CT angiogram abdomen pelvis during arterial phase of injection of IV contrast. 2D and 3D MIP reconstructions were performed by the CT technologist. Dose reduction techniques were used.  CONTRAST: 87ml isovue 370    FINDINGS:  ANGIOGRAM ABDOMEN/PELVIS: Query small focus of active gastrointestinal bleeding within the distal stomach. There is also possible thickening of the distal stomach/proximal duodenum.    LOWER CHEST: No acute focal pulmonary consolidation or pleural effusion.    HEPATOBILIARY: Hepatic steatosis. Benign hepatic cysts which required no dedicated follow-up. Similar morphologic changes of the liver suggestive of chronic liver disease.    PANCREAS: Normal.    SPLEEN: Normal.    ADRENAL GLANDS: Stable adrenal thickening.    KIDNEYS/BLADDER: Renal cysts which require no dedicated follow-up. No hydronephrosis. Diffusely thickened urinary  bladder most likely on the basis of chronic outlet obstruction.    BOWEL: Colonic diverticulosis. No bowel obstruction. Rest as above. Atherosclerotic calcifications of the abdominal aorta and its branches without evidence of aneurysmal dilatation.    LYMPH NODES: Normal.    PELVIC ORGANS: Massive prostatomegaly.    MUSCULOSKELETAL: Degenerative changes of the thoracic and lumbosacral spine. No acute osseous abnormality.      Impression    IMPRESSION:  1.  Query small focus of active gastrointestinal bleeding within the distal stomach. There is also possible thickening of the distal stomach/proximal duodenum. Consider correlation with endoscopy.  2.  Massive prostatomegaly.

## 2024-12-15 NOTE — PROGRESS NOTES
[facility]  [facility]  Gastroenterology Progress note    Interval History:      Pt transferred in case needs IR intervention.  Pt currently confused and trying to get out of bed.  Received 2 units of PRBC overnight with appropriate rise in hemoglobin.  No BM.      Vital Signs:      BP (!) 145/65   Pulse 101   Temp 98.2  F (36.8  C) (Oral)   Resp 20   SpO2 96%   Temp (24hrs), Av.3  F (36.8  C), Min:97.8  F (36.6  C), Max:98.8  F (37.1  C)    No data found.    Intake/Output Summary (Last 24 hours) at 12/15/2024 0827  Last data filed at 12/15/2024 0816  Gross per 24 hour   Intake 300 ml   Output 550 ml   Net -250 ml         Constitutional: NAD, comfortable  Cardiovascular: RRR, normal S1, S2   Respiratory: CTAB  Abdomen: soft, non-tender, nondistended    Additional Comments:  ROS, FH, SH: See initial GI consult for details.    Laboratory Data:  Recent Labs   Lab Test 12/15/24  0606 12/15/24  0228 12/14/24  2007 03/20/24  0624  1858   WBC 23.6* 10.4 14.0*   < > 13.7*   HGB 9.2*  9.2* 6.8* 8.6*   < > 12.4*   MCV 96 98 99   < > 96    189 223   < > 224   INR  --  1.28* 1.21*  --  1.08    < > = values in this interval not displayed.     Recent Labs   Lab Test 12/15/24  0606 12/15/24  0228 12/14/24  2007    140 135   POTASSIUM 3.8 4.1 4.2   CHLORIDE 106 108* 102   CO2 20* 23 21*   BUN 61.9* 64.4* 67.0*   CR 0.80 0.97 0.96   ANIONGAP 12 9 12   MEGHANN 8.6* 8.0* 8.7*     Recent Labs   Lab Test 12/15/24  0606 12/14/24  2118 12/14/24  2007 03/28/24  1613 24  0738 24  1902 24  1858 10/27/23  1441 07/06/23  1835   ALBUMIN 3.3*  --  3.6 3.9 3.4*  --  3.8   < >  --    BILITOTAL 0.5  --  0.4 0.3 0.7  --  0.9   < >  --    DBIL  --   --   --   --  0.22  --  0.27  --   --    ALT 11  --  12 24 24  --  31   < >  --    AST 16  --  16 27 28  --  35   < >  --    ALKPHOS 76  --  85 118 177*  --  167*   < >  --    PROTEIN  --  Negative  --   --   --  100*  --   --   100*   LIPASE  --   --   --   --   --   --  14  --   --     < > = values in this interval not displayed.         Assessment:  89 yo male with acute GI bleeding noted within the stomach on CTA at [facility].  Thickening concerning for possible peptic ulcer disease.  Hemoglobin 8 from baseline of 11.  No NSAID use.  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  Large amount of red blood and clot noted.  Peripheral biopsies obtained with further bleeding, pending.  Hemostatic gel and Hemospray applied.  Bleeding slowed.  [facility] not able to perform angiography.  Patient transferred to [facility] in case needs IR intervention.  Received 2 units PRBC, hemoglobin 6.8 -> 9.2.  No further report of bleeding overnight.  Plan:  -  IR and surgery consults pending.  -  NPO for now.  If no intervention today, ok to start clear liquid diet.  -  Monitor H/H; transfuse prn.  -  Monitor for signs of bleeding.  -  Pantoprazole 40 mg BID.    Total Time Spent: 11 minutes    MARCIA Cintron  [facility]  Office:  [phone] call if needed after 5PM  Cell:  [phone], not available after 5PM at this number

## 2024-12-15 NOTE — PHARMACY-ADMISSION MEDICATION HISTORY
Pharmacist Admission Medication History    Admission medication history is complete. The information provided in this note is only as accurate as the sources available at the time of the update.    Information Source(s): Patient, Family member, and CareEverywhere/SureScripts via in-person    Pertinent Information: None    Changes made to PTA medication list:  Added: All medications  Deleted: None  Changed: None    Allergies reviewed with patient and updates made in EHR: yes    Medication History Completed By: Whitney Aguayo RPH 12/15/2024 10:33 AM    PTA Med List   Medication Sig Last Dose/Taking    acetaminophen (TYLENOL) 500 MG tablet Take 1,000 mg by mouth 2 times daily. 12/14/2024 Morning    amLODIPine (NORVASC) 10 MG tablet Take 10 mg by mouth daily. 12/14/2024 Morning    carboxymethylcellulose PF (REFRESH PLUS) 0.5 % ophthalmic solution Place 2 drops into both eyes 2 times daily. 12/13/2024    irbesartan-hydrochlorothiazide (AVALIDE) 300-12.5 MG tablet Take 1 tablet by mouth daily. 12/14/2024 Morning    levothyroxine (SYNTHROID/LEVOTHROID) 75 MCG tablet Take 75 mcg by mouth daily. 12/14/2024 Morning    melatonin 3 MG tablet Take 3 mg by mouth at bedtime. 12/13/2024    polyethylene glycol (MIRALAX) 17 GM/Dose powder Take 1 Capful by mouth daily as needed for constipation. Past Week    pravastatin (PRAVACHOL) 20 MG tablet Take 20 mg by mouth daily. 12/13/2024 Evening    tamsulosin (FLOMAX) 0.4 MG capsule Take 0.4 mg by mouth daily. 12/13/2024 Evening    Vitamin D3 (VITAMIN D, CHOLECALCIFEROL,) 25 mcg (1000 units) tablet Take 25 mcg by mouth daily. 12/13/2024

## 2024-12-15 NOTE — PROGRESS NOTES
Non billing PN    Noted some mild delirium and impulsivity by patient.  Will check CTH    Mild tachycardia and no further GI blood loss.    Await surgery and IR evaluation but anticipate nothing for the next 24 hours as long he is not bleeding.    Will conditionally start with clear liquid diet, NPO after midnight.    Monitor Hgb and monitor on IMC        Lobo Zimmerman MD

## 2024-12-15 NOTE — ED TRIAGE NOTES
Triage Assessment (Adult)       Row Name 12/14/24 2004          Triage Assessment    Airway WDL WDL        Cognitive/Neuro/Behavioral WDL    Cognitive/Neuro/Behavioral WDL WDL

## 2024-12-15 NOTE — ANESTHESIA POSTPROCEDURE EVALUATION
Patient: Clayton Pacheco    Procedure: Procedure(s):  ESOPHAGOGASTRODUODENOSCOPY WITH GASTRIC MASS BIOPSIES AND HEMOSTASIS       Anesthesia Type:  MAC    Note:  Disposition: Outpatient   Postop Pain Control: Uneventful            Sign Out: Well controlled pain   PONV: No   Neuro/Psych: Uneventful            Sign Out: Acceptable/Baseline neuro status   Airway/Respiratory: Uneventful            Sign Out: Acceptable/Baseline resp. status   CV/Hemodynamics: Uneventful            Sign Out: Acceptable CV status; No obvious hypovolemia; No obvious fluid overload   Other NRE: NONE   DID A NON-ROUTINE EVENT OCCUR? No           Last vitals:  Vitals:    12/15/24 0252 12/15/24 0307 12/15/24 0324   BP: 113/53 101/53 119/53   Pulse: 75 77    Resp: 18 17 16   Temp: 36.6  C (97.8  F) 36.7  C (98  F) 36.7  C (98  F)   SpO2:  96% 97%       Electronically Signed By: Saul Coon MD  December 15, 2024  6:54 AM

## 2024-12-15 NOTE — ED TRIAGE NOTES
The patient comes form home. He reports back pain that started today at 1600 he started having black tarry stools. 911 called. EMS started an IV and fluids. PT A&Ox4.

## 2024-12-15 NOTE — PLAN OF CARE
Pt admitted from [facility] ED this AM for acute GI bleed\gastric mass. Pt A&Ox4. Denies pain. Denies any nausea, dizziness, lightheadedness. Receiving 2nd unit of PRBC, tolerating. VSS on RA ex mild tachycardia low 100s with activity. Voiding via urinal at bedside, urgency. No BM since admission, passing gas. NPO. Plan for GI, gen surgery, IR consult.

## 2024-12-15 NOTE — PROGRESS NOTES
GI follow-up note    Upper endoscopy performed.  Large submucosal ulcerated mass noted within the gastric fundus.  Large amount of red blood and clot.  Peripheral biopsies obtained from the mass.  Further bleeding.  Hemostatic gel and Hemospray applied.  Bleeding slowed.    Discussed findings with interventional radiology.  Images reviewed.  Mass in the fundus noted.  Not able to perform angiography at [facility].  In case of needing IR intervention, needs transfer to either [facility] or [facility].    Discussed with hospitalist.  Findings discussed with patient and son, Curt.    Lobo Hernandez MD on 12/15/2024 at 2:04 AM

## 2024-12-15 NOTE — PROGRESS NOTES
Update H&P documented yesterday:      GI performed EGD that was notable for large submucosal ulcerated mass with a large amount of blood and clot.  The mass continued to bleed when it was biopsied.  Hemostatic gel and Hemospray was applied and the bleeding slowed down but was still bleeding.     GI is recommending angiography by interventional radiology.  GI discussed the case with interventional radiology here at [facility]: IR is not able to provide this procedure here until Monday.  Thus GI is recommending the patient transfer out of soon as possible to a facility that can offer this procedure.    I discussed transfer with the patient and his son Curt.  We discussed the risks and benefits of transfer.  Both of them agree to transfer soon as possible.      Update: 0300  Dr. Roy at [facility] accepted the patient. Son, Curt has been updated. Repeat Hgb returned at 6.8. Ordered another unit of pRBCs for a total of 2 units., Nursing is inserting a second large PIV. VS at the time of this writing: HR 78, /53.

## 2024-12-15 NOTE — ANESTHESIA CARE TRANSFER NOTE
Patient: Clayton Pacheco    Procedure: Procedure(s):  ESOPHAGOGASTRODUODENOSCOPY WITH GASTRIC MASS BIOPSIES AND HEMOSTASIS       Diagnosis: Melena [K92.1]  UGIB (upper gastrointestinal bleed) [K92.2]  Anemia due to blood loss, acute [D62]  Diagnosis Additional Information: No value filed.    Anesthesia Type:   MAC     Note:    Oropharynx: oropharynx clear of all foreign objects and spontaneously breathing  Level of Consciousness: awake  Oxygen Supplementation: room air    Independent Airway: airway patency satisfactory and stable  Dentition: dentition unchanged  Vital Signs Stable: post-procedure vital signs reviewed and stable  Report to RN Given: handoff report given  Patient transferred to: Medical/Surgical Unit    Handoff Report: Identifed the Patient, Identified the Reponsible Provider, Reviewed the pertinent medical history, Discussed the surgical course, Reviewed Intra-OP anesthesia mangement and issues during anesthesia, Set expectations for post-procedure period and Allowed opportunity for questions and acknowledgement of understanding      Vitals:  Vitals Value Taken Time   BP     Temp     Pulse     Resp     SpO2         Electronically Signed By: XOCHILT Sanford CRNA  December 15, 2024  2:00 AM

## 2024-12-15 NOTE — H&P
[facility]    History and Physical  Hospitalist       Date of Admission:  (Not on file)  Date of Service (when I saw the patient): 12/15/24    ASSESSMENT  Clayton Pacheco is a markedly pleasant 89 year old gentleman with past medical history that is most notable for hypertension, hyperlipidemia, and hypothyroid, who presents with acute abdominal pain and melena, and is found to have acute blood loss anemia and due to acute upper GI bleeding and hemorrhage, due to bleeding ulcerated gastric mass.    PLAN     Acute blood loss anemia and due to acute upper GI bleeding and hemorrhage, due to bleeding ulcerated gastric mass: Of note, Mr. Pacheco lives independently. He does not take anticoagulation or NSAID's at home. He presents tonight, 12/14/2024, to the [facility]'s ED, for evaluation of acute abdominal pain and melena.    In the ED, he has tachycardia, without hypotension, fever, or hypoxia. He has acute leukocytosis to 14.0. HGB has dropped to 8.6 from previous 11.4. BUN level is elevated in isolation from Cr. INR is slightly elevated to 1.21. Troponin levels are minimally elevated. UA is negative. CTA shows a small focus of active gastrointestinal bleeding within the distal stomach, and possible thickening of the distal stomach/proximal duodenum. He is admitted there, and [facility] consulted. IV Protonix started. IV NS ordered on admission.    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 This I biopsied. Hemostatic gel and spray are applied applied, but this ulcerated mass continues to bleed. Clotted blood in the gastric body is also noted. His case has been discussed with IR and he is now transferred here.    Prior to arrival, HGB drops to 6.8. Two units packed red cells transfusion have been started. I have discussed the case tonight with IR and guidance is appreciated. Plan to hold on angiogram  for now but he may need an angiogram while hospitalized if he decompensates.       -- IMC. NPO. [facility] as well as IR consulted.    -- 150 cc/hour NS IVF ordered. Q 4 HGB checks, transfuse for less than 7 or hypotension. Informed consent obtained    -- IV Protonix BID. Multi-modal pain relief with hot, cold, Tylenol, prn Oxycodone, and IV Dilaudid as needed for pain. Anti-emetics as needed.    -- Repeat CBC and BMP 12/16/24 .     -- Advance directives discussed and he says he would want a 24 hour trial of resuscitation, if only so that loved ones could gather. We discussed that he may have gastric cancer. It seems that despite his advanced age he lives fully independently at home and is fully alert and oriented. Further care goal discussions will likely be ongoing during his hospital stay    -- SW consulted for disposition planning.    Recent Labs   Lab Test 12/15/24  0228 12/14/24 2007 04/04/24  1031   HGB 6.8* 8.6* 11.4*     Elevated Troponins: Noting that he was hospitalized 3/2024 for sepsis and UTI, as discussed below, and Troponins were elevated at that time. Cardiology was consulted. Lexiscan showed no evidence of inducible ischemia. Suspect elevated Troponins 12/14/24 to be due to demand ischemia due to acute illness.      -- Telemetry.     Massive prostatomegaly. Seen incidentally on CT. Noting that he was hospitalized for cystitis due to E coli in 7/2023, and hospitalized for sepsis, UTI, and bacteremia due to E coli, resistant to Unasyn and Zosyn, treated with Cipro.      -- Monitor symptomatically    Hypertension: TTE in 7/2023 showed preserved LVEF with mild to moderate concentric LVH and mild aortic stenosis.      -- Hold home anti-hypertensives for bleeding    Hyperlipidemia: Resume home Pravastatin when verified  Hypothyroid: Resume Synthroid when verified    I have spent 85 minutes on the date of service doing chart review, history, examination, documentation, and further activities per  the note.    Chief Complaint   Melena    History is obtained from the patient and the [place] physician whom I have spoken with    History of Present Illness   Clayton Pacheco is a markedly pleasant 89 year old gentleman, who lives independently with his wife, who presents with acute onset today of black stool that started this afternoon, associated with new onset of pain in the right side of his lower back, and also with dizziness.  He has had several episodes of melena since onset. He [facility] to the [facility] for further evaluation. There, he was hospitalized and now transferred here after EGD was done. He says he has never had these symptoms before. He denies recent fatigue at home, or weight loss, or fever. He also denies ETOH or NSAID use. He is fully alert and oriented during the interview. He otherwise denies nausea, vomiting, chest pain, dyspnea, syncope, or any other acute complaints.    In the ED,   /56  Pulse 94  Temp 98.5  F (36.9  C) (Oral)  Resp 16  Wt 65.8 kg (145 lb)  SpO2 95%  BMI 23.40 kg/m      CBC and CMP in the ED were notable for WBC 14.0, HGB 8.6, CO2 21, BUN 67, Cr 0.96, Ca 8.7, T prot 5.3, Glucose 144, otherwise were within the normal reference range. INR was 1.21. Troponin T levels were 27 and then 23. UA showed 1 WBC, 1 RBC.    Repeat HGB tonight on the medical floor showed HGB 6.8. 2 units packed red blood cells were ordered.    Recent Results (from the past 24 hours)   CTA Abdomen Pelvis with Contrast    Narrative    EXAM: CTA ABDOMEN PELVIS WITH CONTRAST  LOCATION: [facility]  DATE: 12/14/2024    INDICATION: melena  COMPARISON: CT abdomen/pelvis with contrast 03/19/2024  TECHNIQUE: CT angiogram abdomen pelvis during arterial phase of injection of IV contrast. 2D and 3D MIP reconstructions were performed by the CT technologist. Dose reduction techniques were used.  CONTRAST: 87ml isovue 370    FINDINGS:  ANGIOGRAM ABDOMEN/PELVIS: Query small  focus of active gastrointestinal bleeding within the distal stomach. There is also possible thickening of the distal stomach/proximal duodenum.    LOWER CHEST: No acute focal pulmonary consolidation or pleural effusion.    HEPATOBILIARY: Hepatic steatosis. Benign hepatic cysts which required no dedicated follow-up. Similar morphologic changes of the liver suggestive of chronic liver disease.    PANCREAS: Normal.    SPLEEN: Normal.    ADRENAL GLANDS: Stable adrenal thickening.    KIDNEYS/BLADDER: Renal cysts which require no dedicated follow-up. No hydronephrosis. Diffusely thickened urinary bladder most likely on the basis of chronic outlet obstruction.    BOWEL: Colonic diverticulosis. No bowel obstruction. Rest as above. Atherosclerotic calcifications of the abdominal aorta and its branches without evidence of aneurysmal dilatation.    LYMPH NODES: Normal.    PELVIC ORGANS: Massive prostatomegaly.    MUSCULOSKELETAL: Degenerative changes of the thoracic and lumbosacral spine. No acute osseous abnormality.      Impression    IMPRESSION:  1.  Query small focus of active gastrointestinal bleeding within the distal stomach. There is also possible thickening of the distal stomach/proximal duodenum. Consider correlation with endoscopy.  2.  Massive prostatomegaly.       PHYSICAL EXAM  Blood pressure 135/64, pulse 83, resp. rate 18, SpO2 95%.  Constitutional: Alert and oriented to person, place and time; no apparent distress  Respiratory: lungs clear to auscultation bilaterally  Cardiovascular: regular S1 S2  GI: abdomen soft non tender, mildly distended, bowel sounds positive  Musculoskeletal: no clubbing, cyanosis or edema  Neurologic: extra-ocular muscles intact; moves all four extremities  Psychiatric: appropriate affect, insight and judgment     DVT Prophylaxis: Pneumatic Compression Devices  Code Status: Full Code    Medically Ready for Discharge: Anticipated in 2-4 Days       Diony Roy MD, MD    Past  Medical History    I have reviewed this patient's medical history and updated it with pertinent information if needed.   Past Medical History:   Diagnosis Date    Enlarged prostate     HTN (hypertension)     Hyperlipidemia     Hypothyroidism     Presbyopia     Vitamin D deficiency        Past Surgical History   I have reviewed this patient's surgical history and updated it with pertinent information if needed.  Past Surgical History:   Procedure Laterality Date    EGD  12/15/2024       Prior to Admission Medications     Need to be reconciled but seem to include:    1) Amlodipine  2) Avalide  3) Pravstatin  4) Synthroid    Among others    Allergies   No Known Allergies    Social History   I have reviewed this patient's social history and updated it with pertinent information if needed. Clayton Pacheco  reports that he has never smoked. He does not have any smokeless tobacco history on file. He reports that he does not currently use alcohol.    Family History   Family history assessed and, except as above, is non-contributory.    Family History   Problem Relation Age of Onset    Coronary Artery Disease Son        Review of Systems   The 10 point Review of Systems is negative other than noted in the HPI or here.     Primary Care Physician   Oscar De Oliveira    Data   Labs Ordered and Resulted from Time of ED Arrival to Time of ED Departure - No data to display    Data reviewed today:  I personally reviewed the abdominal CT image(s) showing bleeding into the stomach .

## 2024-12-15 NOTE — DISCHARGE SUMMARY
[facility]  Hospitalist Discharge Summary        Date of Admission:  12/14/2024  Date of Discharge:  12/15/2024  Discharging Provider: Lobo Ybarra MD  Discharge Service: Hospitalist Service    Discharge Diagnoses   Melena  Upper GI bleed  Large gastric mass    Clinically Significant Risk Factors          Follow-ups Needed After Discharge   N/a     Unresulted Labs Ordered in the Past 30 Days of this Admission       Date and Time Order Name Status Description    12/15/2024  2:16 AM Prepare red blood cells (unit) Preliminary         These results will be followed up by accepting physician.    Discharge Disposition   Transferred to [facility]  Condition at discharge: [facility]      Clayton Pacheco is a 89 year old male admitted on 12/14/2024. He history PMH most notable for HTN, HLD, hypothyroidism and presents with back pain and melena. He is found to have acute anemia likely due to GI bleed. He is stable on admission.     #Melena  #Upper GI bleed   #Large gastric mass  #Acute blood loss anemia  The patient presented with acute onset of melena for 1 day and orthostatic symptoms.  No syncope. Vital signs normal/stable on admission.  Labs with ~3 g Hgb drop and uremia suggestive of upper GI bleed.  CTA AP with a possible small focus of active gastrointestinal bleeding within the distal stomach.  He received 40 mg IV Protonix in the emergency department.  GI was consulted who performed emergent EGD.  EGD notable for large submucosal ulcerated mass with a large amount of blood and clot.  The mass continued to bleed when it was biopsied.  Hemostatic gel and Hemospray was applied and the bleeding slowed down but was still bleeding. GI is recommending angiography by interventional radiology.  JESÚS discussed the case with interventional radiology here at [facility]: IR is not able to provide this procedure here until Monday.  Thus GI is recommending the patient transfer  out of soon as possible to a facility that can offer this procedure. I discussed transfer with the patient and his son Curt.  We discussed the risks and benefits of transfer.  Both of them agree to transfer soon as possible.  The patient was given a unit of PRBCs stat.  Discussed the case with Dr. Roy at [facility].  He agrees to accept the patient for transfer.    #Back/RUQ pain  Could be due to MSK pain.  The patient states his melena was preceded by back pain.  The back pain is actually located in the right mid axillary line and RUQ.  On exam he has  tenderness to palpation along his right upper quadrant and right ribs.  No tenderness along C/T/L spine.  LFTs are normal.  CTAP with no acute changes in the hepatobiliary system.  Patient denies recent falls and trauma to his rib cage.    #Leukocytosis  -Likely due to stress demargination in the setting of acute blood loss anemia.  The patient denies infectious symptoms.  No signs of infection on exam, labs, imaging.  -Observe for now, start antimicrobials if the patient develops systemic signs of infection    #Elevated troponin  Likely due to demand ischemia in the setting of acute GI bleed.  The patient denies chest pain.  EKG with no contiguous ST segment elevation/depressions or diffuse T wave inversions.  Troponin is downtrending.    #Massive prostamegaly  -UA not indicative of infection and the patient denies urinary symptoms    #HTN  -Hold PTA antihypertensives    #HLD  -PTA pravastatin    #Hypothyroidism  - Hold Synthroid    Consultations This Hospital Stay   INTERVENTIONAL RADIOLOGY ADULT/PEDS IP CONSULT  GASTROENTEROLOGY IP CONSULT    Code Status   Full Code    Time Spent on this Encounter   I, Lobo Ybarra MD, personally saw the patient today and spent greater than 30 minutes discharging this patient.       Lobo Ybarra MD  [address] [address]  Phone: [phone]  Fax:  [phone]  ______________________________________________________________________    Physical Exam   Vital Signs: Temp: 97.8  F (36.6  C) Temp src: Oral BP: 95/47 Pulse: 74   Resp: 20 SpO2: 94 % O2 Device: None (Room air)    Weight: 145 lbs 0 oz  GENERAL: Lying in bed, no distress, appears stated age   HEENT: NC/AT, sclera anicteric   CV: RRR, no M/R/G, CR < 2 s  PULM: CTAB, no wheezes, rales, rhonchi   GI: Abd soft, NT, ND, no involuntary or voluntary guarding, no pain when I bumped the bed, not an acute abdomen   MSK: WWP, mild LE   NEURO: Awake, alert, oriented to [facility], December 2024, CN II-XII grossly intact, CARDENAS, appears nonfocal  SKIN: no rash              Primary Care Physician   Oscar De Oliveira    Discharge Orders      Reason for your hospital stay    You were admitted for a bleed in your stomach. You need a procedure that is not offered at this facility and will be transferred to [facility].     Activity - Up with assistive device     Full Code     Fall precautions       Significant Results and Procedures   Results for orders placed or performed during the hospital encounter of 12/14/24   CTA Abdomen Pelvis with Contrast    Narrative    EXAM: CTA ABDOMEN PELVIS WITH CONTRAST  LOCATION: [facility]  DATE: 12/14/2024    INDICATION: melena  COMPARISON: CT abdomen/pelvis with contrast 03/19/2024  TECHNIQUE: CT angiogram abdomen pelvis during arterial phase of injection of IV contrast. 2D and 3D MIP reconstructions were performed by the CT technologist. Dose reduction techniques were used.  CONTRAST: 87ml isovue 370    FINDINGS:  ANGIOGRAM ABDOMEN/PELVIS: Query small focus of active gastrointestinal bleeding within the distal stomach. There is also possible thickening of the distal stomach/proximal duodenum.    LOWER CHEST: No acute focal pulmonary consolidation or pleural effusion.    HEPATOBILIARY: Hepatic steatosis. Benign hepatic cysts which required  no dedicated follow-up. Similar morphologic changes of the liver suggestive of chronic liver disease.    PANCREAS: Normal.    SPLEEN: Normal.    ADRENAL GLANDS: Stable adrenal thickening.    KIDNEYS/BLADDER: Renal cysts which require no dedicated follow-up. No hydronephrosis. Diffusely thickened urinary bladder most likely on the basis of chronic outlet obstruction.    BOWEL: Colonic diverticulosis. No bowel obstruction. Rest as above. Atherosclerotic calcifications of the abdominal aorta and its branches without evidence of aneurysmal dilatation.    LYMPH NODES: Normal.    PELVIC ORGANS: Massive prostatomegaly.    MUSCULOSKELETAL: Degenerative changes of the thoracic and lumbosacral spine. No acute osseous abnormality.      Impression    IMPRESSION:  1.  Query small focus of active gastrointestinal bleeding within the distal stomach. There is also possible thickening of the distal stomach/proximal duodenum. Consider correlation with endoscopy.  2.  Massive prostatomegaly.       Discharge Medications   Current Discharge Medication List        STOP taking these medications       acetaminophen (TYLENOL) 500 MG tablet Comments:   Reason for Stopping:         amLODIPine (NORVASC) 10 MG tablet Comments:   Reason for Stopping:         Carboxymethylcellulose Sod PF 0.25 % SOLN Comments:   Reason for Stopping:         irbesartan-hydrochlorothiazide (AVALIDE) 300-12.5 MG tablet Comments:   Reason for Stopping:         levothyroxine (SYNTHROID/LEVOTHROID) 75 MCG tablet Comments:   Reason for Stopping:         melatonin 3 MG tablet Comments:   Reason for Stopping:         polyethylene glycol (MIRALAX) 17 g packet Comments:   Reason for Stopping:         pravastatin (PRAVACHOL) 20 MG tablet Comments:   Reason for Stopping:         Vitamin D3 (VITAMIN D, CHOLECALCIFEROL,) 25 mcg (1000 units) tablet Comments:   Reason for Stopping:             Allergies   No Known Allergies

## 2024-12-15 NOTE — PROGRESS NOTES
"[facility] DIGESTIVE HEALTH CONSULTATION    Clayton Pacheco   [address]   [place] [address]  89 year old male    Admission Date/Time: 12/14/2024  8:00 PM    Primary Care Provider:  Oscar De Oliveira    Requesting Physician: Caden Wilson MD      CHIEF COMPLAINT:   Dark stool    REASON FOR THE CONSULT:  Melena, anemia, GI bleeding    HPI:   Patient is a 89-year-old male with a past medical history significant for hypertension, hypothyroidism who presents to the hospital with melenic stool.  He reports celebrating his birthday when he was experiencing right flank pain.  Shortly thereafter he had a loose stool which was black and \"tarry.\"  He denies any previous episodes.  He denies any hematochezia.  He denies any abdominal pain, nausea, hematemesis, fever or weight loss.  He denies any NSAID use including aspirin.  Uses Tylenol as needed.  He presented to the hospital was noted to have hemoglobin of 8.6 down from 11.4, BUN 67.  CT of the abdomen performed which revealed active bleeding within the distal stomach and thickening of the distal stomach proximal duodenum.  Prostatomegaly also noted.  Denies any chest pain, shortness of breath.  Initial complaints of some lightheadedness however this is improved.  Denies any loss of consciousness/syncope.      REVIEW OF SYSTEMS:   10 point ROS neg other than the symptoms noted above in the HPI.    MEDICATIONS:  Current Outpatient Medications   Medication Instructions    acetaminophen (TYLENOL) 1,000 mg, Oral, 2 TIMES DAILY    amLODIPine (NORVASC) 10 mg, Oral, DAILY    Carboxymethylcellulose Sod PF 0.25 % SOLN 2 drops, Both Eyes, 2 TIMES DAILY    irbesartan-hydrochlorothiazide (AVALIDE) 300-12.5 MG tablet 1 tablet, Oral, DAILY    levothyroxine (SYNTHROID/LEVOTHROID) 75 mcg, Oral, DAILY    melatonin 3 mg, Oral, AT BEDTIME    polyethylene glycol (MIRALAX) 17 g, Oral, DAILY PRN, Goal to have at least 1 bowel movement either every day or every other day    " pravastatin (PRAVACHOL) 20 mg, Oral, EVERY EVENING    Vitamin D3 (VITAMIN D, CHOLECALCIFEROL,) 25 mcg (1000 units) tablet 1 tablet, Oral, DAILY       PAST MEDICAL HISTORY:  Patient Active Problem List   Diagnosis    Acute cystitis without hematuria    Metabolic encephalopathy    Primary hypertension    Other hyperlipidemia    Other specified hypothyroidism    Fall    Hypomagnesemia    ELVIN (acute kidney injury) (H)    Foreign body (FB) in soft tissue    Urinary tract infection without hematuria, site unspecified    Presbyopia    Sensorineural hearing loss, bilateral    Sensorineural hearing loss (SNHL) of both ears    Atelectasis    Hypoxia    Fever, unspecified fever cause    Urinary tract infection with hematuria, site unspecified    Other fatigue    Sepsis, due to unspecified organism, unspecified whether acute organ dysfunction present (H)    Melena    Anemia due to blood loss, acute    UGIB (upper gastrointestinal bleed)       PAST SURGICAL HISTORY:  History reviewed. No pertinent surgical history.    FAMILY HISTORY:  History reviewed. No pertinent family history.    SOCIAL HISTORY:  Social History     Tobacco Use    Smoking status: Not on file    Smokeless tobacco: Not on file   Substance Use Topics    Alcohol use: Not on file       ALLERGIES/SENSITIVITIES:  Patient has no known allergies.      PHYSICAL EXAM:  BP (!) 156/66   Pulse 95   Temp 98.5  F (36.9  C) (Oral)   Resp 20   Wt 65.8 kg (145 lb)   SpO2 99%   BMI 23.40 kg/m    Body mass index is 23.4 kg/m .  General: A&O, NAD, non-toxic appearing  Eyes: No icterus or conjunctivitis  ENT: MMM, OP clear without ulcerations  Neck/Thyroid: Supple, no masses  Pulmonary: CTA B  Cardiovascular: RR, S1, S2  Gastrointestinal: Soft, NTTP, no r/g, no masses  Skin: No jaundice/petechiae/rashes  Lymph: No cervical or supraclavicular lymphadenopathy  Extrem: PPI, no c/c/e      LABORATORY DATA:  CBC:  Recent Labs   Lab Test 12/14/24 2007   WBC 14.0*   RBC 2.55*   HGB  8.6*   HCT 25.2*   MCV 99   MCH 33.7*   MCHC 34.1   RDW 13.7           BMP:  Recent Labs   Lab 12/14/24 2007      POTASSIUM 4.2   CHLORIDE 102   CO2 21*   *   CR 0.96   BUN 67.0*       INR:  Recent Labs   Lab Test 12/14/24 2007   INR 1.21*       Liver and Pancreas panel:  Recent Labs   Lab 12/14/24 2007   AST 16   ALT 12   ALKPHOS 85   BILITOTAL 0.4         IMAGING:    CTA Abdomen Pelvis with Contrast    Result Date: 12/14/2024  EXAM: CTA ABDOMEN PELVIS WITH CONTRAST LOCATION: [facility] DATE: 12/14/2024 INDICATION: melena COMPARISON: CT abdomen/pelvis with contrast 03/19/2024 TECHNIQUE: CT angiogram abdomen pelvis during arterial phase of injection of IV contrast. 2D and 3D MIP reconstructions were performed by the CT technologist. Dose reduction techniques were used. CONTRAST: 87ml isovue 370 FINDINGS: ANGIOGRAM ABDOMEN/PELVIS: Query small focus of active gastrointestinal bleeding within the distal stomach. There is also possible thickening of the distal stomach/proximal duodenum. LOWER CHEST: No acute focal pulmonary consolidation or pleural effusion. HEPATOBILIARY: Hepatic steatosis. Benign hepatic cysts which required no dedicated follow-up. Similar morphologic changes of the liver suggestive of chronic liver disease. PANCREAS: Normal. SPLEEN: Normal. ADRENAL GLANDS: Stable adrenal thickening. KIDNEYS/BLADDER: Renal cysts which require no dedicated follow-up. No hydronephrosis. Diffusely thickened urinary bladder most likely on the basis of chronic outlet obstruction. BOWEL: Colonic diverticulosis. No bowel obstruction. Rest as above. Atherosclerotic calcifications of the abdominal aorta and its branches without evidence of aneurysmal dilatation. LYMPH NODES: Normal. PELVIC ORGANS: Massive prostatomegaly. MUSCULOSKELETAL: Degenerative changes of the thoracic and lumbosacral spine. No acute osseous abnormality.     IMPRESSION: 1.  Query small focus of active  gastrointestinal bleeding within the distal stomach. There is also possible thickening of the distal stomach/proximal duodenum. Consider correlation with endoscopy. 2.  Massive prostatomegaly.        ASSESSMENT:   Melena.  Active GI bleeding within the stomach on CTA.  Thickening concerning for possible peptic ulcer disease.  AVM or malignancy possible.  Hemoglobin 8 down from 11.  Denies NSAID use.  Anemia.  Acute on chronic.  Acute GI blood loss.  Leukocytosis.    PLAN:  -N.p.o.  -IV PPI  -IV Reglan administered.  -EGD for further evaluation    20 minutes of total time was spent providing patient care including patient evaluation, reviewing documentation/test results, and .             Lobo Hernandez MD  Thank you for the opportunity to participate in the care of this patient.   Please feel free to call me with any questions or concerns.  Phone number [phone].            CC: [facility]

## 2024-12-16 ENCOUNTER — APPOINTMENT (OUTPATIENT)
Dept: CT IMAGING | Facility: CLINIC | Age: 89
End: 2024-12-16
Attending: INTERNAL MEDICINE
Payer: MEDICARE

## 2024-12-16 LAB
ANION GAP SERPL CALCULATED.3IONS-SCNC: 11 MMOL/L (ref 7–15)
BLD PROD TYP BPU: NORMAL
BLOOD COMPONENT TYPE: NORMAL
BUN SERPL-MCNC: 64.1 MG/DL (ref 8–23)
CALCIUM SERPL-MCNC: 8.1 MG/DL (ref 8.8–10.4)
CHLORIDE SERPL-SCNC: 116 MMOL/L (ref 98–107)
CODING SYSTEM: NORMAL
CREAT SERPL-MCNC: 0.96 MG/DL (ref 0.67–1.17)
CROSSMATCH: NORMAL
EGFRCR SERPLBLD CKD-EPI 2021: 73 ML/MIN/1.73M2
ERYTHROCYTE [DISTWIDTH] IN BLOOD BY AUTOMATED COUNT: 15.7 % (ref 10–15)
GLUCOSE BLDC GLUCOMTR-MCNC: 107 MG/DL (ref 70–99)
GLUCOSE BLDC GLUCOMTR-MCNC: 145 MG/DL (ref 70–99)
GLUCOSE BLDC GLUCOMTR-MCNC: 175 MG/DL (ref 70–99)
GLUCOSE SERPL-MCNC: 152 MG/DL (ref 70–99)
HCO3 SERPL-SCNC: 21 MMOL/L (ref 22–29)
HCT VFR BLD AUTO: 19.5 % (ref 40–53)
HGB BLD-MCNC: 6.5 G/DL (ref 13.3–17.7)
HGB BLD-MCNC: 6.6 G/DL (ref 13.3–17.7)
HGB BLD-MCNC: 8 G/DL (ref 13.3–17.7)
HGB BLD-MCNC: 8.3 G/DL (ref 13.3–17.7)
ISSUE DATE AND TIME: NORMAL
MAGNESIUM SERPL-MCNC: 2 MG/DL (ref 1.7–2.3)
MCH RBC QN AUTO: 31.9 PG (ref 26.5–33)
MCHC RBC AUTO-ENTMCNC: 33.8 G/DL (ref 31.5–36.5)
MCV RBC AUTO: 94 FL (ref 78–100)
PHOSPHATE SERPL-MCNC: 3.3 MG/DL (ref 2.5–4.5)
PLATELET # BLD AUTO: 152 10E3/UL (ref 150–450)
POTASSIUM SERPL-SCNC: 4.1 MMOL/L (ref 3.4–5.3)
RBC # BLD AUTO: 2.07 10E6/UL (ref 4.4–5.9)
SODIUM SERPL-SCNC: 148 MMOL/L (ref 135–145)
UNIT ABO/RH: NORMAL
UNIT NUMBER: NORMAL
UNIT STATUS: NORMAL
UNIT TYPE ISBT: 6200
UPPER GI ENDOSCOPY: NORMAL
WBC # BLD AUTO: 17.9 10E3/UL (ref 4–11)

## 2024-12-16 PROCEDURE — 99233 SBSQ HOSP IP/OBS HIGH 50: CPT | Performed by: INTERNAL MEDICINE

## 2024-12-16 PROCEDURE — 36415 COLL VENOUS BLD VENIPUNCTURE: CPT | Performed by: INTERNAL MEDICINE

## 2024-12-16 PROCEDURE — 250N000012 HC RX MED GY IP 250 OP 636 PS 637: Performed by: INTERNAL MEDICINE

## 2024-12-16 PROCEDURE — XW0G886 INTRODUCTION OF MINERAL-BASED TOPICAL HEMOSTATIC AGENT INTO UPPER GI, VIA NATURAL OR ARTIFICIAL OPENING ENDOSCOPIC, NEW TECHNOLOGY GROUP 6: ICD-10-PCS | Performed by: INTERNAL MEDICINE

## 2024-12-16 PROCEDURE — P9016 RBC LEUKOCYTES REDUCED: HCPCS | Performed by: INTERNAL MEDICINE

## 2024-12-16 PROCEDURE — 85018 HEMOGLOBIN: CPT | Performed by: INTERNAL MEDICINE

## 2024-12-16 PROCEDURE — 43255 EGD CONTROL BLEEDING ANY: CPT | Performed by: INTERNAL MEDICINE

## 2024-12-16 PROCEDURE — 85041 AUTOMATED RBC COUNT: CPT | Performed by: INTERNAL MEDICINE

## 2024-12-16 PROCEDURE — 83735 ASSAY OF MAGNESIUM: CPT | Performed by: INTERNAL MEDICINE

## 2024-12-16 PROCEDURE — 84100 ASSAY OF PHOSPHORUS: CPT | Performed by: INTERNAL MEDICINE

## 2024-12-16 PROCEDURE — 120N000013 HC R&B IMCU

## 2024-12-16 PROCEDURE — 250N000011 HC RX IP 250 OP 636: Performed by: INTERNAL MEDICINE

## 2024-12-16 PROCEDURE — G1010 CDSM STANSON: HCPCS

## 2024-12-16 PROCEDURE — 3E0436Z INTRODUCTION OF NUTRITIONAL SUBSTANCE INTO CENTRAL VEIN, PERCUTANEOUS APPROACH: ICD-10-PCS | Performed by: SURGERY

## 2024-12-16 PROCEDURE — 36569 INSJ PICC 5 YR+ W/O IMAGING: CPT

## 2024-12-16 PROCEDURE — 999N000040 HC STATISTIC CONSULT NO CHARGE VASC ACCESS

## 2024-12-16 PROCEDURE — 250N000009 HC RX 250: Performed by: HOSPITALIST

## 2024-12-16 PROCEDURE — 85014 HEMATOCRIT: CPT | Performed by: INTERNAL MEDICINE

## 2024-12-16 PROCEDURE — 272N000415: Performed by: INTERNAL MEDICINE

## 2024-12-16 PROCEDURE — 272N000451 HC KIT SHRLOCK 5FR POWER PICC DOUBLE LUMEN

## 2024-12-16 PROCEDURE — 250N000009 HC RX 250: Performed by: INTERNAL MEDICINE

## 2024-12-16 PROCEDURE — G0500 MOD SEDAT ENDO SERVICE >5YRS: HCPCS | Performed by: INTERNAL MEDICINE

## 2024-12-16 PROCEDURE — 250N000013 HC RX MED GY IP 250 OP 250 PS 637: Performed by: INTERNAL MEDICINE

## 2024-12-16 PROCEDURE — 272N000104 HC DEVICE CLIP RESOLUTION, EACH: Performed by: INTERNAL MEDICINE

## 2024-12-16 PROCEDURE — 80048 BASIC METABOLIC PNL TOTAL CA: CPT | Performed by: INTERNAL MEDICINE

## 2024-12-16 PROCEDURE — 84478 ASSAY OF TRIGLYCERIDES: CPT | Performed by: INTERNAL MEDICINE

## 2024-12-16 PROCEDURE — 82374 ASSAY BLOOD CARBON DIOXIDE: CPT | Performed by: INTERNAL MEDICINE

## 2024-12-16 PROCEDURE — B4185 PARENTERAL SOL 10 GM LIPIDS: HCPCS | Performed by: INTERNAL MEDICINE

## 2024-12-16 PROCEDURE — 0W3P8ZZ CONTROL BLEEDING IN GASTROINTESTINAL TRACT, VIA NATURAL OR ARTIFICIAL OPENING ENDOSCOPIC: ICD-10-PCS | Performed by: INTERNAL MEDICINE

## 2024-12-16 RX ORDER — DEXTROSE MONOHYDRATE 100 MG/ML
INJECTION, SOLUTION INTRAVENOUS CONTINUOUS PRN
Status: DISCONTINUED | OUTPATIENT
Start: 2024-12-16 | End: 2024-12-20

## 2024-12-16 RX ORDER — EPINEPHRINE 0.1 MG/ML
INJECTION INTRAVENOUS PRN
Status: DISCONTINUED | OUTPATIENT
Start: 2024-12-16 | End: 2024-12-16 | Stop reason: HOSPADM

## 2024-12-16 RX ORDER — IOPAMIDOL 755 MG/ML
89 INJECTION, SOLUTION INTRAVASCULAR ONCE
Status: COMPLETED | OUTPATIENT
Start: 2024-12-16 | End: 2024-12-16

## 2024-12-16 RX ORDER — FENTANYL CITRATE 50 UG/ML
INJECTION, SOLUTION INTRAMUSCULAR; INTRAVENOUS PRN
Status: DISCONTINUED | OUTPATIENT
Start: 2024-12-16 | End: 2024-12-16 | Stop reason: HOSPADM

## 2024-12-16 RX ORDER — LIDOCAINE 40 MG/G
CREAM TOPICAL
Status: ACTIVE | OUTPATIENT
Start: 2024-12-16 | End: 2024-12-19

## 2024-12-16 RX ORDER — NICOTINE POLACRILEX 4 MG
15-30 LOZENGE BUCCAL
Status: DISCONTINUED | OUTPATIENT
Start: 2024-12-16 | End: 2024-12-27 | Stop reason: HOSPADM

## 2024-12-16 RX ORDER — DEXTROSE MONOHYDRATE 25 G/50ML
25-50 INJECTION, SOLUTION INTRAVENOUS
Status: DISCONTINUED | OUTPATIENT
Start: 2024-12-16 | End: 2024-12-27 | Stop reason: HOSPADM

## 2024-12-16 RX ORDER — FUROSEMIDE 10 MG/ML
20 INJECTION INTRAMUSCULAR; INTRAVENOUS ONCE
Status: COMPLETED | OUTPATIENT
Start: 2024-12-16 | End: 2024-12-16

## 2024-12-16 RX ADMIN — FUROSEMIDE 20 MG: 10 INJECTION, SOLUTION INTRAMUSCULAR; INTRAVENOUS at 11:24

## 2024-12-16 RX ADMIN — ASCORBIC ACID, VITAMIN A PALMITATE, CHOLECALCIFEROL, THIAMINE HYDROCHLORIDE, RIBOFLAVIN-5 PHOSPHATE SODIUM, PYRIDOXINE HYDROCHLORIDE, NIACINAMIDE, DEXPANTHENOL, ALPHA-TOCOPHEROL ACETATE, VITAMIN K1, FOLIC ACID, BIOTIN, CYANOCOBALAMIN: 200; 3300; 200; 6; 3.6; 6; 40; 15; 10; 150; 600; 60; 5 INJECTION, SOLUTION INTRAVENOUS at 20:16

## 2024-12-16 RX ADMIN — PANTOPRAZOLE SODIUM 40 MG: 40 INJECTION, POWDER, FOR SOLUTION INTRAVENOUS at 20:15

## 2024-12-16 RX ADMIN — LIDOCAINE HYDROCHLORIDE ANHYDROUS 1 ML: 10 INJECTION, SOLUTION INFILTRATION at 13:50

## 2024-12-16 RX ADMIN — SODIUM CHLORIDE 70 ML: 9 INJECTION, SOLUTION INTRAVENOUS at 09:42

## 2024-12-16 RX ADMIN — QUETIAPINE 12.5 MG: 25 TABLET, FILM COATED ORAL at 20:36

## 2024-12-16 RX ADMIN — INSULIN ASPART 1 UNITS: 100 INJECTION, SOLUTION INTRAVENOUS; SUBCUTANEOUS at 20:36

## 2024-12-16 RX ADMIN — PANTOPRAZOLE SODIUM 40 MG: 40 INJECTION, POWDER, FOR SOLUTION INTRAVENOUS at 08:36

## 2024-12-16 RX ADMIN — IOPAMIDOL 89 ML: 755 INJECTION, SOLUTION INTRAVENOUS at 09:41

## 2024-12-16 RX ADMIN — LIDOCAINE HYDROCHLORIDE ANHYDROUS 1 ML: 10 INJECTION, SOLUTION INFILTRATION at 13:56

## 2024-12-16 RX ADMIN — OLIVE OIL AND SOYBEAN OIL 250 ML: 16; 4 INJECTION, EMULSION INTRAVENOUS at 20:25

## 2024-12-16 RX ADMIN — INSULIN ASPART 1 UNITS: 100 INJECTION, SOLUTION INTRAVENOUS; SUBCUTANEOUS at 23:32

## 2024-12-16 ASSESSMENT — ACTIVITIES OF DAILY LIVING (ADL)
ADLS_ACUITY_SCORE: 51
ADLS_ACUITY_SCORE: 58
ADLS_ACUITY_SCORE: 58
ADLS_ACUITY_SCORE: 52
ADLS_ACUITY_SCORE: 48
ADLS_ACUITY_SCORE: 45
ADLS_ACUITY_SCORE: 52
ADLS_ACUITY_SCORE: 45
ADLS_ACUITY_SCORE: 52
ADLS_ACUITY_SCORE: 52
ADLS_ACUITY_SCORE: 51
ADLS_ACUITY_SCORE: 45
ADLS_ACUITY_SCORE: 52
ADLS_ACUITY_SCORE: 48
ADLS_ACUITY_SCORE: 58
ADLS_ACUITY_SCORE: 45
ADLS_ACUITY_SCORE: 51
ADLS_ACUITY_SCORE: 45
ADLS_ACUITY_SCORE: 52
ADLS_ACUITY_SCORE: 58
ADLS_ACUITY_SCORE: 45
ADLS_ACUITY_SCORE: 53
ADLS_ACUITY_SCORE: 45

## 2024-12-16 NOTE — PROVIDER NOTIFICATION
MD Notification    Notified Person: MD    Notified Person Name: Dr. Dante Pompa    Notification Date/Time: Dec. 26,2024 @ 0115 am    Notification Interaction: Vocera    Purpose of Notification:     Patient latest hgb is 6.5. No order for Prepare and Transfusion. Still with melena episodes. Will you please order conditional order for prepare and transfuse? Thank you.    Orders Received: See chart    Comments:

## 2024-12-16 NOTE — PLAN OF CARE
[phone]    Orientations: A&OX 2-3. Intermittently disoriented to time & place.   Vitals: VSS on 1L NC. LS dim.   Pain: Denies pain  Tele: SR  GI/: Adequate uop via external catheter. Dark red bloody BM X2.   Diet: NPO all day for procedures. Denies nausea.   Ambulation/Assist: Not oob today. Weight shifting in bed.   Skin/Wounds: scattered bruising  LDA: RUE PICC SL. L PIV X1 SL.   Labs:   BG Q4H   K, Mg, & Phos WDL with rechecks tomorrow a.m.  Hgb (Q6H) 6.6 this a.m. transfused for 2 units PRBC now up to 8.3 with next draw at 2100  Plan: Start TPN with Lipids tonight at 2000. Transfuse for Hgb < 8. Continue to monitor & follow POC.

## 2024-12-16 NOTE — PROGRESS NOTES
"Patient Name: Zoran  MRN: [id number]  Date of Admission: 12/15/2024  Reason for Admission: GI Bleed  [place] [facility] [place]:   BP Readings from Last 1 Encounters:   12/16/24 117/50     Pulse Readings from Last 1 Encounters:   12/16/24 87     Wt Readings from Last 1 Encounters:   12/14/24 65.8 kg (145 lb)     Ht Readings from Last 1 Encounters:   12/15/24 1.676 m (5' 6\")     Estimated body mass index is 23.4 kg/m  as calculated from the following:    Height as of an earlier encounter on 12/15/24: 1.676 m (5' 6\").    Weight as of 12/14/24: 65.8 kg (145 lb).  Temp Readings from Last 1 Encounters:   12/16/24 97.6  F (36.4  C) (Oral)       Pain: Pain goal :0 Pain Rating: Denies  Effective pain medication/regimen: None    CV Surgery Patient: No    Assessment:    Resp: Diminished on RA  Telemetry: SR  Neuro: A&O to self and place,disoriented to time and situation.Intermittent confusion.Follows command and calm.  GI/: BS normoactive, passing flatus, odorous, With Melena episodes x 4 during shift.External catheter in placed with adequate urine output.  Diet: NPO post MN  Skin/Wounds: Skin intact, pale  Lines/Drains:  PIV  on Rt arm., SL  Activity: Assist of 1-2 w/GB and cane  Sleep: Sleep between cares  Abnormal Labs: Hgb- 6.5,1 unit PRBC given,Hgb q 6 hrs  Aggression Stop Light: Green          Patient Care Plan: Plan for angiogram today.        "

## 2024-12-16 NOTE — PROGRESS NOTES
GI chart note:    Discussed with Dr Henao. Reviewed initial CT images and also EGD report and pictures.     EGD showed large ulcerated mass in the fundus with adherent clot and large amount of blood clots in the stomach. Biopsies done and hemospray used then.     On CT, this mass appears large occupying outer aspect of the gastric wall from fundus to distal stomach.     - Concern for ongoing bleeding.   - gastric tumors can be highly vascular with potential for arterial bleed.   - no role for repeat EGD.  - recommend discussing with surgery if surgical reseciton is an option, otherwise suggest empiric embolization by IR if ongoing bleeding. If bleeding stabilizes with conservative measures, then we could await biopsies to guide more definitive management for the tumor.    Sydnee Crawford MD  [facility]  Phone [phone]      I as updated by Dr. Henao that 2nd opinion GI consult from Dr. Kim has been requested. [facility] team will sign off.

## 2024-12-16 NOTE — PLAN OF CARE
"Goal Outcome Evaluation:       Patient Name: Clayton Pacheco  MRN: [id number]  Date of Admission: 12/15/2024  Reason for Admission:     UGIB (upper gastrointestinal bleed) and mass  [place] [facility] [place]    [place]itals:   BP Readings from Last 1 Encounters:   12/15/24 114/45     Pulse Readings from Last 1 Encounters:   12/15/24 64     Wt Readings from Last 1 Encounters:   12/14/24 65.8 kg (145 lb)     Ht Readings from Last 1 Encounters:   12/15/24 1.676 m (5' 6\")     Estimated body mass index is 23.4 kg/m  as calculated from the following:    Height as of an earlier encounter on 12/15/24: 1.676 m (5' 6\").    Weight as of 12/14/24: 65.8 kg (145 lb).  Temp Readings from Last 1 Encounters:   12/15/24 98  F (36.7  C) (Oral)       Pain: Pain goal 0 Pain Rating 0 Effective pain medication/regimen Denied pain during shift    CV Surgery Patient: No    Assessment    Resp: Diminished on RA  Telemetry: SR/ST  Neuro: A&O to self and situation. Disoriented to time and place. Neuros in tact. Mumbles at times. Impulsive, keep bed alarm on. Head CT ordered by provider and performed.  GI/: BS normoactive, passing flatus, odorous, no BM during shift. Urgency when urinating, using external catheter.   Clear liquid diet, NPO after midnight.  Skin/Wounds: Skin intact, pale  Lines/Drains: Two PIV's on Rt arm., SL  Activity: Assist of 1 w/GB and cane, impulsive and weak.  Sleep: Inadequate overnight, slept a lot during the day.  Abnormal Labs: Hgb  Finished one unit of blood at the beginning of the shift and Hgb was 9.7 at 1009. Next lab draw at 1814 it decreased to 7.5.  Aggression Stop Light: Green          Patient Care Plan: NPO at midnight in case of angiogram in the morning. Awaiting Surgery and IR consult.                 "

## 2024-12-16 NOTE — CONSULTS
IR consult request for an an embolization of a bleeding gastric mass.    Clayton Pacheco is a markedly pleasant 89 year old gentleman with past medical history that is most notable for hypertension, hyperlipidemia, and hypothyroid, who presents with acute abdominal pain and melena, and is found to have acute blood loss anemia and due to acute upper GI bleeding and hemorrhage, due to bleeding ulcerated gastric mass.     He had a biopsy early hours of 8/15 with subsequent hemoglobin drop after, CTA done prior to the patient being scoped and biopsied showed some generalized oozing within the mass. The patient received a couple units of packed cells during the day 12/15 and one overnight. Hemoglobin has been in the 6 range with soft bowel movements, no clots noted.     Reviewed imaging and chart with IR Dr Hannah and Hospitalist Dr Zimmerman.     As the CTA was done Saturday evening prior to the EGD and biopsy another one was requested by Dr Hannah. He would need to see if the bleeding was active still and which area to target (Mass in upper stomach vs GI bleeding within the distal stomach in the distal stomach/proximal duodenum). If there is some oozing noted in the mass, this is most likely venous oozing and IR has no target for arterial embolization.     CTA done today with the following noted    IMPRESSION:  1.  A mass in the gastric lumen is again noted. No active extravasation is seen associated with the mass.  2.  Borderline fusiform dilation of the right common iliac artery measuring 1.7 cm. Unchanged from the prior exam.  3.  Marked prostatomegaly.    No extravasation noted in the distal stomach.     No IR embolization procedure recommended.    Total time: 25 minutes     [facility] CNP ([phone]) (phone [phone])

## 2024-12-16 NOTE — PROGRESS NOTES
[facility]  Hospitalist Progress Note  Lobo Zimmerman MD  12/16/2024    Assessment & Plan   Clayton Pacheco is a markedly pleasant 89 year old gentleman with past medical history that is most notable for hypertension, hyperlipidemia, and hypothyroid, who presents with acute abdominal pain and melena, and is found to have acute blood loss anemia and due to acute upper GI bleeding and hemorrhage, due to bleeding ulcerated gastric mass.        ## Acute blood loss anemia and due   ## Acute upper GI bleeding and hemorrhage due to   ## Bleeding ulcerated gastric mass:   Of note, Mr. Pacheco lives independently, drives, takes care of his wife and history of HTN, HLP, hypothyroid.  He is not on any anticoagulation or NSAID use at home. He presented on 12/14/2024, to the [facility] ED, for evaluation of acute abdominal pain and melena.     In the ED, he has tachycardia, without hypotension, fever, or hypoxia. He has acute leukocytosis to 14.0. HGB has dropped to 8.6 from previous 11.4. BUN level is elevated in isolation from Cr. INR is slightly elevated to 1.21. Troponin levels are minimally elevated. UA is negative. CTA shows a small focus of active gastrointestinal bleeding within the distal stomach, and possible thickening of the distal stomach/proximal duodenum. He is admitted there, and [facility] consulted. IV Protonix started. IV NS ordered on admission.     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 This was biopsied. Hemostatic gel and spray are applied applied, but this ulcerated mass continued to bleed. Clotted blood in the gastric body is also noted. Patient was transferred for IR, surgical and GI involvement.        Prior to arrival, HGB drops to 6.8. Two units packed red cells transfusion have been started.         -- [facility] [place] with frequent monitoring of hemodynamics, serial Hgb.    -- on IV  Protonix.BID    -- BP has been stable.    12/16     -- patient had bloody stools this AM and hgb down to 6.6.    -- discussed with GI, IR and Surgery    -- CTA repeat did not show any active bleeding    -- currently no active bleeding for IR, surgery for now hesitant for possible large gastric resection and also not sure how far the mass is away from the esophagus. (Dr Jules)    -- Plan:    -- if patient has active bleeding, then to IR    -- allow clear liquid diet    -- transfuse to keep at or above 8    -- await pathology from 12/15    -- discuss with 2nd GI group for opinion about APC treatment and now will plan for EGD later today.    -- Q 6 HGB checks, transfuse for less than 8 or hypotension. Informed consent obtained    -- Advance directives discussed and he says he would want a 24 hour trial of resuscitation, if only so that loved ones could gather. We discussed that he may have gastric cancer. It seems that despite his advanced age he lives fully independently at home and is fully alert and oriented. Further care goal discussions will likely be ongoing during his hospital stay    -- SW consulted for disposition planning.            Recent Labs   Lab Test 12/15/24      0649 12/15/24  0228 12/14/24 2007 04/04/24  1031   HGB 6.6 6.8* 8.6* 11.4*      Elevated Troponins: Noting that he was hospitalized 3/2024 for sepsis and UTI, as discussed below, and Troponins were elevated at that time. Cardiology was consulted. Lexiscan showed no evidence of inducible ischemia. Suspect elevated Troponins 12/14/24 to be due to demand ischemia due to acute illness.    -- Telemetry.     -- denies any chest pain or sob    -- not a candidate for coronary intervention due to active bleeding     Massive prostatomegaly. Seen incidentally on CT. Noting that he was hospitalized for cystitis due to E coli in 7/2023, and hospitalized for sepsis, UTI, and bacteremia due to E coli, resistant to Unasyn and Zosyn, treated with Cipro.        -- Monitor symptomatically     Hypertension: TTE in 7/2023 showed preserved LVEF with mild to moderate concentric LVH and mild aortic stenosis.    -- Hold home anti-hypertensives for bleeding     Hyperlipidemia:   -- hold Pravastatin    Hypothyroid:   -- change to IV Synthroid      Nutrition  -- will place PICC ine  -- start TPN  -- keep NPO    Disposition:   > 5 days    Interval History   -- had 2 bloody movements this am  -- not tachycardic, BP stable  -- slept better  -- not confused    -Data reviewed today: I reviewed all new labs and imaging over the last 24 hours. I personally reviewed no images or EKG's today.    Physical Exam    , Blood pressure 125/61, pulse 97, temperature 97.5  F (36.4  C), temperature source Oral, resp. rate 16, SpO2 95%.  There were no vitals filed for this visit.  Vital Signs with Ranges  Temp:  [97.5  F (36.4  C)-98.2  F (36.8  C)] 97.5  F (36.4  C)  Pulse:  [64-99] 97  Resp:  [16-18] 16  BP: ()/(41-90) 125/61  SpO2:  [90 %-97 %] 95 %  I/O's Last 24 hours  I/O last 3 completed shifts:  In: 1110 [I.V.:10]  Out: 1525 [Urine:1525]    Constitutional: Awake, alert, cooperative, no apparent distress, pallor  Respiratory: Clear to auscultation bilaterally, no crackles or wheezing  Cardiovascular: Regular rate and rhythm, normal S1 and S2   GI: Normal bowel sounds, soft, non-distended, non-tender  Skin/Integumen: No rashes, no cyanosis, no edema  Other:      Medications   All medications were reviewed.  Current Facility-Administered Medications   Medication Dose Route Frequency Provider Last Rate Last Admin    sodium chloride 0.9 % infusion   Intravenous Continuous Diony Roy  mL/hr at 12/16/24 0803 Restarted at 12/16/24 0803     Current Facility-Administered Medications   Medication Dose Route Frequency Provider Last Rate Last Admin    pantoprazole (PROTONIX) IV push injection 40 mg  40 mg Intravenous Q12H Diony Roy MD   40 mg at 12/16/24 0836     QUEtiapine (SEROquel) half-tab 12.5 mg  12.5 mg Oral At Bedtime Lobo Zimmerman MD   12.5 mg at 12/15/24 2117    sodium chloride (PF) 0.9% PF flush 3 mL  3 mL Intracatheter Q8H Diony Roy MD   3 mL at 12/15/24 2117        Data   Recent Labs   Lab 12/16/24  0649 12/16/24  0037 12/15/24  1814 12/15/24  1009 12/15/24  0606 12/15/24  0228 12/14/24 2007   WBC 17.9*  --   --   --  23.6* 10.4 14.0*   HGB 6.6* 6.5* 7.5*   < > 9.2*  9.2* 6.8* 8.6*   MCV 94  --   --   --  96 98 99     --   --   --  188 189 223   INR  --   --   --   --   --  1.28* 1.21*   *  --   --   --  138 140 135   POTASSIUM 4.1  --   --   --  3.8 4.1 4.2   CHLORIDE 116*  --   --   --  106 108* 102   CO2 21*  --   --   --  20* 23 21*   BUN 64.1*  --   --   --  61.9* 64.4* 67.0*   CR 0.96  --   --   --  0.80 0.97 0.96   ANIONGAP 11  --   --   --  12 9 12   MEGHANN 8.1*  --   --   --  8.6* 8.0* 8.7*   *  --   --   --  111* 127* 144*   ALBUMIN  --   --   --   --  3.3*  --  3.6   PROTTOTAL  --   --   --   --  4.8*  --  5.3*   BILITOTAL  --   --   --   --  0.5  --  0.4   ALKPHOS  --   --   --   --  76  --  85   ALT  --   --   --   --  11  --  12   AST  --   --   --   --  16  --  16    < > = values in this interval not displayed.       Recent Results (from the past 24 hours)   CT Head w/o Contrast    Narrative    EXAM: CT HEAD W/O CONTRAST  LOCATION: [facility]  DATE: 12/15/2024    INDICATION: AMS, new finding of gastric mass  COMPARISON: None.  TECHNIQUE: Routine CT Head without IV contrast. Multiplanar reformats. Dose reduction techniques were used.    FINDINGS:  INTRACRANIAL CONTENTS: No midline shift or mass effect. No acute intracranial hemorrhage. No hydrocephalus or extra-axial hemorrhage. Mild global volume loss with expected dilatation of the ventricular system. Moderate chronic small vessel ischemic   changes. No acute loss of gray-white differentiation identified.     VISUALIZED  ORBITS/SINUSES/MASTOIDS: No intraorbital abnormality. No paranasal sinus mucosal disease. No middle ear or mastoid effusion.    BONES/SOFT TISSUES: No acute abnormality.      Impression    IMPRESSION:  1.  No acute findings. Chronic changes as above.   CTA Abdomen Pelvis with Contrast    Narrative    CTA ABDOMEN PELVIS WITH CONTRAST    PROCEDURE DATE: 12/16/2024 10:05 AM     HISTORY:  bleeding gastric mass    COMPARISON: CTA abdomen/pelvis 12/14/2024    TECHNIQUE: Helical acquisition through the abdomen and pelvis was  performed during the arterial phase of contrast enhancement. 2D and 3D  reconstructions performed by the CT technologist. Dose reduction  techniques were used.  CONTRAST: 89 mL of Isovue-370    FINDINGS:   ARTERIAL FINDINGS: Normal caliber abdominal aorta without dissection  or aneurysm. Diffuse calcific atherosclerotic disease. There is likely  mild celiac origin stenosis from atherosclerotic disease. The SMA and  renal arteries appear patent. Borderline fusiform dilation of the  right common iliac artery measuring 1.7 cm in diameter. Ectasia of the  distal left common iliac artery. No obvious gastrointestinal active  extravasation is seen, with special attention to the known gastric  mass.    NONVASCULAR FINDINGS:  LUNG BASES: Development of dependent consolidation/atelectasis in the  left lung base. No pleural effusion.    ABDOMEN: Benign hepatic cysts are again noted and require no further  follow-up. The spleen and pancreas are unremarkable. Stable mild  thickening of the adrenal glands. Normal caliber bowel loops appearing  renal cysts are benign and require no further follow-up. No  hydronephrosis. Sigmoid diverticulosis without acute diverticulitis.    PELVIS: Marked prostatomegaly.    MUSCULOSKELETAL: Degenerative changes of the thoracolumbar spine as  well as the hips. No destructive osseous lesion.      Impression    IMPRESSION:  1.  A mass in the gastric lumen is again noted. No  active  extravasation is seen associated with the mass.  2.  Borderline fusiform dilation of the right common iliac artery  measuring 1.7 cm. Unchanged from the prior exam.  3.  Marked prostatomegaly.    ARTURO PACKER MD         SYSTEM ID:  [id number]       Lobo Zimmerman MD  Text Page  (7am to 6pm)

## 2024-12-16 NOTE — PROGRESS NOTES
[facility]  General Surgery Progress Note        Ck Jules MD   12/16/2024        Interval History:      1) Still having some red blood per rectum.  HGB down to 6.6.  Getting blood.         Assessment and Plan:      1) Discussion with Dr. Zimmerman:  Further endoscopic treatment of this including Argon beam coagulation or other endoscopic methods would be much more preferable than surgical management of this.  At this point we would need to resect it.  The tumor has not be adequately localized, meaning does he have enough stomach between his tumor and his esophageal orifice to avoid an Esophago-jejunostomy.   I think he needs another EGD with another attempt to stop the bleeding with pictures showing the relationship of the tumor to his esophageal opening to know what operation to offer him if it needed.  I do not think IR will be helpful as bleeding wasn't seen on the CTA, meaning they won't be able to localize it.    In short he needs another EGD.                    Physical Exam:      Blood pressure 129/56, pulse 85, temperature 97.9  F (36.6  C), resp. rate 16, SpO2 100%.  There were no vitals filed for this visit.  Vital Signs with Ranges  Temp:  [97.5  F (36.4  C)-98.2  F (36.8  C)] 97.9  F (36.6  C)  Pulse:  [64-99] 85  Resp:  [16-18] 16  BP: ()/(41-90) 129/56  SpO2:  [90 %-100 %] 100 %  I/O's Last 24 hours  I/O last 3 completed shifts:  In: 1110 [I.V.:10]  Out: 1525 [Urine:1525]    Non tender.             Medications:        Current Facility-Administered Medications   Medication Dose Route Frequency Provider Last Rate Last Admin    insulin aspart (NovoLOG) injection (RAPID ACTING)  1-7 Units Subcutaneous Q4H Lobo Zimmerman MD        lipids plant base (CLINOLIPID) 20 % infusion 250 mL  250 mL Intravenous Q24H Lobo Zimmerman MD        pantoprazole (PROTONIX) IV push injection 40 mg  40 mg Intravenous Q12H Diony Roy MD   40 mg at 12/16/24 0836    QUEtiapine  (SEROquel) half-tab 12.5 mg  12.5 mg Oral At Bedtime Lobo Zimmerman MD   12.5 mg at 12/15/24 2117    sodium chloride (PF) 0.9% PF flush 3 mL  3 mL Intracatheter Q8H Diony Roy MD   3 mL at 12/15/24 2117            Data:      All new lab and imaging data was reviewed.   Recent Labs   Lab Test 12/16/24  0649 12/16/24  0037 12/15/24  1814 12/15/24  1009 12/15/24  0606 12/15/24  0228 12/14/24 2007 03/20/24  0626 03/19/24  1858   WBC 17.9*  --   --   --  23.6* 10.4 14.0*   < > 13.7*   HGB 6.6* 6.5* 7.5*   < > 9.2*  9.2* 6.8* 8.6*   < > 12.4*   MCV 94  --   --   --  96 98 99   < > 96     --   --   --  188 189 223   < > 224   INR  --   --   --   --   --  1.28* 1.21*  --  1.08    < > = values in this interval not displayed.

## 2024-12-16 NOTE — PLAN OF CARE
Pt here with GI bleed/gastric mass. A&Ox4. VSS on RA. Mild tachycardia. Clear liquid diet, NPO after midnight. Monitor Hgb. External catheter. SBA. Patient scoring green on aggressive tool light.

## 2024-12-16 NOTE — PROCEDURES
[facility]    Double Lumen PICC Placement    Date/Time: 12/16/2024 1:45 PM    Performed by: Rivka Hernandez RN  Authorized by: Lobo Zimmerman MD  Indications: vascular access      UNIVERSAL PROTOCOL   Site Marked: Yes  Prior Images Obtained and Reviewed:  Yes  Required items: Required blood products, implants, devices and special equipment available    Patient identity confirmed:  Verbally with patient, arm band and hospital-assigned identification number  NA - No sedation, light sedation, or local anesthesia  Confirmation Checklist:  Patient's identity using two indicators, relevant allergies, procedure was appropriate and matched the consent or emergent situation and correct equipment/implants were available  Time out: Immediately prior to the procedure a time out was called    Universal Protocol: the Joint Commission Universal Protocol was followed    Preparation: Patient was prepped and draped in usual sterile fashion    ESBL (mL):  0.5     ANESTHESIA    Anesthesia:  Local infiltration  Local Anesthetic:  Lidocaine 1% without epinephrine  Anesthetic Total (mL):  2      SEDATION    Patient Sedated: No        Preparation: skin prepped with ChloraPrep  Skin prep agent: skin prep agent completely dried prior to procedure  Sterile barriers: maximum sterile barriers were used: cap, mask, sterile gown, sterile gloves, and large sterile sheet  Hand hygiene: hand hygiene performed prior to central venous catheter insertion  Type of line used: PICC  Catheter type: double lumen  Lumen type: valved and power PICC  Lumen Identification: Purple and Red  Catheter size: 5 Fr  Brand: Bard  Lot number: [id number]  Placement method: venipuncture  Number of attempts: 1  Difficulty threading catheter: no  Successful placement: yes  Orientation: right    Location: basilic vein  Tip Location: SVC/RA Junction  Arm circumference: adults 10 cm  Extremity circumference: 26  Visible catheter length:  4  Total catheter length: 43  Dressing and securement: alcohol impregnated caps, blood cleaned with CHG, blood removed, chlorhexidine disc applied, occlusive dressing applied and subcutaneous anchor securement system  Post procedure assessment: blood return through all ports and placement verified by 3CG technology  PROCEDURE   Patient Tolerance:  Patient tolerated the procedure well with no immediate complicationsDescribe Procedure: Patient instruct on PICC placement. Patient verbalized an understanding. Patient gave verbal and written consent for PICC placement. VAT RN placed 5 FR Double lumen catheter in right basilic vein. Patient tolerated well. Blood return noted. PICC placement was confirmed with 3 CG technology noted on rhythm strip. RN caring for patient updated that PICC is ok to use. This was noted by 2 VAT RN's as printer not working  Disposal: sharps and needle count correct at the end of procedure, needles and guidewire disposed in sharps container

## 2024-12-16 NOTE — CONSULTS
"CLINICAL NUTRITION SERVICES  -  ASSESSMENT NOTE    Recommendations Ordered by Registered Dietitian (RD):   TPN with 3-step progression:     Step 1: Clinimix D15 AA5 @ 40 ml/hr + 250 mL 20% lipid daily.   Provides 144 g Dex (GIR = 1.5), 48 g AA.     Step 2: Clinimix D15 AA5 @ 60 mL/hr + 250 mL 20% lipid daily.   Provides 216 g Dex (GIR = 1.5), 72 g AA.     Step 3 (Goal): Clinimix D15 AA5 @ 75 mL/hr + 250 mL 20% lipid daily.   Provides 270 g Dex (GIR = 2.84), 90 g AA, 28% kcal from lipid.   1778 kcal (27 kcal/kg), 1.4 g protein/kg.     Dosing Weight = 66 kg  Total Fluid = 75 ml/hr, which is TPN at goal (1800 mL/day).    Future/Additional Recommendations:  - Monitor sodium and fluid balance.    Malnutrition:   % Weight Loss:  None noted  % Intake:  Unable to fully assess   Subcutaneous Fat Loss:  Orbital region moderate depletion, Upper arm region moderate depletion, and Thoracic region moderate depletion  Muscle Loss:  Temporal region moderate depletion, Clavicle bone region moderate depletion, and Dorsal hand region moderate depletion  Fluid Retention:  None noted    Malnutrition Diagnosis: Moderate malnutrition  In Context of:  Chronic illness or disease     REASON FOR ASSESSMENT  Clayton Pacheco is a 89 year old male seen by Registered Dietitian for Pharmacy/Nutrition to Start and Manage PN    NUTRITION HISTORY  - Information obtained from chart. Pt seen in room, but he was asleep.   - Patient lives independently.     CURRENT NUTRITION ORDERS  Diet Order:     Clear Liquid Diet     Current Intake/Tolerance:  Nothing since admission.     NUTRITION FOCUSED PHYSICAL ASSESSMENT FOR DIAGNOSING MALNUTRITION)  Yes         Observed:    Muscle wasting (refer to documentation in Malnutrition section) and Subcutaneous fat loss (refer to documentation in Malnutrition section) - age related to some extent.     Obtained from Chart/Interdisciplinary Team:  PICC not yet in place     ANTHROPOMETRICS  Height: 5' 6\"  Weight: 65.8 " kg - taken 12/14  BMI = 23.4 kg/m2  Weight Status:  Normal BMI  IBW: 64.5 kg   % IBW: 102%  Weight History: Stable weights, if accurate  Wt Readings from Last 10 Encounters:   12/14/24 65.8 kg (145 lb)   03/20/24 67.1 kg (147 lb 14.9 oz)   07/10/23 65.5 kg (144 lb 6.4 oz)     LABS  Labs reviewed  Na 148 (H)  BUN 64.1 (H), Cr 0.96 (NL)  GFR NL    MEDICATIONS  Medications reviewed  NaCL IVF was @ 150 mL/hr, now off.     ASSESSED NUTRITION NEEDS PER APPROVED PRACTICE GUIDELINES:  Dosing Weight 66 kg - current   Estimated Energy Needs: 9784-3260 kcals (25-30 Kcal/Kg)  Justification: maintenance  Estimated Protein Needs: 79-99 grams protein (1.2-1.5 g pro/Kg)  Justification: preservation of lean body mass  Estimated Fluid Needs: 1 mL/kcal   Justification: maintenance    MALNUTRITION:  % Weight Loss:  None noted  % Intake:  Unable to fully assess   Subcutaneous Fat Loss:  Orbital region moderate depletion, Upper arm region moderate depletion, and Thoracic region moderate depletion  Muscle Loss:  Temporal region moderate depletion, Clavicle bone region moderate depletion, and Dorsal hand region moderate depletion  Fluid Retention:  None noted    Malnutrition Diagnosis: Moderate malnutrition  In Context of:  Chronic illness or disease    NUTRITION DIAGNOSIS:  Inadequate oral intake related to altered GI function as evidenced by NPO, need for TPN to meet nutrient needs.     NUTRITION INTERVENTIONS  Recommendations / Nutrition Prescription  TPN with 3-step progression:     Step 1: Clinimix D15 AA5 @ 40 ml/hr + 250 mL 20% lipid daily.   Provides 144 g Dex (GIR = 1.5), 48 g AA.     Step 2: Clinimix D15 AA5 @ 60 mL/hr + 250 mL 20% lipid daily.   Provides 216 g Dex (GIR = 1.5), 72 g AA.     Step 3 (Goal): Clinimix D15 AA5 @ 75 mL/hr + 250 mL 20% lipid daily.   Provides 270 g Dex (GIR = 2.84), 90 g AA, 28% kcal from lipid.   1778 kcal (27 kcal/kg), 1.4 g protein/kg.     Dosing Weight = 66 kg  Total Fluid = 75 ml/hr, which is TPN  at goal (1800 mL/day).     Implementation  Nutrition education: Per Provider order if indicated. Pt sleeping during visit.    PN Composition, PN Schedule: ordered  Collaboration and Referral of Nutrition care: D/w PharmD and MD.     Nutrition Goals  TPN at goal to provide % estimated needs.     MONITORING AND EVALUATION:  Progress towards goals will be monitored and evaluated per protocol and Practice Guidelines    Claribel Gutierrez RD, LD  Pager: [phone]  Available on [email or web address]

## 2024-12-16 NOTE — PLAN OF CARE
Goal Outcome Evaluation:      Plan of Care Reviewed With: patient    Overall Patient Progress: no changeOverall Patient Progress: no change

## 2024-12-16 NOTE — PROVIDER NOTIFICATION
MD Notification    Notified Person: MD    Notified Person Name: Lobo Zimmerman    Notification Date/Time: 12/15/24 1100    Notification Interaction: Gus    Purpose of Notification:   Pt is experiencing some confusion that was not present upon admission. He was A&Ox4. Now He is alert to situation and self but does not know the day of the week, year or where he is. He has not received any medication. Son has been here for an hour and this is not the pt's baseline. Did a neuro assessment and everything seems to be intact. He does keep involuntarily moving his left leg, but does not have any numbness or tingling. Son is concerned. Asked provider to advise.    Orders Received:    Provider advised he would be up in 30 minutes to see the pt and son Curt.    Writer advised she told the pt's son this changes could be attributed to lack of sleep and age of pt.    Comments:

## 2024-12-17 ENCOUNTER — APPOINTMENT (OUTPATIENT)
Dept: OCCUPATIONAL THERAPY | Facility: CLINIC | Age: 89
End: 2024-12-17
Attending: INTERNAL MEDICINE
Payer: MEDICARE

## 2024-12-17 LAB
ALBUMIN SERPL BCG-MCNC: 2.9 G/DL (ref 3.5–5.2)
ALP SERPL-CCNC: 54 U/L (ref 40–150)
ALT SERPL W P-5'-P-CCNC: 16 U/L (ref 0–70)
ANION GAP SERPL CALCULATED.3IONS-SCNC: 7 MMOL/L (ref 7–15)
AST SERPL W P-5'-P-CCNC: 23 U/L (ref 0–45)
BILIRUB DIRECT SERPL-MCNC: <0.2 MG/DL (ref 0–0.3)
BILIRUB SERPL-MCNC: 0.3 MG/DL
BLD PROD TYP BPU: NORMAL
BLOOD COMPONENT TYPE: NORMAL
BUN SERPL-MCNC: 59.5 MG/DL (ref 8–23)
CALCIUM SERPL-MCNC: 8 MG/DL (ref 8.8–10.4)
CHLORIDE SERPL-SCNC: 117 MMOL/L (ref 98–107)
CODING SYSTEM: NORMAL
CREAT SERPL-MCNC: 0.96 MG/DL (ref 0.67–1.17)
CROSSMATCH: NORMAL
EGFRCR SERPLBLD CKD-EPI 2021: 73 ML/MIN/1.73M2
GLUCOSE BLDC GLUCOMTR-MCNC: 112 MG/DL (ref 70–99)
GLUCOSE BLDC GLUCOMTR-MCNC: 132 MG/DL (ref 70–99)
GLUCOSE BLDC GLUCOMTR-MCNC: 143 MG/DL (ref 70–99)
GLUCOSE BLDC GLUCOMTR-MCNC: 145 MG/DL (ref 70–99)
GLUCOSE BLDC GLUCOMTR-MCNC: 151 MG/DL (ref 70–99)
GLUCOSE SERPL-MCNC: 147 MG/DL (ref 70–99)
HCO3 SERPL-SCNC: 24 MMOL/L (ref 22–29)
HGB BLD-MCNC: 8.2 G/DL (ref 13.3–17.7)
HGB BLD-MCNC: 8.6 G/DL (ref 13.3–17.7)
HGB BLD-MCNC: 9.3 G/DL (ref 13.3–17.7)
INR PPP: 1.17 (ref 0.85–1.15)
ISSUE DATE AND TIME: NORMAL
MAGNESIUM SERPL-MCNC: 2.1 MG/DL (ref 1.7–2.3)
PATH REPORT.COMMENTS IMP SPEC: NORMAL
PATH REPORT.FINAL DX SPEC: NORMAL
PATH REPORT.GROSS SPEC: NORMAL
PATH REPORT.MICROSCOPIC SPEC OTHER STN: NORMAL
PATH REPORT.RELEVANT HX SPEC: NORMAL
PHOSPHATE SERPL-MCNC: 2.8 MG/DL (ref 2.5–4.5)
PHOTO IMAGE: NORMAL
POTASSIUM SERPL-SCNC: 3.6 MMOL/L (ref 3.4–5.3)
PROT SERPL-MCNC: 4.5 G/DL (ref 6.4–8.3)
SODIUM SERPL-SCNC: 148 MMOL/L (ref 135–145)
TRIGL SERPL-MCNC: 158 MG/DL
UNIT ABO/RH: NORMAL
UNIT NUMBER: NORMAL
UNIT STATUS: NORMAL
UNIT TYPE ISBT: 6200

## 2024-12-17 PROCEDURE — 85018 HEMOGLOBIN: CPT | Performed by: INTERNAL MEDICINE

## 2024-12-17 PROCEDURE — 85610 PROTHROMBIN TIME: CPT | Performed by: INTERNAL MEDICINE

## 2024-12-17 PROCEDURE — 120N000013 HC R&B IMCU

## 2024-12-17 PROCEDURE — 250N000009 HC RX 250: Performed by: HOSPITALIST

## 2024-12-17 PROCEDURE — 82248 BILIRUBIN DIRECT: CPT | Performed by: INTERNAL MEDICINE

## 2024-12-17 PROCEDURE — 82947 ASSAY GLUCOSE BLOOD QUANT: CPT | Performed by: INTERNAL MEDICINE

## 2024-12-17 PROCEDURE — 99233 SBSQ HOSP IP/OBS HIGH 50: CPT | Performed by: INTERNAL MEDICINE

## 2024-12-17 PROCEDURE — 84520 ASSAY OF UREA NITROGEN: CPT | Performed by: INTERNAL MEDICINE

## 2024-12-17 PROCEDURE — 97166 OT EVAL MOD COMPLEX 45 MIN: CPT | Mod: GO | Performed by: OCCUPATIONAL THERAPIST

## 2024-12-17 PROCEDURE — P9016 RBC LEUKOCYTES REDUCED: HCPCS | Performed by: INTERNAL MEDICINE

## 2024-12-17 PROCEDURE — 88342 IMHCHEM/IMCYTCHM 1ST ANTB: CPT | Mod: 26 | Performed by: PATHOLOGY

## 2024-12-17 PROCEDURE — 97530 THERAPEUTIC ACTIVITIES: CPT | Mod: GO | Performed by: OCCUPATIONAL THERAPIST

## 2024-12-17 PROCEDURE — 83735 ASSAY OF MAGNESIUM: CPT | Performed by: INTERNAL MEDICINE

## 2024-12-17 PROCEDURE — 84100 ASSAY OF PHOSPHORUS: CPT | Performed by: INTERNAL MEDICINE

## 2024-12-17 PROCEDURE — 84450 TRANSFERASE (AST) (SGOT): CPT | Performed by: INTERNAL MEDICINE

## 2024-12-17 PROCEDURE — 88305 TISSUE EXAM BY PATHOLOGIST: CPT | Mod: 26 | Performed by: PATHOLOGY

## 2024-12-17 PROCEDURE — 250N000013 HC RX MED GY IP 250 OP 250 PS 637: Performed by: INTERNAL MEDICINE

## 2024-12-17 PROCEDURE — 250N000009 HC RX 250: Performed by: INTERNAL MEDICINE

## 2024-12-17 PROCEDURE — B4185 PARENTERAL SOL 10 GM LIPIDS: HCPCS | Performed by: INTERNAL MEDICINE

## 2024-12-17 RX ORDER — CEFAZOLIN SODIUM 2 G/100ML
2 INJECTION, SOLUTION INTRAVENOUS SEE ADMIN INSTRUCTIONS
OUTPATIENT
Start: 2024-12-17

## 2024-12-17 RX ORDER — LEVOTHYROXINE SODIUM 75 UG/1
75 TABLET ORAL DAILY
Status: DISCONTINUED | OUTPATIENT
Start: 2024-12-17 | End: 2024-12-27 | Stop reason: HOSPADM

## 2024-12-17 RX ORDER — CEFAZOLIN SODIUM 2 G/100ML
2 INJECTION, SOLUTION INTRAVENOUS
OUTPATIENT
Start: 2024-12-17

## 2024-12-17 RX ORDER — CARBOXYMETHYLCELLULOSE SODIUM 5 MG/ML
2 SOLUTION/ DROPS OPHTHALMIC 2 TIMES DAILY
Status: DISCONTINUED | OUTPATIENT
Start: 2024-12-17 | End: 2024-12-27 | Stop reason: HOSPADM

## 2024-12-17 RX ORDER — TAMSULOSIN HYDROCHLORIDE 0.4 MG/1
0.4 CAPSULE ORAL DAILY
Status: DISCONTINUED | OUTPATIENT
Start: 2024-12-17 | End: 2024-12-27 | Stop reason: HOSPADM

## 2024-12-17 RX ADMIN — QUETIAPINE 12.5 MG: 25 TABLET, FILM COATED ORAL at 19:56

## 2024-12-17 RX ADMIN — TAMSULOSIN HYDROCHLORIDE 0.4 MG: 0.4 CAPSULE ORAL at 19:56

## 2024-12-17 RX ADMIN — OLIVE OIL AND SOYBEAN OIL 250 ML: 16; 4 INJECTION, EMULSION INTRAVENOUS at 19:54

## 2024-12-17 RX ADMIN — INSULIN ASPART 1 UNITS: 100 INJECTION, SOLUTION INTRAVENOUS; SUBCUTANEOUS at 12:12

## 2024-12-17 RX ADMIN — PANTOPRAZOLE SODIUM 40 MG: 40 INJECTION, POWDER, FOR SOLUTION INTRAVENOUS at 19:55

## 2024-12-17 RX ADMIN — PANTOPRAZOLE SODIUM 40 MG: 40 INJECTION, POWDER, FOR SOLUTION INTRAVENOUS at 08:03

## 2024-12-17 RX ADMIN — INSULIN ASPART 1 UNITS: 100 INJECTION, SOLUTION INTRAVENOUS; SUBCUTANEOUS at 04:17

## 2024-12-17 RX ADMIN — LEVOTHYROXINE SODIUM 75 MCG: 75 TABLET ORAL at 11:51

## 2024-12-17 RX ADMIN — ASCORBIC ACID, VITAMIN A PALMITATE, CHOLECALCIFEROL, THIAMINE HYDROCHLORIDE, RIBOFLAVIN-5 PHOSPHATE SODIUM, PYRIDOXINE HYDROCHLORIDE, NIACINAMIDE, DEXPANTHENOL, ALPHA-TOCOPHEROL ACETATE, VITAMIN K1, FOLIC ACID, BIOTIN, CYANOCOBALAMIN: 200; 3300; 200; 6; 3.6; 6; 40; 15; 10; 150; 600; 60; 5 INJECTION, SOLUTION INTRAVENOUS at 19:54

## 2024-12-17 RX ADMIN — CARBOXYMETHYLCELLULOSE SODIUM 2 DROP: 5 SOLUTION/ DROPS OPHTHALMIC at 11:51

## 2024-12-17 RX ADMIN — INSULIN ASPART 1 UNITS: 100 INJECTION, SOLUTION INTRAVENOUS; SUBCUTANEOUS at 08:03

## 2024-12-17 RX ADMIN — CARBOXYMETHYLCELLULOSE SODIUM 2 DROP: 5 SOLUTION/ DROPS OPHTHALMIC at 19:56

## 2024-12-17 ASSESSMENT — ACTIVITIES OF DAILY LIVING (ADL)
ADLS_ACUITY_SCORE: 52
ADLS_ACUITY_SCORE: 53
ADLS_ACUITY_SCORE: 53
ADLS_ACUITY_SCORE: 59
ADLS_ACUITY_SCORE: 52
ADLS_ACUITY_SCORE: 58
ADLS_ACUITY_SCORE: 56
ADLS_ACUITY_SCORE: 59
ADLS_ACUITY_SCORE: 53
ADLS_ACUITY_SCORE: 59
ADLS_ACUITY_SCORE: 53
ADLS_ACUITY_SCORE: 53
ADLS_ACUITY_SCORE: 59
ADLS_ACUITY_SCORE: 56
ADLS_ACUITY_SCORE: 51
ADLS_ACUITY_SCORE: 59
ADLS_ACUITY_SCORE: 52
ADLS_ACUITY_SCORE: 53
ADLS_ACUITY_SCORE: 59
ADLS_ACUITY_SCORE: 53
ADLS_ACUITY_SCORE: 59
ADLS_ACUITY_SCORE: 59
ADLS_ACUITY_SCORE: 56

## 2024-12-17 NOTE — PROGRESS NOTES
[facility]  Hospitalist Progress Note  Lobo Zimmerman MD  12/17/2024    Assessment & Plan   Clayton Pacheco is a markedly pleasant 89 year old gentleman with past medical history that is most notable for hypertension, hyperlipidemia, and hypothyroid, who presents with acute abdominal pain and melena, and is found to have acute blood loss anemia and due to acute upper GI bleeding and hemorrhage, due to bleeding ulcerated gastric mass.        ## Acute blood loss anemia and due   ## Acute upper GI bleeding and hemorrhage due to   ## Bleeding ulcerated gastric mass:   Of note, Mr. Pacheco lives independently, drives, takes care of his wife and history of HTN, HLP, hypothyroid.  He is not on any anticoagulation or NSAID use at home. He presented on 12/14/2024, to the [facility]'s ED, for evaluation of acute abdominal pain and melena.     In the ED, he has tachycardia, without hypotension, fever, or hypoxia. He has acute leukocytosis to 14.0. HGB has dropped to 8.6 from previous 11.4. BUN level is elevated in isolation from Cr. INR is slightly elevated to 1.21. Troponin levels are minimally elevated. UA is negative. CTA shows a small focus of active gastrointestinal bleeding within the distal stomach, and possible thickening of the distal stomach/proximal duodenum. He is admitted there, and [facility] consulted. IV Protonix started. IV NS ordered on admission.     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 This was biopsied. Hemostatic gel and spray are applied applied, but this ulcerated mass continued to bleed. Clotted blood in the gastric body is also noted. Patient was transferred for IR, surgical and GI involvement.        Prior to arrival, HGB drops to 6.8. Two units packed red cells transfusion have been started.         -- [facility] [place] with frequent monitoring of hemodynamics, serial Hgb.    -- on IV  Protonix.BID    -- BP has been stable.    12/16     -- patient had bloody stools this AM and hgb down to 6.6.    -- discussed with GI, IR and Surgery    -- CTA repeat did not show any active bleeding    -- currently no active bleeding for IR, surgery for now hesitant for possible large gastric resection and also not sure how far the mass is away from the esophagus. (Dr Jules)    -- Plan:    -- if patient has active bleeding, then to IR    -- allow clear liquid diet    -- transfuse to keep at or above 8    -- Pathology from 12/15 only shows gastric fundic mucosa with ulceration and reactive changes, negative for dysplasia and malignancy (sampling suboptimal)    -- underwent repeat EGD 12/16 by Dr Kim:     - Normal esophagus.               - Clotted blood in the cardia, in the gastric fundus and in the gastric body.               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 Clips were placed. Clip : [employer] Injected. Treated with argon plasma coagulation                             (APC). hemostatic spray applied.                - Normal examined duodenum.   12/17    -- pathology non-diagnositic,had a couple small melenic stools but he is warm perfused and hemodynamically stable.  Hgb is 8 and he was transfused to keep above 8 and is currently 9.3.  Change frequency of Hgb to q 12 hours, clear liquid diet, discontinue IMC, continue IV protonix.      -- discussed with surgery, they can resect this tumor (Gleason) for later this week    -- continue TPN, allow clear liquid diet, stop narcotics and deliriogenic medications and continue seroquel at bedtime, stop telemetry.                               -- Advance directives discussed and he says he would want a 24 hour trial of resuscitation, if only so that loved ones could gather. We discussed that he may have gastric cancer. It seems that despite his advanced age he lives fully independently at home and is fully alert and oriented. Further care goal  "discussions will likely be ongoing during his hospital stay    -- SW consulted for disposition planning.              Recent Labs   Lab Test 12/17/24  0600 12/16/24         2214 12/15/24      0649 12/15/24  0228 12/14/24 2007 04/04/24  1031   HGB 9.3 8 6.6 6.8* 8.6* 11.4*      Delirium  Was delirious initially upon presentation after transfer but has cleared mentally but does have episodes of confusion.  -- continue seroquel at bedtime  -- stop narcotics    Hypernatremia  Secondary to decreased po intake.  Patient did have some delirium but this was more associated with sleep deprivation.  Patient was given IVF and now TPN with resolution of his hypernatremia    Elevated Troponins: Noting that he was hospitalized 3/2024 for sepsis and UTI, as discussed below, and Troponins were elevated at that time. Cardiology was consulted. Lexiscan showed no evidence of inducible ischemia. Suspect elevated Troponins 12/14/24 to be due to demand ischemia due to acute illness.    -- discontinue telemetry     -- denies any chest pain or sob    -- not a candidate for coronary intervention due to active bleeding     Massive prostatomegaly. Seen incidentally on CT. Noting that he was hospitalized for cystitis due to E coli in 7/2023, and hospitalized for sepsis, UTI, and bacteremia due to E coli, resistant to Unasyn and Zosyn, treated with Cipro.    -- Monitor symptomatically     Hypertension: TTE in 7/2023 showed preserved LVEF with mild to moderate concentric LVH and mild aortic stenosis.    -- Hold home anti-hypertensives for bleeding     Hyperlipidemia:   -- hold Pravastatin      Hypothyroid:   -- continue Synthroid      Moderate malnutrition  -- PICC ine, continue TPN, clear liquid diet, only  Moderate Protein-Calorie Malnutrition  \"% Weight Loss: None noted  % Intake: Unable to fully assess  Subcutaneous Fat Loss: Orbital region moderate depletion, Upper arm region moderate depletion, and Thoracic region moderate " "depletion  Muscle Loss: Temporal region moderate depletion, Clavicle bone region moderate depletion, and Dorsal hand region moderate depletion  Fluid Retention: None noted    Malnutrition Diagnosis: Moderate malnutrition  In Context of: Chronic illness or disease\"     Drugs/Tx:  TPN    Disposition:   > 5 days    Interval History   -- had 2 small melenic stools  -- not tachycardic, BP stable  -- occasional confusion or disorientation    -Data reviewed today: I reviewed all new labs and imaging over the last 24 hours. I personally reviewed no images or EKG's today.    Physical Exam    , Blood pressure 105/48, pulse 85, temperature 98  F (36.7  C), temperature source Oral, resp. rate 16, weight 64.5 kg (142 lb 3.2 oz), SpO2 95%.  Vitals:    12/17/24 0600   Weight: 64.5 kg (142 lb 3.2 oz)     Vital Signs with Ranges  Temp:  [97.4  F (36.3  C)-98.2  F (36.8  C)] 98  F (36.7  C)  Pulse:  [68-97] 85  Resp:  [16-19] 16  BP: ()/(38-65) 105/48  SpO2:  [91 %-100 %] 95 %  I/O's Last 24 hours  I/O last 3 completed shifts:  In: 2271.16 [I.V.:782.5]  Out: 2890 [Urine:2890]    Constitutional:   no  distress, pallor improved  Respiratory: Clear to auscultation bilaterally, no crackles or wheezing  Cardiovascular: Regular rate and rhythm, normal S1 and S2   GI: Normal bowel sounds, soft, non-distended, non-tender  Skin/Integumen: No rashes, no cyanosis, no edema  Other:      Medications   All medications were reviewed.  Current Facility-Administered Medications   Medication Dose Route Frequency Provider Last Rate Last Admin    dextrose 10% infusion   Intravenous Continuous PRN Lobo Zimmerman MD        parenteral nutrition - Clinimix E   CENTRAL LINE IV TPN CONTINUOUS Lobo Zimmerman MD 40 mL/hr at 12/16/24 2016 New Bag at 12/16/24 2016     Current Facility-Administered Medications   Medication Dose Route Frequency Provider Last Rate Last Admin    insulin aspart (NovoLOG) injection (RAPID ACTING)  1-7 Units Subcutaneous " Q4H Lobo Zimmerman MD   1 Units at 12/17/24 0803    lipids plant base (CLINOLIPID) 20 % infusion 250 mL  250 mL Intravenous Q24H Lobo Zimmerman MD 20.8 mL/hr at 12/16/24 2025 250 mL at 12/16/24 2025    pantoprazole (PROTONIX) IV push injection 40 mg  40 mg Intravenous Q12H Diony Roy MD   40 mg at 12/17/24 0803    QUEtiapine (SEROquel) half-tab 12.5 mg  12.5 mg Oral At Bedtime Lobo Zimmerman MD   12.5 mg at 12/16/24 2036    sodium chloride (PF) 0.9% PF flush 3 mL  3 mL Intracatheter Q8H Diony Roy MD   3 mL at 12/17/24 0417        Data   Recent Labs   Lab 12/17/24  0736 12/17/24  0600 12/17/24  0416 12/16/24  2330 12/16/24  2214 12/16/24  1855 12/16/24  1520 12/16/24  0649 12/15/24  1009 12/15/24  0606 12/15/24  0228 12/14/24 2007   WBC  --   --   --   --   --   --   --  17.9*  --  23.6* 10.4 14.0*   HGB  --  9.3*  --   --  8.0*  --  8.3* 6.6*   < > 9.2*  9.2* 6.8* 8.6*   MCV  --   --   --   --   --   --   --  94  --  96 98 99   PLT  --   --   --   --   --   --   --  152  --  188 189 223   INR  --  1.17*  --   --   --   --   --   --   --   --  1.28* 1.21*   NA  --  148*  --   --   --   --   --  148*  --  138 140 135   POTASSIUM  --  3.6  --   --   --   --   --  4.1  --  3.8 4.1 4.2   CHLORIDE  --  117*  --   --   --   --   --  116*  --  106 108* 102   CO2  --  24  --   --   --   --   --  21*  --  20* 23 21*   BUN  --  59.5*  --   --   --   --   --  64.1*  --  61.9* 64.4* 67.0*   CR  --  0.96  --   --   --   --   --  0.96  --  0.80 0.97 0.96   ANIONGAP  --  7  --   --   --   --   --  11  --  12 9 12   MEGHANN  --  8.0*  --   --   --   --   --  8.1*  --  8.6* 8.0* 8.7*   * 147* 143*   < >  --    < >  --  152*  --  111* 127* 144*   ALBUMIN  --  2.9*  --   --   --   --   --   --   --  3.3*  --  3.6   PROTTOTAL  --  4.5*  --   --   --   --   --   --   --  4.8*  --  5.3*   BILITOTAL  --  0.3  --   --   --   --   --   --   --  0.5  --  0.4   ALKPHOS  --  54  --   --   --   --    --   --   --  76  --  85   ALT  --  16  --   --   --   --   --   --   --  11  --  12   AST  --  23  --   --   --   --   --   --   --  16  --  16    < > = values in this interval not displayed.       No results found for this or any previous visit (from the past 24 hours).      Lobo Zimmerman MD  Text Page  (7am to 6pm)

## 2024-12-17 NOTE — CONSULTS
[facility]  Gastroenterology Consultation         Clayton Pacheco  [address]   [place] [id number]  89 year old male    Admission Date/Time: 12/15/2024  Primary Care Provider: Oscar De Oliveira  Referring / Attending Physician:  Dr. Zimmerman    We were asked to see the patient in consultation by Dr. Zimmerman for evaluation of acute upper GI bleed      CC: Acute upper GI bleed    HPI:  Clayton Pacheco is a 89 year old male who who was admitted with history of acute onset of upper GI bleed who is not on anticoagulation in the ED patient was tachycardic patient's hemoglobin was 14 hemoglobin dropped to 8.6 CTA showed small amount of active bleeding in the distal stomach possible thickening of distal stomach proximal duodenum patient was admitted patient was evaluated with upper GI endoscopy patient was found to have a mass in the fundus of stomach.  Patient was treated with hemostatic gel patient continued to bleed due to that patient was transferred to [facility].  Patient's hemoglobin is down to 6.2.  Patient has clinical signs of active bleeding GI was asked to be further evaluated for endoscopic therapy and intervention and further demarcation of the anatomy of the lesion.  Patient is currently being evaluated in interventional radiology.  Patient is hemodynamically stable denying any new GI complaint patient is otherwise fairly comfortable.    ROS: A comprehensive ten point review of systems was negative aside from those in mentioned in the HPI.      PAST MED HX:  I have reviewed this patient's medical history and updated it with pertinent information if needed.   Past Medical History:   Diagnosis Date    Enlarged prostate     HTN (hypertension)     Hyperlipidemia     Hypothyroidism     Presbyopia     Vitamin D deficiency        MEDICATIONS:   Prior to Admission Medications   Prescriptions Last Dose Informant Patient Reported? Taking?   Vitamin D3 (VITAMIN  D, CHOLECALCIFEROL,) 25 mcg (1000 units) tablet 12/13/2024 Self Yes Yes   Sig: Take 25 mcg by mouth daily.   acetaminophen (TYLENOL) 500 MG tablet 12/14/2024 Morning Self Yes Yes   Sig: Take 1,000 mg by mouth 2 times daily.   amLODIPine (NORVASC) 10 MG tablet 12/14/2024 Morning Other, Self Yes Yes   Sig: Take 10 mg by mouth daily.   carboxymethylcellulose PF (REFRESH PLUS) 0.5 % ophthalmic solution 12/13/2024 Self Yes Yes   Sig: Place 2 drops into both eyes 2 times daily.   irbesartan-hydrochlorothiazide (AVALIDE) 300-12.5 MG tablet 12/14/2024 Morning Other, Self Yes Yes   Sig: Take 1 tablet by mouth daily.   levothyroxine (SYNTHROID/LEVOTHROID) 75 MCG tablet 12/14/2024 Morning Other, Self Yes Yes   Sig: Take 75 mcg by mouth daily.   melatonin 3 MG tablet 12/13/2024 Self Yes Yes   Sig: Take 3 mg by mouth at bedtime.   polyethylene glycol (MIRALAX) 17 GM/Dose powder Past Week Self Yes Yes   Sig: Take 1 Capful by mouth daily as needed for constipation.   pravastatin (PRAVACHOL) 20 MG tablet 12/13/2024 Evening Other, Self Yes Yes   Sig: Take 20 mg by mouth daily.   tamsulosin (FLOMAX) 0.4 MG capsule 12/13/2024 Evening Other, Self Yes Yes   Sig: Take 0.4 mg by mouth daily.      Facility-Administered Medications: None       ALLERGIES: No Known Allergies    SOCIAL HISTORY:  Social History     Tobacco Use    Smoking status: Never    Smokeless tobacco: Never   Vaping Use    Vaping status: Never Used   Substance Use Topics    Alcohol use: Not Currently    Drug use: Not Currently       FAMILY HISTORY:  Family History   Problem Relation Age of Onset    Coronary Artery Disease Son        PHYSICAL EXAM:   General Awake alert oriented comfortable  Vital Signs with Ranges  Temp: 97.8  F (36.6  C) Temp src: Oral BP: (!) 151/57 Pulse: 86   Resp: 18 SpO2: 97 % O2 Device: Nasal cannula Oxygen Delivery: 1 LPM  I/O last 3 completed shifts:  In: 1992.5 [I.V.:792.5]  Out: 2290 [Urine:2290]    Cardiovascular: negative, PMI normal. No  lifts, heaves, or thrills. RRR. No murmurs, clicks gallops or rub  Respiratory: negative, Percussion normal. Good diaphragmatic excursion. Lungs clear  Head: Normocephalic. No masses, lesions, tenderness or abnormalities  Neck: Neck supple. No adenopathy. Thyroid symmetric, normal size,, Carotids without bruits.  Abdomen: Abdomen soft, non-tender. BS normal. No masses, organomegaly  SKIN: no suspicious lesions or rashes          ADDITIONAL COMMENTS:   I reviewed the patient's new clinical lab test results.   Recent Labs   Lab Test 12/16/24  1520 12/16/24  0649 12/16/24  0037 12/15/24  1009 12/15/24  0606 12/15/24  0228 12/14/24 2007 03/20/24  0626 03/19/24  1858   WBC  --  17.9*  --   --  23.6* 10.4 14.0*   < > 13.7*   HGB 8.3* 6.6* 6.5*   < > 9.2*  9.2* 6.8* 8.6*   < > 12.4*   MCV  --  94  --   --  96 98 99   < > 96   PLT  --  152  --   --  188 189 223   < > 224   INR  --   --   --   --   --  1.28* 1.21*  --  1.08    < > = values in this interval not displayed.     Recent Labs   Lab Test 12/16/24  0649 12/15/24  0606 12/15/24  0228   POTASSIUM 4.1 3.8 4.1   CHLORIDE 116* 106 108*   CO2 21* 20* 23   BUN 64.1* 61.9* 64.4*   ANIONGAP 11 12 9     Recent Labs   Lab Test 12/15/24  0606 12/14/24  2118 12/14/24 2007 03/28/24  1613 03/21/24  0738 03/19/24  1902 03/19/24  1858 10/27/23  1441 07/06/23  1835   ALBUMIN 3.3*  --  3.6 3.9   < >  --  3.8   < >  --    BILITOTAL 0.5  --  0.4 0.3   < >  --  0.9   < >  --    ALT 11  --  12 24   < >  --  31   < >  --    AST 16  --  16 27   < >  --  35   < >  --    PROTEIN  --  Negative  --   --   --  100*  --   --  100*   LIPASE  --   --   --   --   --   --  14  --   --     < > = values in this interval not displayed.       I reviewed the patient's new imaging results.        CONSULTATION ASSESSMENT AND PLAN:    Active Problems:    UGIB (upper gastrointestinal bleed)    Assessment: Very pleasant 89-year-old gentleman with history of acute recurrent GI bleed finding suggestive of  submucosal mass in the fundus of the stomach clinical findings are highly suggestive of possible GI bleed from gastric tumor.  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  Agree with the plan to further evaluate with endoscopic therapy.  Risk-benefit plan discussed in detail.  I will recommend to continue on Protonix IV twice a day continue to monitor serial hemoglobin type and cross and transfuse as needed will proceed with repeat upper GI endoscopy today.  Finding and plan discussed with the hospitalist team and also with the general surgery team.  Thank you very much for letting us participate in his care.                  Victor Manuel Kim MD, FACP  [facility].  Office: [phone]  Cell : [phone]      [facility]  Ph: [phone] Fax: [phone]

## 2024-12-17 NOTE — PROGRESS NOTES
[facility]  Gastroenterology Progress Note     Clayton Pacheco MRN# [id number]   YOB: 1929 Age: 89 year old          Assessment and Plan:     Clayton Pacheco is a markedly pleasant 89 year old gentleman with past medical history that is most notable for hypertension, hyperlipidemia, and hypothyroid, who presents with acute abdominal pain and melena, and is found to have acute blood loss anemia and due to acute upper GI bleeding and hemorrhage, due to bleeding ulcerated gastric mass.     UGIB (upper gastrointestinal bleed)  GIST tumor  Called yesterday to attempt hemostasis to known gastric mass as [facility] did not see role in repeat EGD  Initial EGD noted large ulcerated mass in the fundus with adherent clot and large amount of blood clots in the stomach. Biopsies done and hemospray used then.    Repeat EGD on 12/16 with Dr. Kim noted clotted blood in cardia, fundus and gastric body. Gastric tumor in gastric fundus. Clips were placed. Injected and treated with APC and hemostatic spray applied  Hgb stabilized from 8 to 9.3  Still NPO on TPN    - recommend to continue to monitor hemoglobin  - IV pantorpazole 80 mg daily  - clear liquid diet and advance to full liquid diet tomorrow if hemoglobin remains stable  - Gi will continue to follow along         UGIB (upper gastrointestinal bleed)      Interval History:     no new complaints and doing well; no cp, sob, n/v/d, or abd pain.              Review of Systems:     C: NEGATIVE for fever, chills, change in weight  E/M: NEGATIVE for ear, mouth and throat problems  R: NEGATIVE for significant cough or SOB  CV: NEGATIVE for chest pain, palpitations or peripheral edema             Medications:   I have reviewed this patient's current medications  Current Facility-Administered Medications   Medication Dose Route Frequency Provider Last Rate Last Admin    insulin aspart (NovoLOG) injection (RAPID ACTING)  1-7 Units Subcutaneous  Q4H Lobo Zimmerman MD   1 Units at 12/17/24 0803    lipids plant base (CLINOLIPID) 20 % infusion 250 mL  250 mL Intravenous Q24H Lobo Zimmerman MD 20.8 mL/hr at 12/16/24 2025 250 mL at 12/16/24 2025    pantoprazole (PROTONIX) IV push injection 40 mg  40 mg Intravenous Q12H Diony Roy MD   40 mg at 12/17/24 0803    QUEtiapine (SEROquel) half-tab 12.5 mg  12.5 mg Oral At Bedtime Lobo Zimmerman MD   12.5 mg at 12/16/24 2036    sodium chloride (PF) 0.9% PF flush 3 mL  3 mL Intracatheter Q8H Diony Roy MD   3 mL at 12/17/24 0417                  Physical Exam:   Vitals were reviewed  Vital Signs with Ranges  Temp:  [97.4  F (36.3  C)-98.2  F (36.8  C)] 98  F (36.7  C)  Pulse:  [68-97] 78  Resp:  [16-19] 18  BP: ()/(38-90) 123/47  SpO2:  [91 %-100 %] 95 %  I/O last 3 completed shifts:  In: 2271.16 [I.V.:782.5]  Out: 2890 [Urine:2890]  Constitutional: healthy, alert, and no distress   Cardiovascular: negative, PMI normal. No lifts, heaves, or thrills. RRR. No murmurs, clicks gallops or rub  Respiratory: negative, Percussion normal. Good diaphragmatic excursion. Lungs clear  Abdomen: Abdomen soft, non-tender. BS normal. No masses, organomegaly           Data:   I reviewed the patient's new clinical lab test results.   Recent Labs   Lab Test 12/17/24  0600 12/16/24  2214 12/16/24  1520 12/16/24  0649 12/15/24  1009 12/15/24  0606 12/15/24  0228 12/14/24 2007   WBC  --   --   --  17.9*  --  23.6* 10.4 14.0*   HGB 9.3* 8.0* 8.3* 6.6*   < > 9.2*  9.2* 6.8* 8.6*   MCV  --   --   --  94  --  96 98 99   PLT  --   --   --  152  --  188 189 223   INR 1.17*  --   --   --   --   --  1.28* 1.21*    < > = values in this interval not displayed.     Recent Labs   Lab Test 12/17/24  0600 12/16/24  0649 12/15/24  0606   POTASSIUM 3.6 4.1 3.8   CHLORIDE 117* 116* 106   CO2 24 21* 20*   BUN 59.5* 64.1* 61.9*   ANIONGAP 7 11 12     Recent Labs   Lab Test 12/17/24  0600 12/15/24  0606  12/14/24  2118 12/14/24 2007 03/21/24  0738 03/19/24  1902 03/19/24  1858 10/27/23  1441 07/06/23  1835   ALBUMIN 2.9* 3.3*  --  3.6   < >  --  3.8   < >  --    BILITOTAL 0.3 0.5  --  0.4   < >  --  0.9   < >  --    ALT 16 11  --  12   < >  --  31   < >  --    AST 23 16  --  16   < >  --  35   < >  --    PROTEIN  --   --  Negative  --   --  100*  --   --  100*   LIPASE  --   --   --   --   --   --  14  --   --     < > = values in this interval not displayed.       I reviewed the patient's new imaging results.    All laboratory data reviewed  All imaging studies reviewed by me.    Bhavani Burgess PA-C,  12/17/2024  [facility]  Office : [phone]  Cell: [phone] (Dr. Kim)  Cell: [phone] (Bhavani Burgess PA-C)

## 2024-12-17 NOTE — PLAN OF CARE
"Patient Name: Zoran  MRN: [id number]  Date of Admission: 12/15/2024  Reason for Admission: UGIB  Level of Care: [facility]    Vitals:   BP Readings from Last 1 Encounters:   12/17/24 123/63     Pulse Readings from Last 1 Encounters:   12/17/24 83     Wt Readings from Last 1 Encounters:   12/14/24 65.8 kg (145 lb)     Ht Readings from Last 1 Encounters:   12/15/24 1.676 m (5' 6\")     Estimated body mass index is 23.4 kg/m  as calculated from the following:    Height as of an earlier encounter on 12/15/24: 1.676 m (5' 6\").    Weight as of 12/14/24: 65.8 kg (145 lb).  Temp Readings from Last 1 Encounters:   12/17/24 97.4  F (36.3  C) (Oral)       Pain: Pain goal 0 Pain Rating 0 Effective pain medication/regimen rest/reposition    CV Surgery Patient: No    Assessment    Resp: shallow, on 1L of O2 nc at HS; currently on RA  Telemetry: SR  Neuro: A&O x4, drowsy at times but easily arousable and conversant  GI/: incontinent, voided via external catheter, pericares done; had a small dark stool x1   Skin/Wounds: scattered bruising  Lines/Drains: L PIV-SL; 2L PICC on RUE with TPN at 40 ml/hr and Lipids at 20.8 ml/hr  Activity: not OOB; A2-turn and repo overnight  Sleep: long intervals  Abnormal Labs: Hgb Q6H, was 8 at 2200, with conditional order to transfuse; 1u PRBC given around midnight-tolerated; Hgb recheck is 9.3; K/Mg/Ph replacement protocol-recheck 12/17    Aggression Stop Light: Green          Patient Care Plan: monitor s/sx of bleeding, PT consult, activity as tolerated    "

## 2024-12-17 NOTE — PLAN OF CARE
[id number]    Orientations: A&OX 3, disoriented to time.   Vitals: VSS on RA. LS dim.   Pain: Denies pain  Tele: SR  GI/: Adequate uop via external catheter. Black BM X2.   Diet: Tolerating clear liquid diet. Denies nausea.   Ambulation/Assist: A1GBW. OT consult today.   Skin/Wounds: scattered bruising  LDA: RUE PICC infusing TPN at 40 ml/hr. L PIV X1 SL.   Labs:   BG Q4H 151, 145, 112  K, Mg, & Phos WDL with rechecks tomorrow a.m.  Hgb (Q8H) 9.3 & 8.6 with next check @ 22:00  Plan: Transfuse for Hgb < 8. Possible tumor resection later this week. Continue to monitor & follow POC.

## 2024-12-17 NOTE — PROGRESS NOTES
[facility]  General Surgery Progress Note        Ck Jules MD   12/17/2024        Interval History:      1) Note events from yesterday.  Dr. Kim treated the tumor with APC, clips and glue.  He also got a better idea of its location and took pictures.  It is close to the esophageal orifice but probably far enough away that a partial gastrectomy can be done.  Dr. Hairston is the [facility] surgeon this week.   2) Biopsies from [facility] shows just inflamed mucosa, so non-diagnostic for the tumor.             Assessment and Plan:      1) Discussed with Curt, the patient's son.  The tumor bleeding is likely stopped for now, but given its size it probably will rebleed again.  Endoscopic and interventional radiology methods would probably only be temporary.  Surgical removal is the only way to eliminate the risk from rebleed.  The patient is more confused since coming here which is expected given his age.  I discussed with Curt that will likely get worse with surgery but would also worsen with a prolonged hospitalization if he rebleeds.  The family plans to discuss things amongst themselves.  If they want to have that discussion in person we would find a way to be part of that.                    Physical Exam:      Blood pressure 123/47, pulse 78, temperature 98  F (36.7  C), temperature source Oral, resp. rate 18, weight 64.5 kg (142 lb 3.2 oz), SpO2 95%.  Vitals:    12/17/24 0600   Weight: 64.5 kg (142 lb 3.2 oz)     Vital Signs with Ranges  Temp:  [97.4  F (36.3  C)-98.2  F (36.8  C)] 98  F (36.7  C)  Pulse:  [68-97] 78  Resp:  [16-19] 18  BP: ()/(38-65) 123/47  SpO2:  [91 %-100 %] 95 %  I/O's Last 24 hours  I/O last 3 completed shifts:  In: 2271.16 [I.V.:782.5]  Out: 2890 [Urine:2890]    Non tender abdomen.              Medications:        Current Facility-Administered Medications   Medication Dose Route Frequency Provider Last Rate Last Admin    insulin aspart (NovoLOG) injection (RAPID ACTING)   1-7 Units Subcutaneous Q4H Lobo Zimmerman MD   1 Units at 12/17/24 0803    lipids plant base (CLINOLIPID) 20 % infusion 250 mL  250 mL Intravenous Q24H Lobo Zimmerman MD 20.8 mL/hr at 12/16/24 2025 250 mL at 12/16/24 2025    pantoprazole (PROTONIX) IV push injection 40 mg  40 mg Intravenous Q12H Diony Roy MD   40 mg at 12/17/24 0803    QUEtiapine (SEROquel) half-tab 12.5 mg  12.5 mg Oral At Bedtime Lobo Zimmeramn MD   12.5 mg at 12/16/24 2036    sodium chloride (PF) 0.9% PF flush 3 mL  3 mL Intracatheter Q8H Diony Roy MD   3 mL at 12/17/24 0417            Data:      All new lab and imaging data was reviewed.   Recent Labs   Lab Test 12/17/24  0600 12/16/24  2214 12/16/24  1520 12/16/24  0649 12/15/24  1009 12/15/24  0606 12/15/24  0228 12/14/24 2007   WBC  --   --   --  17.9*  --  23.6* 10.4 14.0*   HGB 9.3* 8.0* 8.3* 6.6*   < > 9.2*  9.2* 6.8* 8.6*   MCV  --   --   --  94  --  96 98 99   PLT  --   --   --  152  --  188 189 223   INR 1.17*  --   --   --   --   --  1.28* 1.21*    < > = values in this interval not displayed.

## 2024-12-18 ENCOUNTER — APPOINTMENT (OUTPATIENT)
Dept: OCCUPATIONAL THERAPY | Facility: CLINIC | Age: 89
End: 2024-12-18
Attending: HOSPITALIST
Payer: MEDICARE

## 2024-12-18 ENCOUNTER — APPOINTMENT (OUTPATIENT)
Dept: PHYSICAL THERAPY | Facility: CLINIC | Age: 89
End: 2024-12-18
Attending: INTERNAL MEDICINE
Payer: MEDICARE

## 2024-12-18 LAB
ABO + RH BLD: NORMAL
ANION GAP SERPL CALCULATED.3IONS-SCNC: 5 MMOL/L (ref 7–15)
BLD GP AB SCN SERPL QL: NEGATIVE
BLD PROD TYP BPU: NORMAL
BLOOD COMPONENT TYPE: NORMAL
BUN SERPL-MCNC: 32.7 MG/DL (ref 8–23)
CALCIUM SERPL-MCNC: 7.8 MG/DL (ref 8.8–10.4)
CHLORIDE SERPL-SCNC: 114 MMOL/L (ref 98–107)
CODING SYSTEM: NORMAL
CREAT SERPL-MCNC: 0.71 MG/DL (ref 0.67–1.17)
CROSSMATCH: NORMAL
EGFRCR SERPLBLD CKD-EPI 2021: 84 ML/MIN/1.73M2
ERYTHROCYTE [DISTWIDTH] IN BLOOD BY AUTOMATED COUNT: 15.9 % (ref 10–15)
GLUCOSE BLDC GLUCOMTR-MCNC: 149 MG/DL (ref 70–99)
GLUCOSE BLDC GLUCOMTR-MCNC: 154 MG/DL (ref 70–99)
GLUCOSE BLDC GLUCOMTR-MCNC: 162 MG/DL (ref 70–99)
GLUCOSE BLDC GLUCOMTR-MCNC: 171 MG/DL (ref 70–99)
GLUCOSE BLDC GLUCOMTR-MCNC: 232 MG/DL (ref 70–99)
GLUCOSE BLDC GLUCOMTR-MCNC: 335 MG/DL (ref 70–99)
GLUCOSE SERPL-MCNC: 156 MG/DL (ref 70–99)
HCO3 SERPL-SCNC: 25 MMOL/L (ref 22–29)
HCT VFR BLD AUTO: 21.8 % (ref 40–53)
HGB BLD-MCNC: 7.6 G/DL (ref 13.3–17.7)
HGB BLD-MCNC: 8.6 G/DL (ref 13.3–17.7)
ISSUE DATE AND TIME: NORMAL
MAGNESIUM SERPL-MCNC: 1.9 MG/DL (ref 1.7–2.3)
MCH RBC QN AUTO: 31.8 PG (ref 26.5–33)
MCHC RBC AUTO-ENTMCNC: 34.9 G/DL (ref 31.5–36.5)
MCV RBC AUTO: 91 FL (ref 78–100)
PHOSPHATE SERPL-MCNC: 2.3 MG/DL (ref 2.5–4.5)
PLATELET # BLD AUTO: 101 10E3/UL (ref 150–450)
POTASSIUM SERPL-SCNC: 3.4 MMOL/L (ref 3.4–5.3)
POTASSIUM SERPL-SCNC: 5.2 MMOL/L (ref 3.4–5.3)
RBC # BLD AUTO: 2.39 10E6/UL (ref 4.4–5.9)
SODIUM SERPL-SCNC: 144 MMOL/L (ref 135–145)
SPECIMEN EXP DATE BLD: NORMAL
UNIT ABO/RH: NORMAL
UNIT NUMBER: NORMAL
UNIT STATUS: NORMAL
UNIT TYPE ISBT: 6200
WBC # BLD AUTO: 11.8 10E3/UL (ref 4–11)

## 2024-12-18 PROCEDURE — 84520 ASSAY OF UREA NITROGEN: CPT | Performed by: INTERNAL MEDICINE

## 2024-12-18 PROCEDURE — 99233 SBSQ HOSP IP/OBS HIGH 50: CPT | Performed by: HOSPITALIST

## 2024-12-18 PROCEDURE — B4185 PARENTERAL SOL 10 GM LIPIDS: HCPCS | Performed by: INTERNAL MEDICINE

## 2024-12-18 PROCEDURE — 97535 SELF CARE MNGMENT TRAINING: CPT | Mod: GO

## 2024-12-18 PROCEDURE — 97530 THERAPEUTIC ACTIVITIES: CPT | Mod: GP

## 2024-12-18 PROCEDURE — 250N000013 HC RX MED GY IP 250 OP 250 PS 637: Performed by: INTERNAL MEDICINE

## 2024-12-18 PROCEDURE — 85014 HEMATOCRIT: CPT | Performed by: INTERNAL MEDICINE

## 2024-12-18 PROCEDURE — 250N000009 HC RX 250

## 2024-12-18 PROCEDURE — 84132 ASSAY OF SERUM POTASSIUM: CPT | Performed by: HOSPITALIST

## 2024-12-18 PROCEDURE — 84100 ASSAY OF PHOSPHORUS: CPT | Performed by: INTERNAL MEDICINE

## 2024-12-18 PROCEDURE — 85041 AUTOMATED RBC COUNT: CPT | Performed by: INTERNAL MEDICINE

## 2024-12-18 PROCEDURE — 250N000009 HC RX 250: Performed by: HOSPITALIST

## 2024-12-18 PROCEDURE — 83735 ASSAY OF MAGNESIUM: CPT | Performed by: INTERNAL MEDICINE

## 2024-12-18 PROCEDURE — 120N000013 HC R&B IMCU

## 2024-12-18 PROCEDURE — 250N000013 HC RX MED GY IP 250 OP 250 PS 637: Performed by: HOSPITALIST

## 2024-12-18 PROCEDURE — 80048 BASIC METABOLIC PNL TOTAL CA: CPT | Performed by: INTERNAL MEDICINE

## 2024-12-18 PROCEDURE — 250N000009 HC RX 250: Performed by: INTERNAL MEDICINE

## 2024-12-18 PROCEDURE — 97161 PT EVAL LOW COMPLEX 20 MIN: CPT | Mod: GP

## 2024-12-18 PROCEDURE — 97116 GAIT TRAINING THERAPY: CPT | Mod: GP

## 2024-12-18 PROCEDURE — 85018 HEMOGLOBIN: CPT | Performed by: HOSPITALIST

## 2024-12-18 PROCEDURE — P9016 RBC LEUKOCYTES REDUCED: HCPCS | Performed by: INTERNAL MEDICINE

## 2024-12-18 PROCEDURE — 86900 BLOOD TYPING SEROLOGIC ABO: CPT | Performed by: INTERNAL MEDICINE

## 2024-12-18 PROCEDURE — 86901 BLOOD TYPING SEROLOGIC RH(D): CPT | Performed by: INTERNAL MEDICINE

## 2024-12-18 PROCEDURE — 86923 COMPATIBILITY TEST ELECTRIC: CPT | Performed by: INTERNAL MEDICINE

## 2024-12-18 RX ORDER — POTASSIUM CHLORIDE 1500 MG/1
40 TABLET, EXTENDED RELEASE ORAL ONCE
Status: COMPLETED | OUTPATIENT
Start: 2024-12-18 | End: 2024-12-18

## 2024-12-18 RX ADMIN — Medication 1 PACKET: at 09:02

## 2024-12-18 RX ADMIN — Medication 1 PACKET: at 12:56

## 2024-12-18 RX ADMIN — INSULIN ASPART 4 UNITS: 100 INJECTION, SOLUTION INTRAVENOUS; SUBCUTANEOUS at 16:27

## 2024-12-18 RX ADMIN — Medication 1 PACKET: at 16:28

## 2024-12-18 RX ADMIN — INSULIN ASPART 2 UNITS: 100 INJECTION, SOLUTION INTRAVENOUS; SUBCUTANEOUS at 12:57

## 2024-12-18 RX ADMIN — CARBOXYMETHYLCELLULOSE SODIUM 2 DROP: 5 SOLUTION/ DROPS OPHTHALMIC at 09:02

## 2024-12-18 RX ADMIN — OLIVE OIL AND SOYBEAN OIL 250 ML: 16; 4 INJECTION, EMULSION INTRAVENOUS at 20:56

## 2024-12-18 RX ADMIN — POTASSIUM CHLORIDE 40 MEQ: 1500 TABLET, EXTENDED RELEASE ORAL at 09:02

## 2024-12-18 RX ADMIN — INSULIN ASPART 1 UNITS: 100 INJECTION, SOLUTION INTRAVENOUS; SUBCUTANEOUS at 10:57

## 2024-12-18 RX ADMIN — TAMSULOSIN HYDROCHLORIDE 0.4 MG: 0.4 CAPSULE ORAL at 20:57

## 2024-12-18 RX ADMIN — LEVOTHYROXINE SODIUM 75 MCG: 75 TABLET ORAL at 09:02

## 2024-12-18 RX ADMIN — PANTOPRAZOLE SODIUM 40 MG: 40 INJECTION, POWDER, FOR SOLUTION INTRAVENOUS at 20:56

## 2024-12-18 RX ADMIN — INSULIN ASPART 1 UNITS: 100 INJECTION, SOLUTION INTRAVENOUS; SUBCUTANEOUS at 21:23

## 2024-12-18 RX ADMIN — PANTOPRAZOLE SODIUM 40 MG: 40 INJECTION, POWDER, FOR SOLUTION INTRAVENOUS at 09:02

## 2024-12-18 RX ADMIN — INSULIN ASPART 1 UNITS: 100 INJECTION, SOLUTION INTRAVENOUS; SUBCUTANEOUS at 01:36

## 2024-12-18 RX ADMIN — CARBOXYMETHYLCELLULOSE SODIUM 2 DROP: 5 SOLUTION/ DROPS OPHTHALMIC at 20:57

## 2024-12-18 RX ADMIN — INSULIN ASPART 1 UNITS: 100 INJECTION, SOLUTION INTRAVENOUS; SUBCUTANEOUS at 05:03

## 2024-12-18 RX ADMIN — QUETIAPINE 12.5 MG: 25 TABLET, FILM COATED ORAL at 21:08

## 2024-12-18 ASSESSMENT — ACTIVITIES OF DAILY LIVING (ADL)
ADLS_ACUITY_SCORE: 56
ADLS_ACUITY_SCORE: 52
ADLS_ACUITY_SCORE: 56
ADLS_ACUITY_SCORE: 52
ADLS_ACUITY_SCORE: 56
ADLS_ACUITY_SCORE: 56
ADLS_ACUITY_SCORE: 52
ADLS_ACUITY_SCORE: 56
ADLS_ACUITY_SCORE: 52
DEPENDENT_IADLS:: COOKING
ADLS_ACUITY_SCORE: 52
ADLS_ACUITY_SCORE: 56
ADLS_ACUITY_SCORE: 52
ADLS_ACUITY_SCORE: 52
ADLS_ACUITY_SCORE: 56

## 2024-12-18 NOTE — PROGRESS NOTES
12/18/24 6154   Appointment Info   Signing Clinician's Name / Credentials (PT) Cayla Zaman, PT, DPT   Living Environment   People in Home spouse   Current Living Arrangements independent living facility   Home Accessibility no concerns   Transportation Anticipated family or friend will provide   Living Environment Comments coop apartment, pt drives, elevator access   Self-Care   Usual Activity Tolerance good   Current Activity Tolerance fair   Equipment Currently Used at Home cane, straight   Fall history within last six months no   Activity/Exercise/Self-Care Comment pt reports he sometimes uses cane for ambulation or walker. pt is ind with ADLs, IADLs, drives. Pt cares for his wife.   General Information   Onset of Illness/Injury or Date of Surgery 12/15/24   Referring Physician Lobo Zimmerman MD   Patient/Family Therapy Goals Statement (PT) Planning on going home when able.   Pertinent History of Current Problem (include personal factors and/or comorbidities that impact the POC) Pt is a 89 year old gentleman with past medical history that is most notable for hypertension, hyperlipidemia, and hypothyroid, who presents with acute abdominal pain and melena, and is found to have acute blood loss anemia and due to acute upper GI bleeding and hemorrhage, due to bleeding ulcerated gastric mass.   Existing Precautions/Restrictions cardiac;fall;oxygen therapy device and L/min  (2 LPM O2)   Cognition   Affect/Mental Status (Cognition) WFL   Orientation Status (Cognition) oriented x 3   Follows Commands (Cognition) WFL   Cognitive Status Comments Pt [place], hearing aids   Pain Assessment   Patient Currently in Pain No   Integumentary/Edema   Integumentary/Edema Comments no pitting edema present, age related skin changes.   Posture    Posture Forward head position;Protracted shoulders;Kyphosis   Range of Motion (ROM)   ROM Comment Grossly WFL BUE and LE with functional transfers.   Strength (Manual Muscle Testing)    Strength (Manual Muscle Testing) Deficits observed during functional mobility   Strength Comments grossly antigravity strength BUE and LE with functional transfers, global weakness.   Bed Mobility   Comment, (Bed Mobility) supine HOB approx 60 deg>sitting EOB, CGA   Transfers   Comment, (Transfers) sit>stand to FWW, Justyna   Gait/Stairs (Locomotion)   Distance in Feet (Gait) 5' eval + 150' treatment   Comment, (Gait/Stairs) amb with FWW, CGA, decreased gait speed, decreased step width, no LOB.   Balance   Balance Comments Able to maintain seated balance at EOB. Able to maintain standing balance in FWW, SBA.   Sensory Examination   Sensory Perception patient reports no sensory changes   Clinical Impression   Criteria for Skilled Therapeutic Intervention Yes, treatment indicated   PT Diagnosis (PT) Impaired functional mobility   Influenced by the following impairments decreased activity tolerance, decreased strength, impaired balance   Functional limitations due to impairments Impaired functional independence, fall risk   Clinical Presentation (PT Evaluation Complexity) evolving   Clinical Presentation Rationale per MR and clinical judgement   Clinical Decision Making (Complexity) low complexity   Planned Therapy Interventions (PT) balance training;bed mobility training;gait training;home exercise program;neuromuscular re-education;patient/family education;ROM (range of motion);stair training;strengthening;stretching;transfer training;progressive activity/exercise   Risk & Benefits of therapy have been explained evaluation/treatment results reviewed;care plan/treatment goals reviewed;risks/benefits reviewed;current/potential barriers reviewed;participants voiced agreement with care plan;participants included;patient   PT Total Evaluation Time   PT Eval, Low Complexity Minutes (30336) 8   Physical Therapy Goals   PT Frequency 5x/week   PT Predicted Duration/Target Date for Goal Attainment 12/25/24   PT Goals Bed  Mobility;Transfers;Gait;Aerobic Activity   PT: Bed Mobility Independent;Supine to/from sit   PT: Transfers Modified independent;Sit to/from stand;Bed to/from chair;Assistive device  (least restrictive device)   PT: Gait Modified independent;Assistive device;Rolling walker;Greater than 200 feet  (least restrictive device)   PT: Perform aerobic activity with stable cardiovascular response continuous activity;10 minutes;ambulation   Interventions   Interventions Quick Adds Gait Training;Therapeutic Activity;Therapeutic Procedure   Therapeutic Procedure/Exercise   Ther. Procedure: strength, endurance, ROM, flexibillity Minutes (54977) 2   Treatment Detail/Skilled Intervention Pt completed the following exercises for functional strength and ROM benefits. pt completed 10 reps bilaterally LAQ. 8 reps hip adduction pillow squeeze sets. Cues for slow and controlled movement. pt tolerated well.   Therapeutic Activity   Therapeutic Activities: dynamic activities to improve functional performance Minutes (41524) 14   Symptoms Noted During/After Treatment Fatigue   Treatment Detail/Skilled Intervention Pt agreed to therapy session. Pt sitting up in bed upon start of session. Pt on 2 LPM O2 throughout. Increased time for room set up, chair set up, line mangament, removal of PCDs, and external catheter. pt sat at EOB for increased time working on core strength to prepare for standing. Pt dependent for donning pull up. Cues for anterior weight shift of trunk, cues for hand placement for STS transfer. Pt completed additional stand>sit, CGa, cues for slow and controlled descend. pt able to reposition hips back into recliner chair using UE support on arm rests. Edu pt on sititng up in chair for meals. pt left in recliner chair, chair alarm on, all needs in reach, pt tolerated well. SPO2 99% at end of session, RN updated.   Gait Training   Gait Training Minutes (20426) 8   Symptoms Noted During/After Treatment (Gait Training) fatigue    Treatment Detail/Skilled Intervention pt amb in room and hallway. Cues for upright gaze and posture, cues for walker proximity throughout. Pt fatigued but tolerated well. Edu pt on amb 3x per day in hallway with nursing staff.   Distance in Feet 150'   [place] Level (Gait Training) contact guard   Physical Assistance Level (Gait Training) set-up required;supervision;verbal cues;nonverbal cues (demo/gestures);1 person + 1 person to manage equipment   PT Discharge Planning   PT Plan progress gait distance, dual task gait, repeated STS   PT Discharge Recommendation (DC Rec) home with assist;home with home care physical therapy;Leaving home requires significant assistance;Leaving home requires significant taxing effort;Transitional Care Facility   PT Rationale for DC Rec Pt tolerated session well. Pt below baseline functional independence. Pt limited by decreased activity tolerance, decreased strength, impaired balance, fall risk. Pt at baseline is ind with mobility, ind with ADLs, drives, IADLs, takes care of spouse. Pt currently requiring CGA for transfers, use of walker for ambulation. Anticipate with continued mobilization with nursing staff and IP PT, pt will be able to progress to discharge to home with assist as neede for heavier task such as laundry, cleaning, cooking, recommend HHPT. If assist is not able to be provided, pt may benefit from short TCU stay. Will continue to update.   PT Brief overview of current status bed mob, CGA. STS to FWW, CGA-Peng. amb with FWW, CGA. recommend pt amb in hallway with FWW 3x per day.  (Goals of therapy will be to address safe mobility and make recs for d/c to next level of care. Pt and RN will continue to follow all falls risk precautions as documented by RN staff while hospitalized.)   PT Equipment Needed at Discharge walker, rolling   Physical Therapy Time and Intention   Timed Code Treatment Minutes 24   Total Session Time (sum of timed and untimed services) 32

## 2024-12-18 NOTE — PROVIDER NOTIFICATION
"12/18/24 1530 to Dr. Silverman via [employer] \"3329 pt has had two dark maroon stools in the last 30 minutes, do you want me to page GI?\"      - Dr. Silverman \" No, follow serum hemoglobins, if drop in hemoglobin will reconsult IR\"  "

## 2024-12-18 NOTE — PROGRESS NOTES
[facility]  Gastroenterology Progress Note     Clayton Pacheco MRN# [id number]   YOB: 1929 Age: 89 year old          Assessment and Plan:     Clayton Pacheco is a markedly pleasant 89 year old gentleman with past medical history that is most notable for hypertension, hyperlipidemia, and hypothyroid, who presents with acute abdominal pain and melena, and is found to have acute blood loss anemia and due to acute upper GI bleeding and hemorrhage, due to bleeding ulcerated gastric mass.     UGIB (upper gastrointestinal bleed)  GIST tumor  Called yesterday to attempt hemostasis to known gastric mass as [facility] did not see role in repeat EGD  Initial EGD noted large ulcerated mass in the fundus with adherent clot and large amount of blood clots in the stomach. Biopsies done and hemospray used then.    Repeat EGD on 12/16 with Dr. Kim noted clotted blood in cardia, fundus and gastric body. Gastric tumor in gastric fundus. Clips were placed. Injected and treated with APC and hemostatic spray applied  Hgb trending down to 7.6 receiving another unit    - recommend to continue to monitor hemoglobin  - IV pantorpazole 80 mg daily  - clear liquid diet and advance to full liquid diet once hemoglobin stabilizes  - Gi will continue to follow along- concern for ongoing bleed vs equilibrating. If further bleeding would recommend IR consult for possible embolization         UGIB (upper gastrointestinal bleed)      Interval History:     no new complaints and doing well; no cp, sob, n/v/d, or abd pain.              Review of Systems:     C: NEGATIVE for fever, chills, change in weight  E/M: NEGATIVE for ear, mouth and throat problems  R: NEGATIVE for significant cough or SOB  CV: NEGATIVE for chest pain, palpitations or peripheral edema             Medications:   I have reviewed this patient's current medications  Current Facility-Administered Medications   Medication Dose Route  Frequency Provider Last Rate Last Admin    carboxymethylcellulose PF (REFRESH PLUS) 0.5 % ophthalmic solution 2 drop  2 drop Both Eyes BID Lobo Zimmerman MD   2 drop at 12/18/24 0902    insulin aspart (NovoLOG) injection (RAPID ACTING)  1-7 Units Subcutaneous Q4H Lobo Zimmerman MD   1 Units at 12/18/24 1057    levothyroxine (SYNTHROID/LEVOTHROID) tablet 75 mcg  75 mcg Oral Daily Lobo Zimmerman MD   75 mcg at 12/18/24 0902    lipids plant base (CLINOLIPID) 20 % infusion 250 mL  250 mL Intravenous Q24H Lobo Zimmerman MD 20.8 mL/hr at 12/17/24 1954 250 mL at 12/17/24 1954    pantoprazole (PROTONIX) IV push injection 40 mg  40 mg Intravenous Q12H Diony Roy MD   40 mg at 12/18/24 0902    potassium & sodium phosphates (NEUTRA-PHOS) Packet 1 packet  1 packet Oral or Feeding Tube Q4H Art Silverman MD   1 packet at 12/18/24 0902    QUEtiapine (SEROquel) half-tab 12.5 mg  12.5 mg Oral At Bedtime Lobo Zimmerman MD   12.5 mg at 12/17/24 1956    sodium chloride (PF) 0.9% PF flush 3 mL  3 mL Intracatheter Q8H Diony Roy MD   3 mL at 12/17/24 1954    tamsulosin (FLOMAX) capsule 0.4 mg  0.4 mg Oral Daily Lobo Zimmerman MD   0.4 mg at 12/17/24 1956                  Physical Exam:   Vitals were reviewed  Vital Signs with Ranges  Temp:  [98.1  F (36.7  C)-98.9  F (37.2  C)] 98.9  F (37.2  C)  Pulse:  [80-91] 85  Resp:  [16-20] 18  BP: (117-145)/(48-62) 131/59  SpO2:  [94 %-97 %] 97 %  I/O last 3 completed shifts:  In: 3051.67 [P.O.:950]  Out: 2100 [Urine:2100]  Constitutional: healthy, alert, and no distress   Cardiovascular: negative, PMI normal. No lifts, heaves, or thrills. RRR. No murmurs, clicks gallops or rub  Respiratory: negative, Percussion normal. Good diaphragmatic excursion. Lungs clear  Abdomen: Abdomen soft, non-tender. BS normal. No masses, organomegaly           Data:   I reviewed the patient's new clinical lab test results.   Recent Labs   Lab Test  12/18/24  0557 12/17/24  2221 12/17/24  1356 12/17/24  0600 12/16/24  1520 12/16/24  0649 12/15/24  1009 12/15/24  0606 12/15/24  0228 12/14/24 2007   WBC 11.8*  --   --   --   --  17.9*  --  23.6* 10.4 14.0*   HGB 7.6* 8.2* 8.6* 9.3*   < > 6.6*   < > 9.2*  9.2* 6.8* 8.6*   MCV 91  --   --   --   --  94  --  96 98 99   *  --   --   --   --  152  --  188 189 223   INR  --   --   --  1.17*  --   --   --   --  1.28* 1.21*    < > = values in this interval not displayed.     Recent Labs   Lab Test 12/18/24  0557 12/17/24  0600 12/16/24  0649   POTASSIUM 3.4 3.6 4.1   CHLORIDE 114* 117* 116*   CO2 25 24 21*   BUN 32.7* 59.5* 64.1*   ANIONGAP 5* 7 11     Recent Labs   Lab Test 12/17/24  0600 12/15/24  0606 12/14/24 2118 12/14/24 2007 03/21/24  0738 03/19/24  1902 03/19/24  1858 10/27/23  1441 07/06/23  1835   ALBUMIN 2.9* 3.3*  --  3.6   < >  --  3.8   < >  --    BILITOTAL 0.3 0.5  --  0.4   < >  --  0.9   < >  --    ALT 16 11  --  12   < >  --  31   < >  --    AST 23 16  --  16   < >  --  35   < >  --    PROTEIN  --   --  Negative  --   --  100*  --   --  100*   LIPASE  --   --   --   --   --   --  14  --   --     < > = values in this interval not displayed.       I reviewed the patient's new imaging results.    All laboratory data reviewed  All imaging studies reviewed by me.    Bhavani Burgess PA-C,  12/17/2024  [facility]  Office : [phone]  Cell: [phone] (Dr. Kim)  Cell: [phone] (Bhavani Burgess PA-C)

## 2024-12-18 NOTE — PROGRESS NOTES
General Surgery note    Had a conversation with the patient's son Shaheen, Shaheen s wife, and the patient's wife over the phone.  I explained the rationale for surgical intervention and the risk and benefits of both.  They are going to discuss things amongst themselves a bit but they are really leaning toward endoscopic treatment and nonsurgical treatments at the moment.  We will continue to follow along.

## 2024-12-18 NOTE — PLAN OF CARE
Goal Outcome Evaluation:      Plan of Care Reviewed With: patient    Overall Patient Progress: no changeOverall Patient Progress: no change     Date & Time: 12/17/24 6299-5597  Surgery/POD#: Here d/t acute abd pain and melena d/t upper GI bleed r/t ulcerated gastric mass. POD2 for EGD, clips placed.  Behavior & Aggression: green  Fall Risk: yes  Orientation: AOX3, disoriented to time.  ABNL VS/O2:VSS on RA  ABNL Labs: Serial hgb, Awaiting AM labs.  Pain Management: denies pain  Bowel/Bladder: Inc of B/B, has external catheter on w/ adequate UOP. Black small BM x1.   IV/Drains: R PICC inf TPN @ 60mL/hr and lipids @ 20.8mL/hr. L PIV SL.  Wounds/incisions: intact, scattered bruising.  Diet: Clear Liquid diet  Activity Level: A1gbw  Tests/Procedures: See provider note regarding surgical or endoscopic interventions discussed with family.  Anticipated  DC Date: Pending  Significant Information: No acute concerns on overnight. Hdlr0er.  K+, mag, phos protocol, recheck this AM.

## 2024-12-18 NOTE — CONSULTS
Care Management Initial Consult    General Information  Assessment completed with: Patient,    Type of CM/SW Visit: Initial Assessment    Primary Care Provider verified and updated as needed: Yes   Readmission within the last 30 days: no previous admission in last 30 days      Reason for Consult: other (see comments)  Advance Care Planning: Advance Care Planning Reviewed: concerns discussed  Patient reports he has a HCD  General Information Comments: High readmission risk    Communication Assessment  Patient's communication style: spoken language (English or Bilingual)    Hearing Difficulty or Deaf: yes   Wear Glasses or Blind: no    Cognitive  Cognitive/Neuro/Behavioral: .WDL except  Level of Consciousness: alert  Arousal Level: opens eyes spontaneously  Orientation: disoriented to, time  Mood/Behavior: calm, cooperative  Best Language: 0 - No aphasia  Speech: clear, spontaneous, logical    Living Environment:   People in home: spouse  Krystyna  Current living Arrangements: apartment      Able to return to prior arrangements: other (see comments)  Living Arrangement Comments: Co-op apt    Family/Social Support:  Care provided by: self  Provides care for: spouse  Marital Status:   Support system: Wife, Children          Description of Support System: Supportive    Support Assessment: Adequate family and caregiver support    Current Resources:   Patient receiving home care services: No        Community Resources: Other (see comment) (Has a cleaning lady every two weeks)  Equipment currently used at home: cane, straight  Supplies currently used at home: None    Employment/Financial:  Employment Status: , previous service, retired     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  Yes     Which insurance plan 3 day waiver is available? ACO  REACH    Will the waiver be used for post-acute placement? Undetermined at this time    Lifestyle & Psychosocial Needs:  Social Drivers of Health     Food Insecurity: Low Risk  (12/15/2024)    Food Insecurity     Within the past 12 months, did you worry that your food would run out before you got money to buy more?: No     Within the past 12 months, did the food you bought just not last and you didn t have money to get more?: No   Depression: Not on file   Housing Stability: Low Risk  (12/15/2024)    Housing Stability     Do you have housing? : Yes     Are you worried about losing your housing?: No   Tobacco Use: Low Risk  (12/16/2024)    Patient History     Smoking Tobacco Use: Never     Smokeless Tobacco Use: Never     Passive Exposure: Not on file   Financial Resource Strain: Low Risk  (12/15/2024)    Financial Resource Strain     Within the past 12 months, have you or your family members you live with been unable to get utilities (heat, electricity) when it was really needed?: No   Alcohol Use: Not on file   Transportation Needs: Low Risk  (12/15/2024)    Transportation Needs     Within the past 12 months, has lack of transportation kept you from medical appointments, getting your medicines, non-medical meetings or appointments, work, or from getting things that you need?: No   Physical Activity: Not on file   Interpersonal Safety: Low Risk  (12/15/2024)    Interpersonal Safety     Do you feel physically and emotionally safe where you currently live?: Yes     Within the past 12 months, have you been hit, slapped, kicked or otherwise physically hurt by someone?: No     Within the past 12 months, have you been humiliated or emotionally abused in other ways by your partner or ex-partner?: No   Stress: Not on file   Social Connections: Not on file   Health Literacy: Not on file       Functional Status:  Prior to admission patient needed assistance:   Dependent ADLs:: Ambulation-cane  Dependent IADLs:: Cooking        Mental Health Status:  Mental Health Status: No Current Concerns       Chemical Dependency Status:  Chemical Dependency Status: No Current Concerns             Values/Beliefs:  Spiritual, Cultural Beliefs, Faith Practices, Values that affect care: other (see comments)          Values/Beliefs Comment: Holiness    Discussed  Partnership in Safe Discharge Planning  document with patient/family: No    Additional Information:  Care Coordinator met with patient, introduced self and role.  Patient resides with his spouse in a cooperative apartment in [place].  Patient is independent at baseline with all ADLs/IADLs, except cooking.  Patient noted his spouse did not want to give the cooking up.  Patient uses a cane in the apartment building due to the length of the hallways and shares that he usually morris not use a cane while shopping due to risk of losing his cane.  Patient does drive.    Patient is the primary caregiver for his spouse that is able to ambulate independently, has good cognition and is on home O2 24/7.      Patient reports he is 100% disabled from serving in the [employer] in the 1950's.  [facility] and sees [facility].    Discussed having home care.  Patient is open to having HC if this is recommended.  He did not want the home care agency that he had in the past after a discharge from [facility].  Unable to find this agency in his records.  Patient noted one of his sons will be providing transportation at discharge.    Next Steps:   Care Management following  Await decision on surgical intervention vs endoscopic    Alisson Roberts, RN  Inpatient Care Management  [phone]

## 2024-12-18 NOTE — PROGRESS NOTES
[facility]  Hospitalist Progress Note  Art Silverman MD  12/18/2024    Assessment & Plan   Clayton Pacheco is a markedly pleasant 89 year old gentleman with past medical history that is most notable for hypertension, hyperlipidemia, and hypothyroid, who presents with acute abdominal pain and melena, and is found to have acute blood loss anemia and due to acute upper GI bleeding and hemorrhage, due to bleeding ulcerated gastric mass.        ## Acute blood loss anemia   ## Acute upper GI bleeding and hemorrhage due to   ## Bleeding ulcerated gastric mass:   Of note, Mr. Pacheco lives independently, drives, takes care of his wife and history of HTN, HLP, hypothyroid.  He is not on any anticoagulation or NSAID use at home. He presented on 12/14/2024, to the [facility] ED, for evaluation of acute abdominal pain and melena.     In the ED, he has tachycardia, without hypotension, fever, or hypoxia. He has acute leukocytosis to 14.0. HGB has dropped to 8.6 from previous 11.4. BUN level is elevated in isolation from Cr. INR is slightly elevated to 1.21. Troponin levels are minimally elevated. UA is negative. CTA shows a small focus of active gastrointestinal bleeding within the distal stomach, and possible thickening of the distal stomach/proximal duodenum. He is admitted there, and [facility] consulted. IV Protonix started. IV NS ordered on admission.     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 This was biopsied. Hemostatic gel and spray are applied applied, but this ulcerated mass continued to bleed. Clotted blood in the gastric body is also noted. Patient was transferred for IR, surgical and GI involvement.        Prior to arrival, HGB drops to 6.8. Two units packed red cells transfusion have been started.         -- [facility] admission with frequent monitoring of hemodynamics, serial Hgb.    -- on IV  Protonix.BID    -- BP has been stable.    12/16     -- patient had bloody stools this AM and hgb down to 6.6.    -- discussed with GI, IR and Surgery    -- CTA repeat did not show any active bleeding    -- currently no active bleeding for IR, surgery for now hesitant for possible large gastric resection and also not sure how far the mass is away from the esophagus. (Dr Jules)    -- if patient has active bleeding, then to IR    -- allow clear liquid diet; on TPN    -- transfuse to keep at or above 8    -- Pathology from 12/15 only shows gastric fundic mucosa with ulceration and reactive changes, negative for dysplasia and malignancy (sampling suboptimal)    -- underwent repeat EGD 12/16 by Dr Kim:     - Normal esophagus.               - Clotted blood in the cardia, in the gastric fundus and in the gastric body.               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 Clips were placed. Clip : Huiyuan Injected. Treated with argon plasma coagulation                             (APC). hemostatic spray applied.                - Normal examined duodenum.   12/17    -- pathology non-diagnositic,had a couple small melenic stools but he is warm perfused and hemodynamically stable.  Hgb is 8 and he was transfused to keep above 8 and is currently 9.3.  Change frequency of Hgb to q 12 hours, clear liquid diet, discontinue IMC, continue IV protonix.      -- discussed with surgery, they can resect this tumor ([place]) for later this week    -- continue TPN, allow clear liquid diet, stop narcotics and deliriogenic medications and continue seroquel at bedtime, stop telemetry.                      -- Advance directives discussed and he says he would want a 24 hour trial of resuscitation, if only so that loved ones could gather. We discussed that he may have gastric cancer. It seems that despite his advanced age he lives fully independently at home and is fully alert and oriented. Further care goal discussions  will likely be ongoing during his hospital stay    -- SW consulted for disposition planning.  12/18/2024.  Assumed care.  Hemoglobin dropped to 7.6.  Received 1 unit PRBC per conditional transfusion.  Will obtain hemoglobin 2 hours after transfusion completed then in a.m. every 12 hours to determine rate of bleeding/stability.  Long discussion with patient and son Curt by phone, decision is conservative, ie NO surgery;.  Discussed with general surgery, Dr. Hairston who is in agreement.  Reviewed GI note, if further active bleeding, IR for embolization.  Patient and son acknowledge no firm tissue diagnosis to rule out CA however they are okay with this and are making plans for patient and wife regarding long-term care and comfort.      Delirium, resolved  Was delirious initially upon presentation after transfer but has cleared mentally but does have episodes of confusion.  -- continue seroquel at bedtime  -- stop narcotics  -monitor, rather spry 89-year-old, is aware of condition and elects nonaggressive management.    Hypernatremia, resolved  Secondary to decreased po intake.  Patient did have some delirium but this was more associated with sleep deprivation.  Patient was given IVF and now TPN with resolution of his hypernatremia  -Monitor BMP, today sodium 144.    Elevated Troponins: Noting that he was hospitalized 3/2024 for sepsis and UTI, as discussed below, and Troponins were elevated at that time. Cardiology was consulted. Lexiscan showed no evidence of inducible ischemia. Suspect elevated Troponins 12/14/24 to be due to demand ischemia due to acute illness.    -- discontinue telemetry     -- denies any chest pain or sob    -- not a candidate for coronary intervention due to active bleeding, patient elects conservative management.     Massive prostatomegaly. Seen incidentally on CT. Noting that he was hospitalized for cystitis due to E coli in 7/2023, and hospitalized for sepsis, UTI, and bacteremia due to E  "coli, resistant to Unasyn and Zosyn, treated with Cipro.    -- Monitor symptomatically     Hypertension: TTE in 7/2023 showed preserved LVEF with mild to moderate concentric LVH and mild aortic stenosis.    -- Hold home anti-hypertensives for bleeding     Hyperlipidemia:   -- hold Pravastatin      Hypothyroid:   -- continue Synthroid      Moderate malnutrition  -- PICC ine, continue TPN, clear liquid diet, only  Moderate Protein-Calorie Malnutrition  \"% Weight Loss: None noted  % Intake: Unable to fully assess  Subcutaneous Fat Loss: Orbital region moderate depletion, Upper arm region moderate depletion, and Thoracic region moderate depletion  Muscle Loss: Temporal region moderate depletion, Clavicle bone region moderate depletion, and Dorsal hand region moderate depletion  Fluid Retention: None noted    Malnutrition Diagnosis: Moderate malnutrition  In Context of: Chronic illness or disease\"     Drugs/Tx:  TPN    Disposition:   Medically Ready for Discharge: Anticipated in 2-4 Days     Total time 50 minutes for today 12/18/2024 :   time consisted of the following, assuming Patient care with review of records including labs, imaging results, medications, interdisciplinary notes; examination of Patient;  and completing documentation and orders.  Care Management included counseling/discussion with Patient and Son Curt by phone regarding current condition including GIB from gastric mass, transfusion, TPN.  And Coordination of Care time with Nursing  and Specialists, GI and General Surgery, Dr. Hairston regarding care plan, management and surveillance.     Interval History   Assumed care.  Denies abdominal pain nausea, emesis.  Tolerating clear liquids, on TPN.  Continue melanotic stools.  Transfuse 1 unit PRBC, posttransfusion hemoglobin pending.  Phone discussion with son Curt.  Discussed with Dr. Hairston, General Surgery, as above.        Physical Exam    , Blood pressure 136/58, pulse 85, temperature 98.9  F (37.2  C), " temperature source Axillary, resp. rate 18, weight 66 kg (145 lb 8.1 oz), SpO2 90%.  Vitals:    12/17/24 0600 12/18/24 0600   Weight: 64.5 kg (142 lb 3.2 oz) 66 kg (145 lb 8.1 oz)       General/Constitutional:   NAD, alert, calm, cooperative    Chest/Respiratory: Respirations nonlabored room air  Cardiovascular:  regular, no murmur appreciated.  LE edema none  Gastrointestinal/Abdomen:  soft, nontender, no rebound, guarding or other peritoneal signs.  Neuro.  Gross motor tested, nonfocal,  Psych oriented, affect calm       Medications   All medications were reviewed.  Current Facility-Administered Medications   Medication Dose Route Frequency Provider Last Rate Last Admin    dextrose 10% infusion   Intravenous Continuous PRN Lobo Zimmerman MD        parenteral nutrition - Clinimix E   CENTRAL LINE IV TPN CONTINUOUS [place] [facility]        parenteral nutrition - Clinimix E   CENTRAL LINE IV TPN CONTINUOUS Lobo Zimmerman MD 60 mL/hr at 12/17/24 1954 New Bag at 12/17/24 1954     Current Facility-Administered Medications   Medication Dose Route Frequency Provider Last Rate Last Admin    carboxymethylcellulose PF (REFRESH PLUS) 0.5 % ophthalmic solution 2 drop  2 drop Both Eyes BID Lobo Zimmerman MD   2 drop at 12/18/24 0902    insulin aspart (NovoLOG) injection (RAPID ACTING)  1-7 Units Subcutaneous Q4H Lobo Zimmerman MD   2 Units at 12/18/24 1257    levothyroxine (SYNTHROID/LEVOTHROID) tablet 75 mcg  75 mcg Oral Daily Lobo Zimmerman MD   75 mcg at 12/18/24 0902    lipids plant base (CLINOLIPID) 20 % infusion 250 mL  250 mL Intravenous Q24H Lobo Zimmerman MD 20.8 mL/hr at 12/17/24 1954 250 mL at 12/17/24 1954    pantoprazole (PROTONIX) IV push injection 40 mg  40 mg Intravenous Q12H Diony Roy MD   40 mg at 12/18/24 0902    potassium & sodium phosphates (NEUTRA-PHOS) Packet 1 packet  1 packet Oral or Feeding Tube Q4H Art Silverman MD   1 packet at 12/18/24 1256     QUEtiapine (SEROquel) half-tab 12.5 mg  12.5 mg Oral At Bedtime Lobo Zimmerman MD   12.5 mg at 12/17/24 1956    sodium chloride (PF) 0.9% PF flush 3 mL  3 mL Intracatheter Q8H Diony Roy MD   3 mL at 12/17/24 1954    tamsulosin (FLOMAX) capsule 0.4 mg  0.4 mg Oral Daily Lobo Zimmerman MD   0.4 mg at 12/17/24 1956        Data   Recent Labs   Lab 12/18/24  1149 12/18/24  0939 12/18/24  0557 12/18/24  0058 12/17/24  2221 12/17/24  1609 12/17/24  1356 12/17/24  0736 12/17/24  0600 12/16/24  1520 12/16/24  0649 12/15/24  1009 12/15/24  0606 12/15/24  0228 12/14/24 2007   WBC  --   --  11.8*  --   --   --   --   --   --   --  17.9*  --  23.6* 10.4 14.0*   HGB  --   --  7.6*  --  8.2*  --  8.6*  --  9.3*   < > 6.6*   < > 9.2*  9.2* 6.8* 8.6*   MCV  --   --  91  --   --   --   --   --   --   --  94  --  96 98 99   PLT  --   --  101*  --   --   --   --   --   --   --  152  --  188 189 223   INR  --   --   --   --   --   --   --   --  1.17*  --   --   --   --  1.28* 1.21*   NA  --   --  144  --   --   --   --   --  148*  --  148*  --  138 140 135   POTASSIUM  --   --  3.4  --   --   --   --   --  3.6  --  4.1  --  3.8 4.1 4.2   CHLORIDE  --   --  114*  --   --   --   --   --  117*  --  116*  --  106 108* 102   CO2  --   --  25  --   --   --   --   --  24  --  21*  --  20* 23 21*   BUN  --   --  32.7*  --   --   --   --   --  59.5*  --  64.1*  --  61.9* 64.4* 67.0*   CR  --   --  0.71  --   --   --   --   --  0.96  --  0.96  --  0.80 0.97 0.96   ANIONGAP  --   --  5*  --   --   --   --   --  7  --  11  --  12 9 12   MEGHANN  --   --  7.8*  --   --   --   --   --  8.0*  --  8.1*  --  8.6* 8.0* 8.7*   * 171* 156*   < >  --    < >  --    < > 147*   < > 152*  --  111* 127* 144*   ALBUMIN  --   --   --   --   --   --   --   --  2.9*  --   --   --  3.3*  --  3.6   PROTTOTAL  --   --   --   --   --   --   --   --  4.5*  --   --   --  4.8*  --  5.3*   BILITOTAL  --   --   --   --   --   --   --   --  0.3  --    --   --  0.5  --  0.4   ALKPHOS  --   --   --   --   --   --   --   --  54  --   --   --  76  --  85   ALT  --   --   --   --   --   --   --   --  16  --   --   --  11  --  12   AST  --   --   --   --   --   --   --   --  23  --   --   --  16  --  16    < > = values in this interval not displayed.       No results found for this or any previous visit (from the past 24 hours).      Lobo Zimmerman MD  Text Page  (7am to 6pm)

## 2024-12-18 NOTE — PLAN OF CARE
[id number]    Assessment     Resp: sating mid 90s on RA  Neuro: A&O x4, drowsy at times but easily arousable and conversant  GI/: incontinent, voided via external catheter, pericares done; no melena  Skin/Wounds: scattered bruising  Lines/Drains: L PIV-SL; 2L PICC on RUE with TPN at 60 ml/hr and Lipids at 20.8 ml/hr  Activity: A1-GB/RW  Sleep: long intervals  Abnormal Labs: Hgb Q8H, was 8.2 at 2200   Aggression Stop Light: Green          Patient Care Plan: monitor Hgb and s/sx of bleeding, activity as tolerated

## 2024-12-18 NOTE — PLAN OF CARE
Goal Outcome Evaluation:                 Outcome Evaluation: Patient is planning to discharge home with HC if recommended.

## 2024-12-19 ENCOUNTER — APPOINTMENT (OUTPATIENT)
Dept: GENERAL RADIOLOGY | Facility: CLINIC | Age: 89
End: 2024-12-19
Payer: MEDICARE

## 2024-12-19 ENCOUNTER — APPOINTMENT (OUTPATIENT)
Dept: ULTRASOUND IMAGING | Facility: CLINIC | Age: 89
End: 2024-12-19
Payer: MEDICARE

## 2024-12-19 VITALS
BODY MASS INDEX: 23.48 KG/M2 | TEMPERATURE: 98.5 F | WEIGHT: 145.5 LBS | SYSTOLIC BLOOD PRESSURE: 136 MMHG | OXYGEN SATURATION: 96 % | RESPIRATION RATE: 22 BRPM | HEART RATE: 94 BPM | DIASTOLIC BLOOD PRESSURE: 54 MMHG

## 2024-12-19 LAB
ACINETOBACTER SPECIES: NOT DETECTED
ALBUMIN SERPL BCG-MCNC: 3.1 G/DL (ref 3.5–5.2)
ALBUMIN UR-MCNC: 20 MG/DL
ALP SERPL-CCNC: 98 U/L (ref 40–150)
ALT SERPL W P-5'-P-CCNC: 71 U/L (ref 0–70)
ANION GAP SERPL CALCULATED.3IONS-SCNC: 11 MMOL/L (ref 7–15)
ANION GAP SERPL CALCULATED.3IONS-SCNC: 13 MMOL/L (ref 7–15)
APPEARANCE UR: CLEAR
AST SERPL W P-5'-P-CCNC: 61 U/L (ref 0–45)
BACTERIA BLD CULT: ABNORMAL
BASE EXCESS BLDV CALC-SCNC: 0.5 MMOL/L (ref -3–3)
BILIRUB SERPL-MCNC: 0.5 MG/DL
BILIRUB UR QL STRIP: NEGATIVE
BKR LAB AP TR RXN INTERPRETATION: NORMAL
BKR LAB AP TRAN RXN RECOMMENDATION: NORMAL
BKR LAB AP TRANS SIGNS AND SX: NORMAL
BKR LAB AP TRANSFUSION REACTION: NORMAL
BUN SERPL-MCNC: 21.8 MG/DL (ref 8–23)
BUN SERPL-MCNC: 24.1 MG/DL (ref 8–23)
C PNEUM DNA SPEC QL NAA+PROBE: NOT DETECTED
CA-I BLD-MCNC: 4.6 MG/DL (ref 4.4–5.2)
CALCIUM SERPL-MCNC: 7.4 MG/DL (ref 8.8–10.4)
CALCIUM SERPL-MCNC: 8.1 MG/DL (ref 8.8–10.4)
CHLORIDE SERPL-SCNC: 105 MMOL/L (ref 98–107)
CHLORIDE SERPL-SCNC: 108 MMOL/L (ref 98–107)
CITROBACTER SPECIES: NOT DETECTED
CO COMPONENTS: NORMAL
COLOR UR AUTO: YELLOW
CREAT SERPL-MCNC: 0.68 MG/DL (ref 0.67–1.17)
CREAT SERPL-MCNC: 0.84 MG/DL (ref 0.67–1.17)
CTX-M: NOT DETECTED
EGFRCR SERPLBLD CKD-EPI 2021: 80 ML/MIN/1.73M2
EGFRCR SERPLBLD CKD-EPI 2021: 86 ML/MIN/1.73M2
ENTEROBACTER SPECIES: NOT DETECTED
ERYTHROCYTE [DISTWIDTH] IN BLOOD BY AUTOMATED COUNT: 15.8 % (ref 10–15)
ESCHERICHIA COLI: DETECTED
FLUAV H1 2009 PAND RNA SPEC QL NAA+PROBE: NOT DETECTED
FLUAV H1 RNA SPEC QL NAA+PROBE: NOT DETECTED
FLUAV H3 RNA SPEC QL NAA+PROBE: NOT DETECTED
FLUAV RNA SPEC QL NAA+PROBE: NOT DETECTED
FLUBV RNA SPEC QL NAA+PROBE: NOT DETECTED
GLUCOSE BLDC GLUCOMTR-MCNC: 107 MG/DL (ref 70–99)
GLUCOSE BLDC GLUCOMTR-MCNC: 128 MG/DL (ref 70–99)
GLUCOSE BLDC GLUCOMTR-MCNC: 132 MG/DL (ref 70–99)
GLUCOSE BLDC GLUCOMTR-MCNC: 162 MG/DL (ref 70–99)
GLUCOSE BLDC GLUCOMTR-MCNC: 165 MG/DL (ref 70–99)
GLUCOSE BLDC GLUCOMTR-MCNC: 169 MG/DL (ref 70–99)
GLUCOSE BLDC GLUCOMTR-MCNC: 214 MG/DL (ref 70–99)
GLUCOSE SERPL-MCNC: 116 MG/DL (ref 70–99)
GLUCOSE SERPL-MCNC: 144 MG/DL (ref 70–99)
GLUCOSE UR STRIP-MCNC: NEGATIVE MG/DL
GRAM/CULTURE INDICATED?: NO
HADV DNA SPEC QL NAA+PROBE: NOT DETECTED
HCO3 BLDV-SCNC: 25 MMOL/L (ref 21–28)
HCO3 SERPL-SCNC: 21 MMOL/L (ref 22–29)
HCO3 SERPL-SCNC: 21 MMOL/L (ref 22–29)
HCOV PNL SPEC NAA+PROBE: NOT DETECTED
HCT VFR BLD AUTO: 29.2 % (ref 40–53)
HGB BLD-MCNC: 10.2 G/DL (ref 13.3–17.7)
HGB BLD-MCNC: 8.5 G/DL (ref 13.3–17.7)
HGB UR QL STRIP: ABNORMAL
HMPV RNA SPEC QL NAA+PROBE: NOT DETECTED
HPIV1 RNA SPEC QL NAA+PROBE: NOT DETECTED
HPIV2 RNA SPEC QL NAA+PROBE: NOT DETECTED
HPIV3 RNA SPEC QL NAA+PROBE: NOT DETECTED
HPIV4 RNA SPEC QL NAA+PROBE: NOT DETECTED
IMP: NOT DETECTED
KETONES UR STRIP-MCNC: NEGATIVE MG/DL
KLEBSIELLA OXYTOCA: NOT DETECTED
KLEBSIELLA PNEUMONIAE: NOT DETECTED
KPC: NOT DETECTED
LACTATE SERPL-SCNC: 2.3 MMOL/L (ref 0.7–2)
LACTATE SERPL-SCNC: 2.7 MMOL/L (ref 0.7–2)
LACTATE SERPL-SCNC: 2.8 MMOL/L (ref 0.7–2)
LEUKOCYTE ESTERASE UR QL STRIP: ABNORMAL
M PNEUMO DNA SPEC QL NAA+PROBE: NOT DETECTED
MAGNESIUM SERPL-MCNC: 1.7 MG/DL (ref 1.7–2.3)
MCH RBC QN AUTO: 31.4 PG (ref 26.5–33)
MCHC RBC AUTO-ENTMCNC: 34.9 G/DL (ref 31.5–36.5)
MCV RBC AUTO: 90 FL (ref 78–100)
MRSA DNA SPEC QL NAA+PROBE: NEGATIVE
MUCOUS THREADS #/AREA URNS LPF: PRESENT /LPF
NDM: NOT DETECTED
NITRATE UR QL: NEGATIVE
NT-PROBNP SERPL-MCNC: 1232 PG/ML (ref 0–1800)
O2/TOTAL GAS SETTING VFR VENT: 35 %
OTHER UNITS TRANSFUSED: NORMAL
OXA (DETECTED/NOT DETECTED): NOT DETECTED
OXYHGB MFR BLDV: 60 % (ref 70–75)
PATH REPORT.COMMENTS IMP SPEC: NORMAL
PATH REPORT.COMMENTS IMP SPEC: NORMAL
PCO2 BLDV: 36 MM HG (ref 40–50)
PH BLDV: 7.44 [PH] (ref 7.32–7.43)
PH UR STRIP: 6 [PH] (ref 5–7)
PHOSPHATE SERPL-MCNC: 2.5 MG/DL (ref 2.5–4.5)
PLATELET # BLD AUTO: 104 10E3/UL (ref 150–450)
PO2 BLDV: 31 MM HG (ref 25–47)
POST RXN CLERICAL CHECK: NORMAL
POST SPECIMEN APPEARANCE: NORMAL
POST-RXN ABO/RH: NORMAL
POST-RXN POLY DAT: NEGATIVE
POTASSIUM SERPL-SCNC: 3.5 MMOL/L (ref 3.4–5.3)
POTASSIUM SERPL-SCNC: 3.8 MMOL/L (ref 3.4–5.3)
PROCALCITONIN SERPL IA-MCNC: 0.27 NG/ML
PRODUCT CODE: NORMAL
PROT SERPL-MCNC: 5.1 G/DL (ref 6.4–8.3)
PROTEUS SPECIES: NOT DETECTED
PSEUDOMONAS AERUGINOSA: NOT DETECTED
RBC # BLD AUTO: 3.25 10E6/UL (ref 4.4–5.9)
RBC URINE: 2 /HPF
RSV RNA SPEC QL NAA+PROBE: NOT DETECTED
RSV RNA SPEC QL NAA+PROBE: NOT DETECTED
RV+EV RNA SPEC QL NAA+PROBE: NOT DETECTED
SA TARGET DNA: NEGATIVE
SAO2 % BLDV: 61.4 % (ref 70–75)
SARS-COV-2 RNA RESP QL NAA+PROBE: NEGATIVE
SODIUM SERPL-SCNC: 139 MMOL/L (ref 135–145)
SODIUM SERPL-SCNC: 140 MMOL/L (ref 135–145)
SP GR UR STRIP: 1.02 (ref 1–1.03)
SPECIMEN EXP DATE BLD: NORMAL
SQUAMOUS EPITHELIAL: <1 /HPF
TROPONIN T SERPL HS-MCNC: 39 NG/L
TROPONIN T SERPL HS-MCNC: 45 NG/L
TROPONIN T SERPL HS-MCNC: 49 NG/L
TROPONIN T SERPL HS-MCNC: 53 NG/L
UNIT BLOOD TYPE TRANSFUSION REACTION: NORMAL
UNIT NUMBER: NORMAL
UROBILINOGEN UR STRIP-MCNC: 4 MG/DL
VIM: NOT DETECTED
WBC # BLD AUTO: 8.4 10E3/UL (ref 4–11)
WBC URINE: 4 /HPF

## 2024-12-19 PROCEDURE — 250N000009 HC RX 250

## 2024-12-19 PROCEDURE — 999N000157 HC STATISTIC RCP TIME EA 10 MIN

## 2024-12-19 PROCEDURE — 82565 ASSAY OF CREATININE: CPT | Performed by: INTERNAL MEDICINE

## 2024-12-19 PROCEDURE — 250N000009 HC RX 250: Performed by: HOSPITALIST

## 2024-12-19 PROCEDURE — 99291 CRITICAL CARE FIRST HOUR: CPT

## 2024-12-19 PROCEDURE — 80069 RENAL FUNCTION PANEL: CPT | Performed by: INTERNAL MEDICINE

## 2024-12-19 PROCEDURE — 83880 ASSAY OF NATRIURETIC PEPTIDE: CPT

## 2024-12-19 PROCEDURE — 87186 SC STD MICRODIL/AGAR DIL: CPT

## 2024-12-19 PROCEDURE — 84484 ASSAY OF TROPONIN QUANT: CPT

## 2024-12-19 PROCEDURE — 120N000013 HC R&B IMCU

## 2024-12-19 PROCEDURE — 84450 TRANSFERASE (AST) (SGOT): CPT

## 2024-12-19 PROCEDURE — 85018 HEMOGLOBIN: CPT | Performed by: HOSPITALIST

## 2024-12-19 PROCEDURE — 87633 RESP VIRUS 12-25 TARGETS: CPT

## 2024-12-19 PROCEDURE — 84155 ASSAY OF PROTEIN SERUM: CPT

## 2024-12-19 PROCEDURE — 71045 X-RAY EXAM CHEST 1 VIEW: CPT

## 2024-12-19 PROCEDURE — 99207 PR NO BILLABLE SERVICE THIS VISIT: CPT | Performed by: HOSPITALIST

## 2024-12-19 PROCEDURE — 250N000013 HC RX MED GY IP 250 OP 250 PS 637: Performed by: HOSPITALIST

## 2024-12-19 PROCEDURE — 94640 AIRWAY INHALATION TREATMENT: CPT | Mod: 76

## 2024-12-19 PROCEDURE — 250N000013 HC RX MED GY IP 250 OP 250 PS 637: Performed by: INTERNAL MEDICINE

## 2024-12-19 PROCEDURE — 82330 ASSAY OF CALCIUM: CPT

## 2024-12-19 PROCEDURE — 86078 PHYS BLOOD BANK SERV REACTJ: CPT | Performed by: PATHOLOGY

## 2024-12-19 PROCEDURE — 258N000001 HC RX 258: Performed by: INTERNAL MEDICINE

## 2024-12-19 PROCEDURE — 258N000003 HC RX IP 258 OP 636

## 2024-12-19 PROCEDURE — 5A09357 ASSISTANCE WITH RESPIRATORY VENTILATION, LESS THAN 24 CONSECUTIVE HOURS, CONTINUOUS POSITIVE AIRWAY PRESSURE: ICD-10-PCS

## 2024-12-19 PROCEDURE — 258N000003 HC RX IP 258 OP 636: Performed by: HOSPITALIST

## 2024-12-19 PROCEDURE — 250N000011 HC RX IP 250 OP 636

## 2024-12-19 PROCEDURE — 94640 AIRWAY INHALATION TREATMENT: CPT

## 2024-12-19 PROCEDURE — 83735 ASSAY OF MAGNESIUM: CPT | Performed by: INTERNAL MEDICINE

## 2024-12-19 PROCEDURE — 36415 COLL VENOUS BLD VENIPUNCTURE: CPT

## 2024-12-19 PROCEDURE — 87149 DNA/RNA DIRECT PROBE: CPT

## 2024-12-19 PROCEDURE — B4185 PARENTERAL SOL 10 GM LIPIDS: HCPCS | Performed by: INTERNAL MEDICINE

## 2024-12-19 PROCEDURE — 83605 ASSAY OF LACTIC ACID: CPT

## 2024-12-19 PROCEDURE — 82805 BLOOD GASES W/O2 SATURATION: CPT

## 2024-12-19 PROCEDURE — 87635 SARS-COV-2 COVID-19 AMP PRB: CPT

## 2024-12-19 PROCEDURE — 94660 CPAP INITIATION&MGMT: CPT

## 2024-12-19 PROCEDURE — 84100 ASSAY OF PHOSPHORUS: CPT | Performed by: INTERNAL MEDICINE

## 2024-12-19 PROCEDURE — 87640 STAPH A DNA AMP PROBE: CPT

## 2024-12-19 PROCEDURE — 85027 COMPLETE CBC AUTOMATED: CPT

## 2024-12-19 PROCEDURE — 93970 EXTREMITY STUDY: CPT

## 2024-12-19 PROCEDURE — 87641 MR-STAPH DNA AMP PROBE: CPT

## 2024-12-19 PROCEDURE — 250N000009 HC RX 250: Performed by: INTERNAL MEDICINE

## 2024-12-19 PROCEDURE — 250N000013 HC RX MED GY IP 250 OP 250 PS 637: Performed by: PHYSICIAN ASSISTANT

## 2024-12-19 PROCEDURE — 82947 ASSAY GLUCOSE BLOOD QUANT: CPT | Performed by: INTERNAL MEDICINE

## 2024-12-19 PROCEDURE — 81001 URINALYSIS AUTO W/SCOPE: CPT

## 2024-12-19 PROCEDURE — 84145 PROCALCITONIN (PCT): CPT

## 2024-12-19 RX ORDER — PIPERACILLIN SODIUM, TAZOBACTAM SODIUM 4; .5 G/20ML; G/20ML
4.5 INJECTION, POWDER, LYOPHILIZED, FOR SOLUTION INTRAVENOUS EVERY 6 HOURS
Status: DISCONTINUED | OUTPATIENT
Start: 2024-12-19 | End: 2024-12-21

## 2024-12-19 RX ORDER — CALCIUM GLUCONATE 20 MG/ML
1 INJECTION, SOLUTION INTRAVENOUS ONCE
Status: COMPLETED | OUTPATIENT
Start: 2024-12-19 | End: 2024-12-19

## 2024-12-19 RX ORDER — LIDOCAINE 40 MG/G
CREAM TOPICAL
Status: DISCONTINUED | OUTPATIENT
Start: 2024-12-19 | End: 2024-12-19

## 2024-12-19 RX ORDER — PANTOPRAZOLE SODIUM 40 MG/1
40 TABLET, DELAYED RELEASE ORAL
Status: DISCONTINUED | OUTPATIENT
Start: 2024-12-19 | End: 2024-12-27 | Stop reason: HOSPADM

## 2024-12-19 RX ORDER — CARBOXYMETHYLCELLULOSE SODIUM 5 MG/ML
1 SOLUTION/ DROPS OPHTHALMIC
Status: DISCONTINUED | OUTPATIENT
Start: 2024-12-19 | End: 2024-12-27 | Stop reason: HOSPADM

## 2024-12-19 RX ORDER — IPRATROPIUM BROMIDE AND ALBUTEROL SULFATE 2.5; .5 MG/3ML; MG/3ML
3 SOLUTION RESPIRATORY (INHALATION) EVERY 4 HOURS PRN
Status: DISCONTINUED | OUTPATIENT
Start: 2024-12-19 | End: 2024-12-27 | Stop reason: HOSPADM

## 2024-12-19 RX ORDER — SODIUM CHLORIDE, SODIUM LACTATE, POTASSIUM CHLORIDE, CALCIUM CHLORIDE 600; 310; 30; 20 MG/100ML; MG/100ML; MG/100ML; MG/100ML
INJECTION, SOLUTION INTRAVENOUS CONTINUOUS
OUTPATIENT
Start: 2024-12-19

## 2024-12-19 RX ORDER — ALBUTEROL SULFATE 0.83 MG/ML
2.5 SOLUTION RESPIRATORY (INHALATION)
Status: DISCONTINUED | OUTPATIENT
Start: 2024-12-19 | End: 2024-12-27 | Stop reason: HOSPADM

## 2024-12-19 RX ORDER — IPRATROPIUM BROMIDE AND ALBUTEROL SULFATE 2.5; .5 MG/3ML; MG/3ML
3 SOLUTION RESPIRATORY (INHALATION)
Status: DISCONTINUED | OUTPATIENT
Start: 2024-12-19 | End: 2024-12-24

## 2024-12-19 RX ORDER — DIPHENHYDRAMINE HYDROCHLORIDE 50 MG/ML
50 INJECTION INTRAMUSCULAR; INTRAVENOUS ONCE
Status: COMPLETED | OUTPATIENT
Start: 2024-12-19 | End: 2024-12-19

## 2024-12-19 RX ADMIN — OLIVE OIL AND SOYBEAN OIL 250 ML: 16; 4 INJECTION, EMULSION INTRAVENOUS at 20:18

## 2024-12-19 RX ADMIN — PIPERACILLIN AND TAZOBACTAM 4.5 G: 4; .5 INJECTION, POWDER, FOR SOLUTION INTRAVENOUS at 22:14

## 2024-12-19 RX ADMIN — ASCORBIC ACID, VITAMIN A PALMITATE, CHOLECALCIFEROL, THIAMINE HYDROCHLORIDE, RIBOFLAVIN-5 PHOSPHATE SODIUM, PYRIDOXINE HYDROCHLORIDE, NIACINAMIDE, DEXPANTHENOL, ALPHA-TOCOPHEROL ACETATE, VITAMIN K1, FOLIC ACID, BIOTIN, CYANOCOBALAMIN: 200; 3300; 200; 6; 3.6; 6; 40; 15; 10; 150; 600; 60; 5 INJECTION, SOLUTION INTRAVENOUS at 06:08

## 2024-12-19 RX ADMIN — PANTOPRAZOLE SODIUM 40 MG: 40 INJECTION, POWDER, FOR SOLUTION INTRAVENOUS at 08:18

## 2024-12-19 RX ADMIN — ALBUTEROL SULFATE 2.5 MG: 2.5 SOLUTION RESPIRATORY (INHALATION) at 04:20

## 2024-12-19 RX ADMIN — INSULIN ASPART 1 UNITS: 100 INJECTION, SOLUTION INTRAVENOUS; SUBCUTANEOUS at 08:16

## 2024-12-19 RX ADMIN — CARBOXYMETHYLCELLULOSE SODIUM 2 DROP: 5 SOLUTION/ DROPS OPHTHALMIC at 08:18

## 2024-12-19 RX ADMIN — CALCIUM GLUCONATE 1 G: 20 INJECTION, SOLUTION INTRAVENOUS at 06:51

## 2024-12-19 RX ADMIN — INSULIN ASPART 2 UNITS: 100 INJECTION, SOLUTION INTRAVENOUS; SUBCUTANEOUS at 12:04

## 2024-12-19 RX ADMIN — QUETIAPINE 12.5 MG: 25 TABLET, FILM COATED ORAL at 22:14

## 2024-12-19 RX ADMIN — INSULIN ASPART 1 UNITS: 100 INJECTION, SOLUTION INTRAVENOUS; SUBCUTANEOUS at 01:16

## 2024-12-19 RX ADMIN — PANTOPRAZOLE SODIUM 40 MG: 40 TABLET, DELAYED RELEASE ORAL at 17:07

## 2024-12-19 RX ADMIN — INSULIN ASPART 1 UNITS: 100 INJECTION, SOLUTION INTRAVENOUS; SUBCUTANEOUS at 20:27

## 2024-12-19 RX ADMIN — SODIUM CHLORIDE, POTASSIUM CHLORIDE, SODIUM LACTATE AND CALCIUM CHLORIDE 1000 ML: 600; 310; 30; 20 INJECTION, SOLUTION INTRAVENOUS at 03:26

## 2024-12-19 RX ADMIN — IPRATROPIUM BROMIDE AND ALBUTEROL SULFATE 3 ML: .5; 3 SOLUTION RESPIRATORY (INHALATION) at 02:32

## 2024-12-19 RX ADMIN — PIPERACILLIN AND TAZOBACTAM 4.5 G: 4; .5 INJECTION, POWDER, FOR SOLUTION INTRAVENOUS at 17:07

## 2024-12-19 RX ADMIN — TAMSULOSIN HYDROCHLORIDE 0.4 MG: 0.4 CAPSULE ORAL at 20:08

## 2024-12-19 RX ADMIN — IPRATROPIUM BROMIDE AND ALBUTEROL SULFATE 3 ML: .5; 3 SOLUTION RESPIRATORY (INHALATION) at 15:25

## 2024-12-19 RX ADMIN — SODIUM CHLORIDE 500 ML: 9 INJECTION, SOLUTION INTRAVENOUS at 10:37

## 2024-12-19 RX ADMIN — ACETAMINOPHEN 650 MG: 650 SUPPOSITORY RECTAL at 01:17

## 2024-12-19 RX ADMIN — SODIUM CHLORIDE, POTASSIUM CHLORIDE, SODIUM LACTATE AND CALCIUM CHLORIDE 1000 ML: 600; 310; 30; 20 INJECTION, SOLUTION INTRAVENOUS at 06:50

## 2024-12-19 RX ADMIN — DEXTROSE MONOHYDRATE 250 ML: 100 INJECTION, SOLUTION INTRAVENOUS at 05:00

## 2024-12-19 RX ADMIN — PIPERACILLIN AND TAZOBACTAM 4.5 G: 4; .5 INJECTION, POWDER, FOR SOLUTION INTRAVENOUS at 04:04

## 2024-12-19 RX ADMIN — ASCORBIC ACID, VITAMIN A PALMITATE, CHOLECALCIFEROL, THIAMINE HYDROCHLORIDE, RIBOFLAVIN-5 PHOSPHATE SODIUM, PYRIDOXINE HYDROCHLORIDE, NIACINAMIDE, DEXPANTHENOL, ALPHA-TOCOPHEROL ACETATE, VITAMIN K1, FOLIC ACID, BIOTIN, CYANOCOBALAMIN: 200; 3300; 200; 6; 3.6; 6; 40; 15; 10; 150; 600; 60; 5 INJECTION, SOLUTION INTRAVENOUS at 20:12

## 2024-12-19 RX ADMIN — IPRATROPIUM BROMIDE AND ALBUTEROL SULFATE 3 ML: .5; 3 SOLUTION RESPIRATORY (INHALATION) at 19:04

## 2024-12-19 RX ADMIN — CARBOXYMETHYLCELLULOSE SODIUM 2 DROP: 5 SOLUTION/ DROPS OPHTHALMIC at 20:09

## 2024-12-19 RX ADMIN — PIPERACILLIN AND TAZOBACTAM 4.5 G: 4; .5 INJECTION, POWDER, FOR SOLUTION INTRAVENOUS at 09:56

## 2024-12-19 RX ADMIN — DIPHENHYDRAMINE HYDROCHLORIDE 50 MG: 50 INJECTION INTRAMUSCULAR; INTRAVENOUS at 03:08

## 2024-12-19 ASSESSMENT — ACTIVITIES OF DAILY LIVING (ADL)
ADLS_ACUITY_SCORE: 52
ADLS_ACUITY_SCORE: 53
ADLS_ACUITY_SCORE: 46
ADLS_ACUITY_SCORE: 52
ADLS_ACUITY_SCORE: 46
ADLS_ACUITY_SCORE: 52
ADLS_ACUITY_SCORE: 48
ADLS_ACUITY_SCORE: 52
ADLS_ACUITY_SCORE: 48
ADLS_ACUITY_SCORE: 46
ADLS_ACUITY_SCORE: 52
ADLS_ACUITY_SCORE: 48
ADLS_ACUITY_SCORE: 52
ADLS_ACUITY_SCORE: 46
ADLS_ACUITY_SCORE: 46
ADLS_ACUITY_SCORE: 48
ADLS_ACUITY_SCORE: 48

## 2024-12-19 NOTE — PROGRESS NOTES
[facility]  Hospitalist Progress Note  Art Silverman MD  12/19/2024    Assessment & Plan   Clayton Pacheco is a markedly pleasant 89 year old gentleman with past medical history that is most notable for hypertension, hyperlipidemia, and hypothyroid, who presents with acute abdominal pain and melena, and is found to have acute blood loss anemia and due to acute upper GI bleeding and hemorrhage, due to bleeding ulcerated gastric mass.        ## Acute blood loss anemia   ## Acute upper GI bleeding and hemorrhage due to   ## Bleeding ulcerated gastric mass:   Of note, Mr. Pacheco lives independently, drives, takes care of his wife and history of HTN, HLP, hypothyroid.  He is not on any anticoagulation or NSAID use at home. He presented on 12/14/2024, to the [facility] ED, for evaluation of acute abdominal pain and melena.     In the ED, he has tachycardia, without hypotension, fever, or hypoxia. He has acute leukocytosis to 14.0. HGB has dropped to 8.6 from previous 11.4. BUN level is elevated in isolation from Cr. INR is slightly elevated to 1.21. Troponin levels are minimally elevated. UA is negative. CTA shows a small focus of active gastrointestinal bleeding within the distal stomach, and possible thickening of the distal stomach/proximal duodenum. He is admitted there, and [facility] consulted. IV Protonix started. IV NS ordered on admission.     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 This was biopsied. Hemostatic gel and spray are applied applied, but this ulcerated mass continued to bleed. Clotted blood in the gastric body is also noted. Patient was transferred for IR, surgical and GI involvement.      Prior to arrival, HGB drops to 6.8. Two units packed red cells transfusion have been started.       -- [facility] admission with frequent monitoring of hemodynamics, serial Hgb.    -- on IV  Protonix.BID    -- BP has been stable.    12/16     -- patient had bloody stools this AM and hgb down to 6.6.    -- discussed with GI, IR and Surgery    -- CTA repeat did not show any active bleeding    -- currently no active bleeding for IR, surgery for now hesitant for possible large gastric resection and also not sure how far the mass is away from the esophagus. (Dr Jules)    -- if patient has active bleeding, then to IR    -- allow clear liquid diet; on TPN    -- transfuse to keep at or above 8    -- Pathology from 12/15 only shows gastric fundic mucosa with ulceration and reactive changes, negative for dysplasia and malignancy (sampling suboptimal)    -- underwent repeat EGD 12/16 by Dr Kim:     - Normal esophagus.               - Clotted blood in the cardia, in the gastric fundus and in the gastric body.               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 Clips were placed. Clip : [place] Injected. Treated with argon plasma coagulation                             (APC). hemostatic spray applied.                - Normal examined duodenum.   12/17    -- pathology non-diagnositic,had a couple small melenic stools but he is warm perfused and hemodynamically stable.  Hgb is 8 and he was transfused to keep above 8 and is currently 9.3.  Change frequency of Hgb to q 12 hours, clear liquid diet, discontinue IMC, continue IV protonix.      -- discussed with surgery, they can resect this tumor ([place]) for later this week    -- continue TPN, allow clear liquid diet, stop narcotics and deliriogenic medications and continue seroquel at bedtime, stop telemetry.                      -- Advance directives discussed and he says he would want a 24 hour trial of resuscitation, if only so that loved ones could gather. We discussed that he may have gastric cancer. It seems that despite his advanced age he lives fully independently at home and is fully alert and oriented. Further care goal discussions  will likely be ongoing during his hospital stay    -- SW consulted for disposition planning.  12/18/2024.  Assumed care.  Hemoglobin dropped to 7.6.  Received 1 unit PRBC per conditional transfusion.  Will obtain hemoglobin 2 hours after transfusion completed then in a.m. every 12 hours to determine rate of bleeding/stability.  Long discussion with patient and son Curt by phone, decision is conservative, ie NO surgery;.  Discussed with general surgery, Dr. Hairston who is in agreement.  Reviewed GI note, if further active bleeding, IR for embolization.  Patient and son acknowledge no firm tissue diagnosis to rule out CA however they are okay with this and are making plans for patient and wife regarding long-term care and comfort.  12/19/2024 RRT last evening due to acute respiratory failure with hypoxia, fever 103 rectal, LA, sepsis.  See RRT note.  Appreciate input.  CXR negative for acute pneumonia, UA negative.  BC subsequently grew out at 12 hours 1/2 gram-negative bacillus.  On Zosyn since RRT.  Given 2-1/2 L bolus since RRT.  At the time of RRT without etiology and Pro-Daryn 0.27 concern of possible PE and DDx, duplex venous ultrasound bilateral LE negative.  Subsequently weaned from BiPAP to room air, pt back to baseline mentation, hungry and desires pos [clear liquids under direct supervision] and afebrile and suspect initial respiratory distress likely related to mucous plug.  Continue DuoNeb.  Continue Zosyn for bacteremia, await ID and sensitivities.   Earlier this morning, discussion with the patient's son Curt after he was notified by RRT last evening he conferred with patient's wife and his other brother and sister-in-law and reviewed patient ACP which defers treatment plan primarily to attending MD; wife, 2 sons  [including Curt, present] and DILs knowing patient's wishes for his quality of life desired transition to DNR/DNI.  At that time patient on BiPAP without clear etiology and given patient's age at 89  "and family knowledge to Patients wishes decision to transition patient to DNR DNI [however continue TPN.]    Delirium, resolved  Was delirious initially upon presentation after transfer but has cleared mentally but does have episodes of confusion.  -- continue seroquel at bedtime  -- stop narcotics  -monitor, rather spry 89-year-old, is aware of condition and elects nonaggressive management.  -see #1.     Hypernatremia, resolved  Secondary to decreased po intake.  Patient did have some delirium but this was more associated with sleep deprivation.  Patient was given IVF and now TPN with resolution of his hypernatremia  -Monitor BMP, today sodium 144.    Elevated Troponins: Noting that he was hospitalized 3/2024 for sepsis and UTI, as discussed below, and Troponins were elevated at that time. Cardiology was consulted. Lexiscan showed no evidence of inducible ischemia. Suspect elevated Troponins 12/14/24 to be due to demand ischemia due to acute illness.    -- discontinue telemetry     -- denies any chest pain or sob    -- not a candidate for coronary intervention due to active bleeding, patient elects conservative management.  12/19/2024 at time of RRT troponin 53 however flat 45.  No chest pain.     Massive prostatomegaly. Seen incidentally on CT. Noting that he was hospitalized for cystitis due to E coli in 7/2023, and hospitalized for sepsis, UTI, and bacteremia due to E coli, resistant to Unasyn and Zosyn, treated with Cipro.    -- Monitor symptomatically     Hypertension: TTE in 7/2023 showed preserved LVEF with mild to moderate concentric LVH and mild aortic stenosis.    -- Hold home anti-hypertensives for bleeding     Hyperlipidemia:   -- hold Pravastatin      Hypothyroid:   -- continue Synthroid      Moderate malnutrition  -- PICC ine, continue TPN, clear liquid diet, only  Moderate Protein-Calorie Malnutrition  \"% Weight Loss: None noted  % Intake: Unable to fully assess  Subcutaneous Fat Loss: Orbital region " "moderate depletion, Upper arm region moderate depletion, and Thoracic region moderate depletion  Muscle Loss: Temporal region moderate depletion, Clavicle bone region moderate depletion, and Dorsal hand region moderate depletion  Fluid Retention: None noted    Malnutrition Diagnosis: Moderate malnutrition  In Context of: Chronic illness or disease\"     Drugs/Tx:  TPN    Disposition:   Medically Ready for Discharge: Anticipated in 2-4 Days     Total time 50 minutes for today 12/19/2024 :  time consisted of the following, examination of patient, review of records including labs, imaging results, medications, interdisciplinary notes and completing documentation and orders.  Care Management included counseling/discussion with Patient's son regarding current condition as above and discussion on code status and Coordination of Care time with Nursing regarding care plan, management and surveillance.     Interval History   Reviewed RRT notes.  On morning encounter patient on BiPAP, opens eyes to voice however somnolent.  Long discussion with patient's son Curt, as above.  As above in afternoon patient weaned off BiPAP to room air, but mentation at baseline, suspect acute respiratory distress secondary to mucous plug, continue DuoNeb.  Blood cultures at time of RRT subsequently grew out 1/2 gram-negative bacilli.  On Zosyn.        Physical Exam    , Blood pressure 112/58, pulse 88, temperature 98.8  F (37.1  C), temperature source Oral, resp. rate 19, weight 66 kg (145 lb 8.1 oz), SpO2 97%.  Vitals:    12/17/24 0600 12/18/24 0600   Weight: 64.5 kg (142 lb 3.2 oz) 66 kg (145 lb 8.1 oz)       General/Constitutional:   On morning encounter on BiPAP, opens eyes to voice.  Chest/Respiratory: Respirations nonlabored room air on BiPAP on morning encounter  Cardiovascular:  regular, no murmur appreciated.  LE edema none  Gastrointestinal/Abdomen:  soft, nontender, no rebound, guarding or other peritoneal signs.  Neuro.  Gross motor " tested, nonfocal,  Psych somnolent on morning encounter    Medications   All medications were reviewed.  Current Facility-Administered Medications   Medication Dose Route Frequency Provider Last Rate Last Admin    dextrose 10% infusion   Intravenous Continuous PRN Lobo Zimmerman MD   Stopped at 12/19/24 0600    No lozenges or gum should be given while patient on BIPAP/AVAPS/AVAPS AE   Does not apply Continuous PRN Joel Gonzalez APRN CNP        parenteral nutrition - Clinimix E   CENTRAL LINE IV TPN CONTINUOUS Nida Horner, SHANNON        parenteral nutrition - Clinimix E   CENTRAL LINE IV TPN CONTINUOUS Nida Horner, [facility] 75 mL/hr at 12/19/24 0608 New Bag at 12/19/24 0608    Patient may continue current oral medications   Does not apply Continuous PRN Joel Gonzalez APRN CNP         Current Facility-Administered Medications   Medication Dose Route Frequency Provider Last Rate Last Admin    carboxymethylcellulose PF (REFRESH PLUS) 0.5 % ophthalmic solution 2 drop  2 drop Both Eyes BID Lobo Zimmerman MD   2 drop at 12/19/24 0818    insulin aspart (NovoLOG) injection (RAPID ACTING)  1-7 Units Subcutaneous Q4H Lobo Zimmerman MD   2 Units at 12/19/24 1204    ipratropium - albuterol 0.5 mg/2.5 mg/3 mL (DUONEB) neb solution 3 mL  3 mL Nebulization 4x daily Art Silverman MD   3 mL at 12/19/24 1525    levothyroxine (SYNTHROID/LEVOTHROID) tablet 75 mcg  75 mcg Oral Daily Lobo Zimmerman MD   75 mcg at 12/18/24 0902    lipids plant base (CLINOLIPID) 20 % infusion 250 mL  250 mL Intravenous Q24H Lobo Zimmerman MD 20.8 mL/hr at 12/18/24 2056 250 mL at 12/18/24 2056    pantoprazole (PROTONIX) EC tablet 40 mg  40 mg Oral BID AC Bhavani Burgess PA-C        piperacillin-tazobactam (ZOSYN) 4.5 g vial to attach to  mL bag  4.5 g Intravenous Q6H Joel Gonzalez APRN CNP   4.5 g at 12/19/24 0956    QUEtiapine (SEROquel) half-tab 12.5 mg  12.5 mg Oral At Bedtime Lobo Zimmerman,  MD   12.5 mg at 12/18/24 2108    sodium chloride (PF) 0.9% PF flush 3 mL  3 mL Intracatheter Q8H Diony Roy MD   3 mL at 12/19/24 1221    tamsulosin (FLOMAX) capsule 0.4 mg  0.4 mg Oral Daily Lobo Zimmerman MD   0.4 mg at 12/18/24 2057        Data   Recent Labs   Lab 12/19/24  1200 12/19/24  0628 12/19/24  0502 12/19/24  0358 12/19/24  0239 12/18/24  2122 12/18/24  1621 12/18/24  0939 12/18/24  0557 12/17/24  0736 12/17/24  0600 12/16/24  1520 12/16/24  0649 12/15/24  0606 12/15/24  0228 12/14/24 2007   WBC  --   --   --   --  8.4  --   --   --  11.8*  --   --   --  17.9*   < > 10.4 14.0*   HGB  --   --   --   --  10.2*  --  8.6*  --  7.6*   < > 9.3*   < > 6.6*   < > 6.8* 8.6*   MCV  --   --   --   --  90  --   --   --  91  --   --   --  94   < > 98 99   PLT  --   --   --   --  104*  --   --   --  101*  --   --   --  152   < > 189 223   INR  --   --   --   --   --   --   --   --   --   --  1.17*  --   --   --  1.28* 1.21*   NA  --   --  140  --  139  --   --   --  144  --  148*  --  148*   < > 140 135   POTASSIUM  --   --  3.5  --  3.8  --  5.2  --  3.4  --  3.6  --  4.1   < > 4.1 4.2   CHLORIDE  --   --  108*  --  105  --   --   --  114*  --  117*  --  116*   < > 108* 102   CO2  --   --  21*  --  21*  --   --   --  25  --  24  --  21*   < > 23 21*   BUN  --   --  24.1*  --  21.8  --   --   --  32.7*  --  59.5*  --  64.1*   < > 64.4* 67.0*   CR  --   --  0.84  --  0.68  --   --   --  0.71  --  0.96  --  0.96   < > 0.97 0.96   ANIONGAP  --   --  11  --  13  --   --   --  5*  --  7  --  11   < > 9 12   MEGHANN  --   --  7.4*  --  8.1*  --   --   --  7.8*  --  8.0*  --  8.1*   < > 8.0* 8.7*   * 162* 144*   < > 116*   < >  --    < > 156*   < > 147*   < > 152*   < > 127* 144*   ALBUMIN  --   --   --   --  3.1*  --   --   --   --   --  2.9*  --   --    < >  --  3.6   PROTTOTAL  --   --   --   --  5.1*  --   --   --   --   --  4.5*  --   --    < >  --  5.3*   BILITOTAL  --   --   --   --  0.5  --   --    --   --   --  0.3  --   --    < >  --  0.4   ALKPHOS  --   --   --   --  98  --   --   --   --   --  54  --   --    < >  --  85   ALT  --   --   --   --  71*  --   --   --   --   --  16  --   --    < >  --  12   AST  --   --   --   --  61*  --   --   --   --   --  23  --   --    < >  --  16    < > = values in this interval not displayed.       Recent Results (from the past 24 hours)   XR Chest Port 1 View    Narrative    EXAM: XR CHEST PORT 1 VIEW  LOCATION: [facility]  DATE: 12/19/2024    INDICATION: RRT: Acute hypoxic respiratory failure, dyspnea  COMPARISON: 3/19/2024.      Impression    IMPRESSION: The heart is normal in size. A right PICC is seen in place with the tip at the atrial caval junction. There is mild central pulmonary venous congestion, subsegmental atelectasis seen in the bilateral lower lung zones   US Lower Extremity Venous Duplex Bilateral    Narrative    VENOUS ULTRASOUND BILATERAL LEG(S)  12/19/2024 11:35 AM     HISTORY: Moderate concern for thromboembolism but would like to rule  out DVT prior to CT PE    COMPARISON: None.    FINDINGS: Examination of the deep veins with graded compression and  color flow Doppler with spectral wave form analysis was performed.  Images show no evidence of thrombus in the bilateral common femoral  vein, femoral vein, popliteal vein or calf veins.      Impression    IMPRESSION: No deep vein thrombosis in the bilateral lower  extremities.      DONNA CHRISTIE MD         SYSTEM ID:  [id number]         Lobo Zimmerman MD  Text Page  (7am to 6pm)

## 2024-12-19 NOTE — PROGRESS NOTES
"Son, Curt Pacheco, called and spoke to this writer at 0630 am via phone. He was asking about the \"Advance Directives\" that him and House ESTHELA KERA Gonzalez CNP talked about earlier after pt had an episode of SOB and hypotension. Writer explained that writer is not the right person to talk about this but will ask the day shift Provider to talk to him about the Advance Directives/Code status. Writer verified his phone number and informed him that the Provider in day shift will call him back.    Report given to Day shift nurse.  "

## 2024-12-19 NOTE — PROGRESS NOTES
[facility]  Gastroenterology Progress Note     Clayton Pacheco MRN# [id number]   YOB: 1929 Age: 89 year old          Assessment and Plan:     Clayton Pacheco is a markedly pleasant 89 year old gentleman with past medical history that is most notable for hypertension, hyperlipidemia, and hypothyroid, who presents with acute abdominal pain and melena, and is found to have acute blood loss anemia and due to acute upper GI bleeding and hemorrhage, due to bleeding ulcerated gastric mass.     UGIB (upper gastrointestinal bleed)  GIST tumor  Called yesterday to attempt hemostasis to known gastric mass as [facility] did not see role in repeat EGD  Initial EGD noted large ulcerated mass in the fundus with adherent clot and large amount of blood clots in the stomach. Biopsies done and hemospray used then.    Repeat EGD on 12/16 with Dr. Kim noted clotted blood in cardia, fundus and gastric body. Gastric tumor in gastric fundus. Clips were placed. Injected and treated with APC and hemostatic spray applied  Hgb improved significantly to 10.2    - recommend to continue to monitor hemoglobin  - oral pantoprazole 40 mg BID  - recommend full liquid diet  - Gi will continue to follow along- concern for ongoing bleed vs equilibrating. If further bleeding would recommend IR consult for possible embolization         UGIB (upper gastrointestinal bleed)      Interval History:     no new complaints and doing well; no cp, sob, n/v/d, or abd pain.              Review of Systems:     C: NEGATIVE for fever, chills, change in weight  E/M: NEGATIVE for ear, mouth and throat problems  R: NEGATIVE for significant cough or SOB  CV: NEGATIVE for chest pain, palpitations or peripheral edema             Medications:   I have reviewed this patient's current medications  Current Facility-Administered Medications   Medication Dose Route Frequency Provider Last Rate Last Admin    carboxymethylcellulose PF  (REFRESH PLUS) 0.5 % ophthalmic solution 2 drop  2 drop Both Eyes BID Lobo Zimmerman MD   2 drop at 12/19/24 0818    insulin aspart (NovoLOG) injection (RAPID ACTING)  1-7 Units Subcutaneous Q4H Lobo Zimmerman MD   1 Units at 12/19/24 0816    levothyroxine (SYNTHROID/LEVOTHROID) tablet 75 mcg  75 mcg Oral Daily Lobo Zimmerman MD   75 mcg at 12/18/24 0902    lipids plant base (CLINOLIPID) 20 % infusion 250 mL  250 mL Intravenous Q24H Lobo Zimmerman MD 20.8 mL/hr at 12/18/24 2056 250 mL at 12/18/24 2056    pantoprazole (PROTONIX) IV push injection 40 mg  40 mg Intravenous Q12H Diony Roy MD   40 mg at 12/19/24 0818    piperacillin-tazobactam (ZOSYN) 4.5 g vial to attach to  mL bag  4.5 g Intravenous Q6H Joel Gonzalez APRN CNP   4.5 g at 12/19/24 0404    QUEtiapine (SEROquel) half-tab 12.5 mg  12.5 mg Oral At Bedtime Lobo Zimmerman MD   12.5 mg at 12/18/24 2108    sodium chloride (PF) 0.9% PF flush 3 mL  3 mL Intracatheter Q8H Diony Roy MD   3 mL at 12/19/24 0356    sodium chloride 0.9% BOLUS 500 mL  500 mL Intravenous Once Art Silverman MD        tamsulosin (FLOMAX) capsule 0.4 mg  0.4 mg Oral Daily Lobo Zimmerman MD   0.4 mg at 12/18/24 2057                  Physical Exam:   Vitals were reviewed  Vital Signs with Ranges  Temp:  [98.1  F (36.7  C)-103.2  F (39.6  C)] 99.5  F (37.5  C)  Pulse:  [] 94  Resp:  [18-42] 25  BP: ()/() 124/61  FiO2 (%):  [30 %] 30 %  SpO2:  [90 %-100 %] 100 %  I/O last 3 completed shifts:  In: 3098.25 [P.O.:1280; IV Piggyback:500]  Out: 900 [Urine:900]  Constitutional: healthy, alert, and no distress   Cardiovascular: negative, PMI normal. No lifts, heaves, or thrills. RRR. No murmurs, clicks gallops or rub  Respiratory: negative, Percussion normal. Good diaphragmatic excursion. Lungs clear  Abdomen: Abdomen soft, non-tender. BS normal. No masses, organomegaly           Data:   I reviewed the patient's  new clinical lab test results.   Recent Labs   Lab Test 12/19/24  0239 12/18/24  1621 12/18/24  0557 12/17/24  1356 12/17/24  0600 12/16/24  1520 12/16/24  0649 12/15/24  0606 12/15/24  0228 12/14/24 2007   WBC 8.4  --  11.8*  --   --   --  17.9*   < > 10.4 14.0*   HGB 10.2* 8.6* 7.6*   < > 9.3*   < > 6.6*   < > 6.8* 8.6*   MCV 90  --  91  --   --   --  94   < > 98 99   *  --  101*  --   --   --  152   < > 189 223   INR  --   --   --   --  1.17*  --   --   --  1.28* 1.21*    < > = values in this interval not displayed.     Recent Labs   Lab Test 12/19/24  0502 12/19/24  0239 12/18/24  1621 12/18/24  0557   POTASSIUM 3.5 3.8 5.2 3.4   CHLORIDE 108* 105  --  114*   CO2 21* 21*  --  25   BUN 24.1* 21.8  --  32.7*   ANIONGAP 11 13  --  5*     Recent Labs   Lab Test 12/19/24  0501 12/19/24  0239 12/17/24  0600 12/15/24  0606 12/14/24  2118 03/21/24  0738 03/19/24  1902 03/19/24  1858   ALBUMIN  --  3.1* 2.9* 3.3*  --    < >  --  3.8   BILITOTAL  --  0.5 0.3 0.5  --    < >  --  0.9   ALT  --  71* 16 11  --    < >  --  31   AST  --  61* 23 16  --    < >  --  35   PROTEIN 20*  --   --   --  Negative  --  100*  --    LIPASE  --   --   --   --   --   --   --  14    < > = values in this interval not displayed.       I reviewed the patient's new imaging results.    All laboratory data reviewed  All imaging studies reviewed by me.    Bhavani Burgess PA-C,  12/17/2024  [facility]  Office : [phone]  Cell: [phone] (Dr. Kim)  Cell: [phone] (Bhavani Burgess PA-C)

## 2024-12-19 NOTE — PROVIDER NOTIFICATION
Writer notified House ESTHELA Joel Gonzalez about pt's repeat Lactic Acid result:2.7. Received orders to do another bolus of LR 1L bolus.

## 2024-12-19 NOTE — PLAN OF CARE
Goal Outcome Evaluation:  0300-0730       Pt had RRT due to respiratory distress and hypotension. Pt was placed back on IMC status for close monitoring. PT was on BiPAP and respiration was stabilized but BP suddenly dropped. Pt then was hypotensive, did a couple of IV boluses but ended up becoming congested and having increased respiratory rate. Pt is now more stable with the BiPAP at FiO2 30% and with the LR running at 1000 mL/hr for the elevated lactic. TPN and Lipids still running via PICC line. Pt was somnolent the whole time. He was given Benadryl 50 mg/IV once to rule out Transfusion reaction. Pt's son was updated of the event by House ESTHELA XOCHILT Godwin, CNP. Pt was starting to open eyes and respond verbally when he was repositioned once, but pt still somnolent/lethargic. Son, Curt, called but no definite answer regarding pt's Code status. Report given to day shift and for Day shift Provider to give son a call back regarding this. Continue to monitor.

## 2024-12-19 NOTE — PLAN OF CARE
"Patient Name: Zoran  MRN: [id number]  Date of Admission: 12/15/2024  Reason for Admission: UGIB  Level of Care: Medical    [id number]  **RRT called 0215 for respiratory distress and increased WOB, see provider RRT note     Vitals:   BP Readings from Last 1 Encounters:   12/19/24 99/46     Pulse Readings from Last 1 Encounters:   12/19/24 102     Wt Readings from Last 1 Encounters:   12/18/24 66 kg (145 lb 8.1 oz)     Ht Readings from Last 1 Encounters:   12/15/24 1.676 m (5' 6\")     Estimated body mass index is 23.48 kg/m  as calculated from the following:    Height as of an earlier encounter on 12/15/24: 1.676 m (5' 6\").    Weight as of this encounter: 66 kg (145 lb 8.1 oz).  Temp Readings from Last 1 Encounters:   12/19/24 (!) 103.2  F (39.6  C) (Rectal)       Pain: denied pain    CV Surgery Patient: No    Assessment    Resp: LS clear/dim developing to expiratory wheezes. Patient developed increased WOB, tachypnea in 30s, and respiratory distress, RRT was called 0215.   Neuro: AOx3, disoriented to place, drowsy. Able to follow commands and respond appropriately.   GI/: TPN and lipids. +BS, +flatus, -BM. AUO using external catheter.   Skin/Wounds: Scattered bruising.   Lines/Drains: R PICCx2 lumen, infusing TPN 75ml/hr and lipids 20.8ml/hr  Activity: turned q2 while in bed  Sleep: 5 hours between cares  Abnormal Labs: Lactic 2.3, elevated LFTs, trop 39, ph 7.44, pCO2 36, Hgb 10.2, respiratory PCR panel and blood cultures pending    Aggression Stop Light: Green          Patient Care Plan: Transferred to [facility] status, see RRT note   "

## 2024-12-19 NOTE — PLAN OF CARE
Goal Outcome Evaluation:    0700-1930    Orientations: A/O x3, disoriented to time. [place] at baseline, bilateral hearing aids  Vitals/Pain: VSS. Weaned to RA. LS are dim. Denies SOB/pain.   Tele: N/A  Lines/Drains: R PICC infusing TPN @ 60ml/hr.   Skin/Wounds: Scattered bruising  GI/: Adequate UOP via external catheter. BM x2, soft/maroon  Labs: Abnormal/Trends, Electrolyte Replacement- Hemoglobin 7.6, transfused 1 unit PRBC, recheck 8.6. K replaced, recheck 5.2. Phos replaced x1 recheck in am. Blood sugar checks q4h  Ambulation/Assist: Assist x1 gb/walker. Ambulated x1. Up in chair x1.   Diet: Clear liquid, tolerating well.  Sleep Quality: Poor per pt  Plan: continue q12h hemoglobin checks

## 2024-12-19 NOTE — PROGRESS NOTES
General Surgery      Patient sitting up in room comfortable finishing up blood transfusion.  Will follow peripherally as they have no interest for surgical intervention at the moment.

## 2024-12-19 NOTE — PROGRESS NOTES
Pt is off the BIPAP. Pt is on RA. There was some redness on the bridge of the nose and protective barrier was applied. Mask was changed to half mask for the time pt needs to go back on BIPAP. No respiratory distress was noted at this time.

## 2024-12-19 NOTE — CODE/RAPID RESPONSE
[facility]  House ESTHELA RRT Note  12/19/2024   Time called: 0204  RRT called for: Respiratory distress  Code status: Full Code    Assessment & Plan   Clayton Pacheco is a markedly pleasant 89 year old gentleman with past medical history that is most notable for hypertension, hyperlipidemia, and hypothyroid, who presents with acute abdominal pain and melena, and is found to have acute blood loss anemia and due to acute upper GI bleeding and hemorrhage, due to bleeding ulcerated gastric mass.     I was paged to the bedside to evaluate . Clayton Pacheco for an acute episode of respiratory distress with RR in the 40s with acute hypoxia requiring supplemental oxygen.     Diagnosis:  Acute hypoxic respiratory failure with respiratory distress  Fever 103.2 rectal  Lactic acidosis  Sepsis  Query transfusion reaction  Upon my arrival the patient was in acute distress with RR in the 40s and audible wheezes. Patient was on NC 6L with adequate SpO2. Patient was unable to speak in sentences due to work of breathing but he denied chest pain. Patient endorsed dyspnea. Lung sounds had bilateral coarse expiratory wheezes. Initially hypertensive with , tachycardic 135 appeared to be sinus tach, RR 45, SpO2 100% on 6L NC. Patient was started on BiPAP to ease work of breathing. Patient tolerated this well and his HR and BP began to decrease gradually. Duoneb was given in the event that this was a bronchoconstriction. A thorough skin exam did not reveal any sites suspicious for infection with PIV or PICC line nor any open wounds on back or between gluteal folds. Rectal temp was 103.2, no known infection to this point. Temperature was new this evening. Patient did receive a blood transfusion yesterday afternoon but it was completed at 1400. Patient has had multiple blood transfusions without reaction but given lack of any infectious symptoms to this point he will be tested for transfusion reaction  as he was febrile, in respiratory distress, had mild pulmonary vascular congestion on CXR.     Patient became hemodynamically unstable 20 minutes after starting BiPAP with BP dropping to 75/35 mmHg. Patient was sleeping and his HR had decreased to 95 bpm and he otherwise looked much improved from his initial presentation at the start of RRT. Patient was started on an IVF bolus of 1L LR over 60 minutes. Pressures improved when lying flat and with IVF. WBC normal at 8.4 but patient appeared toxic and was newly febrile with respiratory distress so he was started on empiric zosyn until cultures return. Abdomen was soft and patient denied pain, no guarding. No peripheral edema on exam. Extremities were warm and dry with 2+ pulses in all extremities. Patient had another episode of wheezing and tachypnea during IVF bolus so this was stopped and patient was given an albuterol neb again with improvement. If further respiratory distress occurs could try racemic epinephrine to treat possible bronchospasms. Labs were somewhat unrevealing aside from mild lactic acidosis. New-onset mild transaminitis, possibly due to malnutrition, continue to trend. Infectious workup so far negative without leukocytosis, procalcitonin 0.27. CXR does not appear to show pulmonary edema and BNP was not significantly elevated at 1,232.     Ddx:  Pulmonary embolism: tachycardia and dyspnea with hemodynamic changes. Patient denied chest pain. Fever would be better explained by infectious diagnoses, but would not be inconsistent with PE. Well's criteria score of 4 = moderate risk. Get BLE US to rule out DVT. If DVT is found then send for CT PE.      Plan:  -- BiPAP  -- Duoneb now and PRN  -- Labs   *CBC   *CMP   *BNP   *Ionized calcium   *Lactic acid: 2.3 recheck at 0500   *VBG   *Blood culture   *UA   *Procalcitonin   *Transfusion reaction panel  -- Respiratory viral panel  -- COVID-19 by PCR  -- LR 1L over 2h  -- CXR  -- IMC status  -- Benadryl 50mg  IV once  -- Start empiric Zosyn  -- BLE US    At the conclusion of this RRT patient was hemodynamically stable and will remain on current unit.    Goals of care:  I called patient's son Curt to discuss the patient's FULL CODE status. Curt stated that he thought Clayton's advanced directive stated that he would not want to be intubated. I told him that I cannot change the order based on the advanced directive and that it was a guide for our decision-making but because he was currently a FULL CODE I would want Curt to review the advanced directive and discuss Clayton's code status with other family members before changing this. Curt stated his understanding and told me he would update us on what they discuss this morning.      His history is significant for:  Past Medical History:   Diagnosis Date    Enlarged prostate     HTN (hypertension)     Hyperlipidemia     Hypothyroidism     Presbyopia     Vitamin D deficiency      Past Surgical History:   Procedure Laterality Date    EGD  12/15/2024    ESOPHAGOSCOPY, GASTROSCOPY, DUODENOSCOPY (EGD), COMBINED N/A 12/15/2024    Procedure: ESOPHAGOGASTRODUODENOSCOPY WITH GASTRIC MASS BIOPSIES AND HEMOSTASIS;  Surgeon: Lobo Hernandez MD;  [facility]       Review of Systems   The 10 point Review of Systems is negative other than noted in the HPI or here.     Allergies   No Known Allergies    Physical Exam   Physical Exam  Constitutional:       General: He is in acute distress.      Appearance: He is toxic-appearing.   HENT:      Mouth/Throat:      Mouth: Mucous membranes are moist.   Eyes:      Pupils: Pupils are equal, round, and reactive to light.   Cardiovascular:      Rate and Rhythm: Regular rhythm. Tachycardia present.   Pulmonary:      Effort: Respiratory distress present.      Breath sounds: Wheezing present.   Abdominal:      General: There is distension.      Palpations: Abdomen is soft.      Tenderness: There is no abdominal tenderness. There is no  guarding.   Musculoskeletal:      Right lower leg: No edema.      Left lower leg: No edema.   Skin:     General: Skin is warm and dry.      Capillary Refill: Capillary refill takes less than 2 seconds.      Findings: Erythema present.   Neurological:      Mental Status: He is alert and oriented to person, place, and time.         Vital Signs with Ranges:  Temp:  [98.1  F (36.7  C)-101.4  F (38.6  C)] 101.4  F (38.6  C)  Pulse:  [] 104  Resp:  [18-26] 26  BP: (124-158)/(52-72) 141/70  SpO2:  [90 %-100 %] 94 %  I/O last 3 completed shifts:  In: 2648 [P.O.:1400]  Out: 800 [Urine:800]    Data   Results for orders placed or performed during the hospital encounter of 12/15/24 (from the past 24 hours)   Glucose by meter   Result Value Ref Range    GLUCOSE BY METER POCT 162 (H) 70 - 99 mg/dL   Basic metabolic panel   Result Value Ref Range    Sodium 144 135 - 145 mmol/L    Potassium 3.4 3.4 - 5.3 mmol/L    Chloride 114 (H) 98 - 107 mmol/L    Carbon Dioxide (CO2) 25 22 - 29 mmol/L    Anion Gap 5 (L) 7 - 15 mmol/L    Urea Nitrogen 32.7 (H) 8.0 - 23.0 mg/dL    Creatinine 0.71 0.67 - 1.17 mg/dL    GFR Estimate 84 >60 mL/min/1.73m2    Calcium 7.8 (L) 8.8 - 10.4 mg/dL    Glucose 156 (H) 70 - 99 mg/dL   Magnesium   Result Value Ref Range    Magnesium 1.9 1.7 - 2.3 mg/dL   Phosphorus   Result Value Ref Range    Phosphorus 2.3 (L) 2.5 - 4.5 mg/dL   CBC with platelets   Result Value Ref Range    WBC Count 11.8 (H) 4.0 - 11.0 10e3/uL    RBC Count 2.39 (L) 4.40 - 5.90 10e6/uL    Hemoglobin 7.6 (L) 13.3 - 17.7 g/dL    Hematocrit 21.8 (L) 40.0 - 53.0 %    MCV 91 78 - 100 fL    MCH 31.8 26.5 - 33.0 pg    MCHC 34.9 31.5 - 36.5 g/dL    RDW 15.9 (H) 10.0 - 15.0 %    Platelet Count 101 (L) 150 - 450 10e3/uL   CONDITIONAL Prepare red blood cells (unit)   Result Value Ref Range    Blood Component Type Red Blood Cells     Product Code N9380B80     Unit Status Transfused     Unit Number [id number]     CROSSMATCH Compatible     CODING  SYSTEM [id number]     ISSUE DATE AND TIME [id number]     UNIT ABO/RH A+     UNIT TYPE ISBT 6200    ABO/Rh type and screen *Canceled*    Narrative    The following orders were created for panel order ABO/Rh type and screen.  Procedure                               Abnormality         Status                     ---------                               -----------         ------                       Please view results for these tests on the individual orders.   ABO/Rh type and screen    Narrative    The following orders were created for panel order ABO/Rh type and screen.  Procedure                               Abnormality         Status                     ---------                               -----------         ------                     Adult Type and Screen[[id number]]                            Final result                 Please view results for these tests on the individual orders.   Adult Type and Screen   Result Value Ref Range    ABO/RH(D) A POS     Antibody Screen Negative Negative    SPECIMEN EXPIRATION DATE [id number]    Glucose by meter   Result Value Ref Range    GLUCOSE BY METER POCT 171 (H) 70 - 99 mg/dL   Glucose by meter   Result Value Ref Range    GLUCOSE BY METER POCT 232 (H) 70 - 99 mg/dL   Glucose by meter   Result Value Ref Range    GLUCOSE BY METER POCT 335 (H) 70 - 99 mg/dL   Potassium   Result Value Ref Range    Potassium 5.2 3.4 - 5.3 mmol/L   Hemoglobin   Result Value Ref Range    Hemoglobin 8.6 (L) 13.3 - 17.7 g/dL   Glucose by meter   Result Value Ref Range    GLUCOSE BY METER POCT 149 (H) 70 - 99 mg/dL   Glucose by meter   Result Value Ref Range    GLUCOSE BY METER POCT 169 (H) 70 - 99 mg/dL   Glucose by meter   Result Value Ref Range    GLUCOSE BY METER POCT 132 (H) 70 - 99 mg/dL   XR Chest Port 1 View    Narrative    EXAM: XR CHEST PORT 1 VIEW  LOCATION: [facility]  DATE: 12/19/2024    INDICATION: RRT: Acute hypoxic respiratory failure,  dyspnea  COMPARISON: 3/19/2024.      Impression    IMPRESSION: The heart is normal in size. A right PICC is seen in place with the tip at the atrial caval junction. There is mild central pulmonary venous congestion, subsegmental atelectasis seen in the bilateral lower lung zones   Magnesium   Result Value Ref Range    Magnesium 1.7 1.7 - 2.3 mg/dL   Phosphorus   Result Value Ref Range    Phosphorus 2.5 2.5 - 4.5 mg/dL   CBC with platelets   Result Value Ref Range    WBC Count 8.4 4.0 - 11.0 10e3/uL    RBC Count 3.25 (L) 4.40 - 5.90 10e6/uL    Hemoglobin 10.2 (L) 13.3 - 17.7 g/dL    Hematocrit 29.2 (L) 40.0 - 53.0 %    MCV 90 78 - 100 fL    MCH 31.4 26.5 - 33.0 pg    MCHC 34.9 31.5 - 36.5 g/dL    RDW 15.8 (H) 10.0 - 15.0 %    Platelet Count 104 (L) 150 - 450 10e3/uL   Lactic acid whole blood   Result Value Ref Range    Lactic Acid 2.3 (H) 0.7 - 2.0 mmol/L   Nt probnp inpatient   Result Value Ref Range    N terminal Pro BNP Inpatient 1,232 0 - 1,800 pg/mL   Procalcitonin   Result Value Ref Range    Procalcitonin 0.27 <0.50 ng/mL   Ionized Calcium   Result Value Ref Range    Calcium Ionized Whole Blood 4.6 4.4 - 5.2 mg/dL   Comprehensive metabolic panel   Result Value Ref Range    Sodium 139 135 - 145 mmol/L    Potassium 3.8 3.4 - 5.3 mmol/L    Carbon Dioxide (CO2) 21 (L) 22 - 29 mmol/L    Anion Gap 13 7 - 15 mmol/L    Urea Nitrogen 21.8 8.0 - 23.0 mg/dL    Creatinine 0.68 0.67 - 1.17 mg/dL    GFR Estimate 86 >60 mL/min/1.73m2    Calcium 8.1 (L) 8.8 - 10.4 mg/dL    Chloride 105 98 - 107 mmol/L    Glucose 116 (H) 70 - 99 mg/dL    Alkaline Phosphatase 98 40 - 150 U/L    AST 61 (H) 0 - 45 U/L    ALT 71 (H) 0 - 70 U/L    Protein Total 5.1 (L) 6.4 - 8.3 g/dL    Albumin 3.1 (L) 3.5 - 5.2 g/dL    Bilirubin Total 0.5 <=1.2 mg/dL   Blood gas venous   Result Value Ref Range    pH Venous 7.44 (H) 7.32 - 7.43    pCO2 Venous 36 (L) 40 - 50 mm Hg    pO2 Venous 31 25 - 47 mm Hg    Bicarbonate Venous 25 21 - 28 mmol/L    Base  Excess/Deficit Venous 0.5 -3.0 - 3.0 mmol/L    FIO2 35     Oxyhemoglobin Venous 60 (L) 70 - 75 %    O2 Sat, Venous 61.4 (L) 70.0 - 75.0 %    Narrative    In healthy individuals, oxyhemoglobin (O2Hb) and oxygen saturation (SO2) are approximately equal. In the presence of dyshemoglobins, oxyhemoglobin can be considerably lower than oxygen saturation.   Transfusion Reaction Investigation    Narrative    The following orders were created for panel order Transfusion Reaction Investigation.  Procedure                               Abnormality         Status                     ---------                               -----------         ------                     Transfusion reaction rosa...[[id number]]                      In process                   Please view results for these tests on the individual orders.   COVID-19 Virus (Coronavirus) by PCR Nasopharyngeal    Specimen: Nasopharyngeal; Swab   Result Value Ref Range    SARS CoV2 PCR Negative Negative    Narrative    Testing was performed using the Xpert Xpress SARS-CoV-2 Assay on the Cepheid Gene-Xpert Instrument Systems. Additional information about this assay can be found via the Test Directory. This [place] FDA cleared test should be ordered for the detection of SARS-CoV-2 in individuals with signs and symptoms of respiratory tract infection. This test is for in vitro diagnostic use under the [place] FDA for laboratories certified under CLIA to perform high complexity testing. A negative result does not rule out the presence of PCR inhibitors in the specimen or target RNA concentration below the limit of detection for the assay. The possibility of a false negative should be considered if the patient's recent exposure or clinical presentation suggests COVID-19. This test was validated by [facility]. These Laboratories are certified under the  Clinical Laboratory Improvement Amendments (CLIA) as qualified to perform high complexity testing.   Glucose by  meter   Result Value Ref Range    GLUCOSE BY METER POCT 107 (H) 70 - 99 mg/dL     COVID-19 PCR Results          7/6/2023    17:48 3/19/2024    19:01   COVID-19 PCR Results   SARS CoV2 PCR Negative  Negative      COVID-19 Antibody Results, Testing for Immunity           No data to display                Time Spent on this Encounter   I spent 90 minutes of critical care time on the unit/floor managing the care of Clayton Pacheco. Upon evaluation, this patient had a high probability of imminent or life-threatening deterioration due to acute hypoxic respiratory failure with respiratory distress, which required my direct attention, intervention, and personal management. 100% of my time was spent at the bedside counseling the patient and/or coordinating care regarding services listed in this note.      XOCHILT Wolfe, CNP  Hospitalist - [facility]  Text me on the [facility] placido for a textback

## 2024-12-19 NOTE — PROGRESS NOTES
RRT called to evaluate. RR  noted to be  in mid 30s. BiPAP set up per NP order  with current settings 12/5  R16 35% SpO2  > 95%. Skin intact. Breath sound exp wheezes. Prn neb given. BS improved post neb tx. Will continue to follow assess and treat respiratory status.

## 2024-12-19 NOTE — CODE/RAPID RESPONSE
"Sepsis Evaluation     I was called to see Clayton Pacheco due to abnormal vital signs triggering the Sepsis SIRS screening alert as well as lactic acidosis of 2.7. He is not known to have an infection.     PHYSICAL EXAM  Vital Signs:  Temp: (!) 103.2  F (39.6  C) Temp src: Rectal BP: 99/46 Pulse: 102   Resp: (!) 32 SpO2: 100 % O2 Device: BiPAP/CPAP Oxygen Delivery: 1/2 LPM    General: acutely ill appearing  Mental Status: altered level of consciousness based on lethargy .     Remainder of physical exam is significant for tachypnea, tachycardia, intermittent hypotension, fever, toxic-appearing.    DATA  Lactic Acid   Date Value Ref Range Status   12/19/2024 2.7 (H) 0.7 - 2.0 mmol/L Final   12/19/2024 2.3 (H) 0.7 - 2.0 mmol/L Final       ASSESSMENT AND PLAN  Vital signs, lab and physical exam findings constitute a diagnosis of SEVERE SEPSIS, based on:SBP <90 or MAP <65, SIRS criteria met, Lactic acid resulted with a level > 2.0, and Acute Resp Failure requiring BIPAP or Mechanical Vent          Anti-infectives (From now, onward)      Start     Dose/Rate Route Frequency Ordered Stop    12/19/24 0400  piperacillin-tazobactam (ZOSYN) 4.5 g vial to attach to  mL bag        Note to Pharmacy: For [facility], [facility] and [facility]: For Zosyn-naive patients, use the \"Zosyn initial dose + extended infusion\" order panel.    4.5 g  over 30 Minutes Intravenous EVERY 6 HOURS 12/19/24 0332            Current antibiotic coverage is appropriate for source of infection.    3 Hour Severe Sepsis Bundle Completion:  1. Initial Lactic Acid result shown above. Repeat lactic acid ordered by reflex for 2 hours from initial collection.  2. Blood Cultures: Ordered, to be drawn prior to antibiotics.  3. Broad Spectrum Antibiotics Administered: yes  4. Is hypotension present? Yes. (Definition - 2 SBPs <90, MAP <65, or decrease > 40 from baseline)   Full 30 mL/kg bolus given based on weight: 2,000 mL (rounded up from 1980 ml)    BMI Readings from Last 1 " Encounters:   12/18/24 23.48 kg/m      30 mL/kg fluids based on weight: 1,980 mL  30 mL/kg fluids based on IBW (must be >= 60 inches tall): 1,910 mL    Disposition: The patient will remain on the current unit. We will continue to monitor this patient closely..  XOCHILT Reyes CNP  12/19/24, 5:57 AM    Sepsis Criteria   Sepsis: The body's generalized inflammatory state as a response to an infection. Sepsis Predictive Model includes >80 variable to alert to potential sepsis.  Severe Sepsis: Sepsis plus one or more variables of acute organ dysfunction (Note: lactic acid >2 or acute encephalopathy each qualify as organ dysfunction)  Septic Shock: Sepsis AND hypotension despite adequate volume resuscitation with crystalloid or lactic acid >=4  Note: HYPOTENSION is defined as 2 BP readings measured 3 hrs apart that have a SBP <90, MAP <65, or decrease >40 mmHg, occurring 6 hrs before or after t-zero

## 2024-12-20 LAB
ANION GAP SERPL CALCULATED.3IONS-SCNC: 7 MMOL/L (ref 7–15)
BLD PROD TYP BPU: NORMAL
BLOOD COMPONENT TYPE: NORMAL
BUN SERPL-MCNC: 24 MG/DL (ref 8–23)
CALCIUM SERPL-MCNC: 7.5 MG/DL (ref 8.8–10.4)
CHLORIDE SERPL-SCNC: 106 MMOL/L (ref 98–107)
CODING SYSTEM: NORMAL
CREAT SERPL-MCNC: 0.8 MG/DL (ref 0.67–1.17)
CROSSMATCH: NORMAL
EGFRCR SERPLBLD CKD-EPI 2021: 81 ML/MIN/1.73M2
ERYTHROCYTE [DISTWIDTH] IN BLOOD BY AUTOMATED COUNT: 16.4 % (ref 10–15)
GLUCOSE BLDC GLUCOMTR-MCNC: 178 MG/DL (ref 70–99)
GLUCOSE BLDC GLUCOMTR-MCNC: 184 MG/DL (ref 70–99)
GLUCOSE BLDC GLUCOMTR-MCNC: 184 MG/DL (ref 70–99)
GLUCOSE BLDC GLUCOMTR-MCNC: 188 MG/DL (ref 70–99)
GLUCOSE BLDC GLUCOMTR-MCNC: 192 MG/DL (ref 70–99)
GLUCOSE BLDC GLUCOMTR-MCNC: 205 MG/DL (ref 70–99)
GLUCOSE SERPL-MCNC: 181 MG/DL (ref 70–99)
HCO3 SERPL-SCNC: 22 MMOL/L (ref 22–29)
HCT VFR BLD AUTO: 23.7 % (ref 40–53)
HGB BLD-MCNC: 7.9 G/DL (ref 13.3–17.7)
HGB BLD-MCNC: 8.6 G/DL (ref 13.3–17.7)
HGB BLD-MCNC: 8.7 G/DL (ref 13.3–17.7)
ISSUE DATE AND TIME: NORMAL
MAGNESIUM SERPL-MCNC: 1.8 MG/DL (ref 1.7–2.3)
MCH RBC QN AUTO: 33.7 PG (ref 26.5–33)
MCHC RBC AUTO-ENTMCNC: 36.7 G/DL (ref 31.5–36.5)
MCV RBC AUTO: 92 FL (ref 78–100)
PHOSPHATE SERPL-MCNC: 2.5 MG/DL (ref 2.5–4.5)
PLATELET # BLD AUTO: 100 10E3/UL (ref 150–450)
POTASSIUM SERPL-SCNC: 3.6 MMOL/L (ref 3.4–5.3)
RBC # BLD AUTO: 2.58 10E6/UL (ref 4.4–5.9)
SODIUM SERPL-SCNC: 135 MMOL/L (ref 135–145)
UNIT ABO/RH: NORMAL
UNIT NUMBER: NORMAL
UNIT STATUS: NORMAL
UNIT TYPE ISBT: 6200
WBC # BLD AUTO: 15.7 10E3/UL (ref 4–11)

## 2024-12-20 PROCEDURE — 250N000013 HC RX MED GY IP 250 OP 250 PS 637: Performed by: PHYSICIAN ASSISTANT

## 2024-12-20 PROCEDURE — 85014 HEMATOCRIT: CPT | Performed by: HOSPITALIST

## 2024-12-20 PROCEDURE — 83735 ASSAY OF MAGNESIUM: CPT | Performed by: INTERNAL MEDICINE

## 2024-12-20 PROCEDURE — 250N000009 HC RX 250

## 2024-12-20 PROCEDURE — 99222 1ST HOSP IP/OBS MODERATE 55: CPT | Performed by: INTERNAL MEDICINE

## 2024-12-20 PROCEDURE — 999N000157 HC STATISTIC RCP TIME EA 10 MIN

## 2024-12-20 PROCEDURE — 94640 AIRWAY INHALATION TREATMENT: CPT | Mod: 76

## 2024-12-20 PROCEDURE — 84100 ASSAY OF PHOSPHORUS: CPT | Performed by: INTERNAL MEDICINE

## 2024-12-20 PROCEDURE — 99233 SBSQ HOSP IP/OBS HIGH 50: CPT | Performed by: HOSPITALIST

## 2024-12-20 PROCEDURE — B4185 PARENTERAL SOL 10 GM LIPIDS: HCPCS | Performed by: INTERNAL MEDICINE

## 2024-12-20 PROCEDURE — 94640 AIRWAY INHALATION TREATMENT: CPT

## 2024-12-20 PROCEDURE — P9016 RBC LEUKOCYTES REDUCED: HCPCS | Performed by: INTERNAL MEDICINE

## 2024-12-20 PROCEDURE — 80048 BASIC METABOLIC PNL TOTAL CA: CPT | Performed by: INTERNAL MEDICINE

## 2024-12-20 PROCEDURE — 250N000011 HC RX IP 250 OP 636

## 2024-12-20 PROCEDURE — 85018 HEMOGLOBIN: CPT | Performed by: HOSPITALIST

## 2024-12-20 PROCEDURE — 120N000013 HC R&B IMCU

## 2024-12-20 PROCEDURE — 250N000009 HC RX 250: Performed by: INTERNAL MEDICINE

## 2024-12-20 PROCEDURE — 250N000013 HC RX MED GY IP 250 OP 250 PS 637: Performed by: INTERNAL MEDICINE

## 2024-12-20 PROCEDURE — 250N000009 HC RX 250: Performed by: HOSPITALIST

## 2024-12-20 RX ADMIN — IPRATROPIUM BROMIDE AND ALBUTEROL SULFATE 3 ML: .5; 3 SOLUTION RESPIRATORY (INHALATION) at 11:10

## 2024-12-20 RX ADMIN — INSULIN ASPART 2 UNITS: 100 INJECTION, SOLUTION INTRAVENOUS; SUBCUTANEOUS at 08:45

## 2024-12-20 RX ADMIN — TAMSULOSIN HYDROCHLORIDE 0.4 MG: 0.4 CAPSULE ORAL at 21:11

## 2024-12-20 RX ADMIN — OLIVE OIL AND SOYBEAN OIL 250 ML: 16; 4 INJECTION, EMULSION INTRAVENOUS at 20:29

## 2024-12-20 RX ADMIN — LEVOTHYROXINE SODIUM 75 MCG: 75 TABLET ORAL at 08:44

## 2024-12-20 RX ADMIN — CARBOXYMETHYLCELLULOSE SODIUM 2 DROP: 5 SOLUTION/ DROPS OPHTHALMIC at 21:11

## 2024-12-20 RX ADMIN — ASCORBIC ACID, VITAMIN A PALMITATE, CHOLECALCIFEROL, THIAMINE HYDROCHLORIDE, RIBOFLAVIN-5 PHOSPHATE SODIUM, PYRIDOXINE HYDROCHLORIDE, NIACINAMIDE, DEXPANTHENOL, ALPHA-TOCOPHEROL ACETATE, VITAMIN K1, FOLIC ACID, BIOTIN, CYANOCOBALAMIN: 200; 3300; 200; 6; 3.6; 6; 40; 15; 10; 150; 600; 60; 5 INJECTION, SOLUTION INTRAVENOUS at 20:31

## 2024-12-20 RX ADMIN — IPRATROPIUM BROMIDE AND ALBUTEROL SULFATE 3 ML: .5; 3 SOLUTION RESPIRATORY (INHALATION) at 07:14

## 2024-12-20 RX ADMIN — IPRATROPIUM BROMIDE AND ALBUTEROL SULFATE 3 ML: .5; 3 SOLUTION RESPIRATORY (INHALATION) at 20:38

## 2024-12-20 RX ADMIN — PIPERACILLIN AND TAZOBACTAM 4.5 G: 4; .5 INJECTION, POWDER, FOR SOLUTION INTRAVENOUS at 04:19

## 2024-12-20 RX ADMIN — IPRATROPIUM BROMIDE AND ALBUTEROL SULFATE 3 ML: .5; 3 SOLUTION RESPIRATORY (INHALATION) at 15:21

## 2024-12-20 RX ADMIN — INSULIN ASPART 1 UNITS: 100 INJECTION, SOLUTION INTRAVENOUS; SUBCUTANEOUS at 00:53

## 2024-12-20 RX ADMIN — PIPERACILLIN AND TAZOBACTAM 4.5 G: 4; .5 INJECTION, POWDER, FOR SOLUTION INTRAVENOUS at 09:51

## 2024-12-20 RX ADMIN — QUETIAPINE 12.5 MG: 25 TABLET, FILM COATED ORAL at 21:11

## 2024-12-20 RX ADMIN — PIPERACILLIN AND TAZOBACTAM 4.5 G: 4; .5 INJECTION, POWDER, FOR SOLUTION INTRAVENOUS at 16:26

## 2024-12-20 RX ADMIN — PIPERACILLIN AND TAZOBACTAM 4.5 G: 4; .5 INJECTION, POWDER, FOR SOLUTION INTRAVENOUS at 23:38

## 2024-12-20 RX ADMIN — CARBOXYMETHYLCELLULOSE SODIUM 2 DROP: 5 SOLUTION/ DROPS OPHTHALMIC at 08:44

## 2024-12-20 RX ADMIN — INSULIN ASPART 2 UNITS: 100 INJECTION, SOLUTION INTRAVENOUS; SUBCUTANEOUS at 13:56

## 2024-12-20 RX ADMIN — INSULIN ASPART 1 UNITS: 100 INJECTION, SOLUTION INTRAVENOUS; SUBCUTANEOUS at 22:53

## 2024-12-20 RX ADMIN — INSULIN ASPART 1 UNITS: 100 INJECTION, SOLUTION INTRAVENOUS; SUBCUTANEOUS at 04:18

## 2024-12-20 RX ADMIN — PANTOPRAZOLE SODIUM 40 MG: 40 TABLET, DELAYED RELEASE ORAL at 16:26

## 2024-12-20 RX ADMIN — PANTOPRAZOLE SODIUM 40 MG: 40 TABLET, DELAYED RELEASE ORAL at 08:43

## 2024-12-20 ASSESSMENT — ACTIVITIES OF DAILY LIVING (ADL)
ADLS_ACUITY_SCORE: 48
ADLS_ACUITY_SCORE: 52
ADLS_ACUITY_SCORE: 48
ADLS_ACUITY_SCORE: 52
ADLS_ACUITY_SCORE: 48
ADLS_ACUITY_SCORE: 48
ADLS_ACUITY_SCORE: 52
ADLS_ACUITY_SCORE: 52
ADLS_ACUITY_SCORE: 48
ADLS_ACUITY_SCORE: 52
ADLS_ACUITY_SCORE: 48

## 2024-12-20 NOTE — PROGRESS NOTES
[facility]  Gastroenterology Progress Note     Clayton Pacheco MRN# [id number]   YOB: 1929 Age: 89 year old          Assessment and Plan:     Clayton Pacheco is a markedly pleasant 89 year old gentleman with past medical history that is most notable for hypertension, hyperlipidemia, and hypothyroid, who presents with acute abdominal pain and melena, and is found to have acute blood loss anemia and due to acute upper GI bleeding and hemorrhage, due to bleeding ulcerated gastric mass.     UGIB (upper gastrointestinal bleed)  GIST tumor  Called yesterday to attempt hemostasis to known gastric mass as [facility] did not see role in repeat EGD  Initial EGD noted large ulcerated mass in the fundus with adherent clot and large amount of blood clots in the stomach. Biopsies done and hemospray used then.    Repeat EGD on 12/16 with Dr. Kim noted clotted blood in cardia, fundus and gastric body. Gastric tumor in gastric fundus. Clips were placed. Injected and treated with APC and hemostatic spray applied  Hgb  stable at 8.7    - recommend to continue to monitor hemoglobin  - oral pantoprazole 40 mg BID  - ok with GI to advance diet to full liquids and then soft diet as tolerated  - GI will sign off service. Please contact [facility] if any further GI service needed.   - Follow-up with [facility] in 1-2 weeks after discharge           UGIB (upper gastrointestinal bleed)      Interval History:     no new complaints and doing well; no cp, sob, n/v/d, or abd pain.              Review of Systems:     C: NEGATIVE for fever, chills, change in weight  E/M: NEGATIVE for ear, mouth and throat problems  R: NEGATIVE for significant cough or SOB  CV: NEGATIVE for chest pain, palpitations or peripheral edema             Medications:   I have reviewed this patient's current medications  Current Facility-Administered Medications   Medication Dose Route Frequency Provider Last Rate Last  Admin    carboxymethylcellulose PF (REFRESH PLUS) 0.5 % ophthalmic solution 2 drop  2 drop Both Eyes BID Lobo Zimmerman MD   2 drop at 12/20/24 0844    insulin aspart (NovoLOG) injection (RAPID ACTING)  1-7 Units Subcutaneous Q4H Lobo Zimmerman MD   2 Units at 12/20/24 0845    ipratropium - albuterol 0.5 mg/2.5 mg/3 mL (DUONEB) neb solution 3 mL  3 mL Nebulization 4x daily Art Silverman MD   3 mL at 12/20/24 0714    levothyroxine (SYNTHROID/LEVOTHROID) tablet 75 mcg  75 mcg Oral Daily Lobo Zimmerman MD   75 mcg at 12/20/24 0844    lipids plant base (CLINOLIPID) 20 % infusion 250 mL  250 mL Intravenous Q24H Lobo Zimmerman MD 20.8 mL/hr at 12/19/24 2018 250 mL at 12/19/24 2018    pantoprazole (PROTONIX) EC tablet 40 mg  40 mg Oral BID AC Bhavani Burgess PA-C   40 mg at 12/20/24 0843    piperacillin-tazobactam (ZOSYN) 4.5 g vial to attach to  mL bag  4.5 g Intravenous Q6H Joel Gonzalez APRN CNP   4.5 g at 12/20/24 0951    QUEtiapine (SEROquel) half-tab 12.5 mg  12.5 mg Oral At Bedtime Lobo Zimmerman MD   12.5 mg at 12/19/24 2214    sodium chloride (PF) 0.9% PF flush 3 mL  3 mL Intracatheter Q8H Diony Roy MD   3 mL at 12/20/24 0418    tamsulosin (FLOMAX) capsule 0.4 mg  0.4 mg Oral Daily Lobo Zimmerman MD   0.4 mg at 12/19/24 2008                  Physical Exam:   Vitals were reviewed  Vital Signs with Ranges  Temp:  [97.9  F (36.6  C)-98.8  F (37.1  C)] 97.9  F (36.6  C)  Pulse:  [88-96] 96  Resp:  [19-26] 25  BP: (111-136)/(50-62) 120/55  SpO2:  [92 %-97 %] 96 %  I/O last 3 completed shifts:  In: 1050 [P.O.:800]  Out: 800 [Urine:800]  Constitutional: healthy, alert, and no distress   Cardiovascular: negative, PMI normal. No lifts, heaves, or thrills. RRR. No murmurs, clicks gallops or rub  Respiratory: negative, Percussion normal. Good diaphragmatic excursion. Lungs clear  Abdomen: Abdomen soft, non-tender. BS normal. No masses, organomegaly           Data:    I reviewed the patient's new clinical lab test results.   Recent Labs   Lab Test 12/20/24  0604 12/19/24  1734 12/19/24  0239 12/18/24  1621 12/18/24  0557 12/17/24  1356 12/17/24  0600 12/15/24  0606 12/15/24  0228 12/14/24 2007   WBC 15.7*  --  8.4  --  11.8*  --   --    < > 10.4 14.0*   HGB 8.7*  8.6* 8.5* 10.2*   < > 7.6*   < > 9.3*   < > 6.8* 8.6*   MCV 92  --  90  --  91  --   --    < > 98 99   *  --  104*  --  101*  --   --    < > 189 223   INR  --   --   --   --   --   --  1.17*  --  1.28* 1.21*    < > = values in this interval not displayed.     Recent Labs   Lab Test 12/20/24  0604 12/19/24  0502 12/19/24  0239   POTASSIUM 3.6 3.5 3.8   CHLORIDE 106 108* 105   CO2 22 21* 21*   BUN 24.0* 24.1* 21.8   ANIONGAP 7 11 13     Recent Labs   Lab Test 12/19/24  0501 12/19/24  0239 12/17/24  0600 12/15/24  0606 12/14/24  2118 03/21/24  0738 03/19/24  1902 03/19/24  1858   ALBUMIN  --  3.1* 2.9* 3.3*  --    < >  --  3.8   BILITOTAL  --  0.5 0.3 0.5  --    < >  --  0.9   ALT  --  71* 16 11  --    < >  --  31   AST  --  61* 23 16  --    < >  --  35   PROTEIN 20*  --   --   --  Negative  --  100*  --    LIPASE  --   --   --   --   --   --   --  14    < > = values in this interval not displayed.       I reviewed the patient's new imaging results.    All laboratory data reviewed  All imaging studies reviewed by me.    Bhavani Burgess PA-C,  12/17/2024  [facility]  Office : [phone]  Cell: [phone] (Dr. Kim)  Cell: [phone] (Bhavani Burgess PA-C)

## 2024-12-20 NOTE — PLAN OF CARE
[id number]    Orientations: A&OX 2-3. Intermittently disoriented to place, consistently disoriented to time.   Vitals: VSS on RA (weaned off BiPAP at 11am). LS coarse with intermittent wheezing.   Pain: Denies pain  Tele: SR  GI/: Adequate uop via external catheter. +BS, +flatus, loose black BM X2 up to bedside commode.   Diet: Tolerating clear liquid diet. Denies nausea.   Ambulation/Assist: A1GBW.   Skin/Wounds: Scattered bruising.   LDA: PIV X1 SL. RUE PICC infusing TPN at 75 ml/hr.   Labs: BG Q4h.   Hgb Q12H 10.2 & 8.5  K, Mg, & Phos WDL with rechecks tomorrow a.m.  Plan: Continue to monitor & follow POC.

## 2024-12-20 NOTE — CONSULTS
[facility]    Infectious Disease Consultation     Date of Admission:  12/15/2024  Date of Consult (When I saw the patient): 12/20/24    Assessment & Plan   Clayton Pacheco is a 89 year old male who was admitted on 12/15/2024.     Impression: 1 89-year-old male, admitted with acute GI bleeding and some vague generalized symptoms, had leukocytosis at admission but not anything else for infection.  CT scan with an ulcerated mass, biopsy suggest bleeding ulcer rather than malignancy  2 in-hospital major fever episode and sepsis, found to have E. coli bacteremia, CT scan without an obvious intra-abdominal infection conceivably ulcer is the source would be unusual pathogen for this, on antibiotics already clinically improved  3 prior E. coli bacteremia earlier this year with prostatitis and urinary tract infection source but current urine unremarkable with no urinary symptoms    REC 1 Zosyn for now, cover intra-abdominal source of infection and the bacteremia await full culture information and adjust, if he clinically improved simply treat with antibiotics if not clinically improving further workup for source.        Jose Velazquez MD    Reason for Consult   Reason for consult: I was asked to evaluate this patient for bacteremia.    Primary Care Physician   Oscar De Oliveira    Chief Complaint   GI bleeding    History is obtained from the patient and medical records    History of Present Illness   Clayton Pacheco is a 89 year old male who presents with acute GI bleeding, had some vague generalized symptoms and leukocytosis but really nothing for infection and no workup for that.  CT scan showed no obvious infection found to have a mass in the gastric area.  Has had some degree of intermittent continued bleeding and biopsy has now been done which showed ulcer but not any malignancy.  Patient was without major infection symptoms or any localizing infection type symptoms until yesterday  when he had acute major fever spike and sepsis type occurrence.  Blood culture at the time is growing E. coli.  No other new focal or localizing symptoms    Past Medical History   I have reviewed this patient's medical history and updated it with pertinent information if needed.   Past Medical History:   Diagnosis Date    Enlarged prostate     HTN (hypertension)     Hyperlipidemia     Hypothyroidism     Presbyopia     Vitamin D deficiency        Past Surgical History   I have reviewed this patient's surgical history and updated it with pertinent information if needed.  Past Surgical History:   Procedure Laterality Date    EGD  12/15/2024    ESOPHAGOSCOPY, GASTROSCOPY, DUODENOSCOPY (EGD), COMBINED N/A 12/15/2024    Procedure: ESOPHAGOGASTRODUODENOSCOPY WITH GASTRIC MASS BIOPSIES AND HEMOSTASIS;  Surgeon: Lobo Hernandez MD;  Location: [facility] OR       Prior to Admission Medications   Prior to Admission Medications   Prescriptions Last Dose Informant Patient Reported? Taking?   Vitamin D3 (VITAMIN D, CHOLECALCIFEROL,) 25 mcg (1000 units) tablet 12/13/2024 Self Yes Yes   Sig: Take 25 mcg by mouth daily.   acetaminophen (TYLENOL) 500 MG tablet 12/14/2024 Morning Self Yes Yes   Sig: Take 1,000 mg by mouth 2 times daily.   amLODIPine (NORVASC) 10 MG tablet 12/14/2024 Morning Other, Self Yes Yes   Sig: Take 10 mg by mouth daily.   carboxymethylcellulose PF (REFRESH PLUS) 0.5 % ophthalmic solution 12/13/2024 Self Yes Yes   Sig: Place 2 drops into both eyes 2 times daily.   irbesartan-hydrochlorothiazide (AVALIDE) 300-12.5 MG tablet 12/14/2024 Morning Other, Self Yes Yes   Sig: Take 1 tablet by mouth daily.   levothyroxine (SYNTHROID/LEVOTHROID) 75 MCG tablet 12/14/2024 Morning Other, Self Yes Yes   Sig: Take 75 mcg by mouth daily.   melatonin 3 MG tablet 12/13/2024 Self Yes Yes   Sig: Take 3 mg by mouth at bedtime.   polyethylene glycol (MIRALAX) 17 GM/Dose powder Past Week Self Yes Yes   Sig: Take 1 Capful by  "mouth daily as needed for constipation.   pravastatin (PRAVACHOL) 20 MG tablet 12/13/2024 Evening Other, Self Yes Yes   Sig: Take 20 mg by mouth daily.   tamsulosin (FLOMAX) 0.4 MG capsule 12/13/2024 Evening Other, Self Yes Yes   Sig: Take 0.4 mg by mouth daily.      Facility-Administered Medications: None     Allergies   No Known Allergies    Immunization History   Immunization History   Administered Date(s) Administered    COVID-19 12+ (Pfizer) 11/17/2023    COVID-19 Bivalent 12+ (Pfizer) 11/03/2022    COVID-19 MONOVALENT 12+ (Pfizer) 02/11/2021, 03/04/2021, 10/12/2021, 04/20/2022       Social History   I have reviewed this patient's social history and updated it with pertinent information if needed. Clayton Pacheco  reports that he has never smoked. He has never used smokeless tobacco. He reports that he does not currently use alcohol. He reports that he does not currently use drugs.    Family History   I have reviewed this patient's family history and updated it with pertinent information if needed.   Family History   Problem Relation Age of Onset    Coronary Artery Disease Son        Review of Systems   The 10 point Review of Systems is negative    Physical Exam   Temp: (P) 98.7  F (37.1  C) Temp src: (P) Oral BP: 128/55 Pulse: 92   Resp: 24 SpO2: 93 % O2 Device: None (Room air)    Vital Signs with Ranges  Temp:  [97.9  F (36.6  C)-98.7  F (37.1  C)] (P) 98.7  F (37.1  C)  Pulse:  [] 92  Resp:  [21-28] 24  BP: (113-141)/(50-62) 128/55  SpO2:  [91 %-96 %] 93 %  155 lbs 8 oz  Body mass index is 25.1 kg/m .    GENERAL APPEARANCE:  awake  EYES: Eyes grossly normal to inspection  NECK: no adenopathy  RESP: lungs clear   CV: regular rates and rhythm  LYMPHATICS: normal ant/post cervical and supraclavicular nodes  ABDOMEN: soft, nontender  MS: extremities normal  SKIN: no suspicious lesions or rashes        Data   All laboratory and imaging data in the past 24 hours reviewed  No results for input(s): \"CULT\" " "in the last 168 hours.  No lab results found.    Invalid input(s): \"UC\"       All cultures:  Recent Labs   Lab 12/19/24  0256   CULTURE Positive on the 1st day of incubation*  Escherichia coli*  Positive on the 1st day of incubation*  Escherichia coli*      Blood culture:  Results for orders placed or performed during the hospital encounter of 12/15/24   Blood Culture Arm, Left    Collection Time: 12/19/24  2:56 AM    Specimen: Arm, Left; Blood   Result Value Ref Range    Culture Positive on the 1st day of incubation (A)     Culture Escherichia coli (AA)    Blood Culture Arm, Right    Collection Time: 12/19/24  2:56 AM    Specimen: Arm, Right; Blood   Result Value Ref Range    Culture Positive on the 1st day of incubation (A)     Culture Escherichia coli (AA)    Results for orders placed or performed during the hospital encounter of 03/19/24   Blood Culture Peripheral Blood    Collection Time: 03/19/24  7:00 PM    Specimen: Peripheral Blood   Result Value Ref Range    Culture No Growth    Blood Culture Arm, Left    Collection Time: 03/19/24  6:58 PM    Specimen: Arm, Left; Blood   Result Value Ref Range    Culture Positive on the 1st day of incubation (A)     Culture Escherichia coli (AA)        Susceptibility    Escherichia coli - KEY     Ampicillin >=32 Resistant ug/mL     Ampicillin/ Sulbactam >=32 Resistant ug/mL     Piperacillin/Tazobactam 64 Resistant ug/mL     Ceftazidime <=1 Susceptible ug/mL     Ceftriaxone <=1 Susceptible ug/mL     Cefepime <=1 Susceptible ug/mL     Meropenem <=0.25 Susceptible ug/mL     Gentamicin <=1 Susceptible ug/mL     Tobramycin <=1 Susceptible ug/mL     Ciprofloxacin <=0.25 Susceptible ug/mL     Levofloxacin <=0.12 Susceptible ug/mL     Trimethoprim/Sulfamethoxazole <=1/19 Susceptible ug/mL   Results for orders placed or performed during the hospital encounter of 07/06/23   Blood Culture Peripheral Blood    Collection Time: 07/06/23  5:48 PM    Specimen: Peripheral Blood   Result " Value Ref Range    Culture No Growth       Urine culture:  Results for orders placed or performed during the hospital encounter of 03/19/24   Urine Culture    Collection Time: 03/19/24  7:02 PM    Specimen: Urine, Clean Catch   Result Value Ref Range    Culture >100,000 CFU/mL Escherichia coli (A)        Susceptibility    Escherichia coli - KEY     Ampicillin >=32 Resistant ug/mL     Ampicillin/ Sulbactam >=32 Resistant ug/mL     Piperacillin/Tazobactam 64 Resistant ug/mL     Cefazolin* <=4 Susceptible ug/mL      * Cefazolin KEY breakpoints are for the treatment of uncomplicated urinary tract infections. For the treatment of systemic infections, please contact the laboratory for additional testing.     Cefoxitin <=4 Susceptible ug/mL     Ceftazidime <=1 Susceptible ug/mL     Ceftriaxone <=1 Susceptible ug/mL     Cefepime <=1 Susceptible ug/mL     Gentamicin <=1 Susceptible ug/mL     Tobramycin <=1 Susceptible ug/mL     Ciprofloxacin <=0.25 Susceptible ug/mL     Levofloxacin <=0.12 Susceptible ug/mL     Nitrofurantoin <=16 Susceptible ug/mL     Trimethoprim/Sulfamethoxazole <=1/19 Susceptible ug/mL   Results for orders placed or performed during the hospital encounter of 07/06/23   Urine Culture    Collection Time: 07/06/23  6:35 PM    Specimen: Urine, Clean Catch   Result Value Ref Range    Culture >100,000 CFU/mL Escherichia coli (A)        Susceptibility    Escherichia coli - KEY     Ampicillin >=32 Resistant ug/mL     Ampicillin/ Sulbactam >=32 Resistant ug/mL     Piperacillin/Tazobactam <=4 Susceptible ug/mL     Cefazolin* 8 Susceptible ug/mL      * Cefazolin KEY breakpoints are for the treatment of uncomplicated urinary tract infections. For the treatment of systemic infections, please contact the laboratory for additional testing.     Cefoxitin <=4 Susceptible ug/mL     Ceftazidime <=1 Susceptible ug/mL     Ceftriaxone <=1 Susceptible ug/mL     Cefepime <=1 Susceptible ug/mL     Gentamicin <=1 Susceptible  ug/mL     Tobramycin <=1 Susceptible ug/mL     Ciprofloxacin <=0.25 Susceptible ug/mL     Levofloxacin <=0.12 Susceptible ug/mL     Nitrofurantoin <=16 Susceptible ug/mL     Trimethoprim/Sulfamethoxazole <=1/19 Susceptible ug/mL

## 2024-12-20 NOTE — PROGRESS NOTES
CLINICAL NUTRITION SERVICES - REASSESSMENT NOTE    Recommendations Ordered by Registered Dietitian (RD):   - Continue TPN.    Malnutrition: 12/16  % Weight Loss:  None noted  % Intake:  Unable to fully assess   Subcutaneous Fat Loss:  Orbital region moderate depletion, Upper arm region moderate depletion, and Thoracic region moderate depletion  Muscle Loss:  Temporal region moderate depletion, Clavicle bone region moderate depletion, and Dorsal hand region moderate depletion  Fluid Retention:  None noted     Malnutrition Diagnosis: Moderate malnutrition  In Context of:  Chronic illness or disease     EVALUATION OF PROGRESS TOWARD GOALS   Diet: CLD  Nutrition Support:   Nutrition Support Parenteral:  Type of Access: PICC  Parenteral Frequency:  Continuous  Parenteral Regimen: Clinimix D15 AA5 @ 75 ml/hr + 250 mL 20% lipids daily   Total Parenteral Provisions: 1778 kcal (27 kcal/kg), 270 g Dex (GIR = 2.84), 90 g AA (1.4 g protein/kg), 28% kcal from lipid.     Intake/Tolerance:   - TPN at goal since 12/18.   - Diet advanced to clear liquids.   - Labs reviewed.   - -214 --> Med sliding scale insulin   - BM x2 yesterday.   - Wt elevated, to 70.5 kg (standing weight).     ASSESSED NUTRITION NEEDS:  Dosing Weight 66 kg - current   Estimated Energy Needs: 5776-6460 kcals (25-30 Kcal/Kg)  Justification: maintenance  Estimated Protein Needs: 79-99 grams protein (1.2-1.5 g pro/Kg)  Justification: preservation of lean body mass    NEW FINDINGS:   12/19 - RRT for respiratory distress --> Bipap. Pt currently on room air.   DNR/DNI, continue TPN.   GI following for GIB - full liquid diet recommended    Previous Goals:   TPN at goal to provide % estimated needs.   Evaluation: Met    Previous Nutrition Diagnosis:   Inadequate oral intake related to altered GI function as evidenced by NPO, need for TPN to meet nutrient needs.   Evaluation: Improving    CURRENT NUTRITION DIAGNOSIS  Inadequate oral intake related to  respiratory status, altered GI function as evidenced by intermittent Bipap requiring NPO, currently on RA w/ CLD.     INTERVENTIONS  Recommendations / Nutrition Prescription  ADAT as medically appropriate  Continue TPN until able to tolerate adequate PO    Implementation  Collaboration and Referral of Nutrition care: D/w PharmD    Goals  TPN @ Goal to provide % estimated needs.   Pt to tolerate >60% estimated needs orally (or >/=75% meals) to discontinue TPN.     MONITORING AND EVALUATION:  Progress towards goals will be monitored and evaluated per protocol and Practice Guidelines    Claribel Gutierrez RD, LD  Pager: [phone]  Available on [employer]

## 2024-12-20 NOTE — UTILIZATION REVIEW
Medicare Post Discharge Review:  Admission Status; Secondary Review Determination   Under the authority of the Utilization Management Committee, the utilization review process indicated a secondary review on Clayton Pacheco. The review outcome is based on review of the medical records, discussions with staff, and applying clinical experience noted on the date of the review.   (x) Inpatient Status Appropriate - This patient's medical care is consistent with medical management for inpatient care and reasonable inpatient medical practice.     RATIONALE FOR DETERMINATION   Clayton Pacheco is a 89-year-old male with a past medical history significant for hypertension, hypothyroidism who presents to the hospital with melenic stool. Suspected UGI bleed and ABLA identified.  GI performed EGD with large submucosal ulcerated mass noted within the gastric fundus.  Large amount of red blood and clot.  Peripheral biopsies obtained from the mass.  Further bleeding.  Hemostatic gel used and discussed need for angiogram.  Did require transfer to higher level of care (angiogram not available at [facility] at time of admission).      At the time of admission with the information available to the attending physician more than 2 nights Hospital complex care was anticipated, based on patient risk of adverse outcome if treated as outpatient and complex care required. Inpatient admission is appropriate based on the Medicare guidelines.   The information on this document is developed by the utilization review team in order for the business office to ensure compliance. This only denotes the appropriateness of proper admission status and does not reflect the quality of care rendered.   The definitions of Inpatient Status and Observation Status used in making the determination above are those provided in the CMS Coverage Manual, Chapter 1 and Chapter 6, section 70.4.   Sincerely,   Kristen Montalvo MD  Utilization Review  Physician  Advisor  [facility]

## 2024-12-20 NOTE — PLAN OF CARE
Goal Outcome Evaluation:    ,Patient Name: Zoran  MRN: [id number]  Date of Admission: 12/15/2024  Reason for Admission: GIB  Level of Care: [facility]    Vitals: /55   Pulse 96   Temp 98.5  F (36.9  C) (Oral)   Resp 25   Wt 70.5 kg (155 lb 8 oz)   SpO2 92%   BMI 25.10 kg/m      Pain: Denies  CV Surgery Patient: No    Assessment    Resp: C  Telemetry: SR  Neuro: A&O x4  GI/: +ve BS, voiding adequately using external catheter.  Skin/Wounds: Scattered bruises.  Lines/Drains: R DL PICC infusing TPN and Lipids, R PIV SL.  Activity: A1 GB/W  Sleep: Well  Abnormal Labs: Hgb stable at 8.7 this morning, WBCs elevated, 15.7. AM team to follow.     Aggression Stop Light: Green          Patient Care Plan: TPN, continue to trend Hgb.     John Hoover RN

## 2024-12-20 NOTE — PROGRESS NOTES
[facility]  Hospitalist Progress Note  Art Silverman MD  12/20/2024    Assessment & Plan   Clayton Pacheco is a markedly pleasant 89 year old gentleman with past medical history that is most notable for hypertension, hyperlipidemia, and hypothyroid, who presents with acute abdominal pain and melena, and is found to have acute blood loss anemia and due to acute upper GI bleeding and hemorrhage, due to bleeding ulcerated gastric mass.        ## Acute blood loss anemia   ## Acute upper GI bleeding and hemorrhage due to   ## Bleeding ulcerated gastric mass:   Of note, Mr. Pacheco lives independently, drives, takes care of his wife and history of HTN, HLP, hypothyroid.  He is not on any anticoagulation or NSAID use at home. He presented on 12/14/2024, to the [facility] ED, for evaluation of acute abdominal pain and melena.     In the ED, he has tachycardia, without hypotension, fever, or hypoxia. He has acute leukocytosis to 14.0. HGB has dropped to 8.6 from previous 11.4. BUN level is elevated in isolation from Cr. INR is slightly elevated to 1.21. Troponin levels are minimally elevated. UA is negative. CTA shows a small focus of active gastrointestinal bleeding within the distal stomach, and possible thickening of the distal stomach/proximal duodenum. He is admitted there, and [facility] consulted. IV Protonix started. IV NS ordered on admission.     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 This was biopsied. Hemostatic gel and spray are applied applied, but this ulcerated mass continued to bleed. Clotted blood in the gastric body is also noted. Patient was transferred for IR, surgical and GI involvement.      Prior to arrival, HGB drops to 6.8. Two units packed red cells transfusion have been started.       -- [facility] [place] with frequent monitoring of hemodynamics, serial Hgb.    -- on IV Protonix.BID  now on p.o. twice daily  12/16   -- patient had bloody stools this AM and hgb down to 6.6.    -- discussed with GI, IR and Surgery    -- CTA repeat did not show any active bleeding    -- currently no active bleeding for IR, surgery for now hesitant for possible large gastric resection and also not sure how far the mass is away from the esophagus. (Dr Jules)    -- if patient has active bleeding, then to IR    -- allow clear liquid diet; on TPN    -- transfuse to keep at or above 8    -- Pathology from 12/15 only shows gastric fundic mucosa with ulceration and reactive changes, negative for dysplasia and malignancy (sampling suboptimal)    -- underwent repeat EGD 12/16 by Dr Kim:     - Normal esophagus.               - Clotted blood in the cardia, in the gastric fundus and in the gastric body.               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 Clips were placed. Clip : [employer] Injected. Treated with argon plasma coagulation                             (APC). hemostatic spray applied.              12/17    -- pathology non-diagnositic,had a couple small melenic stools but he is warm perfused and hemodynamically stable.  Hgb is 8 and he was transfused to keep above 8 and is currently 9.3.  Change frequency of Hgb to q 12 hours, clear liquid diet, discontinue IMC, continue IV protonix.      -- discussed with surgery, they can resect this tumor (Marika) for later this week, subsequently deferred to conservative management by patient and family.    -- continue TPN, allow clear liquid diet, stop narcotics and deliriogenic medications and continue seroquel at bedtime, stop telemetry.                      -- Advance directives discussed and he says he would want a 24 hour trial of resuscitation, if only so that loved ones could gather. We discussed that he may have gastric cancer. It seems that despite his advanced age he lives fully independently at home and is fully alert and oriented. Further  care goal discussions will likely be ongoing during his hospital stay    -- SW consulted for disposition planning.  12/18/2024.  Assumed care.  Hemoglobin dropped to 7.6.  Received 1 unit PRBC per conditional transfusion.  Will obtain hemoglobin 2 hours after transfusion completed then in a.m. every 12 hours to determine rate of bleeding/stability.  Long discussion with patient and son Curt by phone, decision is conservative, ie NO surgery;.  Discussed with general surgery, Dr. Hairston who is in agreement.  Reviewed GI note, if further active bleeding, IR for embolization.  Patient and son acknowledge no firm tissue diagnosis to rule out CA however they are okay with this and are making plans for patient and wife regarding long-term care and comfort.  12/19/2024 RRT last evening due to acute respiratory failure with hypoxia, fever 103 rectal, LA, sepsis.  See RRT note.  Appreciate input.  CXR negative for acute pneumonia, UA negative.  BC subsequently grew out at 12 hours 1/2 gram-negative bacillus.  On Zosyn since RRT.  Given 2-1/2 L bolus since RRT.  At the time of RRT without etiology and Pro-Daryn 0.27 concern of possible PE and DDx, duplex venous ultrasound bilateral LE negative.  Subsequently weaned from BiPAP to room air, pt back to baseline mentation, hungry and desires pos [clear liquids under direct supervision] and afebrile and suspect initial respiratory distress likely related to mucous plug.  Continue DuoNeb.  Continue Zosyn for bacteremia, await ID and sensitivities.   Earlier this morning, discussion with the patient's son Curt after he was notified by RRT last evening he conferred with patient's wife and his other brother and sister-in-law and reviewed patient ACP which defers treatment plan primarily to attending MD; wife, 2 sons  [including Curt, present] and DILs knowing patient's wishes for his quality of life desired transition to DNR/DNI.  At that time patient on BiPAP without clear etiology and  given patient's age at 89 and family knowledge to Patients wishes decision to transition patient to DNR DNI [however continue TPN.]  12/20/2024 patient continues to progress to baseline, on room air although somewhat wheezy.  No history of COPD/bronchospasms.  Patient on TPN therefore changed to n.p.o., ice chips only to avoid possibility of aspiration/pneumonia until patient strengthens to sit up to eat.  Hemoglobins have remained stable, see GI note 12/20/2024.follow-up Dr. Julio ESPINOSA 1-2 weeks after discharge continue every 12 hours hemoglobin.    E. coli bacteremia  RRT 12/19/2024, see above.  Blood cultures 2/2 E. coli.  On Zosyn.  Continue on same until sensitivities return.  UA negative.  CXR negative for pneumonia.  Vitals stable.    Delirium, resolved  Was delirious initially upon presentation after transfer but has cleared mentally but does have episodes of confusion.  -- continue seroquel at bedtime -- stop narcotics  -monitor, rather spry 89-year-old, is aware of condition and elects nonaggressive management.  -see #1.     Hypernatremia, resolved  Secondary to decreased po intake.  Patient did have some delirium but this was more associated with sleep deprivation.  Patient was given IVF and now TPN with resolution of his hypernatremia  -Monitor BMP, today sodium 144.    Elevated Troponins: Noting that he was hospitalized 3/2024 for sepsis and UTI, as discussed below, and Troponins were elevated at that time. Cardiology was consulted. Lexiscan showed no evidence of inducible ischemia. Suspect elevated Troponins 12/14/24 to be due to demand ischemia due to acute illness.    -- discontinue telemetry     -- denies any chest pain or sob    -- not a candidate for coronary intervention due to active bleeding, patient elects conservative management.  12/19/2024 at time of RRT troponin 53 however flat 45.  No chest pain.     Massive prostatomegaly. Seen incidentally on CT. Noting that he was hospitalized for  "cystitis due to E coli in 7/2023, and hospitalized for sepsis, UTI, and bacteremia due to E coli, resistant to Unasyn and Zosyn, treated with Cipro.    -- Monitor symptomatically     Hypertension: TTE in 7/2023 showed preserved LVEF with mild to moderate concentric LVH and mild aortic stenosis.    -- Hold home anti-hypertensives for bleeding     Hyperlipidemia:   -- hold Pravastatin      Hypothyroid:   -- continue Synthroid      Moderate malnutrition  -- PICC ine, continue TPN, clear liquid diet, only  Moderate Protein-Calorie Malnutrition  \"% Weight Loss: None noted  % Intake: Unable to fully assess  Subcutaneous Fat Loss: Orbital region moderate depletion, Upper arm region moderate depletion, and Thoracic region moderate depletion  Muscle Loss: Temporal region moderate depletion, Clavicle bone region moderate depletion, and Dorsal hand region moderate depletion  Fluid Retention: None noted    Malnutrition Diagnosis: Moderate malnutrition  In Context of: Chronic illness or disease\"     Drugs/Tx:  TPN    Disposition:   Medically Ready for Discharge: Anticipated in 2-4 Days     Total time 50 minutes for today 12/20/2024 :  time consisted of the following, examination of patient, review of records including labs, imaging results, medications, interdisciplinary notes and completing documentation and orders.  Care Management included counseling/discussion with Patient and DIL regarding current condition including E. coli bacteremia, GIB and Coordination of Care time with Nursing  and Specialists, GI regarding care plan, management and surveillance.     Interval History   Patient continues more alert, stronger status post RRT last evening 12/19/2024.  Off BiPAP, on room air, mentation tired but near baseline.  Mild wheezing, continues on DuoNeb, will keep n.p.o. except for ice chips to avoid possible aspiration pneumonia.  On TPN.  No active bleeding.  Hemoglobin stable.  E. coli bacteremia as above.  UA negative.  CXR " negative.      Physical Exam    , Blood pressure 133/55, pulse 86, temperature (P) 98.7  F (37.1  C), temperature source (P) Oral, resp. rate 22, weight 70.5 kg (155 lb 8 oz), SpO2 93%.  Vitals:    12/17/24 0600 12/18/24 0600 12/20/24 0553   Weight: 64.5 kg (142 lb 3.2 oz) 66 kg (145 lb 8.1 oz) 70.5 kg (155 lb 8 oz)       General/Constitutional:   NAD, more alert, calm, cooperative  Chest/Respiratory: Respirations nonlabored now off BiPAP on room air.  Mild bronchospasms.    Cardiovascular:  regular, no murmur appreciated.  LE edema none  Gastrointestinal/Abdomen:  soft, nontender, no rebound, guarding or other peritoneal signs.  Neuro.  Gross motor tested, nonfocal,  Psych affect, awake, calm.    Medications   All medications were reviewed.  Current Facility-Administered Medications   Medication Dose Route Frequency Provider Last Rate Last Admin    No lozenges or gum should be given while patient on BIPAP/AVAPS/AVAPS AE   Does not apply Continuous PRN Joel Gonzalez APRN CNP        parenteral nutrition - Clinimix E   CENTRAL LINE IV TPN CONTINUOUS Nida Horner RPH        parenteral nutrition - Clinimix E   CENTRAL LINE IV TPN CONTINUOUS Nida Horner RPH 75 mL/hr at 12/19/24 2012 New Bag at 12/19/24 2012    Patient may continue current oral medications   Does not apply Continuous PRN Joel Gonzalez APRN CNP         Current Facility-Administered Medications   Medication Dose Route Frequency Provider Last Rate Last Admin    carboxymethylcellulose PF (REFRESH PLUS) 0.5 % ophthalmic solution 2 drop  2 drop Both Eyes BID Lobo Zimmerman MD   2 drop at 12/20/24 0844    insulin aspart (NovoLOG) injection (RAPID ACTING)  1-7 Units Subcutaneous Q4H Lobo Zimmerman MD   2 Units at 12/20/24 1356    ipratropium - albuterol 0.5 mg/2.5 mg/3 mL (DUONEB) neb solution 3 mL  3 mL Nebulization 4x daily Art Silverman MD   3 mL at 12/20/24 1110    levothyroxine (SYNTHROID/LEVOTHROID) tablet 75 mcg   75 mcg Oral Daily Lobo Zimmerman MD   75 mcg at 12/20/24 0844    lipids plant base (CLINOLIPID) 20 % infusion 250 mL  250 mL Intravenous Q24H Lobo Zimmerman MD 20.8 mL/hr at 12/19/24 2018 250 mL at 12/19/24 2018    pantoprazole (PROTONIX) EC tablet 40 mg  40 mg Oral BID AC Bhavani Burgess PA-C   40 mg at 12/20/24 0843    piperacillin-tazobactam (ZOSYN) 4.5 g vial to attach to  mL bag  4.5 g Intravenous Q6H Joel Gonzalez APRN CNP   4.5 g at 12/20/24 0951    QUEtiapine (SEROquel) half-tab 12.5 mg  12.5 mg Oral At Bedtime Lobo Zimmerman MD   12.5 mg at 12/19/24 2214    sodium chloride (PF) 0.9% PF flush 3 mL  3 mL Intracatheter Q8H Diony Roy MD   3 mL at 12/20/24 1358    tamsulosin (FLOMAX) capsule 0.4 mg  0.4 mg Oral Daily Lobo Zimmerman MD   0.4 mg at 12/19/24 2008        Data   Recent Labs   Lab 12/20/24  1350 12/20/24  0755 12/20/24  0604 12/19/24  2026 12/19/24  1734 12/19/24  0628 12/19/24  0502 12/19/24  0358 12/19/24  0239 12/18/24  0939 12/18/24  0557 12/17/24  0736 12/17/24  0600 12/15/24  0606 12/15/24  0228 12/14/24 2007   WBC  --   --  15.7*  --   --   --   --   --  8.4  --  11.8*  --   --    < > 10.4 14.0*   HGB  --   --  8.7*  8.6*  --  8.5*  --   --   --  10.2*   < > 7.6*   < > 9.3*   < > 6.8* 8.6*   MCV  --   --  92  --   --   --   --   --  90  --  91  --   --    < > 98 99   PLT  --   --  100*  --   --   --   --   --  104*  --  101*  --   --    < > 189 223   INR  --   --   --   --   --   --   --   --   --   --   --   --  1.17*  --  1.28* 1.21*   NA  --   --  135  --   --   --  140  --  139  --  144  --  148*   < > 140 135   POTASSIUM  --   --  3.6  --   --   --  3.5  --  3.8   < > 3.4  --  3.6   < > 4.1 4.2   CHLORIDE  --   --  106  --   --   --  108*  --  105  --  114*  --  117*   < > 108* 102   CO2  --   --  22  --   --   --  21*  --  21*  --  25  --  24   < > 23 21*   BUN  --   --  24.0*  --   --   --  24.1*  --  21.8  --  32.7*  --  59.5*   < > 64.4*  67.0*   CR  --   --  0.80  --   --   --  0.84  --  0.68  --  0.71  --  0.96   < > 0.97 0.96   ANIONGAP  --   --  7  --   --   --  11  --  13  --  5*  --  7   < > 9 12   MEGHANN  --   --  7.5*  --   --   --  7.4*  --  8.1*  --  7.8*  --  8.0*   < > 8.0* 8.7*   * 192* 181*   < >  --    < > 144*   < > 116*   < > 156*   < > 147*   < > 127* 144*   ALBUMIN  --   --   --   --   --   --   --   --  3.1*  --   --   --  2.9*   < >  --  3.6   PROTTOTAL  --   --   --   --   --   --   --   --  5.1*  --   --   --  4.5*   < >  --  5.3*   BILITOTAL  --   --   --   --   --   --   --   --  0.5  --   --   --  0.3   < >  --  0.4   ALKPHOS  --   --   --   --   --   --   --   --  98  --   --   --  54   < >  --  85   ALT  --   --   --   --   --   --   --   --  71*  --   --   --  16   < >  --  12   AST  --   --   --   --   --   --   --   --  61*  --   --   --  23   < >  --  16    < > = values in this interval not displayed.       No results found for this or any previous visit (from the past 24 hours).        Lobo Zimmerman MD  Text Page  (7am to 6pm)

## 2024-12-21 LAB
ANION GAP SERPL CALCULATED.3IONS-SCNC: 8 MMOL/L (ref 7–15)
BACTERIA BLD CULT: ABNORMAL
BUN SERPL-MCNC: 18.9 MG/DL (ref 8–23)
CALCIUM SERPL-MCNC: 7.6 MG/DL (ref 8.8–10.4)
CHLORIDE SERPL-SCNC: 107 MMOL/L (ref 98–107)
CREAT SERPL-MCNC: 0.72 MG/DL (ref 0.67–1.17)
EGFRCR SERPLBLD CKD-EPI 2021: 84 ML/MIN/1.73M2
ERYTHROCYTE [DISTWIDTH] IN BLOOD BY AUTOMATED COUNT: 15.9 % (ref 10–15)
GLUCOSE BLDC GLUCOMTR-MCNC: 176 MG/DL (ref 70–99)
GLUCOSE BLDC GLUCOMTR-MCNC: 178 MG/DL (ref 70–99)
GLUCOSE BLDC GLUCOMTR-MCNC: 185 MG/DL (ref 70–99)
GLUCOSE BLDC GLUCOMTR-MCNC: 192 MG/DL (ref 70–99)
GLUCOSE BLDC GLUCOMTR-MCNC: 201 MG/DL (ref 70–99)
GLUCOSE BLDC GLUCOMTR-MCNC: 224 MG/DL (ref 70–99)
GLUCOSE SERPL-MCNC: 179 MG/DL (ref 70–99)
HCO3 SERPL-SCNC: 23 MMOL/L (ref 22–29)
HCT VFR BLD AUTO: 26.7 % (ref 40–53)
HGB BLD-MCNC: 9.1 G/DL (ref 13.3–17.7)
MAGNESIUM SERPL-MCNC: 1.8 MG/DL (ref 1.7–2.3)
MCH RBC QN AUTO: 30.4 PG (ref 26.5–33)
MCHC RBC AUTO-ENTMCNC: 34.1 G/DL (ref 31.5–36.5)
MCV RBC AUTO: 89 FL (ref 78–100)
NT-PROBNP SERPL-MCNC: 1957 PG/ML (ref 0–1800)
PHOSPHATE SERPL-MCNC: 2.2 MG/DL (ref 2.5–4.5)
PLATELET # BLD AUTO: 102 10E3/UL (ref 150–450)
POTASSIUM SERPL-SCNC: 3.4 MMOL/L (ref 3.4–5.3)
POTASSIUM SERPL-SCNC: 3.9 MMOL/L (ref 3.4–5.3)
RBC # BLD AUTO: 2.99 10E6/UL (ref 4.4–5.9)
SODIUM SERPL-SCNC: 138 MMOL/L (ref 135–145)
WBC # BLD AUTO: 13.4 10E3/UL (ref 4–11)

## 2024-12-21 PROCEDURE — 84100 ASSAY OF PHOSPHORUS: CPT | Performed by: INTERNAL MEDICINE

## 2024-12-21 PROCEDURE — 94640 AIRWAY INHALATION TREATMENT: CPT

## 2024-12-21 PROCEDURE — 999N000157 HC STATISTIC RCP TIME EA 10 MIN

## 2024-12-21 PROCEDURE — 250N000009 HC RX 250

## 2024-12-21 PROCEDURE — B4185 PARENTERAL SOL 10 GM LIPIDS: HCPCS | Performed by: INTERNAL MEDICINE

## 2024-12-21 PROCEDURE — 250N000013 HC RX MED GY IP 250 OP 250 PS 637: Performed by: PHYSICIAN ASSISTANT

## 2024-12-21 PROCEDURE — 83880 ASSAY OF NATRIURETIC PEPTIDE: CPT | Performed by: HOSPITALIST

## 2024-12-21 PROCEDURE — 250N000013 HC RX MED GY IP 250 OP 250 PS 637: Performed by: INTERNAL MEDICINE

## 2024-12-21 PROCEDURE — 99232 SBSQ HOSP IP/OBS MODERATE 35: CPT | Performed by: INTERNAL MEDICINE

## 2024-12-21 PROCEDURE — 250N000011 HC RX IP 250 OP 636

## 2024-12-21 PROCEDURE — 250N000011 HC RX IP 250 OP 636: Performed by: INTERNAL MEDICINE

## 2024-12-21 PROCEDURE — 84132 ASSAY OF SERUM POTASSIUM: CPT | Performed by: HOSPITALIST

## 2024-12-21 PROCEDURE — 85027 COMPLETE CBC AUTOMATED: CPT | Performed by: HOSPITALIST

## 2024-12-21 PROCEDURE — 82565 ASSAY OF CREATININE: CPT | Performed by: HOSPITALIST

## 2024-12-21 PROCEDURE — 83735 ASSAY OF MAGNESIUM: CPT | Performed by: INTERNAL MEDICINE

## 2024-12-21 PROCEDURE — 250N000009 HC RX 250: Performed by: INTERNAL MEDICINE

## 2024-12-21 PROCEDURE — 250N000009 HC RX 250: Performed by: HOSPITALIST

## 2024-12-21 PROCEDURE — 250N000011 HC RX IP 250 OP 636: Performed by: HOSPITALIST

## 2024-12-21 PROCEDURE — 250N000013 HC RX MED GY IP 250 OP 250 PS 637: Performed by: HOSPITALIST

## 2024-12-21 PROCEDURE — 99232 SBSQ HOSP IP/OBS MODERATE 35: CPT | Performed by: HOSPITALIST

## 2024-12-21 PROCEDURE — 80048 BASIC METABOLIC PNL TOTAL CA: CPT | Performed by: HOSPITALIST

## 2024-12-21 PROCEDURE — 120N000013 HC R&B IMCU

## 2024-12-21 PROCEDURE — 94640 AIRWAY INHALATION TREATMENT: CPT | Mod: 76

## 2024-12-21 RX ORDER — FUROSEMIDE 10 MG/ML
40 INJECTION INTRAMUSCULAR; INTRAVENOUS EVERY 12 HOURS
Status: COMPLETED | OUTPATIENT
Start: 2024-12-21 | End: 2024-12-21

## 2024-12-21 RX ORDER — CEFTRIAXONE 2 G/1
2 INJECTION, POWDER, FOR SOLUTION INTRAMUSCULAR; INTRAVENOUS EVERY 24 HOURS
Status: DISCONTINUED | OUTPATIENT
Start: 2024-12-21 | End: 2024-12-24

## 2024-12-21 RX ORDER — POTASSIUM CHLORIDE 1500 MG/1
40 TABLET, EXTENDED RELEASE ORAL ONCE
Status: COMPLETED | OUTPATIENT
Start: 2024-12-21 | End: 2024-12-21

## 2024-12-21 RX ADMIN — IPRATROPIUM BROMIDE AND ALBUTEROL SULFATE 3 ML: .5; 3 SOLUTION RESPIRATORY (INHALATION) at 07:11

## 2024-12-21 RX ADMIN — PIPERACILLIN AND TAZOBACTAM 4.5 G: 4; .5 INJECTION, POWDER, FOR SOLUTION INTRAVENOUS at 04:18

## 2024-12-21 RX ADMIN — Medication 1 PACKET: at 10:27

## 2024-12-21 RX ADMIN — OLIVE OIL AND SOYBEAN OIL 250 ML: 16; 4 INJECTION, EMULSION INTRAVENOUS at 20:33

## 2024-12-21 RX ADMIN — PANTOPRAZOLE SODIUM 40 MG: 40 TABLET, DELAYED RELEASE ORAL at 08:53

## 2024-12-21 RX ADMIN — ALBUTEROL SULFATE 2.5 MG: 2.5 SOLUTION RESPIRATORY (INHALATION) at 04:49

## 2024-12-21 RX ADMIN — CEFTRIAXONE SODIUM 2 G: 2 INJECTION, POWDER, FOR SOLUTION INTRAMUSCULAR; INTRAVENOUS at 08:52

## 2024-12-21 RX ADMIN — ASCORBIC ACID, VITAMIN A PALMITATE, CHOLECALCIFEROL, THIAMINE HYDROCHLORIDE, RIBOFLAVIN-5 PHOSPHATE SODIUM, PYRIDOXINE HYDROCHLORIDE, NIACINAMIDE, DEXPANTHENOL, ALPHA-TOCOPHEROL ACETATE, VITAMIN K1, FOLIC ACID, BIOTIN, CYANOCOBALAMIN: 200; 3300; 200; 6; 3.6; 6; 40; 15; 10; 150; 600; 60; 5 INJECTION, SOLUTION INTRAVENOUS at 20:32

## 2024-12-21 RX ADMIN — QUETIAPINE 12.5 MG: 25 TABLET, FILM COATED ORAL at 21:58

## 2024-12-21 RX ADMIN — TAMSULOSIN HYDROCHLORIDE 0.4 MG: 0.4 CAPSULE ORAL at 20:30

## 2024-12-21 RX ADMIN — Medication 1 PACKET: at 15:01

## 2024-12-21 RX ADMIN — IPRATROPIUM BROMIDE AND ALBUTEROL SULFATE 3 ML: .5; 3 SOLUTION RESPIRATORY (INHALATION) at 15:16

## 2024-12-21 RX ADMIN — IPRATROPIUM BROMIDE AND ALBUTEROL SULFATE 3 ML: .5; 3 SOLUTION RESPIRATORY (INHALATION) at 11:59

## 2024-12-21 RX ADMIN — FUROSEMIDE 40 MG: 10 INJECTION, SOLUTION INTRAMUSCULAR; INTRAVENOUS at 20:30

## 2024-12-21 RX ADMIN — INSULIN ASPART 2 UNITS: 100 INJECTION, SOLUTION INTRAVENOUS; SUBCUTANEOUS at 16:29

## 2024-12-21 RX ADMIN — IPRATROPIUM BROMIDE AND ALBUTEROL SULFATE 3 ML: .5; 3 SOLUTION RESPIRATORY (INHALATION) at 19:22

## 2024-12-21 RX ADMIN — PANTOPRAZOLE SODIUM 40 MG: 40 TABLET, DELAYED RELEASE ORAL at 16:28

## 2024-12-21 RX ADMIN — CARBOXYMETHYLCELLULOSE SODIUM 2 DROP: 5 SOLUTION/ DROPS OPHTHALMIC at 08:53

## 2024-12-21 RX ADMIN — INSULIN ASPART 1 UNITS: 100 INJECTION, SOLUTION INTRAVENOUS; SUBCUTANEOUS at 04:27

## 2024-12-21 RX ADMIN — INSULIN ASPART 2 UNITS: 100 INJECTION, SOLUTION INTRAVENOUS; SUBCUTANEOUS at 09:02

## 2024-12-21 RX ADMIN — Medication 1 PACKET: at 17:54

## 2024-12-21 RX ADMIN — FUROSEMIDE 40 MG: 10 INJECTION, SOLUTION INTRAMUSCULAR; INTRAVENOUS at 08:52

## 2024-12-21 RX ADMIN — CARBOXYMETHYLCELLULOSE SODIUM 2 DROP: 5 SOLUTION/ DROPS OPHTHALMIC at 20:41

## 2024-12-21 RX ADMIN — POTASSIUM CHLORIDE 40 MEQ: 1500 TABLET, EXTENDED RELEASE ORAL at 10:27

## 2024-12-21 RX ADMIN — INSULIN ASPART 2 UNITS: 100 INJECTION, SOLUTION INTRAVENOUS; SUBCUTANEOUS at 12:38

## 2024-12-21 RX ADMIN — INSULIN ASPART 1 UNITS: 100 INJECTION, SOLUTION INTRAVENOUS; SUBCUTANEOUS at 20:23

## 2024-12-21 RX ADMIN — LEVOTHYROXINE SODIUM 75 MCG: 75 TABLET ORAL at 08:53

## 2024-12-21 ASSESSMENT — ACTIVITIES OF DAILY LIVING (ADL)
ADLS_ACUITY_SCORE: 52
ADLS_ACUITY_SCORE: 52
ADLS_ACUITY_SCORE: 53
ADLS_ACUITY_SCORE: 53
ADLS_ACUITY_SCORE: 52
ADLS_ACUITY_SCORE: 53
ADLS_ACUITY_SCORE: 52
ADLS_ACUITY_SCORE: 53
ADLS_ACUITY_SCORE: 52
ADLS_ACUITY_SCORE: 52
ADLS_ACUITY_SCORE: 53
ADLS_ACUITY_SCORE: 53
ADLS_ACUITY_SCORE: 52
ADLS_ACUITY_SCORE: 53
ADLS_ACUITY_SCORE: 53
ADLS_ACUITY_SCORE: 52
ADLS_ACUITY_SCORE: 53
ADLS_ACUITY_SCORE: 52

## 2024-12-21 NOTE — PROGRESS NOTES
[facility]  Infectious Disease Progress Note          Assessment and Plan:   Date of Admission:  12/15/2024  Date of Consult (When I saw the patient): 12/20/24        Assessment & Plan  Clayton Pacheco is a 89 year old male who was admitted on 12/15/2024.      Impression: 1 89-year-old male, admitted with acute GI bleeding and some vague generalized symptoms, had leukocytosis at admission but not anything else for infection.  CT scan with an ulcerated mass, biopsy suggest bleeding ulcer rather than malignancy  2 in-hospital major fever episode and sepsis, found to have E. coli bacteremia, CT scan without an obvious intra-abdominal infection conceivably ulcer is the source would be unusual pathogen for this, on antibiotics already clinically improved  3 prior E. coli bacteremia earlier this year with prostatitis and urinary tract infection source but current urine unremarkable with no urinary symptoms     REC 1 Zosyn was reasonable choice assuming possible intra-abdominal source but organism is actually resistant, will switch to ceftriaxone not continue to cover major abdominal infection is imaging and clinically not evident, organism has oral options when clinically improved              Interval History:     no new complaints and doing well; no cp, sob, n/v/d, or abd pain.  Feels relatively okay, not having major chills or fever, this despite the fact that organisms not been covered as it is resistant to Zosyn.  No other particular new abdominal pain or other symptoms              Medications:     Current Facility-Administered Medications   Medication Dose Route Frequency Provider Last Rate Last Admin    carboxymethylcellulose PF (REFRESH PLUS) 0.5 % ophthalmic solution 2 drop  2 drop Both Eyes BID Lobo Zimmerman MD   2 drop at 12/21/24 0853    cefTRIAXone (ROCEPHIN) 2 g vial to attach to  ml bag for ADULTS or NS 50 ml bag for PEDS  2 g Intravenous Q24H Jose Velazquez MD   2  g at 12/21/24 0852    furosemide (LASIX) injection 40 mg  40 mg Intravenous Q12H Art Silverman MD   40 mg at 12/21/24 0852    insulin aspart (NovoLOG) injection (RAPID ACTING)  1-7 Units Subcutaneous Q4H Lobo Zimmerman MD   2 Units at 12/21/24 0902    ipratropium - albuterol 0.5 mg/2.5 mg/3 mL (DUONEB) neb solution 3 mL  3 mL Nebulization 4x daily Art Silverman MD   3 mL at 12/21/24 0711    levothyroxine (SYNTHROID/LEVOTHROID) tablet 75 mcg  75 mcg Oral Daily Lobo Zimmerman MD   75 mcg at 12/21/24 0853    lipids plant base (CLINOLIPID) 20 % infusion 250 mL  250 mL Intravenous Q24H Lobo Zimmerman MD 20.8 mL/hr at 12/20/24 2029 250 mL at 12/20/24 2029    pantoprazole (PROTONIX) EC tablet 40 mg  40 mg Oral BID AC Bhavani Burgess PA-C   40 mg at 12/21/24 0853    potassium & sodium phosphates (NEUTRA-PHOS) Packet 1 packet  1 packet Oral or Feeding Tube Q4H Lobo Zimmerman MD   1 packet at 12/21/24 1027    QUEtiapine (SEROquel) half-tab 12.5 mg  12.5 mg Oral At Bedtime Lobo Zimmerman MD   12.5 mg at 12/20/24 2111    sodium chloride (PF) 0.9% PF flush 3 mL  3 mL Intracatheter Q8H Diony Roy MD   3 mL at 12/20/24 1358    tamsulosin (FLOMAX) capsule 0.4 mg  0.4 mg Oral Daily Lobo Zimmerman MD   0.4 mg at 12/20/24 2111                  Physical Exam:   Blood pressure (!) 117/107, pulse 88, temperature 98.4  F (36.9  C), temperature source Oral, resp. rate 23, weight 72.5 kg (159 lb 12.8 oz), SpO2 93%.  Wt Readings from Last 2 Encounters:   12/21/24 72.5 kg (159 lb 12.8 oz)   12/14/24 65.8 kg (145 lb)     Vital Signs with Ranges  Temp:  [98.4  F (36.9  C)-99.7  F (37.6  C)] 98.4  F (36.9  C)  Pulse:  [] 88  Resp:  [17-28] 23  BP: (117-142)/() 117/107  SpO2:  [91 %-95 %] 93 %    Constitutional: Awake, alert, cooperative, no apparent distress not particularly septic or ill looking     Lungs: Clear to auscultation bilaterally, no crackles or wheezing  "  Cardiovascular: Regular rate and rhythm, normal S1 and S2, and no murmur noted   Abdomen: Normal bowel sounds, soft, non-distended, non-tender   Skin: No rashes, no cyanosis, no edema   Other:           Data:   All microbiology laboratory data reviewed.  Recent Labs   Lab Test 12/21/24  0617 12/20/24  1734 12/20/24  0604 12/19/24  1734 12/19/24  0239   WBC 13.4*  --  15.7*  --  8.4   HGB 9.1* 7.9* 8.7*  8.6*   < > 10.2*   HCT 26.7*  --  23.7*  --  29.2*   MCV 89  --  92  --  90   *  --  100*  --  104*    < > = values in this interval not displayed.     Recent Labs   Lab Test 12/21/24  0617 12/20/24  0604 12/19/24  0502   CR 0.72 0.80 0.84     No lab results found.  No lab results found.    Invalid input(s): \"UC\"     "

## 2024-12-21 NOTE — PLAN OF CARE
"    Vitals:   BP Readings from Last 1 Encounters:   12/20/24 131/63     Pulse Readings from Last 1 Encounters:   12/20/24 97     Wt Readings from Last 1 Encounters:   12/20/24 70.5 kg (155 lb 8 oz)     Ht Readings from Last 1 Encounters:   12/15/24 1.676 m (5' 6\")     Estimated body mass index is 25.1 kg/m  as calculated from the following:    Height as of an earlier encounter on 12/15/24: 1.676 m (5' 6\").    Weight as of this encounter: 70.5 kg (155 lb 8 oz).  Temp Readings from Last 1 Encounters:   12/20/24 (P) 98.6  F (37  C) ((P) Oral)       Pain: Pain goal 0 Pain Rating denies Effective pain medication/regimen none    CV Surgery Patient: No    Assessment    Resp: 24  Telemetry: NSR  Neuro: intact  GI/: incontinent  Skin/Wounds: scattered bruising   Lines/Drains: PIC line and PIV  Activity: [id number] belt and walker   Sleep: 2 naps   Abnormal Labs: elevated wbs    Aggression Stop Light: Green          "

## 2024-12-21 NOTE — PLAN OF CARE
"[id number]  Goal Outcome Evaluation:  Patient Name: Zoran  MRN: [id number]  Date of Admission: 12/15/2024  Reason for Admission: upper GI bleed.   Level of Care: [facility]    Vitals:   BP Readings from Last 1 Encounters:   12/21/24 139/66     Pulse Readings from Last 1 Encounters:   12/21/24 94     Wt Readings from Last 1 Encounters:   12/21/24 71.4 kg (157 lb 6.5 oz)     Ht Readings from Last 1 Encounters:   12/15/24 1.676 m (5' 6\")     Estimated body mass index is 25.41 kg/m  as calculated from the following:    Height as of an earlier encounter on 12/15/24: 1.676 m (5' 6\").    Weight as of this encounter: 71.4 kg (157 lb 6.5 oz).  Temp Readings from Last 1 Encounters:   12/21/24 98.4  F (36.9  C) (Oral)       Pain: Pain goal 0 Pain Rating 0 Effective pain medication/regimen     CV Surgery Patient: No    Assessment    Resp: RA.Ls cl. Dim at bases. Expiratory wheezes. Nebs raad. PRN x1.   Telemetry: NSR  Neuro: A/Ox4. Neuro intact.   GI/: incontinent. External cath in place. Adequate uop. Tolerating ice chips.no BM this shift. Denies n/v.   Skin/Wounds: scattered bruising.  Lines/Drains: double lumen PICC infusing TPN at 75ml. R PIV SL.   Activity: Ax1 GB/W.   Sleep: slept bet cares.   Abnormal Labs: HG 7.9--> 9.1 this morning. WBC (13.4).    Aggression Stop Light: Green          Patient Care Plan: Q12hr HG checks. Continue TPN. ABX.  "

## 2024-12-21 NOTE — PROGRESS NOTES
[facility]  Hospitalist Progress Note  Art Silverman MD  12/21/2024    Assessment & Plan   Clayton Pacheco is a markedly pleasant 89 year old gentleman with past medical history that is most notable for hypertension, hyperlipidemia, and hypothyroid, who presents with acute abdominal pain and melena, and is found to have acute blood loss anemia and due to acute upper GI bleeding and hemorrhage, due to bleeding ulcerated gastric mass.        ## Acute blood loss anemia   ## Acute upper GI bleeding and hemorrhage due to   ## Bleeding ulcerated gastric mass:   Of note, Mr. Pacheco lives independently, drives, takes care of his wife and history of HTN, HLP, hypothyroid.  He is not on any anticoagulation or NSAID use at home. He presented on 12/14/2024, to the [facility] ED, for evaluation of acute abdominal pain and melena.     In the ED, he has tachycardia, without hypotension, fever, or hypoxia. He has acute leukocytosis to 14.0. HGB has dropped to 8.6 from previous 11.4. BUN level is elevated in isolation from Cr. INR is slightly elevated to 1.21. Troponin levels are minimally elevated. UA is negative. CTA shows a small focus of active gastrointestinal bleeding within the distal stomach, and possible thickening of the distal stomach/proximal duodenum. He is admitted there, and [facility] consulted. IV Protonix started. IV NS ordered on admission.     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 This was biopsied. Hemostatic gel and spray are applied applied, but this ulcerated mass continued to bleed. Clotted blood in the gastric body is also noted. Patient was transferred for IR, surgical and GI involvement.      Prior to arrival, HGB drops to 6.8. Two units packed red cells transfusion have been started.       -- [facility] admission with frequent monitoring of hemodynamics, serial Hgb.    -- on IV Protonix.BID  now on p.o. twice daily  12/16   -- patient had bloody stools this AM and hgb down to 6.6.    -- discussed with GI, IR and Surgery    -- CTA repeat did not show any active bleeding    -- currently no active bleeding for IR, surgery for now hesitant for possible large gastric resection and also not sure how far the mass is away from the esophagus. (Dr Jules)    -- if patient has active bleeding, then to IR    -- allow clear liquid diet; on TPN    -- transfuse to keep at or above 8    -- Pathology from 12/15 only shows gastric fundic mucosa with ulceration and reactive changes, negative for dysplasia and malignancy (sampling suboptimal)    -- underwent repeat EGD 12/16 by Dr Kim:     - Normal esophagus.               - Clotted blood in the cardia, in the gastric fundus and in the gastric body.               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 Clips were placed. Clip : Tantaline Injected. Treated with argon plasma coagulation (APC). hemostatic spray applied.              12/17   -- pathology non-diagnositic,had a couple small melenic stools but he is warm perfused and hemodynamically stable.  Hgb is 8 and he was transfused to keep above 8 and is currently 9.3.  Change frequency of Hgb to q 12 hours, clear liquid diet, discontinue IMC, continue IV protonix.      -- discussed with surgery, they can resect this tumor ([facility]) for later this week, subsequently deferred to conservative management by patient and family.    -- continue TPN, allow clear liquid diet, stop narcotics and deliriogenic medications and continue seroquel at bedtime, stop telemetry.                      -- Advance directives discussed and he says he would want a 24 hour trial of resuscitation, if only so that loved ones could gather. We discussed that he may have gastric cancer. It seems that despite his advanced age he lives fully independently at home and is fully alert and oriented. Further care goal discussions will  likely be ongoing during his hospital stay    -- SW consulted for disposition planning.  12/18/2024.  Assumed care.  Hemoglobin dropped to 7.6.  Received 1 unit PRBC per conditional transfusion.  Will obtain hemoglobin 2 hours after transfusion completed then in a.m. every 12 hours to determine rate of bleeding/stability.  Long discussion with patient and son Curt by phone, decision is conservative, ie NO surgery;.  Discussed with general surgery, Dr. Hairston who is in agreement.  Reviewed GI note, if further active bleeding, IR for embolization.  Patient and son acknowledge no firm tissue diagnosis to rule out CA however they are okay with this and are making plans for patient and wife regarding long-term care and comfort.  12/19/2024 RRT last evening due to acute respiratory failure with hypoxia, fever 103 rectal, LA, sepsis.  See RRT note.  Appreciate input.  CXR negative for acute pneumonia, UA negative.  BC subsequently grew out at 12 hours 1/2 gram-negative bacillus.  On Zosyn since RRT.  Given 2-1/2 L bolus since RRT.  At the time of RRT without etiology and Pro-Daryn 0.27 concern of possible PE and DDx, duplex venous ultrasound bilateral LE negative.  Subsequently weaned from BiPAP to room air, pt back to baseline mentation, hungry and desires pos [clear liquids under direct supervision] and afebrile and suspect initial respiratory distress likely related to mucous plug.  Continue DuoNeb.  Continue Zosyn for bacteremia, await ID and sensitivities.   Earlier this morning, discussion with the patient's son Curt after he was notified by RRT last evening he conferred with patient's wife and his other brother and sister-in-law and reviewed patient ACP which defers treatment plan primarily to attending MD; wife, 2 sons  [including Curt, present] and DILs knowing patient's wishes for his quality of life desired transition to DNR/DNI.  At that time patient on BiPAP without clear etiology and given patient's age at 89 and  family knowledge to Patients wishes decision to transition patient to DNR DNI [however continue TPN.]  12/20/2024 patient continues to progress to baseline, on room air although somewhat wheezy.  No history of COPD/bronchospasms.  Patient on TPN therefore changed to n.p.o., ice chips only to avoid possibility of aspiration/pneumonia until patient strengthens to sit up to eat.  Hemoglobins have remained stable, see GI note 12/20/2024.follow-up Dr. Julio ESPINOSA 1-2 weeks after discharge continue every 12 hours hemoglobin.  12/21/2024: Hemoglobins have remained stable.  No abdominal pain or associated GI symptoms.  Due to generalized weakness maintain n.p.o., ice chips only, on TPN until patient strong enough to set up and take p.o.'s.  Wheezy on exam, given 2-1/2 L fluid resuscitation during RRT, likely fluid vascular overload, BMP confirms.  Given furosemide 40 mg IV x 2, nursing states significant improvement of breathing with diuresis.  BMP in a.m.    E. coli bacteremia  RRT 12/19/2024, see above.  Blood cultures 2/2 E. coli.  On Zosyn.  Continue on same until sensitivities return.  UA negative.  CXR negative for pneumonia.  Vitals stable.  12/21/2024, see ID note, appreciate input.  E. coli resistant to Zosyn, changed to Rocephin.  ID notes patient with history of E. coli bacteremia and prostatitis and patient recalls history of cystitis.  Currently no symptoms, UA negative.  No fever, WBC today 13.4, WBC max 15.7.  Monitor.    Delirium, resolved  Was delirious initially upon presentation after transfer but has cleared mentally but does have episodes of confusion.  -- continue seroquel at bedtime -- stop narcotics  -monitor, rather spry 89-year-old, is aware of condition and elects nonaggressive management regarding gastric mass.  Changed to DNR/DNI.  -see #1.     Hypernatremia, resolved  Secondary to decreased po intake.  Patient did have some delirium but this was more associated with sleep deprivation.  Patient  "was given IVF and now TPN with resolution of his hypernatremia  -Monitor BMP, today sodium 138.    Elevated Troponins: Noting that he was hospitalized 3/2024 for sepsis and UTI, as discussed below, and Troponins were elevated at that time. Cardiology was consulted. Lexiscan showed no evidence of inducible ischemia. Suspect elevated Troponins 12/14/24 to be due to demand ischemia due to acute illness.    -- discontinue telemetry     -- denies any chest pain or sob    -- not a candidate for coronary intervention due to active bleeding, patient elects conservative management.  12/19/2024 at time of RRT troponin 53 however flat 45.  No chest pain.     Massive prostatomegaly. Seen incidentally on CT. Noting that he was hospitalized for cystitis due to E coli in 7/2023, and hospitalized for sepsis, UTI, and bacteremia due to E coli, resistant to Unasyn and Zosyn, treated with Cipro.    -- Monitor symptomatically     Hypertension: TTE in 7/2023 showed preserved LVEF with mild to moderate concentric LVH and mild aortic stenosis.    -- Hold home anti-hypertensives for bleeding     Hyperlipidemia:   -- hold Pravastatin      Hypothyroid:   -- continue Synthroid      Moderate malnutrition  -- PICC ine, continue TPN, clear liquid diet, only  Moderate Protein-Calorie Malnutrition  \"% Weight Loss: None noted  % Intake: Unable to fully assess  Subcutaneous Fat Loss: Orbital region moderate depletion, Upper arm region moderate depletion, and Thoracic region moderate depletion  Muscle Loss: Temporal region moderate depletion, Clavicle bone region moderate depletion, and Dorsal hand region moderate depletion  Fluid Retention: None noted    Malnutrition Diagnosis: Moderate malnutrition  In Context of: Chronic illness or disease\"     Drugs/Tx:  TPN    Disposition:   Medically Ready for Discharge: Anticipated in 2-4 Days     I spent 35 minutes total time in management of care today 12/21/2024 reviewing labs, medications, " interdisciplinary notes; and completing documentation of encounter and orders with Care Management including counseling/discussion withthe Patient regarding bacteremia, dyspnea and wheeze, gastric mass and Coordinating Care and plan with Nursing and Specialists, ID and GI regarding management and surveillance     Interval History   Patient continues to improve per alertness and strength, on room air.  Facial erythema felt secondary to use of BiPAP, now on room air.  Notes persistent wheeze, slight dyspnea, no O2 desaturation.  Pulmonary vascular congestion by history of 2-1/2 L fluid resuscitation during RRT and by BNP, given IV furosemide with improvement.  Continue DuoNebs.  No abdominal symptoms, hemoglobin stable.  Afebrile.        Physical Exam    , Blood pressure (!) 117/107, pulse 88, temperature 98.4  F (36.9  C), temperature source Oral, resp. rate 23, weight 72.5 kg (159 lb 12.8 oz), SpO2 96%.  Vitals:    12/21/24 0500 12/21/24 0900 12/21/24 0908   Weight: 71.4 kg (157 lb 6.5 oz) 73 kg (160 lb 15 oz) 72.5 kg (159 lb 12.8 oz)       General/Constitutional:   NAD, more alert, calm, cooperative  Chest/Respiratory: On room air, persistent tachypnea, mild wheezing, air exchange all lung fields.    Cardiovascular:  regular, no murmur appreciated.    Gastrointestinal/Abdomen:  soft, nontender, no rebound, guarding or other peritoneal signs.  Neuro.  Gross motor tested, nonfocal,  Psych affect, more alert, calm.    Medications   All medications were reviewed.  Current Facility-Administered Medications   Medication Dose Route Frequency Provider Last Rate Last Admin    No lozenges or gum should be given while patient on BIPAP/AVAPS/AVAPS AE   Does not apply Continuous PRN Joel Gonzalez APRN CNP        parenteral nutrition - Clinimix E   CENTRAL LINE IV TPN CONTINUOUS Lobo Zimmerman MD        parenteral nutrition - Clinimix E   CENTRAL LINE IV TPN CONTINUOUS Nida Horner, [facility], located within [facility] 75 mL/hr at 12/20/24 2031  New Bag at 12/20/24 2031    Patient may continue current oral medications   Does not apply Continuous PRN Joel Gonzalez APRN CNP         Current Facility-Administered Medications   Medication Dose Route Frequency Provider Last Rate Last Admin    carboxymethylcellulose PF (REFRESH PLUS) 0.5 % ophthalmic solution 2 drop  2 drop Both Eyes BID Lobo Zimmerman MD   2 drop at 12/21/24 0853    cefTRIAXone (ROCEPHIN) 2 g vial to attach to  ml bag for ADULTS or NS 50 ml bag for PEDS  2 g Intravenous Q24H Jose Velazquez MD   2 g at 12/21/24 0852    furosemide (LASIX) injection 40 mg  40 mg Intravenous Q12H Art Silverman MD   40 mg at 12/21/24 0852    insulin aspart (NovoLOG) injection (RAPID ACTING)  1-7 Units Subcutaneous Q4H Lobo Zimmerman MD   2 Units at 12/21/24 1238    ipratropium - albuterol 0.5 mg/2.5 mg/3 mL (DUONEB) neb solution 3 mL  3 mL Nebulization 4x daily Art Silverman MD   3 mL at 12/21/24 1159    levothyroxine (SYNTHROID/LEVOTHROID) tablet 75 mcg  75 mcg Oral Daily Lobo Zimmerman MD   75 mcg at 12/21/24 0853    lipids plant base (CLINOLIPID) 20 % infusion 250 mL  250 mL Intravenous Q24H Lobo Zimmerman MD 20.8 mL/hr at 12/20/24 2029 250 mL at 12/20/24 2029    pantoprazole (PROTONIX) EC tablet 40 mg  40 mg Oral BID AC Bhavani Burgess PA-C   40 mg at 12/21/24 0853    potassium & sodium phosphates (NEUTRA-PHOS) Packet 1 packet  1 packet Oral or Feeding Tube Q4H Lobo Zimmerman MD   1 packet at 12/21/24 1027    QUEtiapine (SEROquel) half-tab 12.5 mg  12.5 mg Oral At Bedtime Lobo Zimmerman MD   12.5 mg at 12/20/24 2111    sodium chloride (PF) 0.9% PF flush 3 mL  3 mL Intracatheter Q8H Diony Roy MD   3 mL at 12/20/24 1358    tamsulosin (FLOMAX) capsule 0.4 mg  0.4 mg Oral Daily Lobo Zimmerman MD   0.4 mg at 12/20/24 2111        Data   Recent Labs   Lab 12/21/24  1221 12/21/24  0902 12/21/24  0617 12/20/24  2236 12/20/24  1734 12/20/24  0758  12/20/24  0604 12/19/24  0628 12/19/24  0502 12/19/24  0358 12/19/24  0239 12/17/24  0736 12/17/24  0600 12/15/24  0606 12/15/24  0228 12/14/24 2007   WBC  --   --  13.4*  --   --   --  15.7*  --   --   --  8.4   < >  --    < > 10.4 14.0*   HGB  --   --  9.1*  --  7.9*  --  8.7*  8.6*   < >  --   --  10.2*   < > 9.3*   < > 6.8* 8.6*   MCV  --   --  89  --   --   --  92  --   --   --  90   < >  --    < > 98 99   PLT  --   --  102*  --   --   --  100*  --   --   --  104*   < >  --    < > 189 223   INR  --   --   --   --   --   --   --   --   --   --   --   --  1.17*  --  1.28* 1.21*   NA  --   --  138  --   --   --  135  --  140  --  139   < > 148*   < > 140 135   POTASSIUM  --   --  3.4  --   --   --  3.6  --  3.5  --  3.8   < > 3.6   < > 4.1 4.2   CHLORIDE  --   --  107  --   --   --  106  --  108*  --  105   < > 117*   < > 108* 102   CO2  --   --  23  --   --   --  22  --  21*  --  21*   < > 24   < > 23 21*   BUN  --   --  18.9  --   --   --  24.0*  --  24.1*  --  21.8   < > 59.5*   < > 64.4* 67.0*   CR  --   --  0.72  --   --   --  0.80  --  0.84  --  0.68   < > 0.96   < > 0.97 0.96   ANIONGAP  --   --  8  --   --   --  7  --  11  --  13   < > 7   < > 9 12   MEGHANN  --   --  7.6*  --   --   --  7.5*  --  7.4*  --  8.1*   < > 8.0*   < > 8.0* 8.7*   * 192* 179*   < >  --    < > 181*   < > 144*   < > 116*   < > 147*   < > 127* 144*   ALBUMIN  --   --   --   --   --   --   --   --   --   --  3.1*  --  2.9*   < >  --  3.6   PROTTOTAL  --   --   --   --   --   --   --   --   --   --  5.1*  --  4.5*   < >  --  5.3*   BILITOTAL  --   --   --   --   --   --   --   --   --   --  0.5  --  0.3   < >  --  0.4   ALKPHOS  --   --   --   --   --   --   --   --   --   --  98  --  54   < >  --  85   ALT  --   --   --   --   --   --   --   --   --   --  71*  --  16   < >  --  12   AST  --   --   --   --   --   --   --   --   --   --  61*  --  23   < >  --  16    < > = values in this interval not displayed.       No results  found for this or any previous visit (from the past 24 hours).        Lobo Zimmerman MD  Text Page  (7am to 6pm)

## 2024-12-22 ENCOUNTER — APPOINTMENT (OUTPATIENT)
Dept: OCCUPATIONAL THERAPY | Facility: CLINIC | Age: 89
End: 2024-12-22
Attending: HOSPITALIST
Payer: MEDICARE

## 2024-12-22 LAB
ANION GAP SERPL CALCULATED.3IONS-SCNC: 10 MMOL/L (ref 7–15)
BUN SERPL-MCNC: 23.5 MG/DL (ref 8–23)
CALCIUM SERPL-MCNC: 8.1 MG/DL (ref 8.8–10.4)
CHLORIDE SERPL-SCNC: 101 MMOL/L (ref 98–107)
CREAT SERPL-MCNC: 0.87 MG/DL (ref 0.67–1.17)
EGFRCR SERPLBLD CKD-EPI 2021: 79 ML/MIN/1.73M2
ERYTHROCYTE [DISTWIDTH] IN BLOOD BY AUTOMATED COUNT: 15.8 % (ref 10–15)
GLUCOSE BLDC GLUCOMTR-MCNC: 158 MG/DL (ref 70–99)
GLUCOSE BLDC GLUCOMTR-MCNC: 174 MG/DL (ref 70–99)
GLUCOSE BLDC GLUCOMTR-MCNC: 183 MG/DL (ref 70–99)
GLUCOSE BLDC GLUCOMTR-MCNC: 196 MG/DL (ref 70–99)
GLUCOSE BLDC GLUCOMTR-MCNC: 199 MG/DL (ref 70–99)
GLUCOSE BLDC GLUCOMTR-MCNC: 236 MG/DL (ref 70–99)
GLUCOSE SERPL-MCNC: 215 MG/DL (ref 70–99)
HCO3 SERPL-SCNC: 26 MMOL/L (ref 22–29)
HCT VFR BLD AUTO: 28.8 % (ref 40–53)
HGB BLD-MCNC: 9.8 G/DL (ref 13.3–17.7)
MAGNESIUM SERPL-MCNC: 1.8 MG/DL (ref 1.7–2.3)
MCH RBC QN AUTO: 30.2 PG (ref 26.5–33)
MCHC RBC AUTO-ENTMCNC: 34 G/DL (ref 31.5–36.5)
MCV RBC AUTO: 89 FL (ref 78–100)
PHOSPHATE SERPL-MCNC: 3.6 MG/DL (ref 2.5–4.5)
PLATELET # BLD AUTO: 169 10E3/UL (ref 150–450)
POTASSIUM SERPL-SCNC: 3.4 MMOL/L (ref 3.4–5.3)
POTASSIUM SERPL-SCNC: 3.6 MMOL/L (ref 3.4–5.3)
RBC # BLD AUTO: 3.25 10E6/UL (ref 4.4–5.9)
SODIUM SERPL-SCNC: 137 MMOL/L (ref 135–145)
WBC # BLD AUTO: 13.3 10E3/UL (ref 4–11)

## 2024-12-22 PROCEDURE — 97530 THERAPEUTIC ACTIVITIES: CPT | Mod: GO

## 2024-12-22 PROCEDURE — 250N000009 HC RX 250: Performed by: INTERNAL MEDICINE

## 2024-12-22 PROCEDURE — 99232 SBSQ HOSP IP/OBS MODERATE 35: CPT | Performed by: INTERNAL MEDICINE

## 2024-12-22 PROCEDURE — 120N000013 HC R&B IMCU

## 2024-12-22 PROCEDURE — 80048 BASIC METABOLIC PNL TOTAL CA: CPT | Performed by: HOSPITALIST

## 2024-12-22 PROCEDURE — 84520 ASSAY OF UREA NITROGEN: CPT | Performed by: HOSPITALIST

## 2024-12-22 PROCEDURE — 250N000013 HC RX MED GY IP 250 OP 250 PS 637: Performed by: PHYSICIAN ASSISTANT

## 2024-12-22 PROCEDURE — 84100 ASSAY OF PHOSPHORUS: CPT | Performed by: INTERNAL MEDICINE

## 2024-12-22 PROCEDURE — 250N000013 HC RX MED GY IP 250 OP 250 PS 637: Performed by: HOSPITALIST

## 2024-12-22 PROCEDURE — 84132 ASSAY OF SERUM POTASSIUM: CPT | Performed by: HOSPITALIST

## 2024-12-22 PROCEDURE — 97535 SELF CARE MNGMENT TRAINING: CPT | Mod: GO

## 2024-12-22 PROCEDURE — 250N000013 HC RX MED GY IP 250 OP 250 PS 637

## 2024-12-22 PROCEDURE — 250N000013 HC RX MED GY IP 250 OP 250 PS 637: Performed by: INTERNAL MEDICINE

## 2024-12-22 PROCEDURE — B4185 PARENTERAL SOL 10 GM LIPIDS: HCPCS | Performed by: INTERNAL MEDICINE

## 2024-12-22 PROCEDURE — 250N000011 HC RX IP 250 OP 636: Performed by: INTERNAL MEDICINE

## 2024-12-22 PROCEDURE — 83735 ASSAY OF MAGNESIUM: CPT | Performed by: HOSPITALIST

## 2024-12-22 PROCEDURE — 99232 SBSQ HOSP IP/OBS MODERATE 35: CPT | Performed by: HOSPITALIST

## 2024-12-22 PROCEDURE — 94642 AEROSOL INHALATION TREATMENT: CPT

## 2024-12-22 PROCEDURE — 94640 AIRWAY INHALATION TREATMENT: CPT | Mod: 76

## 2024-12-22 PROCEDURE — 85041 AUTOMATED RBC COUNT: CPT | Performed by: HOSPITALIST

## 2024-12-22 PROCEDURE — 999N000157 HC STATISTIC RCP TIME EA 10 MIN

## 2024-12-22 PROCEDURE — 250N000009 HC RX 250: Performed by: HOSPITALIST

## 2024-12-22 PROCEDURE — 85018 HEMOGLOBIN: CPT | Performed by: HOSPITALIST

## 2024-12-22 RX ORDER — POTASSIUM CHLORIDE 1.5 G/1.58G
40 POWDER, FOR SOLUTION ORAL ONCE
Status: COMPLETED | OUTPATIENT
Start: 2024-12-22 | End: 2024-12-22

## 2024-12-22 RX ADMIN — ASCORBIC ACID, VITAMIN A PALMITATE, CHOLECALCIFEROL, THIAMINE HYDROCHLORIDE, RIBOFLAVIN-5 PHOSPHATE SODIUM, PYRIDOXINE HYDROCHLORIDE, NIACINAMIDE, DEXPANTHENOL, ALPHA-TOCOPHEROL ACETATE, VITAMIN K1, FOLIC ACID, BIOTIN, CYANOCOBALAMIN: 200; 3300; 200; 6; 3.6; 6; 40; 15; 10; 150; 600; 60; 5 INJECTION, SOLUTION INTRAVENOUS at 21:06

## 2024-12-22 RX ADMIN — IPRATROPIUM BROMIDE AND ALBUTEROL SULFATE 3 ML: .5; 3 SOLUTION RESPIRATORY (INHALATION) at 12:05

## 2024-12-22 RX ADMIN — OLIVE OIL AND SOYBEAN OIL 250 ML: 16; 4 INJECTION, EMULSION INTRAVENOUS at 20:22

## 2024-12-22 RX ADMIN — IPRATROPIUM BROMIDE AND ALBUTEROL SULFATE 3 ML: .5; 3 SOLUTION RESPIRATORY (INHALATION) at 19:30

## 2024-12-22 RX ADMIN — CEFTRIAXONE SODIUM 2 G: 2 INJECTION, POWDER, FOR SOLUTION INTRAMUSCULAR; INTRAVENOUS at 09:17

## 2024-12-22 RX ADMIN — QUETIAPINE 12.5 MG: 25 TABLET, FILM COATED ORAL at 21:07

## 2024-12-22 RX ADMIN — PANTOPRAZOLE SODIUM 40 MG: 40 TABLET, DELAYED RELEASE ORAL at 09:21

## 2024-12-22 RX ADMIN — PANTOPRAZOLE SODIUM 40 MG: 40 TABLET, DELAYED RELEASE ORAL at 17:20

## 2024-12-22 RX ADMIN — CARBOXYMETHYLCELLULOSE SODIUM 2 DROP: 5 SOLUTION/ DROPS OPHTHALMIC at 21:07

## 2024-12-22 RX ADMIN — INSULIN ASPART 2 UNITS: 100 INJECTION, SOLUTION INTRAVENOUS; SUBCUTANEOUS at 10:24

## 2024-12-22 RX ADMIN — INSULIN ASPART 2 UNITS: 100 INJECTION, SOLUTION INTRAVENOUS; SUBCUTANEOUS at 01:02

## 2024-12-22 RX ADMIN — LEVOTHYROXINE SODIUM 75 MCG: 75 TABLET ORAL at 09:21

## 2024-12-22 RX ADMIN — INSULIN ASPART 2 UNITS: 100 INJECTION, SOLUTION INTRAVENOUS; SUBCUTANEOUS at 05:39

## 2024-12-22 RX ADMIN — CARBOXYMETHYLCELLULOSE SODIUM 1 DROP: 5 SOLUTION/ DROPS OPHTHALMIC at 17:24

## 2024-12-22 RX ADMIN — TAMSULOSIN HYDROCHLORIDE 0.4 MG: 0.4 CAPSULE ORAL at 21:07

## 2024-12-22 RX ADMIN — IPRATROPIUM BROMIDE AND ALBUTEROL SULFATE 3 ML: .5; 3 SOLUTION RESPIRATORY (INHALATION) at 16:31

## 2024-12-22 RX ADMIN — POTASSIUM CHLORIDE 40 MEQ: 1.5 POWDER, FOR SOLUTION ORAL at 09:21

## 2024-12-22 RX ADMIN — ACETAMINOPHEN 650 MG: 325 TABLET, FILM COATED ORAL at 05:39

## 2024-12-22 RX ADMIN — CARBOXYMETHYLCELLULOSE SODIUM 2 DROP: 5 SOLUTION/ DROPS OPHTHALMIC at 09:21

## 2024-12-22 ASSESSMENT — ACTIVITIES OF DAILY LIVING (ADL)
ADLS_ACUITY_SCORE: 49
ADLS_ACUITY_SCORE: 49
ADLS_ACUITY_SCORE: 52
ADLS_ACUITY_SCORE: 49
ADLS_ACUITY_SCORE: 52
ADLS_ACUITY_SCORE: 49
ADLS_ACUITY_SCORE: 49
ADLS_ACUITY_SCORE: 52
ADLS_ACUITY_SCORE: 49
ADLS_ACUITY_SCORE: 52
ADLS_ACUITY_SCORE: 49
ADLS_ACUITY_SCORE: 52
ADLS_ACUITY_SCORE: 49
ADLS_ACUITY_SCORE: 52
ADLS_ACUITY_SCORE: 52
ADLS_ACUITY_SCORE: 49

## 2024-12-22 NOTE — PROGRESS NOTES
[facility]  Infectious Disease Progress Note          Assessment and Plan:   Date of Admission:  12/15/2024  Date of Consult (When I saw the patient): 12/20/24        Assessment & Plan  Clayton Pacheco is a 89 year old male who was admitted on 12/15/2024.      Impression: 1 89-year-old male, admitted with acute GI bleeding and some vague generalized symptoms, had leukocytosis at admission but not anything else for infection.  CT scan with an ulcerated mass, biopsy suggest bleeding ulcer rather than malignancy  2 in-hospital major fever episode and sepsis, found to have E. coli bacteremia, CT scan without an obvious intra-abdominal infection conceivably ulcer is the source would be unusual pathogen for this, on antibiotics already clinically improved  3 prior E. coli bacteremia earlier this year with prostatitis and urinary tract infection source but current urine unremarkable with no urinary symptoms     REC 1 Zosyn was reasonable choice assuming possible intra-abdominal source but organism is actually resistant, will switch to ceftriaxone not continue to cover major abdominal infection is imaging and clinically not evident, organism has oral options when clinically improved but probably do a oral quinolone when we get to that point about 1 more week of oral therapy if discharged soon              Interval History:     no new complaints and doing well; no cp, sob, n/v/d, or abd pain.  Feels relatively okay, not having major chills or fever, this despite the fact that organisms not been covered as it is resistant to Zosyn.  No other particular new abdominal pain or other symptoms GI bleeding appears to be controlled currently with no surgical plan              Medications:     Current Facility-Administered Medications   Medication Dose Route Frequency Provider Last Rate Last Admin    carboxymethylcellulose PF (REFRESH PLUS) 0.5 % ophthalmic solution 2 drop  2 drop Both Eyes BID Elsie  Lobo Bolden MD   2 drop at 12/22/24 0921    cefTRIAXone (ROCEPHIN) 2 g vial to attach to  ml bag for ADULTS or NS 50 ml bag for PEDS  2 g Intravenous Q24H Jose Velazquez MD   2 g at 12/22/24 0917    insulin aspart (NovoLOG) injection (RAPID ACTING)  1-12 Units Subcutaneous Q4H Lobo Zimmerman MD        ipratropium - albuterol 0.5 mg/2.5 mg/3 mL (DUONEB) neb solution 3 mL  3 mL Nebulization 4x daily Art Silverman MD   3 mL at 12/21/24 1922    levothyroxine (SYNTHROID/LEVOTHROID) tablet 75 mcg  75 mcg Oral Daily Lobo Zimmerman MD   75 mcg at 12/22/24 0921    lipids plant base (CLINOLIPID) 20 % infusion 250 mL  250 mL Intravenous Q24H Lobo Zimmerman MD   Stopped at 12/22/24 0900    pantoprazole (PROTONIX) EC tablet 40 mg  40 mg Oral BID AC Bhavani Burgess PA-C   40 mg at 12/22/24 0921    QUEtiapine (SEROquel) half-tab 12.5 mg  12.5 mg Oral At Bedtime Lobo Zimmerman MD   12.5 mg at 12/21/24 2158    sodium chloride (PF) 0.9% PF flush 3 mL  3 mL Intracatheter Q8H Diony Roy MD   3 mL at 12/22/24 0542    tamsulosin (FLOMAX) capsule 0.4 mg  0.4 mg Oral Daily Lobo Zimmerman MD   0.4 mg at 12/21/24 2030                  Physical Exam:   Blood pressure 101/51, pulse 93, temperature 98  F (36.7  C), temperature source Oral, resp. rate 19, weight 69.1 kg (152 lb 6.4 oz), SpO2 93%.  Wt Readings from Last 2 Encounters:   12/22/24 69.1 kg (152 lb 6.4 oz)   12/14/24 65.8 kg (145 lb)     Vital Signs with Ranges  Temp:  [98  F (36.7  C)-99.2  F (37.3  C)] 98  F (36.7  C)  Pulse:  [] 93  Resp:  [10-31] 19  BP: (101-157)/() 101/51  SpO2:  [93 %-96 %] 93 %    Constitutional: Awake, alert, cooperative, no apparent distress not particularly septic or ill looking     Lungs: Clear to auscultation bilaterally, no crackles or wheezing   Cardiovascular: Regular rate and rhythm, normal S1 and S2, and no murmur noted   Abdomen: Normal bowel sounds, soft, non-distended, non-tender  "  Skin: No rashes, no cyanosis, no edema   Other:           Data:   All microbiology laboratory data reviewed.  Recent Labs   Lab Test 12/22/24  0556 12/21/24  0617 12/20/24  1734 12/20/24  0604   WBC 13.3* 13.4*  --  15.7*   HGB 9.8* 9.1* 7.9* 8.7*  8.6*   HCT 28.8* 26.7*  --  23.7*   MCV 89 89  --  92    102*  --  100*     Recent Labs   Lab Test 12/22/24  0556 12/21/24  0617 12/20/24  0604   CR 0.87 0.72 0.80     No lab results found.  No lab results found.    Invalid input(s): \"UC\"     "

## 2024-12-22 NOTE — PLAN OF CARE
"Patient Name: Zoran  MRN: [id number]  Date of Admission: 12/15/2024  Reason for Admission: Upper GI bleed  Level of Care: [facility]    Vitals:   BP Readings from Last 1 Encounters:   12/22/24 (!) 140/68     Pulse Readings from Last 1 Encounters:   12/22/24 84     Wt Readings from Last 1 Encounters:   12/22/24 69.1 kg (152 lb 6.4 oz)     Ht Readings from Last 1 Encounters:   12/15/24 1.676 m (5' 6\")     Estimated body mass index is 24.6 kg/m  as calculated from the following:    Height as of an earlier encounter on 12/15/24: 1.676 m (5' 6\").    Weight as of this encounter: 69.1 kg (152 lb 6.4 oz).  Temp Readings from Last 1 Encounters:   12/22/24 97.9  F (36.6  C) (Oral)       Pain: Pain goal 0 Pain Rating Denies Effective pain medication/regimen NA    CV Surgery Patient: No    Assessment    Resp: LS clear/dim, scheduled nebs, productive cough, room air  Telemetry: SR  Neuro: A&O, [place]  GI/: External catheter, up to commode, x2 soft/loose dark stools. Diet advanced to clear liquids, tolerating.   Skin/Wounds: Scattered bruising  Lines/Drains: PICC RUE, PIV x1  Activity: A1 GB/walker, up to chairs for meals, ambulating in room   Abnormal Labs: Hgb stable, potassium replaced    Aggression Stop Light: Green          Patient Care Plan: Monitor labs, TPN infusing, advance diet slowly. PT tomorrow.    Problem: Gastrointestinal Bleeding  Goal: Optimal Coping with Acute Illness  Outcome: Progressing  Goal: Hemostasis  Outcome: Progressing     Problem: Fall Injury Risk  Goal: Absence of Fall and Fall-Related Injury  Outcome: Progressing  Intervention: Identify and Manage Contributors  Recent Flowsheet Documentation  Taken 12/22/2024 0800 by Lizabeth Palomino, RN  Self-Care Promotion: BADL personal objects within reach  Medication Review/Management: medications reviewed  Intervention: Promote Injury-Free Environment  Recent Flowsheet Documentation  Taken 12/22/2024 1600 by Lizabeth Palomino, RN  Safety Promotion/Fall " Prevention: safety round/check completed  Taken 12/22/2024 1200 by Lizabeth Palomino, RN  Safety Promotion/Fall Prevention: safety round/check completed  Taken 12/22/2024 0800 by Lizabeth Palomino, RN  Safety Promotion/Fall Prevention: safety round/check completed   Goal Outcome Evaluation: Progressing

## 2024-12-22 NOTE — PROGRESS NOTES
Care Management Follow Up    Length of Stay (days): 7    Expected Discharge Date: 12/24/2024     Concerns to be Addressed: discharge planning  watch for therapy recs  Patient plan of care discussed at interdisciplinary rounds: Yes    Anticipated Discharge Disposition: Home              Anticipated Discharge Services: Home Care  Anticipated Discharge DME: None    Patient/family educated on Medicare website which has current facility and service quality ratings:    Education Provided on the Discharge Plan:    Patient/Family in Agreement with the Plan: yes    Referrals Placed by CM/SW:    Private pay costs discussed: Not applicable    Discussed  Partnership in Safe Discharge Planning  document with patient/family: No     Handoff Completed: No, handoff not indicated or clinically appropriate    Additional Information:  Sent [facility] referral to [facility] for PT/OT.     Next Steps: find accepting [facility] agency        Jonathon Valentin, RN, BSN, Care Coordinator

## 2024-12-22 NOTE — PLAN OF CARE
"  Vitals:   BP Readings from Last 1 Encounters:   12/21/24 (!) 153/79     Pulse Readings from Last 1 Encounters:   12/21/24 98     Wt Readings from Last 1 Encounters:   12/21/24 72.5 kg (159 lb 12.8 oz)     Ht Readings from Last 1 Encounters:   12/15/24 1.676 m (5' 6\")     Estimated body mass index is 25.79 kg/m  as calculated from the following:    Height as of an earlier encounter on 12/15/24: 1.676 m (5' 6\").    Weight as of this encounter: 72.5 kg (159 lb 12.8 oz).  Temp Readings from Last 1 Encounters:   12/21/24 98.4  F (36.9  C) (Oral)       Pain: Pain goal 0 Pain Rating denies  Effective pain medication/regimen none     CV Surgery Patient: No    Assessment    Resp: 23  Telemetry: SR  Neuro: Intact   GI/: No bm and good diuresis   Skin/Wounds: scattered bruising and blanchable occyx   Lines/Drains: PIV  Activity: Ax1 gait belt and walker   Sleep: none           "

## 2024-12-22 NOTE — PLAN OF CARE
"Goal Outcome Evaluation:    Patient Name: Zoran  MRN: [id number]  Date of Admission: 12/15/2024  Reason for Admission: upper GI bleed.   Level of Care: [facility] [place]    Vitals:   BP Readings from Last 1 Encounters:   12/22/24 123/69     Pulse Readings from Last 1 Encounters:   12/22/24 89     Wt Readings from Last 1 Encounters:   12/22/24 69.1 kg (152 lb 6.4 oz)     Ht Readings from Last 1 Encounters:   12/15/24 1.676 m (5' 6\")     Estimated body mass index is 24.6 kg/m  as calculated from the following:    Height as of an earlier encounter on 12/15/24: 1.676 m (5' 6\").    Weight as of this encounter: 69.1 kg (152 lb 6.4 oz).  Temp Readings from Last 1 Encounters:   12/22/24 99.2  F (37.3  C)       Pain: Pain goal 0 Pain Rating 0 Effective pain medication/regimen     CV Surgery Patient: No       Resp: RA.Ls cl. Dim at bases. Expiratory wheezes. Nebs raad. .denies sob.   Telemetry: NSR  Neuro: A/Ox4. Neuro intact.   GI/: incontinent. External cath in place. Good uop. Tolerating ice chips.small of dark stool this shift. . Denies n/v.   Skin/Wounds: scattered bruising.  Lines/Drains: double lumen PICC infusing TPN at 75ml. R PIV SL.   Activity: Ax1 GB/W.   Sleep: slept bet cares.   Abnormal Labs: HG  9.8 this morning. WBC (13.3).     Aggression Stop Light: Green          Patient Care Plan: Q12hr HG checks. Continue TPN. ABX.          Patient Care Plan: continue with plan of care.     Overall Patient Progress: improvingOverall Patient Progress: improving           "

## 2024-12-22 NOTE — PROGRESS NOTES
[facility]  Hospitalist Progress Note  Art Silverman MD  12/22/2024    Assessment & Plan   Clayton Pacheco is a markedly pleasant 89 year old gentleman with past medical history that is most notable for hypertension, hyperlipidemia, and hypothyroid, who presents with acute abdominal pain and melena, and is found to have acute blood loss anemia and due to acute upper GI bleeding and hemorrhage, due to bleeding ulcerated gastric mass.        ## Acute blood loss anemia   ## Acute upper GI bleeding and hemorrhage due to   ## Bleeding ulcerated gastric mass:   Of note, Mr. Pacheco lives independently, drives, takes care of his wife and history of HTN, HLP, hypothyroid.  He is not on any anticoagulation or NSAID use at home. He presented on 12/14/2024, to the [facility] ED, for evaluation of acute abdominal pain and melena.     In the ED, he has tachycardia, without hypotension, fever, or hypoxia. He has acute leukocytosis to 14.0. HGB has dropped to 8.6 from previous 11.4. BUN level is elevated in isolation from Cr. INR is slightly elevated to 1.21. Troponin levels are minimally elevated. UA is negative. CTA shows a small focus of active gastrointestinal bleeding within the distal stomach, and possible thickening of the distal stomach/proximal duodenum. He is admitted there, and [facility] consulted. IV Protonix started. IV NS ordered on admission.     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 This was biopsied. Hemostatic gel and spray are applied applied, but this ulcerated mass continued to bleed. Clotted blood in the gastric body is also noted. Patient was transferred for IR, surgical and GI involvement.      Prior to arrival, HGB drops to 6.8. Two units packed red cells transfusion have been started.       -- [facility] [place] admission with frequent monitoring of hemodynamics, serial Hgb.    -- on IV Protonix.BID  now on p.o. twice daily  12/16   -- patient had bloody stools this AM and hgb down to 6.6.    -- discussed with GI, IR and Surgery    -- CTA repeat did not show any active bleeding    -- currently no active bleeding for IR, surgery for now hesitant for possible large gastric resection and also not sure how far the mass is away from the esophagus. (Dr Jules)    -- if patient has active bleeding, then to IR    -- allow clear liquid diet; on TPN    -- transfuse to keep at or above 8    -- Pathology from 12/15 only shows gastric fundic mucosa with ulceration and reactive changes, negative for dysplasia and malignancy (sampling suboptimal)    -- underwent repeat EGD 12/16 by Dr Kim:  Clotted blood in the cardia, in the gastric fundus and in the gastric body.  Gastric tumor in the gastric fundus. Clips were placed. Clip : Togethera Injected. Treated with argon plasma coagulation (APC). hemostatic spray applied.              12/17 pathology non-diagnositic,see note.   12/18/2024.  Assumed care.  Hemoglobin dropped to 7.6.  Received 1 unit PRBC per conditional transfusion.  Long discussion with patient and son Curt by phone, decision is conservative, ie NO surgery;.  Discussed with general surgery, Dr. Hairston who is in agreement.  Reviewed GI note, if further active bleeding, IR for embolization.  Patient and son acknowledge no firm tissue diagnosis to rule out CA however they are okay with this and are making plans for patient and wife regarding long-term care and comfort.  12/19/2024 RRT last evening due to acute respiratory failure with hypoxia, fever 103 rectal, LA, sepsis.  See RRT note.  BC subsequently grew out at 12 hours 1/2 gram-negative bacillus.  On Zosyn since RRT.  Given 2-1/2 L bolus since RRT.  At the time of RRT without etiology and Pro-Daryn 0.27 concern of possible PE and DDx, duplex venous ultrasound bilateral LE negative.  Subsequently weaned from BiPAP to room air, pt back to  baseline mentation, hungry and desires pos [clear liquids under direct supervision] and afebrile and suspect initial respiratory distress likely related to mucous plug.  Continue Zosyn for bacteremia,   Earlier this morning, discussion with the patient's son Curt after he was notified by RRT last evening he conferred with patient's wife and his other brother and sister-in-law and reviewed patient ACP which defers treatment plan primarily to attending MD; wife, 2 sons  [including Curt, present] and DILs knowing patient's wishes for his quality of life desired transition to DNR/DNI.  At that time patient on BiPAP without clear etiology and given patient's age at 89 and family knowledge to Patients wishes decision to transition patient to DNR DNI [however continue TPN.]  12/20/2024 patient continues to progress to baseline, on room air although somewhat wheezy.  No history of COPD/bronchospasms.  Patient on TPN therefore changed to n.p.o., ice chips only to avoid possibility of aspiration/pneumonia until patient strengthens to sit up to eat.  Hemoglobins have remained stable,   12/21/2024: Hemoglobins have remained stable.  N  Due to generalized weakness maintain n.p.o., ice chips only, on TPN until patient strong enough to set up and take p.o.'s.  Wheezy on exam, given 2-1/2 L fluid resuscitation during RRT, likely fluid vascular overload, BNP confirms.  Given furosemide 40 mg IV x 2, nursing states significant improvement of breathing with diuresis.  BMP in a.m.  -12/22/2024 continues to improve, breathing and bronchospasm improved after IV diuresis for fluid volume overload during RRT.  On room air.  Up and sitting in chair, appears stronger therefore advance to clear liquid diet, ongoing TPN.  Wean as able as diet advanced as tolerated.  Serial hemoglobins for history of GIB and known gastric mass..    E. coli bacteremia  RRT 12/19/2024, see above.  Blood cultures 2/2 E. coli.  On Zosyn.  Continue on same until  sensitivities return.  UA negative.  CXR negative for pneumonia.  Vitals stable.  12/21/2024, see ID note, E. coli resistant to Zosyn, changed to Rocephin.  ID notes patient with history of E. coli bacteremia and prostatitis and patient recalls history of cystitis.  Currently no symptoms, UA negative.  No fever, WBC today 13.4, WBC max 15.7.   12/22/2024 see ID note.  Continue on Rocephin, known E. coli bacteremia 2/2.  Evaluation without localizing source, afebrile, white count continues to downtrend.  Transition to p.o. on discharge.  ID recommends quinolone, 1 more week of oral therapy      Delirium, resolved  Was delirious initially upon presentation after transfer but has cleared mentally but does have episodes of confusion.  -- continue seroquel at bedtime -- stop narcotics  -monitor, baseline rather spry 89-year-old, is aware of condition and elects nonaggressive management regarding gastric mass.  Changed to DNR/DNI 12/19/2024  -see #1.     Hypernatremia, resolved  Secondary to decreased po intake.  Patient did have some delirium but this was more associated with sleep deprivation.  Patient was given IVF and now TPN with resolution of his hypernatremia  -Monitor BMP,     Elevated Troponins: Noting that he was hospitalized 3/2024 for sepsis and UTI, as discussed below, and Troponins were elevated at that time. Cardiology was consulted. Lexiscan showed no evidence of inducible ischemia. Suspect elevated Troponins 12/14/24 to be due to demand ischemia due to acute illness.    -- discontinue telemetry     -- denies any chest pain or sob    -- not a candidate for coronary intervention due to active bleeding, patient elects conservative management.  12/19/2024 at time of RRT troponin 53 however flat 45.  No chest pain.     Massive prostatomegaly. Seen incidentally on CT. Noting that he was hospitalized for cystitis due to E coli in 7/2023, and hospitalized for sepsis, UTI, and bacteremia due to E coli, resistant  "to Unasyn and Zosyn, treated with Cipro.    -- Monitor symptomatically     Hypertension: TTE in 7/2023 showed preserved LVEF with mild to moderate concentric LVH and mild aortic stenosis.    -- Hold home anti-hypertensives for bleeding     Hyperlipidemia:   -- hold Pravastatin      Hypothyroid:   -- continue Synthroid      Moderate malnutrition  -- PICC ine, continue TPN, clear liquid diet, only  Moderate Protein-Calorie Malnutrition  \"% Weight Loss: None noted  % Intake: Unable to fully assess  Subcutaneous Fat Loss: Orbital region moderate depletion, Upper arm region moderate depletion, and Thoracic region moderate depletion  Muscle Loss: Temporal region moderate depletion, Clavicle bone region moderate depletion, and Dorsal hand region moderate depletion  Fluid Retention: None noted    Malnutrition Diagnosis: Moderate malnutrition  In Context of: Chronic illness or disease\"     Drugs/Tx:  TPN    Disposition:   Medically Ready for Discharge: Anticipated in 2-4 Days started on p.o.'s today, on TPN.  Serial hemoglobins, remains on IV antibiotics.  Safe discharge to TCU per patient and family.    Interval History   Patient continues to improve per alertness and strength, on room air, up and sitting, advance to clear liquids, serial hemoglobins, continues on TPN.  Breathing significantly improved after IV diuresis.  Now on Rocephin for E. coli bacteremia, afebrile.  PT resumed.      Physical Exam    , Blood pressure 114/64, pulse 89, temperature 98  F (36.7  C), temperature source Oral, resp. rate 26, weight 69.1 kg (152 lb 6.4 oz), SpO2 96%.  Vitals:    12/21/24 0900 12/21/24 0908 12/22/24 0539   Weight: 73 kg (160 lb 15 oz) 72.5 kg (159 lb 12.8 oz) 69.1 kg (152 lb 6.4 oz)       General/Constitutional:   NAD, more alert, calm, cooperative  Chest/Respiratory: Respirations nonlabored on room air.  No further bronchospasms o  Cardiovascular:  regular, no murmur appreciated.    Gastrointestinal/Abdomen:  soft, nontender, " no rebound, guarding or other peritoneal signs.  Neuro.  Gross motor tested, nonfocal,  Psych affect, more alert, calm, near baseline    Medications   All medications were reviewed.  Current Facility-Administered Medications   Medication Dose Route Frequency Provider Last Rate Last Admin    No lozenges or gum should be given while patient on BIPAP/AVAPS/AVAPS AE   Does not apply Continuous PRN Joel Gonzalez APRN CNP        parenteral nutrition - Clinimix E   CENTRAL LINE IV TPN CONTINUOUS Lobo Zimmerman MD        parenteral nutrition - Clinimix E   CENTRAL LINE IV TPN CONTINUOUS Lobo Zimmerman MD 75 mL/hr at 12/22/24 0800 Rate Verify at 12/22/24 0800    Patient may continue current oral medications   Does not apply Continuous PRN Joel Gonzalez APRN CNP         Current Facility-Administered Medications   Medication Dose Route Frequency Provider Last Rate Last Admin    carboxymethylcellulose PF (REFRESH PLUS) 0.5 % ophthalmic solution 2 drop  2 drop Both Eyes BID Lobo Zimmerman MD   2 drop at 12/22/24 0921    cefTRIAXone (ROCEPHIN) 2 g vial to attach to  ml bag for ADULTS or NS 50 ml bag for PEDS  2 g Intravenous Q24H Jose Velazquez MD   2 g at 12/22/24 0917    insulin aspart (NovoLOG) injection (RAPID ACTING)  1-12 Units Subcutaneous Q4H Lobo Zimmerman MD        ipratropium - albuterol 0.5 mg/2.5 mg/3 mL (DUONEB) neb solution 3 mL  3 mL Nebulization 4x daily Art Silverman MD   3 mL at 12/22/24 1205    levothyroxine (SYNTHROID/LEVOTHROID) tablet 75 mcg  75 mcg Oral Daily Lobo Zimmerman MD   75 mcg at 12/22/24 0921    lipids plant base (CLINOLIPID) 20 % infusion 250 mL  250 mL Intravenous Q24H Lobo Zimmerman MD   Stopped at 12/22/24 0900    pantoprazole (PROTONIX) EC tablet 40 mg  40 mg Oral BID AC Bhavani Burgess PA-C   40 mg at 12/22/24 0921    QUEtiapine (SEROquel) half-tab 12.5 mg  12.5 mg Oral At Bedtime Lobo Zimmerman MD   12.5 mg at 12/21/24 2169     sodium chloride (PF) 0.9% PF flush 3 mL  3 mL Intracatheter Q8H Diony Roy MD   3 mL at 12/22/24 0542    tamsulosin (FLOMAX) capsule 0.4 mg  0.4 mg Oral Daily Lobo Zimmerman MD   0.4 mg at 12/21/24 2030        Data   Recent Labs   Lab 12/22/24  1018 12/22/24  0556 12/22/24  0516 12/21/24  1625 12/21/24  1624 12/21/24  0902 12/21/24  0617 12/20/24  2236 12/20/24  1734 12/20/24  0755 12/20/24  0604 12/19/24  0358 12/19/24  0239 12/17/24  0736 12/17/24  0600   WBC  --  13.3*  --   --   --   --  13.4*  --   --   --  15.7*  --  8.4   < >  --    HGB  --  9.8*  --   --   --   --  9.1*  --  7.9*  --  8.7*  8.6*   < > 10.2*   < > 9.3*   MCV  --  89  --   --   --   --  89  --   --   --  92  --  90   < >  --    PLT  --  169  --   --   --   --  102*  --   --   --  100*  --  104*   < >  --    INR  --   --   --   --   --   --   --   --   --   --   --   --   --   --  1.17*   NA  --  137  --   --   --   --  138  --   --   --  135   < > 139   < > 148*   POTASSIUM  --  3.4  --   --  3.9  --  3.4  --   --   --  3.6   < > 3.8   < > 3.6   CHLORIDE  --  101  --   --   --   --  107  --   --   --  106   < > 105   < > 117*   CO2  --  26  --   --   --   --  23  --   --   --  22   < > 21*   < > 24   BUN  --  23.5*  --   --   --   --  18.9  --   --   --  24.0*   < > 21.8   < > 59.5*   CR  --  0.87  --   --   --   --  0.72  --   --   --  0.80   < > 0.68   < > 0.96   ANIONGAP  --  10  --   --   --   --  8  --   --   --  7   < > 13   < > 7   MEGHANN  --  8.1*  --   --   --   --  7.6*  --   --   --  7.5*   < > 8.1*   < > 8.0*   * 215* 236*   < >  --    < > 179*   < >  --    < > 181*   < > 116*   < > 147*   ALBUMIN  --   --   --   --   --   --   --   --   --   --   --   --  3.1*  --  2.9*   PROTTOTAL  --   --   --   --   --   --   --   --   --   --   --   --  5.1*  --  4.5*   BILITOTAL  --   --   --   --   --   --   --   --   --   --   --   --  0.5  --  0.3   ALKPHOS  --   --   --   --   --   --   --   --   --   --   --   --   98  --  54   ALT  --   --   --   --   --   --   --   --   --   --   --   --  71*  --  16   AST  --   --   --   --   --   --   --   --   --   --   --   --  61*  --  23    < > = values in this interval not displayed.

## 2024-12-23 ENCOUNTER — APPOINTMENT (OUTPATIENT)
Dept: PHYSICAL THERAPY | Facility: CLINIC | Age: 89
End: 2024-12-23
Attending: HOSPITALIST
Payer: MEDICARE

## 2024-12-23 LAB
ALBUMIN SERPL BCG-MCNC: 2.5 G/DL (ref 3.5–5.2)
ALP SERPL-CCNC: 153 U/L (ref 40–150)
ALT SERPL W P-5'-P-CCNC: 60 U/L (ref 0–70)
ANION GAP SERPL CALCULATED.3IONS-SCNC: 8 MMOL/L (ref 7–15)
AST SERPL W P-5'-P-CCNC: 32 U/L (ref 0–45)
BILIRUB SERPL-MCNC: 1 MG/DL
BUN SERPL-MCNC: 22.6 MG/DL (ref 8–23)
CALCIUM SERPL-MCNC: 8 MG/DL (ref 8.8–10.4)
CHLORIDE SERPL-SCNC: 103 MMOL/L (ref 98–107)
CREAT SERPL-MCNC: 0.68 MG/DL (ref 0.67–1.17)
EGFRCR SERPLBLD CKD-EPI 2021: 86 ML/MIN/1.73M2
GLUCOSE BLDC GLUCOMTR-MCNC: 143 MG/DL (ref 70–99)
GLUCOSE BLDC GLUCOMTR-MCNC: 146 MG/DL (ref 70–99)
GLUCOSE BLDC GLUCOMTR-MCNC: 149 MG/DL (ref 70–99)
GLUCOSE BLDC GLUCOMTR-MCNC: 161 MG/DL (ref 70–99)
GLUCOSE BLDC GLUCOMTR-MCNC: 178 MG/DL (ref 70–99)
GLUCOSE BLDC GLUCOMTR-MCNC: 186 MG/DL (ref 70–99)
GLUCOSE SERPL-MCNC: 145 MG/DL (ref 70–99)
HCO3 SERPL-SCNC: 23 MMOL/L (ref 22–29)
HGB BLD-MCNC: 8.8 G/DL (ref 13.3–17.7)
INR PPP: 1.11 (ref 0.85–1.15)
MAGNESIUM SERPL-MCNC: 1.9 MG/DL (ref 1.7–2.3)
PHOSPHATE SERPL-MCNC: 2.7 MG/DL (ref 2.5–4.5)
POTASSIUM SERPL-SCNC: 3.8 MMOL/L (ref 3.4–5.3)
PROT SERPL-MCNC: 4.5 G/DL (ref 6.4–8.3)
SODIUM SERPL-SCNC: 134 MMOL/L (ref 135–145)
TRIGL SERPL-MCNC: 134 MG/DL

## 2024-12-23 PROCEDURE — 99232 SBSQ HOSP IP/OBS MODERATE 35: CPT | Performed by: INTERNAL MEDICINE

## 2024-12-23 PROCEDURE — 83735 ASSAY OF MAGNESIUM: CPT | Performed by: INTERNAL MEDICINE

## 2024-12-23 PROCEDURE — 999N000157 HC STATISTIC RCP TIME EA 10 MIN

## 2024-12-23 PROCEDURE — 999N000040 HC STATISTIC CONSULT NO CHARGE VASC ACCESS

## 2024-12-23 PROCEDURE — 250N000013 HC RX MED GY IP 250 OP 250 PS 637: Performed by: PHYSICIAN ASSISTANT

## 2024-12-23 PROCEDURE — 250N000009 HC RX 250: Performed by: HOSPITALIST

## 2024-12-23 PROCEDURE — 84478 ASSAY OF TRIGLYCERIDES: CPT | Performed by: INTERNAL MEDICINE

## 2024-12-23 PROCEDURE — 250N000013 HC RX MED GY IP 250 OP 250 PS 637: Performed by: INTERNAL MEDICINE

## 2024-12-23 PROCEDURE — 85018 HEMOGLOBIN: CPT

## 2024-12-23 PROCEDURE — 84100 ASSAY OF PHOSPHORUS: CPT | Performed by: INTERNAL MEDICINE

## 2024-12-23 PROCEDURE — 250N000009 HC RX 250

## 2024-12-23 PROCEDURE — 120N000001 HC R&B MED SURG/OB

## 2024-12-23 PROCEDURE — 250N000011 HC RX IP 250 OP 636: Performed by: INTERNAL MEDICINE

## 2024-12-23 PROCEDURE — 85610 PROTHROMBIN TIME: CPT | Performed by: INTERNAL MEDICINE

## 2024-12-23 PROCEDURE — 94640 AIRWAY INHALATION TREATMENT: CPT | Mod: 76

## 2024-12-23 PROCEDURE — 94640 AIRWAY INHALATION TREATMENT: CPT

## 2024-12-23 PROCEDURE — 99233 SBSQ HOSP IP/OBS HIGH 50: CPT | Performed by: HOSPITALIST

## 2024-12-23 PROCEDURE — 97116 GAIT TRAINING THERAPY: CPT | Mod: GP

## 2024-12-23 PROCEDURE — 97530 THERAPEUTIC ACTIVITIES: CPT | Mod: GP

## 2024-12-23 PROCEDURE — 80053 COMPREHEN METABOLIC PANEL: CPT | Performed by: INTERNAL MEDICINE

## 2024-12-23 RX ADMIN — IPRATROPIUM BROMIDE AND ALBUTEROL SULFATE 3 ML: .5; 3 SOLUTION RESPIRATORY (INHALATION) at 15:06

## 2024-12-23 RX ADMIN — TAMSULOSIN HYDROCHLORIDE 0.4 MG: 0.4 CAPSULE ORAL at 20:20

## 2024-12-23 RX ADMIN — CARBOXYMETHYLCELLULOSE SODIUM 2 DROP: 5 SOLUTION/ DROPS OPHTHALMIC at 09:18

## 2024-12-23 RX ADMIN — IPRATROPIUM BROMIDE AND ALBUTEROL SULFATE 3 ML: .5; 3 SOLUTION RESPIRATORY (INHALATION) at 07:54

## 2024-12-23 RX ADMIN — CARBOXYMETHYLCELLULOSE SODIUM 2 DROP: 5 SOLUTION/ DROPS OPHTHALMIC at 20:19

## 2024-12-23 RX ADMIN — IPRATROPIUM BROMIDE AND ALBUTEROL SULFATE 3 ML: .5; 3 SOLUTION RESPIRATORY (INHALATION) at 19:40

## 2024-12-23 RX ADMIN — ASCORBIC ACID, VITAMIN A PALMITATE, CHOLECALCIFEROL, THIAMINE HYDROCHLORIDE, RIBOFLAVIN-5 PHOSPHATE SODIUM, PYRIDOXINE HYDROCHLORIDE, NIACINAMIDE, DEXPANTHENOL, ALPHA-TOCOPHEROL ACETATE, VITAMIN K1, FOLIC ACID, BIOTIN, CYANOCOBALAMIN: 200; 3300; 200; 6; 3.6; 6; 40; 15; 10; 150; 600; 60; 5 INJECTION, SOLUTION INTRAVENOUS at 20:19

## 2024-12-23 RX ADMIN — LEVOTHYROXINE SODIUM 75 MCG: 75 TABLET ORAL at 09:18

## 2024-12-23 RX ADMIN — CEFTRIAXONE SODIUM 2 G: 2 INJECTION, POWDER, FOR SOLUTION INTRAMUSCULAR; INTRAVENOUS at 09:17

## 2024-12-23 RX ADMIN — PANTOPRAZOLE SODIUM 40 MG: 40 TABLET, DELAYED RELEASE ORAL at 17:08

## 2024-12-23 RX ADMIN — QUETIAPINE 12.5 MG: 25 TABLET, FILM COATED ORAL at 21:55

## 2024-12-23 RX ADMIN — IPRATROPIUM BROMIDE AND ALBUTEROL SULFATE 3 ML: .5; 3 SOLUTION RESPIRATORY (INHALATION) at 11:04

## 2024-12-23 RX ADMIN — PANTOPRAZOLE SODIUM 40 MG: 40 TABLET, DELAYED RELEASE ORAL at 09:18

## 2024-12-23 ASSESSMENT — ACTIVITIES OF DAILY LIVING (ADL)
ADLS_ACUITY_SCORE: 49

## 2024-12-23 NOTE — PROGRESS NOTES
Scheduled nebs given as ordered.  BIPAP has been on standby all day.  RT will continue to assess and treat.

## 2024-12-23 NOTE — PROGRESS NOTES
[facility]  Hospitalist Progress Note        Saul Son MD   12/23/2024        Interval History:        - ID following, continued on IV Rocephin; plan for oral Levaquin for 1 more week  if discharged soon  - care management following for disposition to TCU         Assessment and Plan:        Clayton Pacheco is a pleasant 89 year old gentleman with PMH of hypertension, hyperlipidemia, and hypothyroid, who presented with acute abdominal pain and melena, noted with acute blood loss anemia due to acute upper GI bleeding and hemorrhage, due to bleeding ulcerated gastric mass. Initially presented to [facility] ED on 12/14/24 and transferred to [facility], admitted inpatient 12/15/24.    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 Hemostatic gel and spray are applied applied, but this ulcerated mass continued to bleed. Clotted blood in the gastric body is also noted. 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 Clips ([employer]) were placed, injected and treated with argon plasma coagulation, hemostatic spry applied  - after  discussion with family, plan for conservative management with no surgical intervention  - bleeding seems to have now resolved and Hb stable around 8-9; 8.8 on 12/23  - will monitor daily hemoglobin and transfuse PRN for Hb <7  - GI signed off 12/20; to follow up with DR Kim in 1-2 weeks after discharge    Sepsis, E. coli bacteremia  Deirium  - on 12/19 was evaluated by RRT for acute respiratory failure with hypoxia, fever and lactic acidosis  - blood cultures from 12/19 growing E. Coli; UA unremarkable  - unclear source but likely from ulcerated mass  - has been afebrile since 12/20; WBC 15.7---> 13.3  - was initially started on Zosyn on 12/19 , switched to Rocephin 12/21   - infectious disease following; to continue on IV Rocephin; plan for oral Levaquin for 1 more week  if discharged soon  - respiratory status in mentation improved; off BiPAP; respiratory status stable on room air  - code status was changed to DNR/DNI (12/19) after discussion with family    Moderate malnutrition  Hypernatremia, resolved  - was started on TPN with poor PO intake  - started on clears 12/22 and tolerating well  - will start full liquid diet and advance as tolerated (12/23)  - nutrition to wean down on TPN  -monitor BMP     Elevated Troponins: Noting that he was hospitalized 3/2024 for sepsis and UTI, as discussed below, and Troponins were elevated at that time. Cardiology was consulted. Lexiscan showed no evidence of inducible ischemia. Suspect elevated Troponins 12/14/24 to be due to demand ischemia due to acute illness.    -- discontinued telemetry ; no chest pain or sob    -- not a candidate for coronary intervention due to active bleeding, patient elects conservative management.  12/19/2024 at time of RRT troponin 53 however flat 45.  No chest pain.     Massive prostatomegaly. Seen incidentally on CT. Noting that he was hospitalized for cystitis due to E coli in 7/2023, and hospitalized for sepsis, UTI, and bacteremia due to E  coli, resistant to Unasyn and Zosyn, treated with Cipro.    -- Monitor symptomatically     Hypertension: TTE in 7/2023 showed preserved LVEF with mild to moderate concentric LVH and mild aortic stenosis.    -- Hold PTA amlodipine, Avalide  -BP stable without antihypertensives     Hyperlipidemia:   -- hold Pravastatin       Hypothyroid:   -- continue Synthroid        Diet:   parenteral nutrition - Clinimix E  parenteral nutrition - Clinimix E  Advance Diet as Tolerated: Full Liquid Diet; Regular Diet Adult      DVT Prophylaxis: PCD boots    Code status: DNR/DNI    Disposition:   Medically Ready for Discharge: Anticipated in 2-4 Days pending clinical stability, PO tolerance  -care management team following for disposition to TCU  -will discontinue IMC status (12/23)    High medical complexity    Total time spent today 55 minutes on my first encounter on this patient with complicated hospitalization       Clinically Significant Risk Factors         # Hyponatremia: Lowest Na = 134 mmol/L in last 2 days, will monitor as appropriate       # Hypoalbuminemia: Lowest albumin = 2.5 g/dL at 12/23/2024  6:01 AM, will monitor as appropriate       # Hypertension: Noted on problem list               # Moderate Malnutrition: based on nutrition assessment      # Financial/Environmental Concerns: none                            Physical Exam:      Blood pressure (!) 145/66, pulse 84, temperature 98.6  F (37  C), temperature source Oral, resp. rate 24, weight 70.1 kg (154 lb 8 oz), SpO2 94%.  Vitals:    12/21/24 0908 12/22/24 0539 12/23/24 0637   Weight: 72.5 kg (159 lb 12.8 oz) 69.1 kg (152 lb 6.4 oz) 70.1 kg (154 lb 8 oz)     Vital Signs with Ranges  Temp:  [97.9  F (36.6  C)-98.7  F (37.1  C)] 98.6  F (37  C)  Pulse:  [75-98] 84  Resp:  [11-26] 24  BP: (114-145)/(63-94) 145/66  SpO2:  [91 %-96 %] 94 %  I/O's Last 24 hours  I/O last 3 completed shifts:  In: 2720 [P.O.:760; I.V.:100]  Out: 1500 [Urine:1500]    Constitutional: Alert,  awake and oriented ; resting comfortably in no apparent distress       Oral cavity: Moist mucosa   Cardiovascular: Normal s1 s2, regular rate and rhythm, no murmur   Lungs: B/l clear to auscultation, no wheezes or crepitations   Abdomen: Soft, nt, nd, no guarding, rigidity or rebound; BS +   LE : No edema   Musculoskeletal/Neuro Power 5/5 in all extremities; No focal neurological deficits noted   Psychiatry: normal mood and affect                Medications:        Current Facility-Administered Medications   Medication Dose Route Frequency Provider Last Rate Last Admin    carboxymethylcellulose PF (REFRESH PLUS) 0.5 % ophthalmic solution 2 drop  2 drop Both Eyes BID Lobo Zimmerman MD   2 drop at 12/22/24 2107    cefTRIAXone (ROCEPHIN) 2 g vial to attach to  ml bag for ADULTS or NS 50 ml bag for PEDS  2 g Intravenous Q24H Jose Velazquez MD   2 g at 12/22/24 0917    insulin aspart (NovoLOG) injection (RAPID ACTING)  1-12 Units Subcutaneous Q4H Lobo Zimmerman MD   2 Units at 12/23/24 0301    ipratropium - albuterol 0.5 mg/2.5 mg/3 mL (DUONEB) neb solution 3 mL  3 mL Nebulization 4x daily Art Silverman MD   3 mL at 12/23/24 0754    levothyroxine (SYNTHROID/LEVOTHROID) tablet 75 mcg  75 mcg Oral Daily Lobo Zimmerman MD   75 mcg at 12/22/24 0921    lipids plant base (CLINOLIPID) 20 % infusion 250 mL  250 mL Intravenous Q24H Lobo Zimmerman MD 20.8 mL/hr at 12/22/24 2022 250 mL at 12/22/24 2022    pantoprazole (PROTONIX) EC tablet 40 mg  40 mg Oral BID AC Bhavani Burgess PA-C   40 mg at 12/22/24 1720    QUEtiapine (SEROquel) half-tab 12.5 mg  12.5 mg Oral At Bedtime Lobo Zimmerman MD   12.5 mg at 12/22/24 2107    sodium chloride (PF) 0.9% PF flush 3 mL  3 mL Intracatheter Q8H Diony Roy MD   3 mL at 12/22/24 2107    tamsulosin (FLOMAX) capsule 0.4 mg  0.4 mg Oral Daily Lobo Zimmerman MD   0.4 mg at 12/22/24 2104     PRN Meds:   Current Facility-Administered  Medications   Medication Dose Route Frequency Provider Last Rate Last Admin    acetaminophen (TYLENOL) tablet 650 mg  650 mg Oral Q4H PRN Diony Roy MD   650 mg at 12/22/24 0539    Or    acetaminophen (TYLENOL) Suppository 650 mg  650 mg Rectal Q4H PRN Diony Roy MD   650 mg at 12/19/24 0117    albuterol (PROVENTIL) neb solution 2.5 mg  2.5 mg Nebulization Q2H PRN Joel Gonzalez APRN CNP   2.5 mg at 12/21/24 0449    benzocaine-menthol (CHLORASEPTIC) 6-10 MG lozenge 1 lozenge  1 lozenge Buccal Q1H PRN Diony Roy MD        bisacodyl (DULCOLAX) suppository 10 mg  10 mg Rectal Daily PRN Diony Roy MD        calcium carbonate (TUMS) chewable tablet 1,000 mg  1,000 mg Oral 4x Daily PRN Diony Roy MD        carboxymethylcellulose PF (REFRESH PLUS) 0.5 % ophthalmic solution 1 drop  1 drop Both Eyes Q1H PRN Joel Gonzalez APRN CNP   1 drop at 12/22/24 1724    glucose gel 15-30 g  15-30 g Oral Q15 Min PRN Lobo Zimmerman MD        Or    dextrose 50 % injection 25-50 mL  25-50 mL Intravenous Q15 Min PRLobo Vasquez MD        Or    glucagon injection 1 mg  1 mg Subcutaneous Q15 Min PRN Lobo Zimmerman MD        guaiFENesin-dextromethorphan (ROBITUSSIN DM) 100-10 MG/5ML syrup 10 mL  10 mL Oral Q4H PRN Diony Roy MD        ipratropium - albuterol 0.5 mg/2.5 mg/3 mL (DUONEB) neb solution 3 mL  3 mL Nebulization Q4H PRN Joel Gonzalez APRN CNP   3 mL at 12/19/24 0232    melatonin tablet 3 mg  3 mg Oral At Bedtime PRN Diony Roy MD        naloxone (NARCAN) injection 0.2 mg  0.2 mg Intravenous Q2 Min PRDiony Davila MD        Or    naloxone (NARCAN) injection 0.4 mg  0.4 mg Intravenous Q2 Min PRDiony Davila MD        Or    naloxone (NARCAN) injection 0.2 mg  0.2 mg Intramuscular Q2 Min Diony Wagner MD        Or    naloxone (NARCAN) injection 0.4 mg  0.4 mg Intramuscular Q2 Min CARLAN Kirill  Diony Barajas MD        No lozenges or gum should be given while patient on BIPAP/AVAPS/AVAPS AE   Does not apply Continuous PRJoel Whipple APRN CNP        ondansetron (ZOFRAN ODT) ODT tab 4 mg  4 mg Oral Q6H PRDiony Davila MD        Or    ondansetron (ZOFRAN) injection 4 mg  4 mg Intravenous Q6H PRDiony Davila MD        Patient may continue current oral medications   Does not apply Continuous PRN Joel Gonzalez APRN CNP        polyethylene glycol (MIRALAX) Packet 17 g  17 g Oral BID PRDiony Davila MD        senna-docusate (SENOKOT-S/PERICOLACE) 8.6-50 MG per tablet 1 tablet  1 tablet Oral BID Diony Wagner MD        Or    senna-docusate (SENOKOT-S/PERICOLACE) 8.6-50 MG per tablet 2 tablet  2 tablet Oral BID Diony Wagner MD        sodium chloride (PF) 0.9% PF flush 3 mL  3 mL Intracatheter q1 min Diony Wagner MD                Data:      All new lab and imaging data was reviewed.   Recent Labs   Lab Test 12/23/24  0604 12/23/24  0601 12/22/24  0556 12/21/24  0617 12/20/24  1734 12/20/24  0604 12/17/24  1356 12/17/24  0600 12/15/24  0606 12/15/24  0228   WBC  --   --  13.3* 13.4*  --  15.7*   < >  --    < > 10.4   HGB 8.8*  --  9.8* 9.1*   < > 8.7*  8.6*   < > 9.3*   < > 6.8*   MCV  --   --  89 89  --  92   < >  --    < > 98   PLT  --   --  169 102*  --  100*   < >  --    < > 189   INR  --  1.11  --   --   --   --   --  1.17*  --  1.28*    < > = values in this interval not displayed.      Recent Labs   Lab Test 12/23/24  0601 12/23/24  0229 12/22/24  2134 12/22/24  1415 12/22/24  1315 12/22/24  1018 12/22/24  0556 12/21/24  0902 12/21/24  0617   *  --   --   --   --   --  137  --  138   POTASSIUM 3.8  --   --   --  3.6  --  3.4   < > 3.4   CHLORIDE 103  --   --   --   --   --  101  --  107   CO2 23  --   --   --   --   --  26  --  23   BUN 22.6  --   --   --   --   --  23.5*  --  18.9   CR 0.68  --   --   --   --   --  0.87   "--  0.72   ANIONGAP 8  --   --   --   --   --  10  --  8   MEGHANN 8.0*  --   --   --   --   --  8.1*  --  7.6*   * 186* 158*   < >  --    < > 215*   < > 179*    < > = values in this interval not displayed.     No lab results found.    Invalid input(s): \"TROP\", \"TROPONINIES\"      "

## 2024-12-23 NOTE — PLAN OF CARE
"Patient Name: Zoran  MRN: [id number]  Date of Admission: 12/15/2024  Reason for Admission: Upper GIB  Level of Care: IMC to acute.     Vitals:   BP Readings from Last 1 Encounters:   12/23/24 114/58     Pulse Readings from Last 1 Encounters:   12/23/24 83     Wt Readings from Last 1 Encounters:   12/23/24 70.1 kg (154 lb 8 oz)     Ht Readings from Last 1 Encounters:   12/15/24 1.676 m (5' 6\")     Estimated body mass index is 24.94 kg/m  as calculated from the following:    Height as of an earlier encounter on 12/15/24: 1.676 m (5' 6\").    Weight as of this encounter: 70.1 kg (154 lb 8 oz).  Temp Readings from Last 1 Encounters:   12/23/24 98.2  F (36.8  C) (Oral)       Pain: Pain goal denies pain.  Pain Rating denied. Effective pain medication/regimen, PRN tylenol, no given.     CV Surgery Patient: No    Assessment    Resp: RA, lungs clear.   Telemetry: NSR, now discontinued.   Neuro: intact. Hard of hearing. Hearing aides at bedside.   GI/: inc of urine, external catheter in place. Abd rounded, +BS. Pt denied feeling constipated. Last BM 12/22/2024.  Skin/Wounds: intact.   Lines/Drains: PICC double luman, PIV SL.   Activity: Ax1 with gait belt and walker.   Sleep: na  Abnormal Labs: Hgb 8.8, recheck in am. Transfuse if <8.     Aggression Stop Light: Green          Patient Care Plan: plan to wean TPN in am per dietitian. Diet advanced to full liquid for dinner, tolerated well, will plan to advance to regular for breakfast in am. Can advance to regular if tolerated. TCU possible on Friday if able to wean TPN.       "

## 2024-12-23 NOTE — PROGRESS NOTES
Care Management Follow Up    Length of Stay (days): 8  Expected Discharge Date: 12/26/2024  Concerns to be Addressed: discharge planning    Patient plan of care discussed at interdisciplinary rounds: Yes  Anticipated Discharge Disposition: Home vs TCU     Anticipated Discharge Services: Home Care  Anticipated Discharge DME: None  Patient/family educated on Medicare website which has current facility and service quality ratings:    Education Provided on the Discharge Plan:    Patient/Family in Agreement with the Plan: yes  Referrals Placed by CM/SW:    Private pay costs discussed: Not applicable    Additional Information:  Received call from pt son Curt, who states family talked and will aim for TCU discharges; CM-RN reviewed   the Medicare Compare list for SNF.  Discussed associated Medicare star ratings to assist with choice for referrals/discharge planning Yes; Education was given to pt/family that star ratings are updated/maintained by Medicare and can be reviewed by visiting www.medicare.gov Yes.    Sent referrals based on choices:   Service Provider Request Status Address Phone Fax   [facility](TCU) Pending - Request Sent [address] [facility] [id number] [phone]   (Has friends there)             [facility] (SNF) Pending - Request Sent [address] [facility] [address] [phone]   (Close to home)        [facility] [place] (SNF) Pending - Request Sent [address] [address], [place]  [phone]   (Good Ratings)             [facility] FACILITY REFERRAL Considering [address] [facility] [place] [address] [phone]   Please list [facility] facility preference: [place]       (Will be moving to [place] soon, #1 choice)            [facility] - Referral Only(SNF)(TCU) Pending - Request Sent [address], [place] [address]  [phone]         Of note pt did have an accepting C agency, if needed in the future:   Service Provider Request Status Services Address Phone Fax   [facility]  [facility] [address], [address], [facility] [id number] [phone]         Next steps:   Follow up on TCU referrals.   *note, Amber Sanders considering--could take on Friday if TPN is weaned / taking adequate PO (private room no additional fees)    Portia Jaime RN, BSN, PHN  [facility]  Inpatient Care Management - FLOAT

## 2024-12-23 NOTE — PLAN OF CARE
"Goal Outcome Evaluation:    Patient Name: Zoran  MRN: [id number]  Date of Admission: 12/15/2024  Reason for Admission: Upper GI bleed.  Level of Care: [facility]    Vitals:   BP Readings from Last 1 Encounters:   12/23/24 (!) 120/94     Pulse Readings from Last 1 Encounters:   12/23/24 77     Wt Readings from Last 1 Encounters:   12/23/24 70.1 kg (154 lb 8 oz)     Ht Readings from Last 1 Encounters:   12/15/24 1.676 m (5' 6\")     Estimated body mass index is 24.94 kg/m  as calculated from the following:    Height as of an earlier encounter on 12/15/24: 1.676 m (5' 6\").    Weight as of this encounter: 70.1 kg (154 lb 8 oz).  Temp Readings from Last 1 Encounters:   12/23/24 98.6  F (37  C) (Oral)       Pain: Pain goal 0 Pain Rating 0 Effective pain medication/regimen     CV Surgery Patient: No    Assessment  Resp: RA.Ls cl. Dim at bases. denies sob. Productive cough. Nesha nebs.  Telemetry: NSR  Neuro: A/Ox4.[place]. bilateral Hearing aids at bedside. Neuro intact.   GI/: incontinent. External cath in place. Good uop. Tolerating iclear liquid diet. Denies n/v. No BM this shift. Passing flatus. BS+. Abd distended.   Skin/Wounds: scattered bruising.  Lines/Drains:RUE double lumen PICC infusing TPN at 75ml. R PIV SL.   Activity: Ax1 GB/W.   Sleep: slept bet cares.   Abnormal Labs: HG  8.8 this morning.      Aggression Stop Light: Green          Patient Care Plan: Q12hr HG checks. Advance diet slowly. PT today.         "

## 2024-12-23 NOTE — PROGRESS NOTES
[facility]  Infectious Disease Progress Note          Assessment and Plan:   Date of Admission:  12/15/2024  Date of Consult (When I saw the patient): 12/20/24        Assessment & Plan  Clayton Pacheco is a 89 year old male who was admitted on 12/15/2024.      Impression: 1 89-year-old male, admitted with acute GI bleeding and some vague generalized symptoms, had leukocytosis at admission but not anything else for infection.  CT scan with an ulcerated mass, biopsy suggest bleeding ulcer rather than malignancy  2 in-hospital major fever episode and sepsis, found to have E. coli bacteremia, CT scan without an obvious intra-abdominal infection conceivably ulcer is the source would be unusual pathogen for this, on antibiotics already clinically improved  3 prior E. coli bacteremia earlier this year with prostatitis and urinary tract infection source but current urine unremarkable with no urinary symptoms     REC 1 Zosyn was reasonable choice assuming possible intra-abdominal source but organism is resistant, on ceftriaxone . No sign  major abdominal infection on imaging and clinically not evident, organism has oral options when clinically improved but probably do a oral quinolone when we get to that point about 1 more week of oral therapy if discharged soon  If otherwise discharged I would do Levaquin orally for 1 week              Interval History:     no new complaints and doing well; no cp, sob, n/v/d, or abd pain.  Feels relatively okay, not having major chills or fever, this despite the fact that organisms not been covered as it is resistant to Zosyn.  No other particular new abdominal pain or other symptoms GI bleeding appears to be controlled currently with no surgical plan              Medications:     Current Facility-Administered Medications   Medication Dose Route Frequency Provider Last Rate Last Admin    carboxymethylcellulose PF (REFRESH PLUS) 0.5 % ophthalmic solution 2 drop  2  drop Both Eyes BID Lobo Zimmerman MD   2 drop at 12/23/24 0918    cefTRIAXone (ROCEPHIN) 2 g vial to attach to  ml bag for ADULTS or NS 50 ml bag for PEDS  2 g Intravenous Q24H Jose Velazquez MD   2 g at 12/23/24 0917    insulin aspart (NovoLOG) injection (RAPID ACTING)  1-12 Units Subcutaneous Q4H Lobo Zimmerman MD   2 Units at 12/23/24 0301    ipratropium - albuterol 0.5 mg/2.5 mg/3 mL (DUONEB) neb solution 3 mL  3 mL Nebulization 4x daily Art Silverman MD   3 mL at 12/23/24 0754    levothyroxine (SYNTHROID/LEVOTHROID) tablet 75 mcg  75 mcg Oral Daily Lobo Zimmerman MD   75 mcg at 12/23/24 0918    lipids plant base (CLINOLIPID) 20 % infusion 250 mL  250 mL Intravenous Q24H Lobo Zimmerman MD 20.8 mL/hr at 12/22/24 2022 250 mL at 12/22/24 2022    pantoprazole (PROTONIX) EC tablet 40 mg  40 mg Oral BID AC Bhavani Burgess PA-C   40 mg at 12/23/24 0918    QUEtiapine (SEROquel) half-tab 12.5 mg  12.5 mg Oral At Bedtime Lobo Zimmerman MD   12.5 mg at 12/22/24 2107    sodium chloride (PF) 0.9% PF flush 3 mL  3 mL Intracatheter Q8H Diony Roy MD   3 mL at 12/22/24 2107    tamsulosin (FLOMAX) capsule 0.4 mg  0.4 mg Oral Daily Lobo Zimmerman MD   0.4 mg at 12/22/24 2107                  Physical Exam:   Blood pressure 109/72, pulse 79, temperature 98.6  F (37  C), temperature source Oral, resp. rate 21, weight 70.1 kg (154 lb 8 oz), SpO2 98%.  Wt Readings from Last 2 Encounters:   12/23/24 70.1 kg (154 lb 8 oz)   12/14/24 65.8 kg (145 lb)     Vital Signs with Ranges  Temp:  [97.9  F (36.6  C)-98.7  F (37.1  C)] 98.6  F (37  C)  Pulse:  [75-98] 79  Resp:  [11-26] 21  BP: (109-145)/(63-94) 109/72  SpO2:  [91 %-98 %] 98 %    Constitutional: Awake, alert, cooperative, no apparent distress not particularly septic or ill looking     Lungs: Clear to auscultation bilaterally, no crackles or wheezing   Cardiovascular: Regular rate and rhythm, normal S1 and S2, and no murmur  "noted   Abdomen: Normal bowel sounds, soft, non-distended, non-tender   Skin: No rashes, no cyanosis, no edema   Other:           Data:   All microbiology laboratory data reviewed.  Recent Labs   Lab Test 12/23/24  0604 12/22/24  0556 12/21/24  0617 12/20/24  1734 12/20/24  0604   WBC  --  13.3* 13.4*  --  15.7*   HGB 8.8* 9.8* 9.1*   < > 8.7*  8.6*   HCT  --  28.8* 26.7*  --  23.7*   MCV  --  89 89  --  92   PLT  --  169 102*  --  100*    < > = values in this interval not displayed.     Recent Labs   Lab Test 12/23/24  0601 12/22/24  0556 12/21/24  0617   CR 0.68 0.87 0.72     No lab results found.  No lab results found.    Invalid input(s): \"UC\"     "

## 2024-12-23 NOTE — PROGRESS NOTES
CLINICAL NUTRITION SERVICES BRIEF NOTE  RD following for TPN.     - Per RN, with diet advancement to Full Liquids this afternoon OK with provider to start weaning TPN. Diet just advanced at 1328.     Intervention:   - Discontinue Lipids today. Continue Clinimix [id number] @ 75 ml/hr. Provides 1278 kcal/day, 90 g AA.   - Will monitor adequacy of PO and ability to further wean TPN.     Claribel Gutierrez RD, LD  Pager: [phone]  Available on [employer]

## 2024-12-24 LAB
ATRIAL RATE - MUSE: 90 BPM
DIASTOLIC BLOOD PRESSURE - MUSE: NORMAL MMHG
GLUCOSE BLDC GLUCOMTR-MCNC: 153 MG/DL (ref 70–99)
GLUCOSE BLDC GLUCOMTR-MCNC: 157 MG/DL (ref 70–99)
GLUCOSE BLDC GLUCOMTR-MCNC: 157 MG/DL (ref 70–99)
GLUCOSE BLDC GLUCOMTR-MCNC: 161 MG/DL (ref 70–99)
GLUCOSE BLDC GLUCOMTR-MCNC: 162 MG/DL (ref 70–99)
GLUCOSE BLDC GLUCOMTR-MCNC: 164 MG/DL (ref 70–99)
GLUCOSE BLDC GLUCOMTR-MCNC: 170 MG/DL (ref 70–99)
GLUCOSE BLDC GLUCOMTR-MCNC: 183 MG/DL (ref 70–99)
HGB BLD-MCNC: 8.6 G/DL (ref 13.3–17.7)
INTERPRETATION ECG - MUSE: NORMAL
MAGNESIUM SERPL-MCNC: 1.9 MG/DL (ref 1.7–2.3)
P AXIS - MUSE: 57 DEGREES
PHOSPHATE SERPL-MCNC: 2.9 MG/DL (ref 2.5–4.5)
POTASSIUM SERPL-SCNC: 3.8 MMOL/L (ref 3.4–5.3)
PR INTERVAL - MUSE: 138 MS
QRS DURATION - MUSE: 74 MS
QT - MUSE: 368 MS
QTC - MUSE: 450 MS
R AXIS - MUSE: -30 DEGREES
SYSTOLIC BLOOD PRESSURE - MUSE: NORMAL MMHG
T AXIS - MUSE: 39 DEGREES
VENTRICULAR RATE- MUSE: 90 BPM

## 2024-12-24 PROCEDURE — 250N000009 HC RX 250

## 2024-12-24 PROCEDURE — 84132 ASSAY OF SERUM POTASSIUM: CPT | Performed by: HOSPITALIST

## 2024-12-24 PROCEDURE — 250N000013 HC RX MED GY IP 250 OP 250 PS 637: Performed by: PHYSICIAN ASSISTANT

## 2024-12-24 PROCEDURE — 250N000011 HC RX IP 250 OP 636: Performed by: INTERNAL MEDICINE

## 2024-12-24 PROCEDURE — 99232 SBSQ HOSP IP/OBS MODERATE 35: CPT | Performed by: HOSPITALIST

## 2024-12-24 PROCEDURE — 83735 ASSAY OF MAGNESIUM: CPT | Performed by: HOSPITALIST

## 2024-12-24 PROCEDURE — 250N000013 HC RX MED GY IP 250 OP 250 PS 637: Performed by: INTERNAL MEDICINE

## 2024-12-24 PROCEDURE — 93005 ELECTROCARDIOGRAM TRACING: CPT

## 2024-12-24 PROCEDURE — 99232 SBSQ HOSP IP/OBS MODERATE 35: CPT | Performed by: SPECIALIST

## 2024-12-24 PROCEDURE — 84100 ASSAY OF PHOSPHORUS: CPT | Performed by: HOSPITALIST

## 2024-12-24 PROCEDURE — 93010 ELECTROCARDIOGRAM REPORT: CPT | Performed by: INTERNAL MEDICINE

## 2024-12-24 PROCEDURE — 120N000001 HC R&B MED SURG/OB

## 2024-12-24 PROCEDURE — 85018 HEMOGLOBIN: CPT | Performed by: HOSPITALIST

## 2024-12-24 RX ORDER — SULFAMETHOXAZOLE AND TRIMETHOPRIM 800; 160 MG/1; MG/1
1 TABLET ORAL 2 TIMES DAILY
Status: DISCONTINUED | OUTPATIENT
Start: 2024-12-25 | End: 2024-12-27 | Stop reason: HOSPADM

## 2024-12-24 RX ORDER — SULFAMETHOXAZOLE AND TRIMETHOPRIM 800; 160 MG/1; MG/1
1 TABLET ORAL 2 TIMES DAILY
Status: DISCONTINUED | OUTPATIENT
Start: 2024-12-25 | End: 2024-12-24

## 2024-12-24 RX ADMIN — CARBOXYMETHYLCELLULOSE SODIUM 2 DROP: 5 SOLUTION/ DROPS OPHTHALMIC at 09:03

## 2024-12-24 RX ADMIN — PANTOPRAZOLE SODIUM 40 MG: 40 TABLET, DELAYED RELEASE ORAL at 09:02

## 2024-12-24 RX ADMIN — ASCORBIC ACID, VITAMIN A PALMITATE, CHOLECALCIFEROL, THIAMINE HYDROCHLORIDE, RIBOFLAVIN-5 PHOSPHATE SODIUM, PYRIDOXINE HYDROCHLORIDE, NIACINAMIDE, DEXPANTHENOL, ALPHA-TOCOPHEROL ACETATE, VITAMIN K1, FOLIC ACID, BIOTIN, CYANOCOBALAMIN: 200; 3300; 200; 6; 3.6; 6; 40; 15; 10; 150; 600; 60; 5 INJECTION, SOLUTION INTRAVENOUS at 20:18

## 2024-12-24 RX ADMIN — PANTOPRAZOLE SODIUM 40 MG: 40 TABLET, DELAYED RELEASE ORAL at 16:53

## 2024-12-24 RX ADMIN — LEVOTHYROXINE SODIUM 75 MCG: 75 TABLET ORAL at 09:02

## 2024-12-24 RX ADMIN — TAMSULOSIN HYDROCHLORIDE 0.4 MG: 0.4 CAPSULE ORAL at 20:10

## 2024-12-24 RX ADMIN — QUETIAPINE 12.5 MG: 25 TABLET, FILM COATED ORAL at 21:31

## 2024-12-24 RX ADMIN — CEFTRIAXONE SODIUM 2 G: 2 INJECTION, POWDER, FOR SOLUTION INTRAMUSCULAR; INTRAVENOUS at 09:04

## 2024-12-24 RX ADMIN — CARBOXYMETHYLCELLULOSE SODIUM 2 DROP: 5 SOLUTION/ DROPS OPHTHALMIC at 20:10

## 2024-12-24 ASSESSMENT — ACTIVITIES OF DAILY LIVING (ADL)
ADLS_ACUITY_SCORE: 49
ADLS_ACUITY_SCORE: 52
ADLS_ACUITY_SCORE: 51
ADLS_ACUITY_SCORE: 52
ADLS_ACUITY_SCORE: 52
ADLS_ACUITY_SCORE: 51
ADLS_ACUITY_SCORE: 48
ADLS_ACUITY_SCORE: 49
ADLS_ACUITY_SCORE: 48
ADLS_ACUITY_SCORE: 49
ADLS_ACUITY_SCORE: 52
ADLS_ACUITY_SCORE: 52
ADLS_ACUITY_SCORE: 48
ADLS_ACUITY_SCORE: 52
ADLS_ACUITY_SCORE: 48
ADLS_ACUITY_SCORE: 51
ADLS_ACUITY_SCORE: 51
ADLS_ACUITY_SCORE: 48
ADLS_ACUITY_SCORE: 51
ADLS_ACUITY_SCORE: 48
ADLS_ACUITY_SCORE: 51
ADLS_ACUITY_SCORE: 52
ADLS_ACUITY_SCORE: 52

## 2024-12-24 NOTE — PROGRESS NOTES
[facility]  Hospitalist Progress Note        Saul Son MD   12/24/2024        Interval History:        - Tolerating full liquid diet, to advance as tolerated; nutrition weaning down on TPN  - no other acute issues overnight  - ID (signed off 12/24) rec transition of IV Rocephin to PO Bactrim for total 10 days duration; plan for 6 more days of PO Bactrim starting 12/25/24  - care management following for disposition to TCU         Assessment and Plan:        Clayton Pacheco is a pleasant 89 year old gentleman with PMH of hypertension, hyperlipidemia, and hypothyroid, who presented with acute abdominal pain and melena, noted with acute blood loss anemia due to acute upper GI bleeding and hemorrhage, due to bleeding ulcerated gastric mass. Initially presented to [facility] ED on 12/14/24 and transferred to [facility], admitted inpatient 12/15/24.    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 Hemostatic gel and spray are applied applied, but this ulcerated mass continued to bleed. Clotted blood in the gastric body is also noted. 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 Clips ([employer]) were placed, injected and treated with argon plasma coagulation, hemostatic spry applied  - after discussion with family, plan for conservative management with no surgical intervention  - bleeding seems to have now resolved and Hb stable around 8-9; 8.6 on 12/23  - will monitor daily hemoglobin and transfuse PRN for Hb <7  - GI signed off 12/20; to follow up with DR Kim in 1-2 weeks after discharge    Sepsis, E. coli bacteremia  Demarco  - on 12/19 was evaluated by RRT for acute respiratory failure with hypoxia, fever and lactic acidosis  - blood cultures from 12/19 growing E. Coli; UA unremarkable  - unclear source but likely from ulcerated mass  - has been afebrile since 12/20; WBC 15.7---> 13.3  - was initially started on Zosyn on 12/19 , switched to Rocephin 12/21   - ID (signed off 12/24) rec transition of IV Rocephin to PO Bactrim for total 10 days duration; plan for 6 more days of PO Bactrim starting 12/25/24  - respiratory status in mentation improved; off BiPAP; respiratory status stable on room air  - code status was changed to DNR/DNI (12/19) after discussion with family    Moderate malnutrition  Hypernatremia, resolved  - was started on TPN with poor PO intake  - started on clears 12/22 and tolerating well, to advance diet as tolerated  - nutrition to wean down on TPN  - monitor BMP intermittently     Elevated Troponins: Noting that he was hospitalized 3/2024 for sepsis and UTI, as discussed below, and Troponins were elevated at that time. Cardiology was consulted. Lexiscan showed no evidence of inducible ischemia. Suspect elevated Troponins 12/14/24 to be due to demand ischemia due to acute illness.    -- discontinued telemetry ; no chest pain or sob    -- not a candidate for coronary intervention due to active bleeding, patient elects conservative management.  12/19/2024 at time of RRT troponin 53 however flat 45.  No chest pain.    "  Massive prostatomegaly. Seen incidentally on CT. Noting that he was hospitalized for cystitis due to E coli in 7/2023, and hospitalized for sepsis, UTI, and bacteremia due to E coli, resistant to Unasyn and Zosyn, treated with Cipro.    -- Monitor symptomatically     Hypertension: TTE in 7/2023 showed preserved LVEF with mild to moderate concentric LVH and mild aortic stenosis.    -- Hold PTA amlodipine, Avalide  -BP stable without antihypertensives     Hyperlipidemia:   -- hold Pravastatin       Hypothyroid:   -- continue Synthroid        Diet:   parenteral nutrition - Clinimix E  Advance Diet as Tolerated: Regular Diet Adult; Regular Diet Adult      DVT Prophylaxis: PCD boots    Code status: DNR/DNI    Disposition:   Medically Ready for Discharge: Anticipated in 2-4 Days pending clinical stability, PO tolerance, weaning off of TPN  - care management team following for disposition to TCU  - discontinue IMC status (12/23)    Clinically Significant Risk Factors         # Hyponatremia: Lowest Na = 134 mmol/L in last 2 days, will monitor as appropriate       # Hypoalbuminemia: Lowest albumin = 2.5 g/dL at 12/23/2024  6:01 AM, will monitor as appropriate       # Hypertension: Noted on problem list              # Overweight: Estimated body mass index is 27.9 kg/m  as calculated from the following:    Height as of an earlier encounter on 12/15/24: 1.676 m (5' 6\").    Weight as of this encounter: 78.4 kg (172 lb 13.5 oz).   # Moderate Malnutrition: based on nutrition assessment      # Financial/Environmental Concerns: none                            Physical Exam:      Blood pressure (!) 141/68, pulse 80, temperature 98.4  F (36.9  C), temperature source Oral, resp. rate 18, weight 78.4 kg (172 lb 13.5 oz), SpO2 94%.  Vitals:    12/22/24 0539 12/23/24 0637 12/24/24 0649   Weight: 69.1 kg (152 lb 6.4 oz) 70.1 kg (154 lb 8 oz) 78.4 kg (172 lb 13.5 oz)     Vital Signs with Ranges  Temp:  [98  F (36.7  C)-98.4  F (36.9  C)] " 98.4  F (36.9  C)  Pulse:  [80-96] 80  Resp:  [16-28] 18  BP: (114-141)/(58-68) 141/68  SpO2:  [93 %-97 %] 94 %  I/O's Last 24 hours  I/O last 3 completed shifts:  In: 1419 [P.O.:600; I.V.:123]  Out: 1900 [Urine:1900]    Constitutional: Alert, awake and oriented ; resting comfortably in no apparent distress       Oral cavity: Moist mucosa   Cardiovascular: Normal s1 s2, regular rate and rhythm, no murmur   Lungs: B/l clear to auscultation, no wheezes or crepitations   Abdomen: Soft, nt, nd, no guarding, rigidity or rebound; BS +   LE : No edema   Musculoskeletal/Neuro Power 5/5 in all extremities; No focal neurological deficits noted   Psychiatry: normal mood and affect                Medications:        Current Facility-Administered Medications   Medication Dose Route Frequency Provider Last Rate Last Admin    carboxymethylcellulose PF (REFRESH PLUS) 0.5 % ophthalmic solution 2 drop  2 drop Both Eyes BID Lobo Zimmerman MD   2 drop at 12/23/24 2019    cefTRIAXone (ROCEPHIN) 2 g vial to attach to  ml bag for ADULTS or NS 50 ml bag for PEDS  2 g Intravenous Q24H Jose Velazquez MD   2 g at 12/23/24 0917    insulin aspart (NovoLOG) injection (RAPID ACTING)  1-12 Units Subcutaneous Q4H Lobo Zimmerman MD   1 Units at 12/24/24 0530    levothyroxine (SYNTHROID/LEVOTHROID) tablet 75 mcg  75 mcg Oral Daily Lobo Zimmerman MD   75 mcg at 12/23/24 0918    pantoprazole (PROTONIX) EC tablet 40 mg  40 mg Oral BID AC Bhavani Burgess PA-C   40 mg at 12/23/24 1708    QUEtiapine (SEROquel) half-tab 12.5 mg  12.5 mg Oral At Bedtime Lobo Zimmerman MD   12.5 mg at 12/23/24 2155    sodium chloride (PF) 0.9% PF flush 3 mL  3 mL Intracatheter Q8H Diony Roy MD   3 mL at 12/24/24 0532    tamsulosin (FLOMAX) capsule 0.4 mg  0.4 mg Oral Daily Lobo Zimmerman MD   0.4 mg at 12/23/24 2020     PRN Meds:   Current Facility-Administered Medications   Medication Dose Route Frequency Provider Last Rate Last  Admin    acetaminophen (TYLENOL) tablet 650 mg  650 mg Oral Q4H PRN Diony Roy MD   650 mg at 12/22/24 0539    Or    acetaminophen (TYLENOL) Suppository 650 mg  650 mg Rectal Q4H PRN Diony Roy MD   650 mg at 12/19/24 0117    albuterol (PROVENTIL) neb solution 2.5 mg  2.5 mg Nebulization Q2H PRN Joel Gonzalez APRN CNP   2.5 mg at 12/21/24 0449    benzocaine-menthol (CHLORASEPTIC) 6-10 MG lozenge 1 lozenge  1 lozenge Buccal Q1H PRN Diony Roy MD        bisacodyl (DULCOLAX) suppository 10 mg  10 mg Rectal Daily PRN Diony Roy MD        calcium carbonate (TUMS) chewable tablet 1,000 mg  1,000 mg Oral 4x Daily PRN Diony Roy MD        carboxymethylcellulose PF (REFRESH PLUS) 0.5 % ophthalmic solution 1 drop  1 drop Both Eyes Q1H PRN Joel Gonzalez APRN CNP   1 drop at 12/22/24 1724    glucose gel 15-30 g  15-30 g Oral Q15 Min PRN Lobo Zimmerman MD        Or    dextrose 50 % injection 25-50 mL  25-50 mL Intravenous Q15 Min PRN Lobo Zimmerman MD        Or    glucagon injection 1 mg  1 mg Subcutaneous Q15 Min PRN Lobo Zimmerman MD        guaiFENesin-dextromethorphan (ROBITUSSIN DM) 100-10 MG/5ML syrup 10 mL  10 mL Oral Q4H PRN Diony Roy MD        ipratropium - albuterol 0.5 mg/2.5 mg/3 mL (DUONEB) neb solution 3 mL  3 mL Nebulization Q4H PRN Joel Gonzalez APRN CNP   3 mL at 12/19/24 0232    melatonin tablet 3 mg  3 mg Oral At Bedtime PRN Diony Roy MD        naloxone (NARCAN) injection 0.2 mg  0.2 mg Intravenous Q2 Min PRN Diony Roy MD        Or    naloxone (NARCAN) injection 0.4 mg  0.4 mg Intravenous Q2 Min PRN Diony Roy MD        Or    naloxone (NARCAN) injection 0.2 mg  0.2 mg Intramuscular Q2 Min PRN Diony Roy MD        Or    naloxone (NARCAN) injection 0.4 mg  0.4 mg Intramuscular Q2 Min PRN Diony Roy MD        No lozenges or gum should be given while  patient on BIPAP/AVAPS/AVAPS AE   Does not apply Continuous PRN Joel Gonzalez APRN CNP        ondansetron (ZOFRAN ODT) ODT tab 4 mg  4 mg Oral Q6H PRDiony Pruitt MD        Or    ondansetron (ZOFRAN) injection 4 mg  4 mg Intravenous Q6H PRDiony Pruitt MD        Patient may continue current oral medications   Does not apply Continuous PRN Joel Gonzalez APRN CNP        polyethylene glycol (MIRALAX) Packet 17 g  17 g Oral BID PRDiony Pruitt MD        senna-docusate (SENOKOT-S/PERICOLACE) 8.6-50 MG per tablet 1 tablet  1 tablet Oral BID Diony Wagner MD        Or    senna-docusate (SENOKOT-S/PERICOLACE) 8.6-50 MG per tablet 2 tablet  2 tablet Oral BID Diony Wagner MD        sodium chloride (PF) 0.9% PF flush 3 mL  3 mL Intracatheter q1 min prDiony Pruitt MD                Data:      All new lab and imaging data was reviewed.   Recent Labs   Lab Test 12/24/24  0550 12/23/24  0604 12/23/24  0601 12/22/24  0556 12/21/24  0617 12/20/24  1734 12/20/24  0604 12/17/24  1356 12/17/24  0600 12/15/24  0606 12/15/24  0228   WBC  --   --   --  13.3* 13.4*  --  15.7*   < >  --    < > 10.4   HGB 8.6* 8.8*  --  9.8* 9.1*   < > 8.7*  8.6*   < > 9.3*   < > 6.8*   MCV  --   --   --  89 89  --  92   < >  --    < > 98   PLT  --   --   --  169 102*  --  100*   < >  --    < > 189   INR  --   --  1.11  --   --   --   --   --  1.17*  --  1.28*    < > = values in this interval not displayed.      Recent Labs   Lab Test 12/24/24  0550 12/24/24  0530 12/24/24  0138 12/23/24  2153 12/23/24  0608 12/23/24  0601 12/22/24  1415 12/22/24  1315 12/22/24  1018 12/22/24  0556 12/21/24  0902 12/21/24  0617   NA  --   --   --   --   --  134*  --   --   --  137  --  138   POTASSIUM 3.8  --   --   --   --  3.8  --  3.6  --  3.4   < > 3.4   CHLORIDE  --   --   --   --   --  103  --   --   --  101  --  107   CO2  --   --   --   --   --  23  --   --   --  26  --  23   BUN  --    "--   --   --   --  22.6  --   --   --  23.5*  --  18.9   CR  --   --   --   --   --  0.68  --   --   --  0.87  --  0.72   ANIONGAP  --   --   --   --   --  8  --   --   --  10  --  8   MEGHANN  --   --   --   --   --  8.0*  --   --   --  8.1*  --  7.6*   GLC  --  157* 153* 149*   < > 145*   < >  --    < > 215*   < > 179*    < > = values in this interval not displayed.     No lab results found.    Invalid input(s): \"TROP\", \"TROPONINIES\"      "

## 2024-12-24 NOTE — PLAN OF CARE
"Patient Name: Zoran  MRN: [id number]  Date of Admission: 12/15/2024  Reason for Admission: upper GI bleed  Level of Care: med-surg     Vitals:   BP Readings from Last 1 Encounters:   12/23/24 129/66     Pulse Readings from Last 1 Encounters:   12/23/24 83     Wt Readings from Last 1 Encounters:   12/23/24 70.1 kg (154 lb 8 oz)     Ht Readings from Last 1 Encounters:   12/15/24 1.676 m (5' 6\")     Estimated body mass index is 24.94 kg/m  as calculated from the following:    Height as of an earlier encounter on 12/15/24: 1.676 m (5' 6\").    Weight as of this encounter: 70.1 kg (154 lb 8 oz).  Temp Readings from Last 1 Encounters:   12/23/24 98.4  F (36.9  C) (Oral)       Pain: denies pain     CV Surgery Patient: No    Assessment    Resp: RA  Telemetry: no tele  Neuro: aox4  GI/: incontinent of B/B, reg diet, TPN @ 75/ hr no lipids over night, adequate urine output via external cath, 2 BM on shift,   Skin/Wounds: scattered bruising   Lines/Drains: double lumen PICC, 1 PIV SL   Activity: AX1 GBW  Sleep: slept between cares   Abnormal Labs: hgb 8.6     Aggression Stop Light: Green          Patient Care Plan: plan to start weaning off of tpn today, increase oral intake, TCU possibly Friday if weaned off tpn and tolerating oral intake and medically ready per CC note     "

## 2024-12-24 NOTE — PROGRESS NOTES
Care Management Follow Up    Length of Stay (days): 9    Expected Discharge Date: 12/26/2024     Concerns to be Addressed: discharge planning  watch for therapy recs  Patient plan of care discussed at interdisciplinary rounds: Yes    Anticipated Discharge Disposition: Home              Anticipated Discharge Services: Home Care  Anticipated Discharge DME: None    Patient/family educated on Medicare website which has current facility and service quality ratings:    Education Provided on the Discharge Plan:    Patient/Family in Agreement with the Plan: yes    Referrals Placed by CM/SW:    Private pay costs discussed: Not applicable    Discussed  Partnership in Safe Discharge Planning  document with patient/family: No     Handoff Completed: No, handoff not indicated or clinically appropriate    Additional Information:  Writer attended morning charge report. Charge RN states that pt is not stable for discharge as pt still needs to be weaned off tpn. Charge anticipates pt will be hospitalized for a few more days.    Next Steps: awaiting medical progression; need accepting tcu.    MIYA Brown  Social Work  [facility]

## 2024-12-24 NOTE — PROGRESS NOTES
[facility]    Infectious Disease Progress Note    Date of Service (when I saw the patient): 12/24/2024     Assessment:  1 89-year-old male, admitted with acute GI bleeding and some vague generalized symptoms, had leukocytosis at admission but not anything else for infection.  CT scan with an ulcerated mass, biopsy suggest bleeding ulcer rather than malignancy  2 in-hospital major fever episode and sepsis, found to have E. coli bacteremia, CT scan without an obvious intra-abdominal infection conceivably ulcer is the source would be unusual pathogen for this, on antibiotics already clinically improved  3 prior E. coli bacteremia earlier this year with prostatitis and urinary tract infection source but current urine unremarkable with no urinary symptoms    Recommendations:  Transition to oral Bactrim and treat for a total of 10 days. Day 4 of treatment today. 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    Final antimicrobial recommendations as above  ID will sign off, please call us back as needed  Cyndi Houston MD    Interval History   Remains afebrile, leukocytosis is improving  Day 4 of antibiotics today, Hb is stable, no new complaints  Tolerating antibiotics without side effects    Physical Exam   Temp: 98.4  F (36.9  C) Temp src: Oral BP: (!) 141/68 Pulse: 80   Resp: 18 SpO2: 94 % O2 Device: None (Room air)    Vitals:    12/22/24 0539 12/23/24 0637 12/24/24 0649   Weight: 69.1 kg (152 lb 6.4 oz) 70.1 kg (154 lb 8 oz) 78.4 kg (172 lb 13.5 oz)     Vital Signs with Ranges  Temp:  [98  F (36.7  C)-98.4  F (36.9  C)] 98.4  F (36.9  C)  Pulse:  [80-96] 80  Resp:  [16-24] 18  BP: (114-141)/(58-68) 141/68  SpO2:  [93 %-97 %] 94 %    Constitutional: Awake, alert, cooperative, no apparent distress  Lungs: Clear to auscultation bilaterally, no  crackles or wheezing  Cardiovascular: Regular rate and rhythm, normal S1 and S2, and no murmur noted  Abdomen: Normal bowel sounds, soft, non-distended, non-tender  Skin: No rashes, no cyanosis, no edema  Other:    Medications   Current Facility-Administered Medications   Medication Dose Route Frequency Provider Last Rate Last Admin    No lozenges or gum should be given while patient on BIPAP/AVAPS/AVAPS AE   Does not apply Continuous PRN Joel Gonzalez APRN CNP        parenteral nutrition - Clinimix E   CENTRAL LINE IV TPN CONTINUOUS Whitney Aguayo, RPH        parenteral nutrition - Clinimix E   CENTRAL LINE IV TPN CONTINUOUS Whitney Aguayo, RPH 75 mL/hr at 12/23/24 2019 New Bag at 12/23/24 2019    Patient may continue current oral medications   Does not apply Continuous PRN Joel Gonzalez APRN CNP         Current Facility-Administered Medications   Medication Dose Route Frequency Provider Last Rate Last Admin    carboxymethylcellulose PF (REFRESH PLUS) 0.5 % ophthalmic solution 2 drop  2 drop Both Eyes BID Lobo Zimmerman MD   2 drop at 12/24/24 0903    cefTRIAXone (ROCEPHIN) 2 g vial to attach to  ml bag for ADULTS or NS 50 ml bag for PEDS  2 g Intravenous Q24H Jose Velazquez MD   2 g at 12/24/24 0904    insulin aspart (NovoLOG) injection (RAPID ACTING)  1-12 Units Subcutaneous Q4H Lobo Zimmerman MD   1 Units at 12/24/24 0956    levothyroxine (SYNTHROID/LEVOTHROID) tablet 75 mcg  75 mcg Oral Daily Lobo Zimmerman MD   75 mcg at 12/24/24 0902    pantoprazole (PROTONIX) EC tablet 40 mg  40 mg Oral BID AC Bhavani Burgess PA-C   40 mg at 12/24/24 0902    QUEtiapine (SEROquel) half-tab 12.5 mg  12.5 mg Oral At Bedtime Lobo Zimmerman MD   12.5 mg at 12/23/24 2155    sodium chloride (PF) 0.9% PF flush 3 mL  3 mL Intracatheter Q8H Diony Roy MD   3 mL at 12/24/24 0532    tamsulosin (FLOMAX) capsule 0.4 mg  0.4 mg Oral Daily Lobo Zimmerman MD   0.4 mg at 12/23/24 2020        Data   All microbiology laboratory data reviewed.  Recent Labs   Lab Test 12/24/24  0550 12/23/24  0604 12/22/24  0556 12/21/24  0617 12/20/24  1734 12/20/24  0604   WBC  --   --  13.3* 13.4*  --  15.7*   HGB 8.6* 8.8* 9.8* 9.1*   < > 8.7*  8.6*   HCT  --   --  28.8* 26.7*  --  23.7*   MCV  --   --  89 89  --  92   PLT  --   --  169 102*  --  100*    < > = values in this interval not displayed.     Recent Labs   Lab Test 12/23/24  0601 12/22/24  0556 12/21/24  0617   CR 0.68 0.87 0.72     Microbiology  12/19/2024 0355 12/19/2024 0955 MRSA MSSA PCR, Nasal Swab [[id number]]    Swab from Nares, Bilateral    Final result Component Value   MRSA Target DNA Negative   SA Target DNA Negative          12/19/2024 0324 12/19/2024 1030 Respiratory Panel PCR [[id number]]    Swab from Nasopharyngeal    Final result Component Value   Adenovirus Not Detected   Coronavirus Not Detected   This test detects Coronavirus 229E, HKU1, NL63 and OC43 but does not distinguish between them. It does not detect MERS ( Respiratory Syndrome), SARS (Severe Acute Respiratory Syndrome) or 2019-nCoV (Novel 2019) Coronavirus.   Human Metapneumovirus Not Detected   Human Rhin/Enterovirus Not Detected   Influenza A Not Detected   Influenza A, H1 Not Detected   Influenza A 2009 H1N1 Not Detected   Influenza A, H3 Not Detected   Influenza B Not Detected   Parainfluenza Virus 1 Not Detected   Parainfluenza Virus 2 Not Detected   Parainfluenza Virus 3 Not Detected   Parainfluenza Virus 4 Not Detected   Respiratory Syncytial Virus A Not Detected   Respiratory Syncytial Virus B Not Detected   Chlamydia Pneumoniae Not Detected   Mycoplasma Pneumoniae Not Detected          12/19/2024 0324 12/19/2024 0427 COVID-19 Virus (Coronavirus) by PCR Nasopharyngeal [[id number]]    Swab from Nasopharyngeal    Final result Component Value   SARS CoV2 PCR Negative   NEGATIVE: SARS-CoV-2 (COVID-19) RNA not detected, presumed negative.           12/19/2024 0256 12/21/2024 0745 Blood Culture Arm, Right [[id number]]   (Abnormal)   Blood from Arm, Right    Final result Component Value   Culture Positive on the 1st day of incubation Abnormal     Escherichia coli Panic     2 of 2 bottles  Susceptibilities done on previous cultures             12/19/2024 0256 12/21/2024 0743 Blood Culture Arm, Left [[id number]]    (Abnormal)   Blood from Arm, Left    Final result Component Value   Culture Positive on the 1st day of incubation Abnormal     Escherichia coli Panic     2 of 2 bottles       Susceptibility     Escherichia coli     KEY     Ampicillin >=32 ug/mL Resistant     Ampicillin/ Sulbactam >=32 ug/mL Resistant     Cefazolin 16 ug/mL Resistant *     Cefepime <=0.12 ug/mL Susceptible     Ceftazidime <=0.5 ug/mL Susceptible     Ceftriaxone <=0.25 ug/mL Susceptible     Ciprofloxacin <=0.06 ug/mL Susceptible     Ertapenem <=0.12 ug/mL Susceptible *     Extended Spectrum Beta-Lactamase Negative ug/mL ESBL Negative *     Gentamicin <=1 ug/mL Susceptible     Levofloxacin <=0.12 ug/mL Susceptible     Meropenem <=0.25 ug/mL Susceptible     Nitrofurantoin <=16 ug/mL Susceptible *     Piperacillin/Tazobactam 64 ug/mL Resistant     Trimethoprim/Sulfamethoxazole <=1/19 ug/mL Susceptible              * Suppressed Antibiotic              Microbiology  Narrative & Impression   CTA ABDOMEN PELVIS WITH CONTRAST     PROCEDURE DATE: 12/16/2024 10:05 AM      HISTORY:  bleeding gastric mass     COMPARISON: CTA abdomen/pelvis 12/14/2024     TECHNIQUE: Helical acquisition through the abdomen and pelvis was  performed during the arterial phase of contrast enhancement. 2D and 3D  reconstructions performed by the CT technologist. Dose reduction  techniques were used.  CONTRAST: 89 mL of Isovue-370     FINDINGS:   ARTERIAL FINDINGS: Normal caliber abdominal aorta without dissection  or aneurysm. Diffuse calcific atherosclerotic disease. There is likely  mild celiac origin stenosis from  atherosclerotic disease. The SMA and  renal arteries appear patent. Borderline fusiform dilation of the  right common iliac artery measuring 1.7 cm in diameter. Ectasia of the  distal left common iliac artery. No obvious gastrointestinal active  extravasation is seen, with special attention to the known gastric  mass.     NONVASCULAR FINDINGS:  LUNG BASES: Development of dependent consolidation/atelectasis in the  left lung base. No pleural effusion.     ABDOMEN: Benign hepatic cysts are again noted and require no further  follow-up. The spleen and pancreas are unremarkable. Stable mild  thickening of the adrenal glands. Normal caliber bowel loops appearing  renal cysts are benign and require no further follow-up. No  hydronephrosis. Sigmoid diverticulosis without acute diverticulitis.     PELVIS: Marked prostatomegaly.     MUSCULOSKELETAL: Degenerative changes of the thoracolumbar spine as  well as the hips. No destructive osseous lesion.                                                                      IMPRESSION:  1.  A mass in the gastric lumen is again noted. No active  extravasation is seen associated with the mass.  2.  Borderline fusiform dilation of the right common iliac artery  measuring 1.7 cm. Unchanged from the prior exam.  3.  Marked prostatomegaly.

## 2024-12-24 NOTE — PROGRESS NOTES
"Chart reviewed, patient was seen for a complete reassessment on 12/20/24 -- see note with this date for details    Patient currently receiving TPN as follows ~    Nutrition Support Parenteral:  Type of Access: PICC  Parenteral Frequency:  Continuous  Parenteral Regimen: D15 AA5 at 75 mL/hr, no Lipid (d/c'd yesterday)  Total Parenteral Provisions: 1278 kcal (19 kcal/kg), 90 g protein (1.4 g/kg), 270 g CHO    Diet advanced to regular this morning  Visited with patient -- RN also at bedside    Noted that patient took 100% of his dinner last night but only ordered tomato soup  Orders to wean/discontinue TPN once patient eating adequately in hopes to discharge to TCU on Thursday (needs to be off TPN)    Patient notes he takes Ensure at home and would like to do so here  He is also interested in doing the Magic Cups - \"I've tried those before too\"  He also indicated that he is willing to eat some breakfast now --> ordered oatmeal with brown sugar and whole milk, peaches, and an Ensure    Discussed with RN -- will do a 24 hour calorie count for today only in hopes that he will meet at least 2/3 of needs orally and TPN can be stopped tomorrow  Patient understands and is in agreement     ASSESSED NUTRITION NEEDS:  Dosing Weight 66 kg - current   Estimated Energy Needs: 0662-8730 kcals (25-30 Kcal/Kg)  Justification: maintenance  Estimated Protein Needs: 79-99 grams protein (1.2-1.5 g pro/Kg)  Justification: preservation of lean body mass    Continue TPN as is for now   Ensure TID with meals (350 kcal and 20 g protein each)  Magic Cup BID between meals (290 kcal and 9 g protein each)  Above discussed with Pharmacist    Maria Dolores Crowe, RD, LD, [facility]   Clinical Dietitian - [facility]           "

## 2024-12-25 LAB
GLUCOSE BLDC GLUCOMTR-MCNC: 109 MG/DL (ref 70–99)
GLUCOSE BLDC GLUCOMTR-MCNC: 137 MG/DL (ref 70–99)
GLUCOSE BLDC GLUCOMTR-MCNC: 146 MG/DL (ref 70–99)
GLUCOSE BLDC GLUCOMTR-MCNC: 155 MG/DL (ref 70–99)
GLUCOSE BLDC GLUCOMTR-MCNC: 158 MG/DL (ref 70–99)
GLUCOSE BLDC GLUCOMTR-MCNC: 163 MG/DL (ref 70–99)
GLUCOSE BLDC GLUCOMTR-MCNC: 171 MG/DL (ref 70–99)
GLUCOSE BLDC GLUCOMTR-MCNC: 176 MG/DL (ref 70–99)
HGB BLD-MCNC: 8.6 G/DL (ref 13.3–17.7)
MAGNESIUM SERPL-MCNC: 2 MG/DL (ref 1.7–2.3)
PHOSPHATE SERPL-MCNC: 3.2 MG/DL (ref 2.5–4.5)
POTASSIUM SERPL-SCNC: 4.1 MMOL/L (ref 3.4–5.3)

## 2024-12-25 PROCEDURE — 250N000013 HC RX MED GY IP 250 OP 250 PS 637: Performed by: HOSPITALIST

## 2024-12-25 PROCEDURE — 250N000013 HC RX MED GY IP 250 OP 250 PS 637: Performed by: PHYSICIAN ASSISTANT

## 2024-12-25 PROCEDURE — 84132 ASSAY OF SERUM POTASSIUM: CPT | Performed by: INTERNAL MEDICINE

## 2024-12-25 PROCEDURE — 83735 ASSAY OF MAGNESIUM: CPT | Performed by: INTERNAL MEDICINE

## 2024-12-25 PROCEDURE — 99232 SBSQ HOSP IP/OBS MODERATE 35: CPT | Performed by: HOSPITALIST

## 2024-12-25 PROCEDURE — 84100 ASSAY OF PHOSPHORUS: CPT | Performed by: INTERNAL MEDICINE

## 2024-12-25 PROCEDURE — 120N000001 HC R&B MED SURG/OB

## 2024-12-25 PROCEDURE — 85018 HEMOGLOBIN: CPT | Performed by: HOSPITALIST

## 2024-12-25 PROCEDURE — 250N000013 HC RX MED GY IP 250 OP 250 PS 637: Performed by: INTERNAL MEDICINE

## 2024-12-25 RX ADMIN — TAMSULOSIN HYDROCHLORIDE 0.4 MG: 0.4 CAPSULE ORAL at 20:46

## 2024-12-25 RX ADMIN — PANTOPRAZOLE SODIUM 40 MG: 40 TABLET, DELAYED RELEASE ORAL at 15:19

## 2024-12-25 RX ADMIN — SULFAMETHOXAZOLE AND TRIMETHOPRIM 1 TABLET: 800; 160 TABLET ORAL at 20:46

## 2024-12-25 RX ADMIN — CARBOXYMETHYLCELLULOSE SODIUM 2 DROP: 5 SOLUTION/ DROPS OPHTHALMIC at 09:43

## 2024-12-25 RX ADMIN — PANTOPRAZOLE SODIUM 40 MG: 40 TABLET, DELAYED RELEASE ORAL at 06:35

## 2024-12-25 RX ADMIN — QUETIAPINE 12.5 MG: 25 TABLET, FILM COATED ORAL at 21:55

## 2024-12-25 RX ADMIN — SULFAMETHOXAZOLE AND TRIMETHOPRIM 1 TABLET: 800; 160 TABLET ORAL at 09:43

## 2024-12-25 RX ADMIN — LEVOTHYROXINE SODIUM 75 MCG: 75 TABLET ORAL at 09:43

## 2024-12-25 RX ADMIN — CARBOXYMETHYLCELLULOSE SODIUM 2 DROP: 5 SOLUTION/ DROPS OPHTHALMIC at 20:46

## 2024-12-25 ASSESSMENT — ACTIVITIES OF DAILY LIVING (ADL)
ADLS_ACUITY_SCORE: 48
ADLS_ACUITY_SCORE: 48
ADLS_ACUITY_SCORE: 53
ADLS_ACUITY_SCORE: 48
ADLS_ACUITY_SCORE: 53
ADLS_ACUITY_SCORE: 48
ADLS_ACUITY_SCORE: 48
ADLS_ACUITY_SCORE: 53
ADLS_ACUITY_SCORE: 48
ADLS_ACUITY_SCORE: 53
ADLS_ACUITY_SCORE: 48
ADLS_ACUITY_SCORE: 53
ADLS_ACUITY_SCORE: 48

## 2024-12-25 NOTE — PROGRESS NOTES
CALORIE COUNT      Approximate Oral Intake for: (12/24)     Calories: 990 cals (56% needs)  Protein: 43 gm pro (50% needs)    Continues on TPN: Clinimix D15 AA5 @ 75 mL/hr (no Lipids) = 1278 kcal (19 kcal/kg), 90 g protein (1.4 g/kg), 270 g CHO         Estimated Needs:    Dosing Weight (12/23 - 70.1 kg, standing scale)  Estimated Energy Needs: 5688-7812 kcals (25-30 Kcal/Kg)  Justification: maintenance  Estimated Protein Needs:  grams protein (1.2-1.5 g pro/Kg)  Justification: preservation of lean body mass      Summary:   Diet: Regular           Ensure with meals           Magic Cup at 10am and 2pm  Pt consumed 1 Magic Cup yest  Did not take the Ensure supplements    Po intake continues to improve  Goal is 2/3 needs orally (>1200 cals/day and >55 gm pro/day)     Note that TPN needs to be weaned off prior to TCU    Will continue with nutrition supplements  Add Room Service with Assist for ordering and supplement preferences    As pt is very close to goal of 2/3 needs, ok with weaning off TPN with completion of current bag    Kristen Galvin RD, LD  Clinical Dietitian - [facility]   Pager - [phone]

## 2024-12-25 NOTE — PLAN OF CARE
Goal Outcome Evaluation:      Plan of Care Reviewed With: patient    Overall Patient Progress: improvingOverall Patient Progress: improving    Resp: Diminished/Clear on RA  Neuro: A&Ox 4,Vital signs stable.  GI/: No BM on this shift,passing gas.External catheter in placed with adequate urine output.  Diet: Regular with poor appetite.TPN ongoing x 75 ml/hr.  Skin/Wounds: Skin intact, pale,scattered bruising  Lines/Drains: R PICC x double lumen,R PIV SL  Activity: Assist of 1-2 w/GB /W  Sleep: Sleep between cares  Abnormal Labs: Blood sugar every 4 hours,Hgb- 8.6,K+,Mg,phos recheck hernesto am.  Aggression Stop Light: Green          Patient Care Plan:Continue plan of care.

## 2024-12-25 NOTE — PLAN OF CARE
Goal Outcome Evaluation:  A and O x4, forgetful. Calm and cooperative. Neuros intact. Up with 1, belt, walker. Ambulated in hallway. External catheter while in bed, good urine output. Continent of bowel x1. Fair to good appetite on regular diet, calorie count started. On TPN with plans to begin weaning tomorrow depending on PO intake. VSS. No pain. Scoring green on aggression screening tool.

## 2024-12-25 NOTE — PLAN OF CARE
[id number]    A&O x4. VSS on RA.. LS diminished, congested cough at times. BS+, abdomen rounded/distended, reports he had a BM earlier today. Urine output adequate with external catheter in place. TPN infusing at 75ml/hr  Denies pain

## 2024-12-25 NOTE — PROGRESS NOTES
[facility]  Hospitalist Progress Note        Saul Son MD   12/25/2024        Interval History:        - Tolerating regular diet; nutrition weaning down on TPN  - no other acute issues overnight  - Hb stable  - care management following for disposition to TCU         Assessment and Plan:        Clayton Pacheco is a pleasant 89 year old gentleman with PMH of hypertension, hyperlipidemia, and hypothyroid, who presented with acute abdominal pain and melena, noted with acute blood loss anemia due to acute upper GI bleeding and hemorrhage, due to bleeding ulcerated gastric mass. Initially presented to [facility] ED on 12/14/24 and transferred to [facility], admitted inpatient 12/15/24.    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 Hemostatic gel and spray are applied applied, but this ulcerated mass continued to bleed. Clotted blood in the gastric body is also noted. 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 Clips ([employer]) were placed, injected and treated with argon plasma coagulation, hemostatic spry applied  - after  discussion with family, plan for conservative management with no surgical intervention  - bleeding seems to have now resolved and Hb stable around 8-9; 8.6 on 12/25  - will monitor Hb intermittently and transfuse PRN for Hb <7  - GI signed off 12/20; to follow up with DR Kim in 1-2 weeks after discharge    Sepsis, E. coli bacteremia  Deirium  - on 12/19 was evaluated by RRT for acute respiratory failure with hypoxia, fever and lactic acidosis  - blood cultures from 12/19 growing E. Coli; UA unremarkable  - unclear source but likely from ulcerated mass  - has been afebrile since 12/20; WBC 15.7---> 13.3  - was initially started on Zosyn on 12/19 , switched to Rocephin 12/21   - ID (signed off 12/24) rec transition of IV Rocephin to PO Bactrim for total 10 days duration; plan for 6 more days of PO Bactrim starting 12/25/24  - respiratory status and mentation improved; off BiPAP; respiratory status stable on room air  - code status was changed to DNR/DNI (12/19) after discussion with family    Moderate malnutrition  Hypernatremia, resolved  - was started on TPN with poor PO intake  - started PO on 12/22 and currently tolerating regular well,  - nutrition to wean down on TPN  - monitor BMP intermittently     Elevated Troponins: Noting that he was hospitalized 3/2024 for sepsis and UTI, as discussed below, and Troponins were elevated at that time. Cardiology was consulted. Lexiscan showed no evidence of inducible ischemia. Suspect elevated Troponins 12/14/24 to be due to demand ischemia due to acute illness.    -- discontinued telemetry ; no chest pain or sob    -- not a candidate for coronary intervention due to active bleeding, patient elects conservative management.  12/19/2024 at time of RRT troponin 53 however flat 45.  No chest pain.     Massive prostatomegaly. Seen incidentally on CT. Noting that he was hospitalized for cystitis due to E coli in 7/2023, and hospitalized for sepsis, UTI, and bacteremia due to E  coli, resistant to Unasyn and Zosyn, treated with Cipro.    -- Monitor symptomatically     Hypertension: TTE in 7/2023 showed preserved LVEF with mild to moderate concentric LVH and mild aortic stenosis.    -- Hold PTA amlodipine, Avalide  -BP reasonably controlled without antihypertensives      Hyperlipidemia:   -- hold Pravastatin       Hypothyroid:   -- continue Synthroid        Diet:   Advance Diet as Tolerated: Regular Diet Adult; Regular Diet Adult  parenteral nutrition - Clinimix E  Room Service      DVT Prophylaxis: PCD boots    Code status: DNR/DNI    Disposition:   Medically Ready for Discharge: Anticipated in 2-4 Days pending clinical stability, PO tolerance, weaning off of TPN  - care management team following for disposition to TCU    Clinically Significant Risk Factors               # Hypoalbuminemia: Lowest albumin = 2.5 g/dL at 12/23/2024  6:01 AM, will monitor as appropriate       # Hypertension: Noted on problem list               # Moderate Malnutrition: based on nutrition assessment      # Financial/Environmental Concerns: none                            Physical Exam:      Blood pressure (!) 157/57, pulse 90, temperature 100  F (37.8  C), temperature source Axillary, resp. rate 16, weight 69.9 kg (154 lb 1.6 oz), SpO2 94%.  Vitals:    12/23/24 0637 12/24/24 0649 12/25/24 0536   Weight: 70.1 kg (154 lb 8 oz) 78.4 kg (172 lb 13.5 oz) 69.9 kg (154 lb 1.6 oz)     Vital Signs with Ranges  Temp:  [98  F (36.7  C)-100  F (37.8  C)] 100  F (37.8  C)  Pulse:  [78-90] 90  Resp:  [16-19] 16  BP: (126-157)/(55-72) 157/57  SpO2:  [93 %-97 %] 94 %  I/O's Last 24 hours  I/O last 3 completed shifts:  In: 1782.5 [P.O.:120]  Out: 2550 [Urine:2550]    Constitutional: Alert, awake and oriented ; resting comfortably in no apparent distress       Oral cavity: Moist mucosa   Cardiovascular: Normal s1 s2, regular rate and rhythm, no murmur   Lungs: B/l clear to auscultation, no wheezes or crepitations   Abdomen: Soft,  nt, nd, no guarding, rigidity or rebound; BS +   LE : No edema   Musculoskeletal/Neuro Power 5/5 in all extremities; No focal neurological deficits noted   Psychiatry: normal mood and affect                Medications:        Current Facility-Administered Medications   Medication Dose Route Frequency Provider Last Rate Last Admin    carboxymethylcellulose PF (REFRESH PLUS) 0.5 % ophthalmic solution 2 drop  2 drop Both Eyes BID Lobo Zimmerman MD   2 drop at 12/24/24 2010    insulin aspart (NovoLOG) injection (RAPID ACTING)  1-12 Units Subcutaneous Q4H Lobo Zimmerman MD   1 Units at 12/25/24 0540    levothyroxine (SYNTHROID/LEVOTHROID) tablet 75 mcg  75 mcg Oral Daily Lobo Zimmerman MD   75 mcg at 12/24/24 0902    pantoprazole (PROTONIX) EC tablet 40 mg  40 mg Oral BID AC Bhavani Burgess PA-C   40 mg at 12/25/24 0635    QUEtiapine (SEROquel) half-tab 12.5 mg  12.5 mg Oral At Bedtime Lobo Zimmerman MD   12.5 mg at 12/24/24 2131    sodium chloride (PF) 0.9% PF flush 3 mL  3 mL Intracatheter Q8H Diony Roy MD   3 mL at 12/25/24 0356    sulfamethoxazole-trimethoprim (BACTRIM DS) 800-160 MG per tablet 1 tablet  1 tablet Oral BID Saul Son MD        tamsulosin (FLOMAX) capsule 0.4 mg  0.4 mg Oral Daily Lobo Zimmerman MD   0.4 mg at 12/24/24 2010     PRN Meds:   Current Facility-Administered Medications   Medication Dose Route Frequency Provider Last Rate Last Admin    acetaminophen (TYLENOL) tablet 650 mg  650 mg Oral Q4H PRN Diony Ryo MD   650 mg at 12/22/24 0539    Or    acetaminophen (TYLENOL) Suppository 650 mg  650 mg Rectal Q4H PRN Diony Roy MD   650 mg at 12/19/24 0117    albuterol (PROVENTIL) neb solution 2.5 mg  2.5 mg Nebulization Q2H PRN Joel Gonzalez APRN CNP   2.5 mg at 12/21/24 0449    benzocaine-menthol (CHLORASEPTIC) 6-10 MG lozenge 1 lozenge  1 lozenge Buccal Q1H PRN Diony Roy MD        bisacodyl (DULCOLAX)  suppository 10 mg  10 mg Rectal Daily PRN Diony Roy MD        calcium carbonate (TUMS) chewable tablet 1,000 mg  1,000 mg Oral 4x Daily PRN Diony Roy MD        carboxymethylcellulose PF (REFRESH PLUS) 0.5 % ophthalmic solution 1 drop  1 drop Both Eyes Q1H PRN Joel Gonzalez APRN CNP   1 drop at 12/22/24 1724    glucose gel 15-30 g  15-30 g Oral Q15 Min PRN Lobo Zimmerman MD        Or    dextrose 50 % injection 25-50 mL  25-50 mL Intravenous Q15 Min PRN Lobo Zimmerman MD        Or    glucagon injection 1 mg  1 mg Subcutaneous Q15 Min PRN Lobo Zimmerman MD        guaiFENesin-dextromethorphan (ROBITUSSIN DM) 100-10 MG/5ML syrup 10 mL  10 mL Oral Q4H PRN Diony Roy MD        ipratropium - albuterol 0.5 mg/2.5 mg/3 mL (DUONEB) neb solution 3 mL  3 mL Nebulization Q4H PRN Joel Gonzalez APRN CNP   3 mL at 12/19/24 0232    melatonin tablet 3 mg  3 mg Oral At Bedtime PRN Diony Roy MD        naloxone (NARCAN) injection 0.2 mg  0.2 mg Intravenous Q2 Min PRN Diony Roy MD        Or    naloxone (NARCAN) injection 0.4 mg  0.4 mg Intravenous Q2 Min PRDiony Davila MD        Or    naloxone (NARCAN) injection 0.2 mg  0.2 mg Intramuscular Q2 Min PRDiony Davila MD        Or    naloxone (NARCAN) injection 0.4 mg  0.4 mg Intramuscular Q2 Min PRDiony Davila MD        No lozenges or gum should be given while patient on BIPAP/AVAPS/AVAPS AE   Does not apply Continuous PRN Joel Gonzalez APRN CNP        ondansetron (ZOFRAN ODT) ODT tab 4 mg  4 mg Oral Q6H PRN Diony Roy MD        Or    ondansetron (ZOFRAN) injection 4 mg  4 mg Intravenous Q6H PRN Diony Roy MD        Patient may continue current oral medications   Does not apply Continuous PRN [place], Joel J, APRN CNP        polyethylene glycol (MIRALAX) Packet 17 g  17 g Oral BID PRN Diony Roy MD senna-docusate  "(SENOKOT-S/PERICOLACE) 8.6-50 MG per tablet 1 tablet  1 tablet Oral BID Diony Cabral MD        Or    senna-docusate (SENOKOT-S/PERICOLACE) 8.6-50 MG per tablet 2 tablet  2 tablet Oral BID Diony Cabral MD        sodium chloride (PF) 0.9% PF flush 3 mL  3 mL Intracatheter q1 min Diony Cabral MD                Data:      All new lab and imaging data was reviewed.   Recent Labs   Lab Test 12/25/24  0541 12/24/24  0550 12/23/24  0604 12/23/24  0601 12/22/24  0556 12/21/24  0617 12/20/24  1734 12/20/24  0604 12/17/24  1356 12/17/24  0600 12/15/24  0606 12/15/24  0228   WBC  --   --   --   --  13.3* 13.4*  --  15.7*   < >  --    < > 10.4   HGB 8.6* 8.6* 8.8*  --  9.8* 9.1*   < > 8.7*  8.6*   < > 9.3*   < > 6.8*   MCV  --   --   --   --  89 89  --  92   < >  --    < > 98   PLT  --   --   --   --  169 102*  --  100*   < >  --    < > 189   INR  --   --   --  1.11  --   --   --   --   --  1.17*  --  1.28*    < > = values in this interval not displayed.      Recent Labs   Lab Test 12/25/24  0541 12/25/24  0535 12/25/24  0009 12/24/24  2135 12/24/24  0935 12/24/24  0550 12/23/24  0608 12/23/24  0601 12/22/24  1018 12/22/24  0556 12/21/24  0902 12/21/24  0617   NA  --   --   --   --   --   --   --  134*  --  137  --  138   POTASSIUM 4.1  --   --   --   --  3.8  --  3.8   < > 3.4   < > 3.4   CHLORIDE  --   --   --   --   --   --   --  103  --  101  --  107   CO2  --   --   --   --   --   --   --  23  --  26  --  23   BUN  --   --   --   --   --   --   --  22.6  --  23.5*  --  18.9   CR  --   --   --   --   --   --   --  0.68  --  0.87  --  0.72   ANIONGAP  --   --   --   --   --   --   --  8  --  10  --  8   MEGHANN  --   --   --   --   --   --   --  8.0*  --  8.1*  --  7.6*   GLC  --  158* 171* 162*   < >  --    < > 145*   < > 215*   < > 179*    < > = values in this interval not displayed.     No lab results found.    Invalid input(s): \"TROP\", \"TROPONINIES\"      "

## 2024-12-26 ENCOUNTER — APPOINTMENT (OUTPATIENT)
Dept: PHYSICAL THERAPY | Facility: CLINIC | Age: 89
End: 2024-12-26
Attending: HOSPITALIST
Payer: MEDICARE

## 2024-12-26 ENCOUNTER — APPOINTMENT (OUTPATIENT)
Dept: OCCUPATIONAL THERAPY | Facility: CLINIC | Age: 89
End: 2024-12-26
Attending: HOSPITALIST
Payer: MEDICARE

## 2024-12-26 VITALS
HEART RATE: 74 BPM | RESPIRATION RATE: 22 BRPM | BODY MASS INDEX: 24.87 KG/M2 | OXYGEN SATURATION: 93 % | WEIGHT: 154.1 LBS | TEMPERATURE: 98.9 F | SYSTOLIC BLOOD PRESSURE: 150 MMHG | DIASTOLIC BLOOD PRESSURE: 66 MMHG

## 2024-12-26 LAB
CREAT SERPL-MCNC: 0.83 MG/DL (ref 0.67–1.17)
EGFRCR SERPLBLD CKD-EPI 2021: 81 ML/MIN/1.73M2
GLUCOSE BLDC GLUCOMTR-MCNC: 107 MG/DL (ref 70–99)
GLUCOSE BLDC GLUCOMTR-MCNC: 135 MG/DL (ref 70–99)
GLUCOSE BLDC GLUCOMTR-MCNC: 95 MG/DL (ref 70–99)
HGB BLD-MCNC: 8.8 G/DL (ref 13.3–17.7)
MAGNESIUM SERPL-MCNC: 2.1 MG/DL (ref 1.7–2.3)
PHOSPHATE SERPL-MCNC: 3.3 MG/DL (ref 2.5–4.5)
POTASSIUM SERPL-SCNC: 4.2 MMOL/L (ref 3.4–5.3)

## 2024-12-26 PROCEDURE — 97110 THERAPEUTIC EXERCISES: CPT | Mod: GO

## 2024-12-26 PROCEDURE — 85018 HEMOGLOBIN: CPT | Performed by: HOSPITALIST

## 2024-12-26 PROCEDURE — 250N000013 HC RX MED GY IP 250 OP 250 PS 637: Performed by: PHYSICIAN ASSISTANT

## 2024-12-26 PROCEDURE — 83735 ASSAY OF MAGNESIUM: CPT | Performed by: INTERNAL MEDICINE

## 2024-12-26 PROCEDURE — 82565 ASSAY OF CREATININE: CPT | Performed by: INTERNAL MEDICINE

## 2024-12-26 PROCEDURE — 250N000013 HC RX MED GY IP 250 OP 250 PS 637: Performed by: HOSPITALIST

## 2024-12-26 PROCEDURE — 84100 ASSAY OF PHOSPHORUS: CPT | Performed by: INTERNAL MEDICINE

## 2024-12-26 PROCEDURE — 250N000013 HC RX MED GY IP 250 OP 250 PS 637: Performed by: INTERNAL MEDICINE

## 2024-12-26 PROCEDURE — 97530 THERAPEUTIC ACTIVITIES: CPT | Mod: GP

## 2024-12-26 PROCEDURE — 84132 ASSAY OF SERUM POTASSIUM: CPT | Performed by: INTERNAL MEDICINE

## 2024-12-26 PROCEDURE — 120N000001 HC R&B MED SURG/OB

## 2024-12-26 PROCEDURE — 99232 SBSQ HOSP IP/OBS MODERATE 35: CPT | Performed by: HOSPITALIST

## 2024-12-26 RX ADMIN — QUETIAPINE 12.5 MG: 25 TABLET, FILM COATED ORAL at 20:14

## 2024-12-26 RX ADMIN — LEVOTHYROXINE SODIUM 75 MCG: 75 TABLET ORAL at 08:43

## 2024-12-26 RX ADMIN — PANTOPRAZOLE SODIUM 40 MG: 40 TABLET, DELAYED RELEASE ORAL at 06:27

## 2024-12-26 RX ADMIN — PANTOPRAZOLE SODIUM 40 MG: 40 TABLET, DELAYED RELEASE ORAL at 18:14

## 2024-12-26 RX ADMIN — CARBOXYMETHYLCELLULOSE SODIUM 2 DROP: 5 SOLUTION/ DROPS OPHTHALMIC at 20:13

## 2024-12-26 RX ADMIN — SULFAMETHOXAZOLE AND TRIMETHOPRIM 1 TABLET: 800; 160 TABLET ORAL at 20:13

## 2024-12-26 RX ADMIN — TAMSULOSIN HYDROCHLORIDE 0.4 MG: 0.4 CAPSULE ORAL at 20:13

## 2024-12-26 RX ADMIN — CARBOXYMETHYLCELLULOSE SODIUM 2 DROP: 5 SOLUTION/ DROPS OPHTHALMIC at 08:43

## 2024-12-26 RX ADMIN — SULFAMETHOXAZOLE AND TRIMETHOPRIM 1 TABLET: 800; 160 TABLET ORAL at 08:43

## 2024-12-26 ASSESSMENT — ACTIVITIES OF DAILY LIVING (ADL)
ADLS_ACUITY_SCORE: 48

## 2024-12-26 ASSESSMENT — ENCOUNTER SYMPTOMS: FEVER: 1

## 2024-12-26 NOTE — PLAN OF CARE
Goal Outcome Evaluation:      Plan of Care Reviewed With: patient    Overall Patient Progress: improvingOverall Patient Progress: improving    Resp: Diminished/Clear on RA  Neuro: A&Ox 4,Vital signs stable  GI/: No BM on this shift,passing gas.External catheter in placed with adequate urine output.  Diet: Regular,TPN discontinued last night.  Skin/Wounds: Skin intact, pale,scattered bruising  Lines/Drains: R PICC x double lumen,R PIV SL  Activity: Assist of 1 GB /W  Sleep: Sleep between cares  Abnormal Labs: Blood sugar every 4 hours,Hgb- 8.8,On K+,Mg,phos protocol,recheck hernesto am.  Aggression Stop Light: Green          Patient Care Plan:Continue plan of care.

## 2024-12-26 NOTE — PROGRESS NOTES
[facility]  Hospitalist Progress Note        Saul Son MD   12/26/2024        Interval History:        - Tolerating regular diet; TPN discontinued 12/25  - no other acute issues overnight  - Hb stable  - care management following for disposition to TCU         Assessment and Plan:        Clayton Pacheco is a pleasant 89 year old gentleman with PMH of hypertension, hyperlipidemia, and hypothyroid, who presented with acute abdominal pain and melena, noted with acute blood loss anemia due to acute upper GI bleeding and hemorrhage, due to bleeding ulcerated gastric mass. Initially presented to [facility] ED on 12/14/24 and transferred to [facility] for IR/GI/Surgery evaluation, admitted inpatient 12/15/24.    Acute blood loss anemia due to upper GI bleeding from ulcerated gastric mass  S/p EGD 12/15 and 12/16 (clips placed):   - on presentation, noted drop in Hb from 11.4 to 8.6  - CTA noted a small focus of active gastrointestinal bleeding within the distal stomach, and possible thickening of the distal stomach/proximal duodenum  - EGD 12/15 at 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 Hemostatic gel and spray are applied applied, but this ulcerated mass continued to bleed. Clotted blood in the gastric body is also noted.   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 Clips ([employer]) were placed, injected and treated with argon plasma coagulation, hemostatic spry applied  - after discussion with family, plan for  conservative management with no surgical intervention  - bleeding seems to have now resolved and Hb stable around 8-9; 8.8 on 12/26  - will monitor Hb intermittently and transfuse PRN for Hb <7  - GI signed off 12/20; to follow up with DR Kim in 1-2 weeks after discharge    Sepsis, E. coli bacteremia  Deirium  - on 12/19 was evaluated by RRT for acute respiratory failure with hypoxia, fever and lactic acidosis  - blood cultures from 12/19 growing E. Coli; UA unremarkable  - unclear source but likely from ulcerated mass  - has been afebrile since 12/20; WBC 15.7---> 13.3  - was initially started on Zosyn on 12/19 , switched to Rocephin 12/21   - ID (signed off 12/24) rec transition of IV Rocephin to PO Bactrim for total 10 days duration; plan for 6 more days of PO Bactrim starting 12/25/24 (till 12/31)  - respiratory status and mentation improved; off BiPAP; respiratory status stable on room air  - code status was changed to DNR/DNI (12/19) after discussion with family    Moderate malnutrition  Hypernatremia, resolved  - was started on TPN with poor PO intake  - started PO on 12/22 and currently tolerating regular well,  - TPN discontinued on 12/25/24  - monitor BMP intermittently     Elevated Troponins: Noting that he was hospitalized 3/2024 for sepsis and UTI, as discussed below, and Troponins were elevated at that time. Cardiology was consulted. Lexiscan showed no evidence of inducible ischemia. Suspect elevated Troponins 12/14/24 to be due to demand ischemia due to acute illness.    -- discontinued telemetry ; no chest pain or sob    -- not a candidate for coronary intervention due to active bleeding, patient elects conservative management.  12/19/2024 at time of RRT troponin 53 however flat 45.  No chest pain.     Massive prostatomegaly. Seen incidentally on CT. Noting that he was hospitalized for cystitis due to E coli in 7/2023, and hospitalized for sepsis, UTI, and bacteremia due to E coli, resistant to  Unasyn and Zosyn, treated with Cipro.    -- Monitor symptomatically     Hypertension: TTE in 7/2023 showed preserved LVEF with mild to moderate concentric LVH and mild aortic stenosis.    -- Hold PTA amlodipine, Avalide  -BP reasonably controlled without antihypertensives      Hyperlipidemia:   -- hold Pravastatin       Hypothyroid:   -- continue Synthroid        Diet:   Advance Diet as Tolerated: Regular Diet Adult; Regular Diet Adult  Room Service      DVT Prophylaxis: PCD boots    Code status: DNR/DNI    Disposition:   Medically Ready for Discharge: Ready Now pending TCU placement  - care management team following for disposition to TCU    Clinically Significant Risk Factors               # Hypoalbuminemia: Lowest albumin = 2.5 g/dL at 12/23/2024  6:01 AM, will monitor as appropriate       # Hypertension: Noted on problem list               # Moderate Malnutrition: based on nutrition assessment      # Financial/Environmental Concerns: none                            Physical Exam:      Blood pressure 135/62, pulse 78, temperature 98.6  F (37  C), temperature source Oral, resp. rate 18, weight 69.9 kg (154 lb 1.6 oz), SpO2 95%.  Vitals:    12/23/24 0637 12/24/24 0649 12/25/24 0536   Weight: 70.1 kg (154 lb 8 oz) 78.4 kg (172 lb 13.5 oz) 69.9 kg (154 lb 1.6 oz)     Vital Signs with Ranges  Temp:  [98.3  F (36.8  C)-98.6  F (37  C)] 98.6  F (37  C)  Pulse:  [78-88] 78  Resp:  [16-18] 18  BP: (135-157)/(53-69) 135/62  SpO2:  [93 %-96 %] 95 %  I/O's Last 24 hours  I/O last 3 completed shifts:  In: 1274.58 [P.O.:388]  Out: 2300 [Urine:2300]    Constitutional: Alert, awake and oriented ; resting comfortably in no apparent distress       Oral cavity: Moist mucosa   Cardiovascular: Normal s1 s2, regular rate and rhythm, no murmur   Lungs: B/l clear to auscultation, no wheezes or crepitations   Abdomen: Soft, nt, nd, no guarding, rigidity or rebound; BS +   LE : No edema   Musculoskeletal/Neuro Power 5/5 in all  extremities; No focal neurological deficits noted   Psychiatry: normal mood and affect                Medications:        Current Facility-Administered Medications   Medication Dose Route Frequency Provider Last Rate Last Admin    carboxymethylcellulose PF (REFRESH PLUS) 0.5 % ophthalmic solution 2 drop  2 drop Both Eyes BID Lobo Zimmerman MD   2 drop at 12/25/24 2046    insulin aspart (NovoLOG) injection (RAPID ACTING)  1-12 Units Subcutaneous Q4H Lobo Zimmerman MD   1 Units at 12/25/24 1822    levothyroxine (SYNTHROID/LEVOTHROID) tablet 75 mcg  75 mcg Oral Daily Lobo Zimmerman MD   75 mcg at 12/25/24 0943    pantoprazole (PROTONIX) EC tablet 40 mg  40 mg Oral BID AC Bhavani Burgess PA-C   40 mg at 12/26/24 0627    QUEtiapine (SEROquel) half-tab 12.5 mg  12.5 mg Oral At Bedtime Lobo Zimmerman MD   12.5 mg at 12/25/24 2155    sodium chloride (PF) 0.9% PF flush 3 mL  3 mL Intracatheter Q8H Diony Roy MD   3 mL at 12/26/24 0353    sulfamethoxazole-trimethoprim (BACTRIM DS) 800-160 MG per tablet 1 tablet  1 tablet Oral BID Saul Son MD   1 tablet at 12/25/24 2046    tamsulosin (FLOMAX) capsule 0.4 mg  0.4 mg Oral Daily Lobo Zimmerman MD   0.4 mg at 12/25/24 2046     PRN Meds:   Current Facility-Administered Medications   Medication Dose Route Frequency Provider Last Rate Last Admin    acetaminophen (TYLENOL) tablet 650 mg  650 mg Oral Q4H PRN Diony Roy MD   650 mg at 12/22/24 0539    Or    acetaminophen (TYLENOL) Suppository 650 mg  650 mg Rectal Q4H PRN Diony Roy MD   650 mg at 12/19/24 0117    albuterol (PROVENTIL) neb solution 2.5 mg  2.5 mg Nebulization Q2H PRN Joel Gonzalez APRN CNP   2.5 mg at 12/21/24 0449    benzocaine-menthol (CHLORASEPTIC) 6-10 MG lozenge 1 lozenge  1 lozenge Buccal Q1H PRN Diony Roy MD        bisacodyl (DULCOLAX) suppository 10 mg  10 mg Rectal Daily PRN Diony Roy MD        calcium  carbonate (TUMS) chewable tablet 1,000 mg  1,000 mg Oral 4x Daily PRN Diony Roy MD        carboxymethylcellulose PF (REFRESH PLUS) 0.5 % ophthalmic solution 1 drop  1 drop Both Eyes Q1H PRN Joel Gonzalez APRN CNP   1 drop at 12/22/24 1724    glucose gel 15-30 g  15-30 g Oral Q15 Min PRN Lobo Zimmerman MD        Or    dextrose 50 % injection 25-50 mL  25-50 mL Intravenous Q15 Min PRN Lobo Zimmerman MD        Or    glucagon injection 1 mg  1 mg Subcutaneous Q15 Min PRN Lobo Zimmerman MD        guaiFENesin-dextromethorphan (ROBITUSSIN DM) 100-10 MG/5ML syrup 10 mL  10 mL Oral Q4H PRN Diony Roy MD        ipratropium - albuterol 0.5 mg/2.5 mg/3 mL (DUONEB) neb solution 3 mL  3 mL Nebulization Q4H PRN Joel Gonzalez APRN CNP   3 mL at 12/19/24 0232    melatonin tablet 3 mg  3 mg Oral At Bedtime PRN Diony Roy MD        naloxone (NARCAN) injection 0.2 mg  0.2 mg Intravenous Q2 Min PRN Diony Roy MD        Or    naloxone (NARCAN) injection 0.4 mg  0.4 mg Intravenous Q2 Min PRN Dinoy Roy MD        Or    naloxone (NARCAN) injection 0.2 mg  0.2 mg Intramuscular Q2 Min PRN Diony Roy MD        Or    naloxone (NARCAN) injection 0.4 mg  0.4 mg Intramuscular Q2 Min PRN Diony Roy MD        No lozenges or gum should be given while patient on BIPAP/AVAPS/AVAPS AE   Does not apply Continuous PRN Joel Gonzalez APRN CNP        ondansetron (ZOFRAN ODT) ODT tab 4 mg  4 mg Oral Q6H PRN Diony Roy MD        Or    ondansetron (ZOFRAN) injection 4 mg  4 mg Intravenous Q6H PRDiony Davila MD        Patient may continue current oral medications   Does not apply Continuous PRN Joel Gonzalez APRN CNP        polyethylene glycol (MIRALAX) Packet 17 g  17 g Oral BID PRN Diony Roy Karl, MD        senna-docusate (SENOKOT-S/PERICOLACE) 8.6-50 MG per tablet 1 tablet  1 tablet Oral BID PRN Jungman, Diony  MD Karl        Or    senna-docusate (SENOKOT-S/PERICOLACE) 8.6-50 MG per tablet 2 tablet  2 tablet Oral BID Diony Cabral MD        sodium chloride (PF) 0.9% PF flush 3 mL  3 mL Intracatheter q1 min Diony Cabral MD                Data:      All new lab and imaging data was reviewed.   Recent Labs   Lab Test 12/26/24  0557 12/25/24  0541 12/24/24  0550 12/23/24  0604 12/23/24  0601 12/22/24  0556 12/21/24  0617 12/20/24  1734 12/20/24  0604 12/17/24  1356 12/17/24  0600 12/15/24  0606 12/15/24  0228   WBC  --   --   --   --   --  13.3* 13.4*  --  15.7*   < >  --    < > 10.4   HGB 8.8* 8.6* 8.6*   < >  --  9.8* 9.1*   < > 8.7*  8.6*   < > 9.3*   < > 6.8*   MCV  --   --   --   --   --  89 89  --  92   < >  --    < > 98   PLT  --   --   --   --   --  169 102*  --  100*   < >  --    < > 189   INR  --   --   --   --  1.11  --   --   --   --   --  1.17*  --  1.28*    < > = values in this interval not displayed.      Recent Labs   Lab Test 12/26/24  0557 12/26/24  0550 12/26/24  0154 12/25/24  2154 12/25/24  1009 12/25/24  0541 12/24/24  0935 12/24/24  0550 12/23/24  0608 12/23/24  0601 12/22/24  1018 12/22/24  0556 12/21/24  0902 12/21/24  0617   NA  --   --   --   --   --   --   --   --   --  134*  --  137  --  138   POTASSIUM 4.2  --   --   --   --  4.1  --  3.8  --  3.8   < > 3.4   < > 3.4   CHLORIDE  --   --   --   --   --   --   --   --   --  103  --  101  --  107   CO2  --   --   --   --   --   --   --   --   --  23  --  26  --  23   BUN  --   --   --   --   --   --   --   --   --  22.6  --  23.5*  --  18.9   CR  --   --   --   --   --   --   --   --   --  0.68  --  0.87  --  0.72   ANIONGAP  --   --   --   --   --   --   --   --   --  8  --  10  --  8   MEGHANN  --   --   --   --   --   --   --   --   --  8.0*  --  8.1*  --  7.6*   GLC  --  95 107* 109*   < >  --    < >  --    < > 145*   < > 215*   < > 179*    < > = values in this interval not displayed.     No lab results found.    Invalid  "input(s): \"TROP\", \"TROPONINIES\"      "

## 2024-12-26 NOTE — PLAN OF CARE
A&O x4. VSS on RA EX /57, 146/53. Up assist x1 WGB. LS clear, congested cough occasionally. BS+, no BM today. Flatus+. Urine output adequate with external catheter in place. Sat in chair most of the day, ambulated in carmen this evening. Denies pain. TPN rate decreased to 40ml/hr at 1841, to be discontinued this evening.

## 2024-12-27 ENCOUNTER — APPOINTMENT (OUTPATIENT)
Dept: PHYSICAL THERAPY | Facility: CLINIC | Age: 89
End: 2024-12-27
Attending: HOSPITALIST
Payer: MEDICARE

## 2024-12-27 ENCOUNTER — LAB REQUISITION (OUTPATIENT)
Dept: LAB | Facility: CLINIC | Age: 89
End: 2024-12-27
Payer: MEDICARE

## 2024-12-27 ENCOUNTER — LAB REQUISITION (OUTPATIENT)
Dept: LAB | Facility: CLINIC | Age: 89
End: 2024-12-27
Payer: COMMERCIAL

## 2024-12-27 ENCOUNTER — APPOINTMENT (OUTPATIENT)
Dept: OCCUPATIONAL THERAPY | Facility: CLINIC | Age: 89
End: 2024-12-27
Attending: HOSPITALIST
Payer: MEDICARE

## 2024-12-27 VITALS
DIASTOLIC BLOOD PRESSURE: 73 MMHG | TEMPERATURE: 98.2 F | BODY MASS INDEX: 24.27 KG/M2 | RESPIRATION RATE: 16 BRPM | SYSTOLIC BLOOD PRESSURE: 153 MMHG | WEIGHT: 150.35 LBS | HEART RATE: 85 BPM | OXYGEN SATURATION: 92 %

## 2024-12-27 DIAGNOSIS — Z11.1 ENCOUNTER FOR SCREENING FOR RESPIRATORY TUBERCULOSIS: ICD-10-CM

## 2024-12-27 DIAGNOSIS — G93.41 METABOLIC ENCEPHALOPATHY: ICD-10-CM

## 2024-12-27 DIAGNOSIS — K92.2 GASTROINTESTINAL HEMORRHAGE, UNSPECIFIED: ICD-10-CM

## 2024-12-27 DIAGNOSIS — D62 ACUTE POSTHEMORRHAGIC ANEMIA: ICD-10-CM

## 2024-12-27 LAB
CREAT SERPL-MCNC: 0.98 MG/DL (ref 0.67–1.17)
EGFRCR SERPLBLD CKD-EPI 2021: 71 ML/MIN/1.73M2

## 2024-12-27 PROCEDURE — 99239 HOSP IP/OBS DSCHRG MGMT >30: CPT | Performed by: INTERNAL MEDICINE

## 2024-12-27 PROCEDURE — 999N000040 HC STATISTIC CONSULT NO CHARGE VASC ACCESS

## 2024-12-27 PROCEDURE — 250N000013 HC RX MED GY IP 250 OP 250 PS 637: Performed by: PHYSICIAN ASSISTANT

## 2024-12-27 PROCEDURE — 97535 SELF CARE MNGMENT TRAINING: CPT | Mod: GO

## 2024-12-27 PROCEDURE — 82565 ASSAY OF CREATININE: CPT | Performed by: INTERNAL MEDICINE

## 2024-12-27 PROCEDURE — 97116 GAIT TRAINING THERAPY: CPT | Mod: GP

## 2024-12-27 PROCEDURE — 250N000013 HC RX MED GY IP 250 OP 250 PS 637: Performed by: INTERNAL MEDICINE

## 2024-12-27 PROCEDURE — 250N000013 HC RX MED GY IP 250 OP 250 PS 637: Performed by: HOSPITALIST

## 2024-12-27 PROCEDURE — 97530 THERAPEUTIC ACTIVITIES: CPT | Mod: GP

## 2024-12-27 RX ORDER — AMOXICILLIN 250 MG
1 CAPSULE ORAL 2 TIMES DAILY PRN
Qty: 40 TABLET | Refills: 0 | Status: SHIPPED | OUTPATIENT
Start: 2024-12-27

## 2024-12-27 RX ORDER — QUETIAPINE FUMARATE 25 MG/1
12.5 TABLET, FILM COATED ORAL AT BEDTIME
Qty: 30 TABLET | Refills: 0 | Status: SHIPPED | OUTPATIENT
Start: 2024-12-27

## 2024-12-27 RX ORDER — FERROUS SULFATE 325(65) MG
325 TABLET ORAL
Qty: 30 TABLET | Refills: 0 | Status: SHIPPED | OUTPATIENT
Start: 2024-12-27

## 2024-12-27 RX ORDER — PANTOPRAZOLE SODIUM 40 MG/1
40 TABLET, DELAYED RELEASE ORAL
Qty: 60 TABLET | Refills: 0 | Status: SHIPPED | OUTPATIENT
Start: 2024-12-27

## 2024-12-27 RX ORDER — SULFAMETHOXAZOLE AND TRIMETHOPRIM 800; 160 MG/1; MG/1
1 TABLET ORAL 2 TIMES DAILY
Qty: 8 TABLET | Refills: 0 | Status: SHIPPED | OUTPATIENT
Start: 2024-12-27 | End: 2024-12-31

## 2024-12-27 RX ADMIN — CARBOXYMETHYLCELLULOSE SODIUM 2 DROP: 5 SOLUTION/ DROPS OPHTHALMIC at 09:32

## 2024-12-27 RX ADMIN — PANTOPRAZOLE SODIUM 40 MG: 40 TABLET, DELAYED RELEASE ORAL at 06:11

## 2024-12-27 RX ADMIN — LEVOTHYROXINE SODIUM 75 MCG: 75 TABLET ORAL at 09:31

## 2024-12-27 RX ADMIN — SULFAMETHOXAZOLE AND TRIMETHOPRIM 1 TABLET: 800; 160 TABLET ORAL at 09:32

## 2024-12-27 ASSESSMENT — ACTIVITIES OF DAILY LIVING (ADL)
ADLS_ACUITY_SCORE: 53
ADLS_ACUITY_SCORE: 53
ADLS_ACUITY_SCORE: 48
ADLS_ACUITY_SCORE: 53
ADLS_ACUITY_SCORE: 53
ADLS_ACUITY_SCORE: 57
ADLS_ACUITY_SCORE: 48
ADLS_ACUITY_SCORE: 53
ADLS_ACUITY_SCORE: 53
ADLS_ACUITY_SCORE: 57
ADLS_ACUITY_SCORE: 57
ADLS_ACUITY_SCORE: 53
ADLS_ACUITY_SCORE: 53
ADLS_ACUITY_SCORE: 48
ADLS_ACUITY_SCORE: 57
ADLS_ACUITY_SCORE: 57

## 2024-12-27 NOTE — PLAN OF CARE
[id number]    Orientations: AxOx4, forgetful; [place].  Vitals/Pain: VSS on RA. Denies pain.  Lines/Drains: R 2-lumen PICC SL, good blood return noted in both lumens. PIV x1 SL.  Skin/Wounds: Scattered bruises.  GI/: Regular diet. External catheter in place with adequate output. No BM.  Ambulation/Assist: Assist x1 GB/W.  Sleep Quality: Sleeping between cares.    Plan: Continue PO abx. Encourage activity/OOB, PO intake. Discharge to TCU pending placement.

## 2024-12-27 NOTE — PLAN OF CARE
Occupational Therapy Discharge Summary    Reason for therapy discharge:    Discharged to transitional care facility.    Progress towards therapy goal(s). See goals on Care Plan in [facility] electronic health record for goal details.  Goals partially met.  Barriers to achieving goals:   discharge from facility.    Therapy recommendation(s):    Continued therapy is recommended.  Rationale/Recommendations:  to advance safety and indep with ADLs prior to return home.

## 2024-12-27 NOTE — DISCHARGE INSTRUCTIONS
You are admitted for bleeding from your stomach.  You underwent a gastric clipping and the bleeding has stopped.  Please continue taking acid reflux medication.  Avoid taking Advil, naproxen, meloxicam or any other NSAIDs.  Please follow-up with primary care and GI as scheduled.  If you see any signs of bleeding please report tissues for emergency department soon as possible.

## 2024-12-27 NOTE — PLAN OF CARE
"Goal Outcome Evaluation:       Patient Name: Clayton Pacheco  MRN: [id number]  Date of Admission: 12/15/2024  Reason for Admission:     UGIB (upper gastrointestinal bleed) and mass  [facility] [place] [facility]    Vitals:   BP Readings from Last 1 Encounters:   12/26/24 116/54     Pulse Readings from Last 1 Encounters:   12/26/24 80     Wt Readings from Last 1 Encounters:   12/14/24 65.8 kg (145 lb)     Ht Readings from Last 1 Encounters:   12/15/24 1.676 m (5' 6\")     Estimated body mass index is 23.4 kg/m  as calculated from the following:    Height as of an earlier encounter on 12/15/24: 1.676 m (5' 6\").    Weight as of 12/14/24: 65.8 kg (145 lb).  Temp Readings from Last 1 Encounters:   12/26/24 98.3  F (36.8  C) (Oral)       Pain: Pain goal 0 Pain Rating 0 Effective pain medication/regimen Denied pain during shift    CV Surgery Patient: No    Assessment    Resp: Diminished on RA  Telemetry: N/A  Neuro: A&O x4  GI/: BS normoactive, passing flatus, odorous, no BM during shift. Adequate in  external catheter.   Clear liquid diet, NPO after midnight.  Skin/Wounds: Skin intact, pale  Lines/Drains: One PIV Rt arm, SL, Rt PICC with two lumens, flush well, good blood return  Activity: Assist of 1 w/GBW, Walked in carmen, up in chair.  Sleep: Adequate  Abnormal Labs: N/A    Aggression Stop Light: Green          Patient Care Plan: Plan to discharge to TCU tomorrow.                 "

## 2024-12-27 NOTE — PROGRESS NOTES
Care Management Discharge Note    Discharge Date: 12/27/2024       Discharge Disposition: Transitional Care    Discharge Services:     Discharge DME: None    Discharge Transportation:     Private pay costs discussed: transportation costs    Does the patient's insurance plan have a 3 day qualifying hospital stay waiver?  No    PAS Confirmation Code: [id number]  Patient/family educated on Medicare website which has current facility and service quality ratings: yes    Education Provided on the Discharge Plan: Yes  Persons Notified of Discharge Plans: Pt, son, RN, MD, facility  Patient/Family in Agreement with the Plan: yes    Handoff Referral Completed: Yes, [facility] PCP: Internal handoff referral completed    Additional Information:  [facility] 9431-2983. Orders faxed and facility updated. Pt updated and in agreement. Son, Curt, also updated and in agreement. SW informed both that pt will be billed for the cost of the ride. Nursing aware.     GIGI Arteaga, [facility]  [phone] Desk phone  [phone] Cell/text (Preferred)  [facility]    PAS-RR    D: Per DHS regulation, SW completed and submitted PAS-RR to [place] [employer] Direct Connect via the Senior LinkAge Line.  PAS-RR confirmation # is : [id number]    P: Further questions may be directed to Senior LinkAge Line at #[phone], option #4 for PAS-RR staff.

## 2024-12-27 NOTE — PROGRESS NOTES
19:00- 23:00      Resp: Diminished/Clear on RA  Neuro: A&Ox 4,Vital signs stable  GI/: No BM on this shift,passing gas.External catheter in placed with adequate urine output.  Diet: Regular  Skin/Wounds: Skin intact, pale,scattered bruising  Lines/Drains: R PICC x double lumen,R PIV SL  Activity: Assist of 1-2 w/GB /W  Sleep: Sleep between cares  Abnormal Labs: Blood sugar every 4 hours,Hgb- 8.8,On K+,Mg,phos protocol,recheck hernesto am.  Aggression Stop Light: Green          Patient Care Plan:Continue plan of care,for discharge hernesto to TCU.

## 2024-12-27 NOTE — PROGRESS NOTES
CLINICAL NUTRITION SERVICES - REASSESSMENT NOTE    Recommendations Ordered by Registered Dietitian (RD):   - Continue glucerna and magic cup supplements as able.    Malnutrition:   12/16  % Weight Loss:  None noted  % Intake:  Does not meet criteria - was meeting needs with TPN.   Subcutaneous Fat Loss:  Orbital region moderate depletion, Upper arm region moderate depletion, and Thoracic region moderate depletion  Muscle Loss:  Temporal region moderate depletion, Clavicle bone region moderate depletion, and Dorsal hand region moderate depletion  Fluid Retention:  None noted     Malnutrition Diagnosis: Moderate malnutrition  In Context of:  Chronic illness or disease     EVALUATION OF PROGRESS TOWARD GOALS   Diet: Regular diet (since 12/23)  Room Service w/ Assist  Magic cup BID between meals  Glucerna TID w/ meals     Nutrition Support: TPN off since 12/25, with improved PO tolerance.    Intake/Tolerance:   - Pt seen in room. Appetite is fine. Eating at least 50% of his meals. He is also drinking Glucerna shakes as able.   - Last BM x3 on 12/24.   - Wt up to 68.2 kg, admit wt was 64.5 kg. Likely some fluid retention.     ASSESSED NUTRITION NEEDS:  Dosing Weight 66 kg - current   Estimated Energy Needs: 1651-0535 kcals (25-30 Kcal/Kg)  Justification: maintenance  Estimated Protein Needs: 79-99 grams protein (1.2-1.5 g pro/Kg)  Justification: preservation of lean body mass    NEW FINDINGS:   - Planned discharge this afternoon.     Previous Goals:   TPN @ Goal to provide % estimated needs.   Evaluation: Met -until diet advanced and TPN was discontinued   Pt to tolerate >60% estimated needs orally (or >/=75% meals) to discontinue TPN.   Evaluation: Not met on calorie count, but improving.     Previous Nutrition Diagnosis:   Inadequate oral intake related to respiratory status, altered GI function as evidenced by intermittent Bipap requiring NPO, currently on RA w/ CLD.   Evaluation: Improving    CURRENT NUTRITION  DIAGNOSIS  Predicted inadequate nutrient intake related to slowly advancing diet post GIB.     INTERVENTIONS  Recommendations / Nutrition Prescription  Regular diet  Supplements as ordered  Room service w/ assist     Implementation  No changes    Goals  Intake of at least 75% meals TID.     MONITORING AND EVALUATION:  Progress towards goals will be monitored and evaluated per protocol and Practice Guidelines    Claribel Gutierrez RD, LD  Pager: [phone]  Available on [employer]

## 2024-12-28 NOTE — PLAN OF CARE
Physical Therapy Discharge Summary    Reason for therapy discharge:    Discharged to transitional care facility.    Progress towards therapy goal(s). See goals on Care Plan in [facility] electronic health record for goal details.  Goals partially met.  Barriers to achieving goals:   discharge from facility.    Therapy recommendation(s):    Continued therapy is recommended.  Rationale/Recommendations:  cont PT at TCU to further address deficits and optimize functional recovery.

## 2024-12-30 ENCOUNTER — TRANSITIONAL CARE UNIT VISIT (OUTPATIENT)
Dept: GERIATRICS | Facility: CLINIC | Age: 89
End: 2024-12-30
Payer: COMMERCIAL

## 2024-12-30 ENCOUNTER — DOCUMENTATION ONLY (OUTPATIENT)
Dept: GERIATRICS | Facility: CLINIC | Age: 89
End: 2024-12-30

## 2024-12-30 VITALS
BODY MASS INDEX: 23.78 KG/M2 | WEIGHT: 148 LBS | TEMPERATURE: 97.7 F | HEART RATE: 84 BPM | SYSTOLIC BLOOD PRESSURE: 130 MMHG | RESPIRATION RATE: 16 BRPM | DIASTOLIC BLOOD PRESSURE: 76 MMHG | OXYGEN SATURATION: 92 % | HEIGHT: 66 IN

## 2024-12-30 DIAGNOSIS — D62 ABLA (ACUTE BLOOD LOSS ANEMIA): ICD-10-CM

## 2024-12-30 DIAGNOSIS — E03.9 HYPOTHYROIDISM, UNSPECIFIED TYPE: ICD-10-CM

## 2024-12-30 DIAGNOSIS — K92.2 GASTROINTESTINAL HEMORRHAGE, UNSPECIFIED GASTROINTESTINAL HEMORRHAGE TYPE: Primary | ICD-10-CM

## 2024-12-30 DIAGNOSIS — R53.1 WEAKNESS: ICD-10-CM

## 2024-12-30 DIAGNOSIS — R78.81 BACTEREMIA: ICD-10-CM

## 2024-12-30 DIAGNOSIS — I10 ESSENTIAL HYPERTENSION: ICD-10-CM

## 2024-12-30 DIAGNOSIS — R41.0 DELIRIUM: ICD-10-CM

## 2024-12-30 LAB — HGB BLD-MCNC: 9.4 G/DL (ref 13.3–17.7)

## 2024-12-30 PROCEDURE — 36415 COLL VENOUS BLD VENIPUNCTURE: CPT | Mod: ORL | Performed by: FAMILY MEDICINE

## 2024-12-30 PROCEDURE — 99305 1ST NF CARE MODERATE MDM 35: CPT | Performed by: FAMILY MEDICINE

## 2024-12-30 PROCEDURE — 86481 TB AG RESPONSE T-CELL SUSP: CPT | Mod: ORL | Performed by: FAMILY MEDICINE

## 2024-12-30 PROCEDURE — P9604 ONE-WAY ALLOW PRORATED TRIP: HCPCS | Mod: ORL | Performed by: FAMILY MEDICINE

## 2024-12-30 PROCEDURE — 85018 HEMOGLOBIN: CPT | Mod: ORL | Performed by: FAMILY MEDICINE

## 2024-12-30 NOTE — DISCHARGE SUMMARY
[facility]  Hospitalist Discharge Summary      Date of Admission:  12/15/2024  Date of Discharge:  12/27/2024  3:33 PM  Discharging Provider: Gloria Schwab MD  Discharge Service: Hospitalist Service    Discharge Diagnoses   See below    Clinically Significant Risk Factors     # Moderate Malnutrition: based on nutrition assessment      Follow-ups Needed After Discharge   Follow-up Appointments       Follow Up and recommended labs and tests      Hemoglobin at least twice a week, BMP weekly, monitor blood pressure daily            {Additional follow-up instructions/to-do's for PCP    -f/u  BP, BMP    Unresulted Labs Ordered in the Past 30 Days of this Admission       Date and Time Order Name Status Description    12/15/2024  2:54 AM Prepare red blood cells (unit) Preliminary         These results will be followed up by PCP    Discharge Disposition   Discharged to short-term care facility  Condition at discharge: Stable    Hospital Course   Clayton Pacheco is a pleasant 89 year old gentleman with PMH of hypertension, hyperlipidemia, and hypothyroid, who presented with acute abdominal pain and melena, noted with acute blood loss anemia due to acute upper GI bleeding and hemorrhage, due to bleeding ulcerated gastric mass. Initially presented to [facility] ED on 12/14/24 and transferred to [facility] for IR/GI/Surgery evaluation, admitted inpatient 12/15/24.     Acute blood loss anemia due to upper GI bleeding from ulcerated gastric mass  S/p EGD 12/15 and 12/16 (clips placed):   on presentation, noted drop in Hb from 11.4 to 8.6. 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 Hemostatic gel and spray are applied applied, but this ulcerated mass continued to bleed. Clotted blood in the  gastric body is also noted. 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 Clips (CEDU) were placed, injected and treated with argon plasma coagulation, hemostatic spry applied  - after discussion with family, plan for conservative management with no surgical intervention  - bleeding seems to have now resolved and Hb stable around 8-9; 8.8 on 12/26  - GI signed off 12/20; to follow up with DR Kim in 1-2 weeks after discharge  -Cont PPI BID     Sepsis, E. coli bacteremia  Deirium  on 12/19 was evaluated by RRT for acute respiratory failure with hypoxia, fever and lactic acidosis. blood cultures from 12/19 growing E. Coli; UA unremarkable. It was unclear source but likely from ulcerated mass. He has been afebrile since 12/20; WBC 15.7---> 13.3. It was initially started on Zosyn on 12/19 , switched to Rocephin 12/21. ID (signed off 12/24) rec transition of IV Rocephin to PO Bactrim for total 10 days duration; plan for 6 more days of PO Bactrim starting 12/25/24 (till 12/31). The respiratory status and mentation improved; off BiPAP; respiratory status stable on room air. Per previous provider, code status was changed to DNR/DNI (12/19) after discussion with family.     Moderate malnutrition  Hypernatremia, resolved  - was started on TPN with poor PO intake  - started PO on 12/22 and currently tolerating regular well,  - TPN discontinued on 12/25/24  - f/u BMP was reassuring     Elevated Troponins: Noting that he was hospitalized 3/2024 for sepsis and UTI, as discussed below, and Troponins were  elevated at that time. Cardiology was consulted. Lexiscan showed no evidence of inducible ischemia. Suspect elevated Troponins 12/14/24 to be due to demand ischemia due to acute illness. Discontinued telemetry ; no chest pain or sob. He was not a candidate for coronary intervention due to active bleeding, patient elects conservative management.  12/19/2024 at time of RRT troponin 53 however flat 45.  No chest pain.     Massive prostatomegaly. Seen incidentally on CT. Noting that he was hospitalized for cystitis due to E coli in 7/2023, and hospitalized for sepsis, UTI, and bacteremia due to E coli, resistant to Unasyn and Zosyn, treated with Cipro.     Hypertension: TTE in 7/2023 showed preserved LVEF with mild to moderate concentric LVH and mild aortic stenosis. Resumed  PTA amlodipine but discontinued PTA Avalide. BP reasonably controlled.PCP to monitor.     Hyperlipidemia:   -- PTA Pravastatin       Hypothyroid:   -- continue Synthroid        Diet:   Advanced Diet as Tolerated: Regular Diet Adult             # Hypoalbuminemia: Lowest albumin = 2.5 g/dL at 12/23/2024  6:01 AM, will monitor as appropriate           # Moderate Malnutrition: based on nutrition assessmen    Consultations This Hospital Stay   CARE MANAGEMENT / SOCIAL WORK IP CONSULT  GASTROENTEROLOGY IP CONSULT  SURGERY GENERAL IP CONSULT  INTERVENTIONAL RADIOLOGY ADULT/PEDS IP CONSULT  PHYSICAL THERAPY ADULT IP CONSULT  VASCULAR ACCESS ADULT IP CONSULT  PHARMACY/NUTRITION TO START AND MANAGE TPN  PHARMACY IP CONSULT  CARE MANAGEMENT / SOCIAL WORK IP CONSULT  PHARMACY IP CONSULT  OCCUPATIONAL THERAPY ADULT IP CONSULT  PHARMACY IP CONSULT  RESPIRATORY CARE IP CONSULT  INFECTIOUS DISEASES IP CONSULT  VASCULAR ACCESS ADULT IP CONSULT  PHARMACY IP CONSULT  PHARMACY IP CONSULT  PHYSICAL THERAPY ADULT IP CONSULT  OCCUPATIONAL THERAPY ADULT IP CONSULT  VASCULAR ACCESS ADULT IP CONSULT    Code Status   Prior    Time Spent on this Encounter   Gloria HUNTER  MD Zaheer, personally saw the patient today and spent greater than 30 minutes discharging this patient.       Gloria Schwab MD  [address] [place] [address] [place] [address]  Phone: [phone]  ______________________________________________________________________    Physical Exam   Vital Signs:                    Weight: 150 lbs 5.66 oz  Constitutional: Alert, awake and oriented ; resting comfortably in no apparent distress         Oral cavity: Moist mucosa   Cardiovascular: Normal s1 s2, regular rate and rhythm, no murmur   Lungs: B/l clear to auscultation, no wheezes or crepitations   Abdomen: Soft, nt, nd, no guarding, rigidity or rebound; BS +   LE : No edema   Musculoskeletal/Neuro Power 5/5 in all extremities; No focal neurological deficits noted        Primary Care Physician   Oscar De Oliveira    Discharge Orders      Primary Care - Care Coordination Referral      Adult GI  Referral - Consult Only      General info for SNF    Length of Stay Estimate: Short Term Care: Estimated # of Days <30  Condition at Discharge: Improving  Level of care:skilled   Rehabilitation Potential: Good  Admission H&P remains valid and up-to-date: Yes  Recent Chemotherapy: N/A  Use Nursing Home Standing Orders: Yes     Follow Up and recommended labs and tests    Hemoglobin at least twice a week, BMP weekly, monitor blood pressure daily     Reason for your hospital stay    Upper GI bleed due to bleeding gastric mass     Activity - Up ad leeann     Physical Therapy Adult Consult    Evaluate and treat as clinically indicated.    Reason: Generalized weakness     Occupational Therapy Adult Consult    Evaluate and treat as clinically indicated.    Reason: Generalized weakness     Fall precautions     Diet    Follow this diet upon discharge: Current Diet:Orders Placed This Encounter      Calorie Counts      Room Service      Advance Diet as Tolerated: Regular Diet Adult; Regular Diet  Adult       Significant Results and Procedures   Most Recent 3 CBC's:  Recent Labs   Lab Test 12/26/24  0557 12/25/24  0541 12/24/24  0550 12/23/24  0604 12/22/24  0556 12/21/24  0617 12/20/24  1734 12/20/24  0604   WBC  --   --   --   --  13.3* 13.4*  --  15.7*   HGB 8.8* 8.6* 8.6*   < > 9.8* 9.1*   < > 8.7*  8.6*   MCV  --   --   --   --  89 89  --  92   PLT  --   --   --   --  169 102*  --  100*    < > = values in this interval not displayed.     Most Recent 2 LFT's:  Recent Labs   Lab Test 12/23/24  0601 12/19/24  0239   AST 32 61*   ALT 60 71*   ALKPHOS 153* 98   BILITOTAL 1.0 0.5   ,   Results for orders placed or performed during the hospital encounter of 12/15/24   CT Head w/o Contrast    Narrative    EXAM: CT HEAD W/O CONTRAST  LOCATION: [facility]  DATE: 12/15/2024    INDICATION: AMS, new finding of gastric mass  COMPARISON: None.  TECHNIQUE: Routine CT Head without IV contrast. Multiplanar reformats. Dose reduction techniques were used.    FINDINGS:  INTRACRANIAL CONTENTS: No midline shift or mass effect. No acute intracranial hemorrhage. No hydrocephalus or extra-axial hemorrhage. Mild global volume loss with expected dilatation of the ventricular system. Moderate chronic small vessel ischemic   changes. No acute loss of gray-white differentiation identified.     VISUALIZED ORBITS/SINUSES/MASTOIDS: No intraorbital abnormality. No paranasal sinus mucosal disease. No middle ear or mastoid effusion.    BONES/SOFT TISSUES: No acute abnormality.      Impression    IMPRESSION:  1.  No acute findings. Chronic changes as above.   CTA Abdomen Pelvis with Contrast    Narrative    CTA ABDOMEN PELVIS WITH CONTRAST    PROCEDURE DATE: 12/16/2024 10:05 AM     HISTORY:  bleeding gastric mass    COMPARISON: CTA abdomen/pelvis 12/14/2024    TECHNIQUE: Helical acquisition through the abdomen and pelvis was  performed during the arterial phase of contrast enhancement. 2D and 3D  reconstructions  performed by the CT technologist. Dose reduction  techniques were used.  CONTRAST: 89 mL of Isovue-370    FINDINGS:   ARTERIAL FINDINGS: Normal caliber abdominal aorta without dissection  or aneurysm. Diffuse calcific atherosclerotic disease. There is likely  mild celiac origin stenosis from atherosclerotic disease. The SMA and  renal arteries appear patent. Borderline fusiform dilation of the  right common iliac artery measuring 1.7 cm in diameter. Ectasia of the  distal left common iliac artery. No obvious gastrointestinal active  extravasation is seen, with special attention to the known gastric  mass.    NONVASCULAR FINDINGS:  LUNG BASES: Development of dependent consolidation/atelectasis in the  left lung base. No pleural effusion.    ABDOMEN: Benign hepatic cysts are again noted and require no further  follow-up. The spleen and pancreas are unremarkable. Stable mild  thickening of the adrenal glands. Normal caliber bowel loops appearing  renal cysts are benign and require no further follow-up. No  hydronephrosis. Sigmoid diverticulosis without acute diverticulitis.    PELVIS: Marked prostatomegaly.    MUSCULOSKELETAL: Degenerative changes of the thoracolumbar spine as  well as the hips. No destructive osseous lesion.      Impression    IMPRESSION:  1.  A mass in the gastric lumen is again noted. No active  extravasation is seen associated with the mass.  2.  Borderline fusiform dilation of the right common iliac artery  measuring 1.7 cm. Unchanged from the prior exam.  3.  Marked prostatomegaly.    ARTURO PACKER MD         SYSTEM ID:  [id number]   XR Chest Port 1 View    Narrative    EXAM: XR CHEST PORT 1 VIEW  LOCATION: [facility]  DATE: 12/19/2024    INDICATION: RRT: Acute hypoxic respiratory failure, dyspnea  COMPARISON: 3/19/2024.      Impression    IMPRESSION: The heart is normal in size. A right PICC is seen in place with the tip at the atrial caval junction. There is mild  central pulmonary venous congestion, subsegmental atelectasis seen in the bilateral lower lung zones   US Lower Extremity Venous Duplex Bilateral    Narrative    VENOUS ULTRASOUND BILATERAL LEG(S)  12/19/2024 11:35 AM     HISTORY: Moderate concern for thromboembolism but would like to rule  out DVT prior to CT PE    COMPARISON: None.    FINDINGS: Examination of the deep veins with graded compression and  color flow Doppler with spectral wave form analysis was performed.  Images show no evidence of thrombus in the bilateral common femoral  vein, femoral vein, popliteal vein or calf veins.      Impression    IMPRESSION: No deep vein thrombosis in the bilateral lower  extremities.      DONNA CHRISTIE MD         SYSTEM ID:  [id number]       Discharge Medications   Discharge Medication List as of 12/27/2024  1:58 PM        START taking these medications    Details   ferrous sulfate (FEROSUL) 325 (65 Fe) MG tablet Take 1 tablet (325 mg) by mouth daily (with breakfast)., Disp-30 tablet, R-0, E-Prescribe      !! melatonin 3 MG tablet Take 1 tablet (3 mg) by mouth nightly as needed for sleep., Disp-30 tablet, R-0, E-Prescribe      pantoprazole (PROTONIX) 40 MG EC tablet Take 1 tablet (40 mg) by mouth 2 times daily (before meals)., Disp-60 tablet, R-0, E-Prescribe      QUEtiapine (SEROQUEL) 25 MG tablet Take 0.5 tablets (12.5 mg) by mouth at bedtime., Disp-30 tablet, R-0, E-Prescribe      senna-docusate (SENOKOT-S/PERICOLACE) 8.6-50 MG tablet Take 1 tablet by mouth 2 times daily as needed for constipation., Disp-40 tablet, R-0, E-Prescribe      sulfamethoxazole-trimethoprim (BACTRIM DS) 800-160 MG tablet Take 1 tablet by mouth 2 times daily for 4 days., Disp-8 tablet, R-0, E-Prescribe       !! - Potential duplicate medications found. Please discuss with provider.        CONTINUE these medications which have NOT CHANGED    Details   acetaminophen (TYLENOL) 500 MG tablet Take 1,000 mg by mouth 2 times daily., Historical       amLODIPine (NORVASC) 10 MG tablet Take 10 mg by mouth daily., Historical      carboxymethylcellulose PF (REFRESH PLUS) 0.5 % ophthalmic solution Place 2 drops into both eyes 2 times daily., Historical      levothyroxine (SYNTHROID/LEVOTHROID) 75 MCG tablet Take 75 mcg by mouth daily., Historical      !! melatonin 3 MG tablet Take 3 mg by mouth at bedtime., Historical      polyethylene glycol (MIRALAX) 17 GM/Dose powder Take 1 Capful by mouth daily as needed for constipation., Historical      pravastatin (PRAVACHOL) 20 MG tablet Take 20 mg by mouth daily., Historical      tamsulosin (FLOMAX) 0.4 MG capsule Take 0.4 mg by mouth daily., Historical      Vitamin D3 (VITAMIN D, CHOLECALCIFEROL,) 25 mcg (1000 units) tablet Take 25 mcg by mouth daily., Historical       !! - Potential duplicate medications found. Please discuss with provider.        STOP taking these medications       irbesartan-hydrochlorothiazide (AVALIDE) 300-12.5 MG tablet Comments:   Reason for Stopping:             Allergies   No Known Allergies

## 2024-12-30 NOTE — LETTER
12/30/2024      Clayton Pacheco  [address]  [place] [address]        [facility] GERIATRICS  [place] Medical Record Number:  [id number]  Place of Service where encounter took place: [facility] ([facility]) [[id number]]  CODE STATUS:   DNR / DNI    Chief Complaint/Reason for Visit:  Chief Complaint   Patient presents with     Hospital F/U       HPI:    Clayton Pacheco is a 89 year old male with hx of hypertension, hyperlipidemia, and hypothyroid, who presented with acute abdominal pain and melena, noted with acute blood loss anemia due to acute upper GI bleeding and hemorrhage, due to bleeding ulcerated gastric mass. Initially presented to [facility] ED on 12/14/24 and transferred to [facility] for IR/GI/Surgery evaluation, admitted inpatient 12/15/24.    [facility]  Hospitalist Discharge Summary    Date of Admission:  12/15/2024  Date of Discharge:  12/27/2024      Hospital Course  Clayton Pacheco is a pleasant 89 year old gentleman with PMH of hypertension, hyperlipidemia, and hypothyroid, who presented with acute abdominal pain and melena, noted with acute blood loss anemia due to acute upper GI bleeding and hemorrhage, due to bleeding ulcerated gastric mass. Initially presented to [facility] ED on 12/14/24 and transferred to [facility] for IR/GI/Surgery evaluation, admitted inpatient 12/15/24.     Acute blood loss anemia due to upper GI bleeding from ulcerated gastric mass  S/p EGD 12/15 and 12/16 (clips placed):   on presentation, noted drop in Hb from 11.4 to 8.6. 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 Hemostatic gel and spray are applied applied, but this ulcerated mass continued to bleed. Clotted blood in the gastric body is also noted. 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 Clips ([employer]) were placed, injected and treated with argon plasma coagulation, hemostatic spry applied  - after discussion with family, plan for conservative management with no surgical intervention  - bleeding seems to have now resolved and Hb stable around 8-9; 8.8 on 12/26  - GI signed off 12/20; to follow up with DR Kim in 1-2 weeks after discharge  -Cont PPI BID     Sepsis, E. coli bacteremia  De[place]  on 12/19 was evaluated by RRT for acute respiratory failure with hypoxia, fever and lactic acidosis. blood cultures from 12/19 growing E. Coli; UA unremarkable. It was unclear source but likely from ulcerated mass. He has been afebrile since 12/20; WBC 15.7---> 13.3. It was initially started on Zosyn on 12/19 , switched to Rocephin 12/21. ID (signed off 12/24) rec transition of IV Rocephin to PO Bactrim for total 10 days duration; plan for 6 more days of PO Bactrim starting 12/25/24 (till 12/31). The respiratory status and mentation improved; off BiPAP; respiratory status stable on room air. Per previous provider, code status was changed to DNR/DNI (12/19) after discussion with family.     Moderate malnutrition  Hypernatremia, resolved  - was started on TPN with poor PO intake  - started PO on 12/22 and currently tolerating regular well,  - TPN discontinued on 12/25/24  - f/u BMP was reassuring     Elevated Troponins: Noting that he was hospitalized 3/2024 for sepsis and UTI, as discussed below, and Troponins were elevated at that time. Cardiology was  "consulted. Lexiscan showed no evidence of inducible ischemia. Suspect elevated Troponins 12/14/24 to be due to demand ischemia due to acute illness. Discontinued telemetry ; no chest pain or sob. He was not a candidate for coronary intervention due to active bleeding, patient elects conservative management.  12/19/2024 at time of RRT troponin 53 however flat 45.  No chest pain.     Massive prostatomegaly. Seen incidentally on CT. Noting that he was hospitalized for cystitis due to E coli in 7/2023, and hospitalized for sepsis, UTI, and bacteremia due to E coli, resistant to Unasyn and Zosyn, treated with Cipro.     Hypertension: TTE in 7/2023 showed preserved LVEF with mild to moderate concentric LVH and mild aortic stenosis. Resumed  PTA amlodipine but discontinued PTA Avalide. BP reasonably controlled.PCP to monitor.     Hyperlipidemia:   -- PTA Pravastatin       Hypothyroid:   -- continue Synthroid        Overall stabilized and discharged to TCU on 12/27/2024 for PT, OT, nursing cares, medical management and monitoring.     Today:  States he feels pretty good overall given what he has been through. Notes he thinks he is not \"out of the woods\" yet, continuing to work with therapy. No dizziness. No signs of bleeding. Hgb today up to 9.4, did have ABLA from GIB and gastric ulceration, s/p transfusions. No SOB at rest, chest pain. Bowels working normally per his report. No urinary sx, has enlarged prostate per chart notes, should see urology. No fever or cough. No new vision or hearing concerns. Lives at home with wife.       REVIEW OF SYSTEMS:  All others negative other than those noted in HPI.      PAST MEDICAL HISTORY:  Past Medical History:   Diagnosis Date     Enlarged prostate      HTN (hypertension)      Hyperlipidemia      Hypothyroidism      Presbyopia      Vitamin D deficiency        PAST SURGICAL HISTORY:  Past Surgical History:   Procedure Laterality Date     EGD  12/15/2024     ESOPHAGOSCOPY, " GASTROSCOPY, DUODENOSCOPY (EGD), COMBINED N/A 12/15/2024    Procedure: ESOPHAGOGASTRODUODENOSCOPY WITH GASTRIC MASS BIOPSIES AND HEMOSTASIS;  Surgeon: Lobo Hernandez MD;  Location: [facility] Main OR       FAMILY HISTORY:  Family History   Problem Relation Age of Onset     Coronary Artery Disease Son        SOCIAL HISTORY:  Social History     Socioeconomic History     Marital status:      Spouse name: Not on file     Number of children: Not on file     Years of education: Not on file     Highest education level: Not on file   Occupational History     Not on file   Tobacco Use     Smoking status: Never     Smokeless tobacco: Never   Vaping Use     Vaping status: Never Used   Substance and Sexual Activity     Alcohol use: Not Currently     Drug use: Not Currently     Sexual activity: Not on file   Other Topics Concern     Not on file   Social History Narrative     Not on file     Social Drivers of Health     Financial Resource Strain: Low Risk  (12/15/2024)    Financial Resource Strain      Within the past 12 months, have you or your family members you live with been unable to get utilities (heat, electricity) when it was really needed?: No   Food Insecurity: Low Risk  (12/15/2024)    Food Insecurity      Within the past 12 months, did you worry that your food would run out before you got money to buy more?: No      Within the past 12 months, did the food you bought just not last and you didn t have money to get more?: No   Transportation Needs: Low Risk  (12/15/2024)    Transportation Needs      Within the past 12 months, has lack of transportation kept you from medical appointments, getting your medicines, non-medical meetings or appointments, work, or from getting things that you need?: No   Physical Activity: Not on file   Stress: Not on file   Social Connections: Not on file   Interpersonal Safety: Low Risk  (12/15/2024)    Interpersonal Safety      Do you feel physically and emotionally safe where you  currently live?: Yes      Within the past 12 months, have you been hit, slapped, kicked or otherwise physically hurt by someone?: No      Within the past 12 months, have you been humiliated or emotionally abused in other ways by your partner or ex-partner?: No   Housing Stability: Low Risk  (12/15/2024)    Housing Stability      Do you have housing? : Yes      Are you worried about losing your housing?: No       MEDICATIONS:  Current Outpatient Medications   Medication Sig Dispense Refill     acetaminophen (TYLENOL) 500 MG tablet Take 1,000 mg by mouth 2 times daily.       amLODIPine (NORVASC) 10 MG tablet Take 10 mg by mouth daily.       carboxymethylcellulose PF (REFRESH PLUS) 0.5 % ophthalmic solution Place 2 drops into both eyes 2 times daily.       ferrous sulfate (FEROSUL) 325 (65 Fe) MG tablet Take 1 tablet (325 mg) by mouth daily (with breakfast). 30 tablet 0     levothyroxine (SYNTHROID/LEVOTHROID) 75 MCG tablet Take 75 mcg by mouth daily.       melatonin 3 MG tablet Take 1 tablet (3 mg) by mouth nightly as needed for sleep. 30 tablet 0     melatonin 3 MG tablet Take 3 mg by mouth at bedtime.       pantoprazole (PROTONIX) 40 MG EC tablet Take 1 tablet (40 mg) by mouth 2 times daily (before meals). 60 tablet 0     polyethylene glycol (MIRALAX) 17 GM/Dose powder Take 1 Capful by mouth daily as needed for constipation.       pravastatin (PRAVACHOL) 20 MG tablet Take 20 mg by mouth daily.       QUEtiapine (SEROQUEL) 25 MG tablet Take 0.5 tablets (12.5 mg) by mouth at bedtime. 30 tablet 0     senna-docusate (SENOKOT-S/PERICOLACE) 8.6-50 MG tablet Take 1 tablet by mouth 2 times daily as needed for constipation. 40 tablet 0     sulfamethoxazole-trimethoprim (BACTRIM DS) 800-160 MG tablet Take 1 tablet by mouth 2 times daily for 4 days. 8 tablet 0     tamsulosin (FLOMAX) 0.4 MG capsule Take 0.4 mg by mouth daily.       Vitamin D3 (VITAMIN D, CHOLECALCIFEROL,) 25 mcg (1000 units) tablet Take 25 mcg by mouth daily.    "       ALLERGIES:  No Known Allergies      PHYSICAL EXAM:  General: Patient is alert male, no distress.  Vitals: /76   Pulse 84   Temp 97.7  F (36.5  C)   Resp 16   Ht 1.676 m (5' 6\")   Wt 67.1 kg (148 lb)   SpO2 92%   BMI 23.89 kg/m    HEENT: Head is NCAT. Eyes show no injection or icterus. Nares negative. Oropharynx well hydrated.  Neck: Supple. No tenderness or adenopathy. No JVD.  Lungs: Clear bilaterally. No wheezes.  Cardiovascular: Regular rate and rhythm, normal S1, S2.  Back: No spinal or CVA tenderness.  Abdomen: Soft, no tenderness on exam. Bowel sounds present. No guarding rebound or rigidity.  : Deferred.  Extremities: Mild LE edema is noted.  Musculoskeletal: Degen changes.   Skin: Warm and dry.   Psych: Mood appears good.      LABS/DIAGNOSTIC DATA:  Component      Latest Ref Rng 12/21/2024  6:17 AM 12/22/2024  5:56 AM 12/23/2024  6:04 AM 12/24/2024  5:50 AM 12/25/2024  5:41 AM 12/26/2024  5:57 AM   Hemoglobin      13.3 - 17.7 g/dL 9.1 (L)  9.8 (L)  8.8 (L)  8.6 (L)  8.6 (L)  8.8 (L)         ASSESSMENT/PLAN:  GIB. Ulcerated gastric mass. Work up, treatment and intervention in the hospital. States bowel working normally now. No abdominal pain. Continue PPI. Follow up with GI outpatient.   Anemia. Sec to above. Presented with hgb of 6.8. Did receive transfusions. Hgb today at 9.4, up from last hospital hgb on 12/26/2024 of 8.8. No overt signs of bleeding. Labs reviewed, trend Hgb in TCU.   HTN. On amlodipine. Monitor Bps in TCU, adjust meds if needed.   Hypothyroidism. On replacement.   Weakness and deconditioning. Working with therapy.   Bacteremia. Followed by ID in the hospital, treated with abx. ID signed off.   Delirium. Appears back to baseline. Delirium in the hospital likely related to acute medical illness, sepsis.   Malnutrition. Appetite improving. Had TPN in the hospital due to prolonged illness. Dietician following in TCU.   Elevated troponin. Demand ischemia likely. Denies " chest pain. Follow up with cardiology post TCU stay if any concerns.   Prostatomegaly. Should see urology after TCU stay. Continue tamsulosin.  HLD. On statin.   Code status is DR/DNI.       Electronically signed by: Radha Mercer MD       Sincerely,        Radha Mercer MD    Electronically signed

## 2024-12-31 LAB
GAMMA INTERFERON BACKGROUND BLD IA-ACNC: 0.05 IU/ML
M TB IFN-G BLD-IMP: NEGATIVE
M TB IFN-G CD4+ BCKGRND COR BLD-ACNC: 9.95 IU/ML
MITOGEN IGNF BCKGRD COR BLD-ACNC: 0 IU/ML
MITOGEN IGNF BCKGRD COR BLD-ACNC: 0 IU/ML
QUANTIFERON MITOGEN: 10 IU/ML
QUANTIFERON NIL TUBE: 0.05 IU/ML
QUANTIFERON TB1 TUBE: 0.05 IU/ML
QUANTIFERON TB2 TUBE: 0.05

## 2025-01-01 NOTE — PROGRESS NOTES
[facility] GERIATRICS  [facility] Medical Record Number:  [id number]  Place of Service where encounter took place: [facility] ([facility]) [[id number]]  CODE STATUS:   DNR / DNI    Chief Complaint/Reason for Visit:  Chief Complaint   Patient presents with    Hospital F/U       HPI:    Clayton Pacheco is a 89 year old male with hx of hypertension, hyperlipidemia, and hypothyroid, who presented with acute abdominal pain and melena, noted with acute blood loss anemia due to acute upper GI bleeding and hemorrhage, due to bleeding ulcerated gastric mass. Initially presented to [facility] ED on 12/14/24 and transferred to [facility] for IR/GI/Surgery evaluation, admitted inpatient 12/15/24.    [facility]  Hospitalist Discharge Summary    Date of Admission:  12/15/2024  Date of Discharge:  12/27/2024      Hospital Course  Clayton Pacheco is a pleasant 89 year old gentleman with PMH of hypertension, hyperlipidemia, and hypothyroid, who presented with acute abdominal pain and melena, noted with acute blood loss anemia due to acute upper GI bleeding and hemorrhage, due to bleeding ulcerated gastric mass. Initially presented to [facility] ED on 12/14/24 and transferred to [facility] for IR/GI/Surgery evaluation, admitted inpatient 12/15/24.     Acute blood loss anemia due to upper GI bleeding from ulcerated gastric mass  S/p EGD 12/15 and 12/16 (clips placed):   on presentation, noted drop in Hb from 11.4 to 8.6. 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 Hemostatic gel and spray are applied applied, but this ulcerated mass continued to bleed. Clotted blood in the gastric body is also noted. 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 Clips ([employer]) were placed, injected and treated with argon plasma coagulation, hemostatic spry applied  - after discussion with family, plan for conservative management with no surgical intervention  - bleeding seems to have now resolved and Hb stable around 8-9; 8.8 on 12/26  - GI signed off 12/20; to follow up with DR Kim in 1-2 weeks after discharge  -Cont PPI BID     Sepsis, E. coli bacteremia  Deirium  on 12/19 was evaluated by RRT for acute respiratory failure with hypoxia, fever and lactic acidosis. blood cultures from 12/19 growing E. Coli; UA unremarkable. It was unclear source but likely from ulcerated mass. He has been afebrile since 12/20; WBC 15.7---> 13.3. It was initially started on Zosyn on 12/19 , switched to Rocephin 12/21. ID (signed off 12/24) rec transition of IV Rocephin to PO Bactrim for total 10 days duration; plan for 6 more days of PO Bactrim starting 12/25/24 (till 12/31). The respiratory status and mentation improved; off BiPAP; respiratory status stable on room air. Per previous provider, code status was changed to DNR/DNI (12/19) after discussion with family.     Moderate malnutrition  Hypernatremia, resolved  - was started on TPN with poor PO intake  - started PO on 12/22 and currently tolerating regular well,  - TPN discontinued on 12/25/24  - f/u BMP was reassuring     Elevated Troponins: Noting that he was hospitalized 3/2024 for sepsis and UTI, as discussed below, and Troponins were elevated at that time. Cardiology was consulted. Lexiscan showed no evidence of inducible ischemia. Suspect elevated Troponins  "12/14/24 to be due to demand ischemia due to acute illness. Discontinued telemetry ; no chest pain or sob. He was not a candidate for coronary intervention due to active bleeding, patient elects conservative management.  12/19/2024 at time of RRT troponin 53 however flat 45.  No chest pain.     Massive prostatomegaly. Seen incidentally on CT. Noting that he was hospitalized for cystitis due to E coli in 7/2023, and hospitalized for sepsis, UTI, and bacteremia due to E coli, resistant to Unasyn and Zosyn, treated with Cipro.     Hypertension: TTE in 7/2023 showed preserved LVEF with mild to moderate concentric LVH and mild aortic stenosis. Resumed  PTA amlodipine but discontinued PTA Avalide. BP reasonably controlled.PCP to monitor.     Hyperlipidemia:   -- PTA Pravastatin       Hypothyroid:   -- continue Synthroid        Overall stabilized and discharged to TCU on 12/27/2024 for PT, OT, nursing cares, medical management and monitoring.     Today:  States he feels pretty good overall given what he has been through. Notes he thinks he is not \"out of the woods\" yet, continuing to work with therapy. No dizziness. No signs of bleeding. Hgb today up to 9.4, did have ABLA from GIB and gastric ulceration, s/p transfusions. No SOB at rest, chest pain. Bowels working normally per his report. No urinary sx, has enlarged prostate per chart notes, should see urology. No fever or cough. No new vision or hearing concerns. Lives at home with wife.       REVIEW OF SYSTEMS:  All others negative other than those noted in HPI.      PAST MEDICAL HISTORY:  Past Medical History:   Diagnosis Date    Enlarged prostate     HTN (hypertension)     Hyperlipidemia     Hypothyroidism     Presbyopia     Vitamin D deficiency        PAST SURGICAL HISTORY:  Past Surgical History:   Procedure Laterality Date    EGD  12/15/2024    ESOPHAGOSCOPY, GASTROSCOPY, DUODENOSCOPY (EGD), COMBINED N/A 12/15/2024    Procedure: ESOPHAGOGASTRODUODENOSCOPY WITH " GASTRIC MASS BIOPSIES AND HEMOSTASIS;  Surgeon: Lobo Hernandez MD;  Location: [facility] Main OR       FAMILY HISTORY:  Family History   Problem Relation Age of Onset    Coronary Artery Disease Son        SOCIAL HISTORY:  Social History     Socioeconomic History    Marital status:      Spouse name: Not on file    Number of children: Not on file    Years of education: Not on file    Highest education level: Not on file   Occupational History    Not on file   Tobacco Use    Smoking status: Never    Smokeless tobacco: Never   Vaping Use    Vaping status: Never Used   Substance and Sexual Activity    Alcohol use: Not Currently    Drug use: Not Currently    Sexual activity: Not on file   Other Topics Concern    Not on file   Social History Narrative    Not on file     Social Drivers of Health     Financial Resource Strain: Low Risk  (12/15/2024)    Financial Resource Strain     Within the past 12 months, have you or your family members you live with been unable to get utilities (heat, electricity) when it was really needed?: No   Food Insecurity: Low Risk  (12/15/2024)    Food Insecurity     Within the past 12 months, did you worry that your food would run out before you got money to buy more?: No     Within the past 12 months, did the food you bought just not last and you didn t have money to get more?: No   Transportation Needs: Low Risk  (12/15/2024)    Transportation Needs     Within the past 12 months, has lack of transportation kept you from medical appointments, getting your medicines, non-medical meetings or appointments, work, or from getting things that you need?: No   Physical Activity: Not on file   Stress: Not on file   Social Connections: Not on file   Interpersonal Safety: Low Risk  (12/15/2024)    Interpersonal Safety     Do you feel physically and emotionally safe where you currently live?: Yes     Within the past 12 months, have you been hit, slapped, kicked or otherwise physically hurt by  someone?: No     Within the past 12 months, have you been humiliated or emotionally abused in other ways by your partner or ex-partner?: No   Housing Stability: Low Risk  (12/15/2024)    Housing Stability     Do you have housing? : Yes     Are you worried about losing your housing?: No       MEDICATIONS:  Current Outpatient Medications   Medication Sig Dispense Refill    acetaminophen (TYLENOL) 500 MG tablet Take 1,000 mg by mouth 2 times daily.      amLODIPine (NORVASC) 10 MG tablet Take 10 mg by mouth daily.      carboxymethylcellulose PF (REFRESH PLUS) 0.5 % ophthalmic solution Place 2 drops into both eyes 2 times daily.      ferrous sulfate (FEROSUL) 325 (65 Fe) MG tablet Take 1 tablet (325 mg) by mouth daily (with breakfast). 30 tablet 0    levothyroxine (SYNTHROID/LEVOTHROID) 75 MCG tablet Take 75 mcg by mouth daily.      melatonin 3 MG tablet Take 1 tablet (3 mg) by mouth nightly as needed for sleep. 30 tablet 0    melatonin 3 MG tablet Take 3 mg by mouth at bedtime.      pantoprazole (PROTONIX) 40 MG EC tablet Take 1 tablet (40 mg) by mouth 2 times daily (before meals). 60 tablet 0    polyethylene glycol (MIRALAX) 17 GM/Dose powder Take 1 Capful by mouth daily as needed for constipation.      pravastatin (PRAVACHOL) 20 MG tablet Take 20 mg by mouth daily.      QUEtiapine (SEROQUEL) 25 MG tablet Take 0.5 tablets (12.5 mg) by mouth at bedtime. 30 tablet 0    senna-docusate (SENOKOT-S/PERICOLACE) 8.6-50 MG tablet Take 1 tablet by mouth 2 times daily as needed for constipation. 40 tablet 0    sulfamethoxazole-trimethoprim (BACTRIM DS) 800-160 MG tablet Take 1 tablet by mouth 2 times daily for 4 days. 8 tablet 0    tamsulosin (FLOMAX) 0.4 MG capsule Take 0.4 mg by mouth daily.      Vitamin D3 (VITAMIN D, CHOLECALCIFEROL,) 25 mcg (1000 units) tablet Take 25 mcg by mouth daily.          ALLERGIES:  No Known Allergies      PHYSICAL EXAM:  General: Patient is alert male, no distress.  Vitals: /76   Pulse 84    "Temp 97.7  F (36.5  C)   Resp 16   Ht 1.676 m (5' 6\")   Wt 67.1 kg (148 lb)   SpO2 92%   BMI 23.89 kg/m    HEENT: Head is NCAT. Eyes show no injection or icterus. Nares negative. Oropharynx well hydrated.  Neck: Supple. No tenderness or adenopathy. No JVD.  Lungs: Clear bilaterally. No wheezes.  Cardiovascular: Regular rate and rhythm, normal S1, S2.  Back: No spinal or CVA tenderness.  Abdomen: Soft, no tenderness on exam. Bowel sounds present. No guarding rebound or rigidity.  : Deferred.  Extremities: Mild LE edema is noted.  Musculoskeletal: Degen changes.   Skin: Warm and dry.   Psych: Mood appears good.      LABS/DIAGNOSTIC DATA:  Component      Latest Ref Rng 12/21/2024  6:17 AM 12/22/2024  5:56 AM 12/23/2024  6:04 AM 12/24/2024  5:50 AM 12/25/2024  5:41 AM 12/26/2024  5:57 AM   Hemoglobin      13.3 - 17.7 g/dL 9.1 (L)  9.8 (L)  8.8 (L)  8.6 (L)  8.6 (L)  8.8 (L)         ASSESSMENT/PLAN:  GIB. Ulcerated gastric mass. Work up, treatment and intervention in the hospital. States bowel working normally now. No abdominal pain. Continue PPI. Follow up with GI outpatient.   Anemia. Sec to above. Presented with hgb of 6.8. Did receive transfusions. Hgb today at 9.4, up from last hospital hgb on 12/26/2024 of 8.8. No overt signs of bleeding. Labs reviewed, trend Hgb in TCU.   HTN. On amlodipine. Monitor Bps in TCU, adjust meds if needed.   Hypothyroidism. On replacement.   Weakness and deconditioning. Working with therapy.   Bacteremia. Followed by ID in the hospital, treated with abx. ID signed off.   Delirium. Appears back to baseline. Delirium in the hospital likely related to acute medical illness, sepsis.   Malnutrition. Appetite improving. Had TPN in the hospital due to prolonged illness. Dietician following in TCU.   Elevated troponin. Demand ischemia likely. Denies chest pain. Follow up with cardiology post TCU stay if any concerns.   Prostatomegaly. Should see urology after TCU stay. Continue " tamsulosin.  HLD. On statin.   Code status is DR/DNI.       Electronically signed by: Radha Mercer MD

## 2025-01-02 LAB
ANION GAP SERPL CALCULATED.3IONS-SCNC: 10 MMOL/L (ref 7–15)
BUN SERPL-MCNC: 18.4 MG/DL (ref 8–23)
CALCIUM SERPL-MCNC: 8.5 MG/DL (ref 8.8–10.4)
CHLORIDE SERPL-SCNC: 106 MMOL/L (ref 98–107)
CREAT SERPL-MCNC: 0.94 MG/DL (ref 0.67–1.17)
EGFRCR SERPLBLD CKD-EPI 2021: 75 ML/MIN/1.73M2
GLUCOSE SERPL-MCNC: 92 MG/DL (ref 70–99)
HCO3 SERPL-SCNC: 21 MMOL/L (ref 22–29)
HGB BLD-MCNC: 9.9 G/DL (ref 13.3–17.7)
POTASSIUM SERPL-SCNC: 4.3 MMOL/L (ref 3.4–5.3)
SODIUM SERPL-SCNC: 137 MMOL/L (ref 135–145)

## 2025-01-02 PROCEDURE — 36415 COLL VENOUS BLD VENIPUNCTURE: CPT | Mod: ORL | Performed by: FAMILY MEDICINE

## 2025-01-02 PROCEDURE — P9603 ONE-WAY ALLOW PRORATED MILES: HCPCS | Mod: ORL | Performed by: FAMILY MEDICINE

## 2025-01-02 PROCEDURE — 85018 HEMOGLOBIN: CPT | Mod: ORL | Performed by: FAMILY MEDICINE

## 2025-01-02 PROCEDURE — 80048 BASIC METABOLIC PNL TOTAL CA: CPT | Mod: ORL | Performed by: FAMILY MEDICINE

## 2025-01-04 ENCOUNTER — LAB REQUISITION (OUTPATIENT)
Dept: LAB | Facility: CLINIC | Age: OVER 89
End: 2025-01-04
Payer: COMMERCIAL

## 2025-01-04 DIAGNOSIS — K92.2 GASTROINTESTINAL HEMORRHAGE, UNSPECIFIED: ICD-10-CM

## 2025-01-06 ENCOUNTER — DISCHARGE SUMMARY NURSING HOME (OUTPATIENT)
Dept: GERIATRICS | Facility: CLINIC | Age: OVER 89
End: 2025-01-06
Payer: COMMERCIAL

## 2025-01-06 DIAGNOSIS — E03.9 HYPOTHYROIDISM, UNSPECIFIED TYPE: ICD-10-CM

## 2025-01-06 DIAGNOSIS — R41.0 DELIRIUM: ICD-10-CM

## 2025-01-06 DIAGNOSIS — D62 ABLA (ACUTE BLOOD LOSS ANEMIA): ICD-10-CM

## 2025-01-06 DIAGNOSIS — I10 ESSENTIAL HYPERTENSION: ICD-10-CM

## 2025-01-06 DIAGNOSIS — R78.81 BACTEREMIA: ICD-10-CM

## 2025-01-06 DIAGNOSIS — K92.2 GASTROINTESTINAL HEMORRHAGE, UNSPECIFIED GASTROINTESTINAL HEMORRHAGE TYPE: Primary | ICD-10-CM

## 2025-01-06 LAB — HGB BLD-MCNC: 9.9 G/DL (ref 13.3–17.7)

## 2025-01-06 PROCEDURE — 36415 COLL VENOUS BLD VENIPUNCTURE: CPT | Mod: ORL | Performed by: FAMILY MEDICINE

## 2025-01-06 PROCEDURE — 99316 NF DSCHRG MGMT 30 MIN+: CPT | Performed by: FAMILY MEDICINE

## 2025-01-06 PROCEDURE — 85018 HEMOGLOBIN: CPT | Mod: ORL | Performed by: FAMILY MEDICINE

## 2025-01-06 NOTE — LETTER
1/6/2025      Clayton Pacheco  [address]  [place] [address]        [facility]  [facility] Medical Record Number:  [id number]  Place of Service where encounter took place[facility] ([facility]) [[id number]]  CODE STATUS:   DNR / DNI    Chief Complaint/Reason for Visit:  Chief Complaint   Patient presents with     Discharge Summary     [facility] 12/27/2024-1/9/2025 (Anticipated discharge).       TCU HPI:    Clayton Pacheco is a 89 year old male with hx of hypertension, hyperlipidemia, and hypothyroid, who presented with acute abdominal pain and melena, noted with acute blood loss anemia due to acute upper GI bleeding and hemorrhage, due to bleeding ulcerated gastric mass. Initially presented to [facility] ED on 12/14/24 and transferred to [facility] for IR/GI/Surgery evaluation, admitted inpatient 12/15/24.    [facility]  Hospitalist Discharge Summary    Date of Admission:  12/15/2024  Date of Discharge:  12/27/2024      Hospital Course  Clayton Pacheco is a pleasant 89 year old gentleman with PMH of hypertension, hyperlipidemia, and hypothyroid, who presented with acute abdominal pain and melena, noted with acute blood loss anemia due to acute upper GI bleeding and hemorrhage, due to bleeding ulcerated gastric mass. Initially presented to [facility] ED on 12/14/24 and transferred to [facility] for IR/GI/Surgery evaluation, admitted inpatient 12/15/24.     Acute blood loss anemia due to upper GI bleeding from ulcerated gastric mass  S/p EGD 12/15 and 12/16 (clips placed):   on presentation, noted drop in Hb from 11.4 to 8.6. 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 Hemostatic gel and spray are applied applied, but this ulcerated mass continued  to bleed. Clotted blood in the gastric body is also noted. 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 Clips ([employer]) were placed, injected and treated with argon plasma coagulation, hemostatic spry applied  - after discussion with family, plan for conservative management with no surgical intervention  - bleeding seems to have now resolved and Hb stable around 8-9; 8.8 on 12/26  - GI signed off 12/20; to follow up with DR Kim in 1-2 weeks after discharge  -Cont PPI BID     Sepsis, E. coli bacteremia  Deirium  on 12/19 was evaluated by RRT for acute respiratory failure with hypoxia, fever and lactic acidosis. blood cultures from 12/19 growing E. Coli; UA unremarkable. It was unclear source but likely from ulcerated mass. He has been afebrile since 12/20; WBC 15.7---> 13.3. It was initially started on Zosyn on 12/19 , switched to Rocephin 12/21. ID (signed off 12/24) rec transition of IV Rocephin to PO Bactrim for total 10 days duration; plan for 6 more days of PO Bactrim starting 12/25/24 (till 12/31). The respiratory status and mentation improved; off BiPAP; respiratory status stable on room air. Per previous provider, code status was changed to DNR/DNI (12/19) after discussion with family.     Moderate malnutrition  Hypernatremia, resolved  - was started on TPN with poor PO intake  - started PO on 12/22 and currently tolerating regular well,  - TPN discontinued on 12/25/24  - f/u BMP was reassuring     Elevated Troponins: Noting that he was hospitalized 3/2024 for sepsis and UTI, as  discussed below, and Troponins were elevated at that time. Cardiology was consulted. Lexiscan showed no evidence of inducible ischemia. Suspect elevated Troponins 12/14/24 to be due to demand ischemia due to acute illness. Discontinued telemetry ; no chest pain or sob. He was not a candidate for coronary intervention due to active bleeding, patient elects conservative management.  12/19/2024 at time of RRT troponin 53 however flat 45.  No chest pain.     Massive prostatomegaly. Seen incidentally on CT. Noting that he was hospitalized for cystitis due to E coli in 7/2023, and hospitalized for sepsis, UTI, and bacteremia due to E coli, resistant to Unasyn and Zosyn, treated with Cipro.     Hypertension: TTE in 7/2023 showed preserved LVEF with mild to moderate concentric LVH and mild aortic stenosis. Resumed  PTA amlodipine but discontinued PTA Avalide. BP reasonably controlled.PCP to monitor.     Hyperlipidemia:   -- PTA Pravastatin       Hypothyroid:   -- continue Synthroid        Overall stabilized and discharged to TCU on 12/27/2024 for PT, OT, nursing cares, medical management and monitoring.     TCU Course:  He has been doing pretty well, slow steady progression with therapies. Medical status and conditions were monitored as warranted while he participated in therapy and received nursing care and assistance in TCU. Labs done today, hgb at 9.9, same as 1/2/2025 and up from 9.4 on 12/30/2024. He denies dizziness, SOB at rest. Ambulating with therapy. Will be set up for follow up appt with GI. No evidence of bleeding. He denies abdominal pain. Appetite has improved. No urinary sx. No cough or fever. He is planning to discharge home from TCU on Thursday 1/9/2025 with home services.      PAST MEDICAL HISTORY:  Past Medical History:   Diagnosis Date     Enlarged prostate      HTN (hypertension)      Hyperlipidemia      Hypothyroidism      Presbyopia      Vitamin D deficiency        DISCHARGE MEDICATIONS:  Current  "Outpatient Medications   Medication Sig Dispense Refill     acetaminophen (TYLENOL) 500 MG tablet Take 1,000 mg by mouth 2 times daily.       amLODIPine (NORVASC) 10 MG tablet Take 10 mg by mouth daily.       carboxymethylcellulose PF (REFRESH PLUS) 0.5 % ophthalmic solution Place 2 drops into both eyes 2 times daily.       ferrous sulfate (FEROSUL) 325 (65 Fe) MG tablet Take 1 tablet (325 mg) by mouth daily (with breakfast). 30 tablet 0     levothyroxine (SYNTHROID/LEVOTHROID) 75 MCG tablet Take 75 mcg by mouth daily.       melatonin 3 MG tablet Take 1 tablet (3 mg) by mouth nightly as needed for sleep. 30 tablet 0     melatonin 3 MG tablet Take 3 mg by mouth at bedtime.       pantoprazole (PROTONIX) 40 MG EC tablet Take 1 tablet (40 mg) by mouth 2 times daily (before meals). 60 tablet 0     polyethylene glycol (MIRALAX) 17 GM/Dose powder Take 1 Capful by mouth daily as needed for constipation.       pravastatin (PRAVACHOL) 20 MG tablet Take 20 mg by mouth daily.       QUEtiapine (SEROQUEL) 25 MG tablet Take 0.5 tablets (12.5 mg) by mouth at bedtime. 30 tablet 0     senna-docusate (SENOKOT-S/PERICOLACE) 8.6-50 MG tablet Take 1 tablet by mouth 2 times daily as needed for constipation. 40 tablet 0     tamsulosin (FLOMAX) 0.4 MG capsule Take 0.4 mg by mouth daily.       Vitamin D3 (VITAMIN D, CHOLECALCIFEROL,) 25 mcg (1000 units) tablet Take 25 mcg by mouth daily.          PHYSICAL EXAM:  General: Patient is alert male, no distress.  Vitals: /67   Pulse 70   Temp 97.7  F (36.5  C)   Resp 18   Ht 1.676 m (5' 6\")   Wt 66.7 kg (147 lb)   SpO2 92%   BMI 23.73 kg/m    HEENT: Head is NCAT. Eyes show no injection or icterus. Nares negative. Oropharynx well hydrated.  Neck: No JVD.  Lungs: Non labored respirations.   Abdomen: Soft, non tender.  : Deferred.  Extremities: Mild LE edema.  Musculoskeletal: Degen changes.   Skin: Warm and dry.   Psych: Mood is good.      LABS/DIAGNOSTIC DATA:  Component      Latest " Ref Rng 12/21/2024  6:17 AM 12/22/2024  5:56 AM 12/23/2024  6:04 AM 12/24/2024  5:50 AM 12/25/2024  5:41 AM 12/26/2024  5:57 AM   Hemoglobin      13.3 - 17.7 g/dL 9.1 (L)  9.8 (L)  8.8 (L)  8.6 (L)  8.6 (L)  8.8 (L)       Component      Latest Ref Rng 12/26/2024  5:57 AM 12/30/2024  5:16 AM 1/2/2025  6:21 AM 1/6/2025  7:12 AM   Hemoglobin      13.3 - 17.7 g/dL 8.8 (L)  9.4 (L)  9.9 (L)  9.9 (L)          DISCHARGE DIAGNOSES:  GIB. Ulcerated gastric mass. Work up, treatment and intervention in the hospital. Follow up with GI outpatient. Continue PPI.   Anemia. Sec to above. Presented with hgb of 6.8. Did receive transfusions in the hospital. Labs done today, hgb at 9.9, same as 1/2/2025 and up from 9.4 on 12/30/2024. Continue iron.  HTN. On amlodipine.   Hypothyroidism. On replacement.   Bacteremia. Treated with abx in the hospital and followed by ID.   Delirium. Resolved.   Elevated troponin. Demand ischemia likely.   Prostatomegaly. Follow up with urology. Continue tamsulosin.  HLD. On statin.   Disposition. He is planning to discharge home from TCU on Thursday 1/9/2025 with home therapies and support from family.      DISCHARGE PLAN/FACE TO FACE:  I certify that services are/were furnished while this patient was under the care of a physician and that a physician or an allowed non-physician practitioner (NPP), had a face-to-face encounter that meets the physician face-to-face encounter requirements. The encounter was in whole, or in part, related to the primary reason for home health. The patient is confined to his/her home and needs intermittent skilled nursing, physical therapy, speech-language pathology, or the continued need for occupational therapy. A plan of care has been established by a physician and is periodically reviewed by a physician.  Date of Face-to-Face Encounter: 1/6/2025.    I certify that, based on my findings, the following services are medically necessary home health services: PT, OT.    My  clinical findings support the need for the above skilled services because: Needs additional therapy as he returns home for gait, stamina and strengthening.     This patient is homebound because: Too strenuous to leave the home.    The patient is, or has been, under my care and I have initiated the establishment of the plan of care. This patient will be followed by a physician who will periodically review the plan of care.         Total time greater than 30 minutes discharge coordination.      Electronically signed by: Radha Mercer MD       Sincerely,        Radha Mercer MD    Electronically signed

## 2025-01-07 ENCOUNTER — LAB REQUISITION (OUTPATIENT)
Dept: LAB | Facility: CLINIC | Age: OVER 89
End: 2025-01-07
Payer: MEDICARE

## 2025-01-07 VITALS
SYSTOLIC BLOOD PRESSURE: 131 MMHG | HEART RATE: 70 BPM | BODY MASS INDEX: 23.63 KG/M2 | DIASTOLIC BLOOD PRESSURE: 67 MMHG | HEIGHT: 66 IN | WEIGHT: 147 LBS | RESPIRATION RATE: 18 BRPM | TEMPERATURE: 97.7 F | OXYGEN SATURATION: 92 %

## 2025-01-07 DIAGNOSIS — G93.41 METABOLIC ENCEPHALOPATHY: ICD-10-CM

## 2025-01-07 DIAGNOSIS — D62 ACUTE POSTHEMORRHAGIC ANEMIA: ICD-10-CM

## 2025-01-07 NOTE — PROGRESS NOTES
[facility] GERIATRICS  [place] Medical Record Number:  [id number]  Place of Service where encounter took place[facility] ([facility]) [[id number]]  CODE STATUS:   DNR / DNI    Chief Complaint/Reason for Visit:  Chief Complaint   Patient presents with    Discharge Summary     [facility] 12/27/2024-1/9/2025 (Anticipated discharge).       TCU HPI:    Clayton Pacheco is a 89 year old male with hx of hypertension, hyperlipidemia, and hypothyroid, who presented with acute abdominal pain and melena, noted with acute blood loss anemia due to acute upper GI bleeding and hemorrhage, due to bleeding ulcerated gastric mass. Initially presented to [facility] ED on 12/14/24 and transferred to [facility] for IR/GI/Surgery evaluation, admitted inpatient 12/15/24.    [facility]  Hospitalist Discharge Summary    Date of Admission:  12/15/2024  Date of Discharge:  12/27/2024      Hospital Course  Clayton Pacheco is a pleasant 89 year old gentleman with PMH of hypertension, hyperlipidemia, and hypothyroid, who presented with acute abdominal pain and melena, noted with acute blood loss anemia due to acute upper GI bleeding and hemorrhage, due to bleeding ulcerated gastric mass. Initially presented to [facility] ED on 12/14/24 and transferred to [facility] for IR/GI/Surgery evaluation, admitted inpatient 12/15/24.     Acute blood loss anemia due to upper GI bleeding from ulcerated gastric mass  S/p EGD 12/15 and 12/16 (clips placed):   on presentation, noted drop in Hb from 11.4 to 8.6. 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 Hemostatic gel and spray are applied applied, but this ulcerated mass continued to bleed. Clotted blood in the gastric body is also noted. Path from 12/15 only shows  gastric fundic mucosa with ulceration and reactive changes, negative for dysplasia and malignancy (sampling suboptimal)     - at  [facility] Hb 6.8 on arrival; s/p 2 units PRBC; IV Protonix BID--switched to PO  - on 12/16 repeat CTA did not show any active bleeding and IR deferred intervention  - repeat EGD 12/16 (DR Kim) noted clotted blood in the cardia, in the gastric fundus and in the gastric body; Gastric tumor in the gastric fundus. Clips ([employer]) were placed, injected and treated with argon plasma coagulation, hemostatic spry applied  - after discussion with family, plan for conservative management with no surgical intervention  - bleeding seems to have now resolved and Hb stable around 8-9; 8.8 on 12/26  - GI signed off 12/20; to follow up with DR Kim in 1-2 weeks after discharge  -Cont PPI BID     Sepsis, E. coli bacteremia  Deirium  on 12/19 was evaluated by RRT for acute respiratory failure with hypoxia, fever and lactic acidosis. blood cultures from 12/19 growing E. Coli; UA unremarkable. It was unclear source but likely from ulcerated mass. He has been afebrile since 12/20; WBC 15.7---> 13.3. It was initially started on Zosyn on 12/19 , switched to Rocephin 12/21. ID (signed off 12/24) rec transition of IV Rocephin to PO Bactrim for total 10 days duration; plan for 6 more days of PO Bactrim starting 12/25/24 (till 12/31). The respiratory status and mentation improved; off BiPAP; respiratory status stable on room air. Per previous provider, code status was changed to DNR/DNI (12/19) after discussion with family.     Moderate malnutrition  Hypernatremia, resolved  - was started on TPN with poor PO intake  - started PO on 12/22 and currently tolerating regular well,  - TPN discontinued on 12/25/24  - f/u BMP was reassuring     Elevated Troponins: Noting that he was hospitalized 3/2024 for sepsis and UTI, as discussed below, and Troponins were elevated at that time. Cardiology was consulted.  Lexiscan showed no evidence of inducible ischemia. Suspect elevated Troponins 12/14/24 to be due to demand ischemia due to acute illness. Discontinued telemetry ; no chest pain or sob. He was not a candidate for coronary intervention due to active bleeding, patient elects conservative management.  12/19/2024 at time of RRT troponin 53 however flat 45.  No chest pain.     Massive prostatomegaly. Seen incidentally on CT. Noting that he was hospitalized for cystitis due to E coli in 7/2023, and hospitalized for sepsis, UTI, and bacteremia due to E coli, resistant to Unasyn and Zosyn, treated with Cipro.     Hypertension: TTE in 7/2023 showed preserved LVEF with mild to moderate concentric LVH and mild aortic stenosis. Resumed  PTA amlodipine but discontinued PTA Avalide. BP reasonably controlled.PCP to monitor.     Hyperlipidemia:   -- PTA Pravastatin       Hypothyroid:   -- continue Synthroid        Overall stabilized and discharged to TCU on 12/27/2024 for PT, OT, nursing cares, medical management and monitoring.     TCU Course:  He has been doing pretty well, slow steady progression with therapies. Medical status and conditions were monitored as warranted while he participated in therapy and received nursing care and assistance in TCU. Labs done today, hgb at 9.9, same as 1/2/2025 and up from 9.4 on 12/30/2024. He denies dizziness, SOB at rest. Ambulating with therapy. Will be set up for follow up appt with GI. No evidence of bleeding. He denies abdominal pain. Appetite has improved. No urinary sx. No cough or fever. He is planning to discharge home from TCU on Thursday 1/9/2025 with home services.      PAST MEDICAL HISTORY:  Past Medical History:   Diagnosis Date    Enlarged prostate     HTN (hypertension)     Hyperlipidemia     Hypothyroidism     Presbyopia     Vitamin D deficiency        DISCHARGE MEDICATIONS:  Current Outpatient Medications   Medication Sig Dispense Refill    acetaminophen (TYLENOL) 500 MG  "tablet Take 1,000 mg by mouth 2 times daily.      amLODIPine (NORVASC) 10 MG tablet Take 10 mg by mouth daily.      carboxymethylcellulose PF (REFRESH PLUS) 0.5 % ophthalmic solution Place 2 drops into both eyes 2 times daily.      ferrous sulfate (FEROSUL) 325 (65 Fe) MG tablet Take 1 tablet (325 mg) by mouth daily (with breakfast). 30 tablet 0    levothyroxine (SYNTHROID/LEVOTHROID) 75 MCG tablet Take 75 mcg by mouth daily.      melatonin 3 MG tablet Take 1 tablet (3 mg) by mouth nightly as needed for sleep. 30 tablet 0    melatonin 3 MG tablet Take 3 mg by mouth at bedtime.      pantoprazole (PROTONIX) 40 MG EC tablet Take 1 tablet (40 mg) by mouth 2 times daily (before meals). 60 tablet 0    polyethylene glycol (MIRALAX) 17 GM/Dose powder Take 1 Capful by mouth daily as needed for constipation.      pravastatin (PRAVACHOL) 20 MG tablet Take 20 mg by mouth daily.      QUEtiapine (SEROQUEL) 25 MG tablet Take 0.5 tablets (12.5 mg) by mouth at bedtime. 30 tablet 0    senna-docusate (SENOKOT-S/PERICOLACE) 8.6-50 MG tablet Take 1 tablet by mouth 2 times daily as needed for constipation. 40 tablet 0    tamsulosin (FLOMAX) 0.4 MG capsule Take 0.4 mg by mouth daily.      Vitamin D3 (VITAMIN D, CHOLECALCIFEROL,) 25 mcg (1000 units) tablet Take 25 mcg by mouth daily.          PHYSICAL EXAM:  General: Patient is alert male, no distress.  Vitals: /67   Pulse 70   Temp 97.7  F (36.5  C)   Resp 18   Ht 1.676 m (5' 6\")   Wt 66.7 kg (147 lb)   SpO2 92%   BMI 23.73 kg/m    HEENT: Head is NCAT. Eyes show no injection or icterus. Nares negative. Oropharynx well hydrated.  Neck: No JVD.  Lungs: Non labored respirations.   Abdomen: Soft, non tender.  : Deferred.  Extremities: Mild LE edema.  Musculoskeletal: Degen changes.   Skin: Warm and dry.   Psych: Mood is good.      LABS/DIAGNOSTIC DATA:  Component      Latest Ref Rng 12/21/2024  6:17 AM 12/22/2024  5:56 AM 12/23/2024  6:04 AM 12/24/2024  5:50 AM 12/25/2024  5:41 " AM 12/26/2024  5:57 AM   Hemoglobin      13.3 - 17.7 g/dL 9.1 (L)  9.8 (L)  8.8 (L)  8.6 (L)  8.6 (L)  8.8 (L)       Component      Latest Ref Rng 12/26/2024  5:57 AM 12/30/2024  5:16 AM 1/2/2025  6:21 AM 1/6/2025  7:12 AM   Hemoglobin      13.3 - 17.7 g/dL 8.8 (L)  9.4 (L)  9.9 (L)  9.9 (L)          DISCHARGE DIAGNOSES:  GIB. Ulcerated gastric mass. Work up, treatment and intervention in the hospital. Follow up with GI outpatient. Continue PPI.   Anemia. Sec to above. Presented with hgb of 6.8. Did receive transfusions in the hospital. Labs done today, hgb at 9.9, same as 1/2/2025 and up from 9.4 on 12/30/2024. Continue iron.  HTN. On amlodipine.   Hypothyroidism. On replacement.   Bacteremia. Treated with abx in the hospital and followed by ID.   Delirium. Resolved.   Elevated troponin. Demand ischemia likely.   Prostatomegaly. Follow up with urology. Continue tamsulosin.  HLD. On statin.   Disposition. He is planning to discharge home from TCU on Thursday 1/9/2025 with home therapies and support from family.      DISCHARGE PLAN/FACE TO FACE:  I certify that services are/were furnished while this patient was under the care of a physician and that a physician or an allowed non-physician practitioner (NPP), had a face-to-face encounter that meets the physician face-to-face encounter requirements. The encounter was in whole, or in part, related to the primary reason for home health. The patient is confined to his/her home and needs intermittent skilled nursing, physical therapy, speech-language pathology, or the continued need for occupational therapy. A plan of care has been established by a physician and is periodically reviewed by a physician.  Date of Face-to-Face Encounter: 1/6/2025.    I certify that, based on my findings, the following services are medically necessary home health services: PT, OT.    My clinical findings support the need for the above skilled services because: Needs additional therapy as he  returns home for gait, stamina and strengthening.     This patient is homebound because: Too strenuous to leave the home.    The patient is, or has been, under my care and I have initiated the establishment of the plan of care. This patient will be followed by a physician who will periodically review the plan of care.         Total time greater than 30 minutes discharge coordination.      Electronically signed by: Radha Mercer MD

## 2025-01-09 LAB
ANION GAP SERPL CALCULATED.3IONS-SCNC: 8 MMOL/L (ref 7–15)
BUN SERPL-MCNC: 16.1 MG/DL (ref 8–23)
CALCIUM SERPL-MCNC: 8.7 MG/DL (ref 8.8–10.4)
CHLORIDE SERPL-SCNC: 107 MMOL/L (ref 98–107)
CREAT SERPL-MCNC: 0.85 MG/DL (ref 0.67–1.17)
EGFRCR SERPLBLD CKD-EPI 2021: 80 ML/MIN/1.73M2
GLUCOSE SERPL-MCNC: 98 MG/DL (ref 70–99)
HCO3 SERPL-SCNC: 25 MMOL/L (ref 22–29)
HGB BLD-MCNC: 10 G/DL (ref 13.3–17.7)
POTASSIUM SERPL-SCNC: 4.2 MMOL/L (ref 3.4–5.3)
SODIUM SERPL-SCNC: 140 MMOL/L (ref 135–145)

## 2025-01-09 PROCEDURE — P9604 ONE-WAY ALLOW PRORATED TRIP: HCPCS | Mod: ORL | Performed by: FAMILY MEDICINE

## 2025-01-09 PROCEDURE — 85018 HEMOGLOBIN: CPT | Mod: ORL | Performed by: FAMILY MEDICINE

## 2025-01-09 PROCEDURE — 80048 BASIC METABOLIC PNL TOTAL CA: CPT | Mod: ORL | Performed by: FAMILY MEDICINE

## 2025-01-09 PROCEDURE — 36415 COLL VENOUS BLD VENIPUNCTURE: CPT | Mod: ORL | Performed by: FAMILY MEDICINE

## 2025-01-15 ENCOUNTER — LAB REQUISITION (OUTPATIENT)
Dept: LAB | Facility: CLINIC | Age: OVER 89
End: 2025-01-15
Payer: MEDICARE

## 2025-01-15 DIAGNOSIS — E78.2 MIXED HYPERLIPIDEMIA: ICD-10-CM

## 2025-01-15 DIAGNOSIS — E03.9 HYPOTHYROIDISM, UNSPECIFIED: ICD-10-CM

## 2025-01-15 DIAGNOSIS — I10 ESSENTIAL (PRIMARY) HYPERTENSION: ICD-10-CM

## 2025-01-15 DIAGNOSIS — Z87.19 PERSONAL HISTORY OF OTHER DISEASES OF THE DIGESTIVE SYSTEM: ICD-10-CM

## 2025-01-15 LAB
BASOPHILS # BLD AUTO: 0 10E3/UL (ref 0–0.2)
BASOPHILS NFR BLD AUTO: 1 %
EOSINOPHIL # BLD AUTO: 0.2 10E3/UL (ref 0–0.7)
EOSINOPHIL NFR BLD AUTO: 3 %
ERYTHROCYTE [DISTWIDTH] IN BLOOD BY AUTOMATED COUNT: 17.6 % (ref 10–15)
HCT VFR BLD AUTO: 32.3 % (ref 40–53)
HGB BLD-MCNC: 10.1 G/DL (ref 13.3–17.7)
IMM GRANULOCYTES # BLD: 0 10E3/UL
IMM GRANULOCYTES NFR BLD: 0 %
LYMPHOCYTES # BLD AUTO: 1.3 10E3/UL (ref 0.8–5.3)
LYMPHOCYTES NFR BLD AUTO: 17 %
MCH RBC QN AUTO: 30.1 PG (ref 26.5–33)
MCHC RBC AUTO-ENTMCNC: 31.3 G/DL (ref 31.5–36.5)
MCV RBC AUTO: 96 FL (ref 78–100)
MONOCYTES # BLD AUTO: 0.9 10E3/UL (ref 0–1.3)
MONOCYTES NFR BLD AUTO: 12 %
NEUTROPHILS # BLD AUTO: 4.9 10E3/UL (ref 1.6–8.3)
NEUTROPHILS NFR BLD AUTO: 67 %
NRBC # BLD AUTO: 0 10E3/UL
NRBC BLD AUTO-RTO: 0 /100
PLATELET # BLD AUTO: 230 10E3/UL (ref 150–450)
RBC # BLD AUTO: 3.36 10E6/UL (ref 4.4–5.9)
WBC # BLD AUTO: 7.4 10E3/UL (ref 4–11)

## 2025-01-15 PROCEDURE — 80061 LIPID PANEL: CPT | Mod: ORL | Performed by: FAMILY MEDICINE

## 2025-01-15 PROCEDURE — 84443 ASSAY THYROID STIM HORMONE: CPT | Mod: ORL | Performed by: FAMILY MEDICINE

## 2025-01-15 PROCEDURE — 85025 COMPLETE CBC W/AUTO DIFF WBC: CPT | Mod: ORL | Performed by: FAMILY MEDICINE

## 2025-01-15 PROCEDURE — 80053 COMPREHEN METABOLIC PANEL: CPT | Mod: ORL | Performed by: FAMILY MEDICINE

## 2025-01-16 LAB
ALBUMIN SERPL BCG-MCNC: 3.6 G/DL (ref 3.5–5.2)
ALP SERPL-CCNC: 95 U/L (ref 40–150)
ALT SERPL W P-5'-P-CCNC: 11 U/L (ref 0–70)
ANION GAP SERPL CALCULATED.3IONS-SCNC: 12 MMOL/L (ref 7–15)
AST SERPL W P-5'-P-CCNC: 16 U/L (ref 0–45)
BILIRUB SERPL-MCNC: 0.3 MG/DL
BUN SERPL-MCNC: 18.7 MG/DL (ref 8–23)
CALCIUM SERPL-MCNC: 9 MG/DL (ref 8.8–10.4)
CHLORIDE SERPL-SCNC: 103 MMOL/L (ref 98–107)
CHOLEST SERPL-MCNC: 174 MG/DL
CREAT SERPL-MCNC: 0.91 MG/DL (ref 0.67–1.17)
EGFRCR SERPLBLD CKD-EPI 2021: 78 ML/MIN/1.73M2
FASTING STATUS PATIENT QL REPORTED: ABNORMAL
FASTING STATUS PATIENT QL REPORTED: ABNORMAL
GLUCOSE SERPL-MCNC: 160 MG/DL (ref 70–99)
HCO3 SERPL-SCNC: 22 MMOL/L (ref 22–29)
HDLC SERPL-MCNC: 59 MG/DL
LDLC SERPL CALC-MCNC: 78 MG/DL
NONHDLC SERPL-MCNC: 115 MG/DL
POTASSIUM SERPL-SCNC: 3.5 MMOL/L (ref 3.4–5.3)
PROT SERPL-MCNC: 5.9 G/DL (ref 6.4–8.3)
SODIUM SERPL-SCNC: 137 MMOL/L (ref 135–145)
TRIGL SERPL-MCNC: 185 MG/DL
TSH SERPL DL<=0.005 MIU/L-ACNC: 3.25 UIU/ML (ref 0.3–4.2)

## 2025-01-20 ENCOUNTER — PATIENT OUTREACH (OUTPATIENT)
Dept: SURGERY | Facility: CLINIC | Age: OVER 89
End: 2025-01-20
Payer: MEDICARE

## 2025-01-20 DIAGNOSIS — K31.89 GASTRIC MASS: Primary | ICD-10-CM

## 2025-01-20 NOTE — PROGRESS NOTES
New Patient Oncology Nurse Navigator Note     Referring provider: Dr. Santana    Referring Clinic/Organization: [facility]      Referred to: Surgical Oncology -  Hepatobiliary / GI Cancers     Requested provider (if applicable): Dr. Dante Coleman    Referral Received: 01/20/25       Evaluation for :  Gastric mass      Clinical History (per Nurse review of records provided):      See book marked documents:     Referring MD office note  Imaging reports   Procedure report       No Known Allergies     Past Medical History:   Diagnosis Date    Enlarged prostate     HTN (hypertension)     Hyperlipidemia     Hypothyroidism     Presbyopia     Vitamin D deficiency        Past Surgical History:   Procedure Laterality Date    EGD  12/15/2024    ESOPHAGOSCOPY, GASTROSCOPY, DUODENOSCOPY (EGD), COMBINED N/A 12/15/2024    Procedure: ESOPHAGOGASTRODUODENOSCOPY WITH GASTRIC MASS BIOPSIES AND HEMOSTASIS;  Surgeon: Lobo Hernandez MD;  Location: [facility] OR       Current Outpatient Medications   Medication Sig Dispense Refill    acetaminophen (TYLENOL) 500 MG tablet Take 1,000 mg by mouth 2 times daily.      amLODIPine (NORVASC) 10 MG tablet Take 10 mg by mouth daily.      carboxymethylcellulose PF (REFRESH PLUS) 0.5 % ophthalmic solution Place 2 drops into both eyes 2 times daily.      ferrous sulfate (FEROSUL) 325 (65 Fe) MG tablet Take 1 tablet (325 mg) by mouth daily (with breakfast). 30 tablet 0    levothyroxine (SYNTHROID/LEVOTHROID) 75 MCG tablet Take 75 mcg by mouth daily.      melatonin 3 MG tablet Take 1 tablet (3 mg) by mouth nightly as needed for sleep. 30 tablet 0    melatonin 3 MG tablet Take 3 mg by mouth at bedtime.      pantoprazole (PROTONIX) 40 MG EC tablet Take 1 tablet (40 mg) by mouth 2 times daily (before meals). 60 tablet 0    polyethylene glycol (MIRALAX) 17 GM/Dose powder Take 1 Capful by mouth daily as needed for constipation.      pravastatin (PRAVACHOL) 20 MG tablet Take 20 mg by mouth  daily.      QUEtiapine (SEROQUEL) 25 MG tablet Take 0.5 tablets (12.5 mg) by mouth at bedtime. 30 tablet 0    senna-docusate (SENOKOT-S/PERICOLACE) 8.6-50 MG tablet Take 1 tablet by mouth 2 times daily as needed for constipation. 40 tablet 0    tamsulosin (FLOMAX) 0.4 MG capsule Take 0.4 mg by mouth daily.      Vitamin D3 (VITAMIN D, CHOLECALCIFEROL,) 25 mcg (1000 units) tablet Take 25 mcg by mouth daily.          Patient Active Problem List   Diagnosis    Acute cystitis without hematuria    Metabolic encephalopathy    Primary hypertension    Other hyperlipidemia    Other specified hypothyroidism    Fall    Hypomagnesemia    ELVIN (acute kidney injury)    Foreign body (FB) in soft tissue    Urinary tract infection without hematuria, site unspecified    Presbyopia    Sensorineural hearing loss, bilateral    Sensorineural hearing loss (SNHL) of both ears    Atelectasis    Hypoxia    Fever, unspecified fever cause    Urinary tract infection with hematuria, site unspecified    Other fatigue    Sepsis, due to unspecified organism, unspecified whether acute organ dysfunction present (H)    Melena    Anemia due to blood loss, acute    UGIB (upper gastrointestinal bleed)         Clinical Assessment / Barriers to Care (Per Nurse):    None at this time.     Records Location:     [facility]     Records Needed:     [facility] NOTES       Additional testing needed prior to consult:     NONE AT THIS TIME    Referral updates and Plan:       Consult with Surgical Oncology     01/20/2025 12:38 PM - called and spoke with patient, he was aware of referral but at this time declined scheduling. He reports that at this time he and his family are discussing how they would like to proceed. Provided patient with my direct line should they decide they would nain to proceed with consult and informed him we would be happy to schedule. He was appreciative of the call and will contact us if he decides he would like a consult with Dr. Coleman.      Updated referring provider with patients decision.     Mimi Chavez, RN, BSN   Surgical Oncology New Patient Nurse Navigator  [facility]  [phone]        Elena Bradshaw(Attending)

## 2025-02-05 RX ORDER — TAMSULOSIN HYDROCHLORIDE 0.4 MG/1
0.4 CAPSULE ORAL DAILY
Qty: 30 CAPSULE | OUTPATIENT
Start: 2025-02-05

## 2025-02-05 RX ORDER — LEVOTHYROXINE SODIUM 75 UG/1
75 TABLET ORAL DAILY
Qty: 30 TABLET | OUTPATIENT
Start: 2025-02-05

## (undated) DEVICE — CATH ESCP 220CM 7FR HEMOSPRAY HMST 2.8MM

## (undated) DEVICE — SOL WATER IRRIG 1000ML BOTTLE 2F7114

## (undated) DEVICE — FORCEP BIOPSY 2.3MM DISP COATED 000388

## (undated) DEVICE — SUCTION MANIFOLD NEPTUNE 2 SYS 1 PORT 702-025-000

## (undated) DEVICE — TUBING SUCTION MEDI-VAC 1/4"X20' N620A

## (undated) RX ORDER — FENTANYL CITRATE 50 UG/ML
INJECTION, SOLUTION INTRAMUSCULAR; INTRAVENOUS
Status: DISPENSED
Start: 2024-12-16

## (undated) RX ORDER — EPINEPHRINE 0.1 MG/ML
INJECTION INTRAVENOUS
Status: DISPENSED
Start: 2024-12-16

## (undated) RX ORDER — PROPOFOL 10 MG/ML
INJECTION, EMULSION INTRAVENOUS
Status: DISPENSED
Start: 2024-12-15